# Patient Record
Sex: FEMALE | Race: BLACK OR AFRICAN AMERICAN | NOT HISPANIC OR LATINO
[De-identification: names, ages, dates, MRNs, and addresses within clinical notes are randomized per-mention and may not be internally consistent; named-entity substitution may affect disease eponyms.]

---

## 2017-06-30 ENCOUNTER — APPOINTMENT (OUTPATIENT)
Dept: ORTHOPEDIC SURGERY | Facility: CLINIC | Age: 27
End: 2017-06-30

## 2018-03-09 ENCOUNTER — APPOINTMENT (OUTPATIENT)
Dept: INTERNAL MEDICINE | Facility: CLINIC | Age: 28
End: 2018-03-09
Payer: MEDICAID

## 2018-03-09 VITALS
WEIGHT: 162.03 LBS | RESPIRATION RATE: 14 BRPM | HEIGHT: 63.78 IN | HEART RATE: 70 BPM | DIASTOLIC BLOOD PRESSURE: 90 MMHG | OXYGEN SATURATION: 98 % | BODY MASS INDEX: 28.01 KG/M2 | TEMPERATURE: 98.2 F | SYSTOLIC BLOOD PRESSURE: 110 MMHG

## 2018-03-09 DIAGNOSIS — Z86.69 PERSONAL HISTORY OF OTHER DISEASES OF THE NERVOUS SYSTEM AND SENSE ORGANS: ICD-10-CM

## 2018-03-09 DIAGNOSIS — M87.059 IDIOPATHIC ASEPTIC NECROSIS OF UNSPECIFIED FEMUR: ICD-10-CM

## 2018-03-09 DIAGNOSIS — Z83.3 FAMILY HISTORY OF DIABETES MELLITUS: ICD-10-CM

## 2018-03-09 DIAGNOSIS — M25.561 PAIN IN RIGHT KNEE: ICD-10-CM

## 2018-03-09 DIAGNOSIS — Q68.2 CONGENITAL DEFORMITY OF KNEE: ICD-10-CM

## 2018-03-09 DIAGNOSIS — M25.362 OTHER INSTABILITY, RIGHT KNEE: ICD-10-CM

## 2018-03-09 DIAGNOSIS — M25.361 OTHER INSTABILITY, RIGHT KNEE: ICD-10-CM

## 2018-03-09 DIAGNOSIS — Z83.2 FAMILY HISTORY OF DISEASES OF THE BLOOD AND BLOOD-FORMING ORGANS AND CERTAIN DISORDERS INVOLVING THE IMMUNE MECHANISM: ICD-10-CM

## 2018-03-09 PROCEDURE — 99204 OFFICE O/P NEW MOD 45 MIN: CPT | Mod: 25

## 2018-03-09 PROCEDURE — 36415 COLL VENOUS BLD VENIPUNCTURE: CPT

## 2018-03-09 RX ORDER — MULTIVIT-MIN/FOLIC/VIT K/LYCOP 400-300MCG
25 MCG TABLET ORAL
Refills: 0 | Status: ACTIVE | COMMUNITY

## 2018-03-09 RX ORDER — CHLORHEXIDINE GLUCONATE 4 %
1000 LIQUID (ML) TOPICAL
Refills: 0 | Status: ACTIVE | COMMUNITY

## 2018-03-09 RX ORDER — MULTIVITAMIN
CAPSULE ORAL
Refills: 0 | Status: ACTIVE | COMMUNITY

## 2018-03-13 LAB
25(OH)D3 SERPL-MCNC: 47.2 NG/ML
ALBUMIN SERPL ELPH-MCNC: 4.8 G/DL
ALP BLD-CCNC: 107 U/L
ALT SERPL-CCNC: 89 U/L
ANION GAP SERPL CALC-SCNC: 13 MMOL/L
AST SERPL-CCNC: 44 U/L
BASOPHILS # BLD AUTO: 0.03 K/UL
BASOPHILS NFR BLD AUTO: 0.4 %
BILIRUB SERPL-MCNC: 0.4 MG/DL
BUN SERPL-MCNC: 18 MG/DL
C TRACH RRNA SPEC QL NAA+PROBE: NOT DETECTED
CALCIUM SERPL-MCNC: 10 MG/DL
CHLORIDE SERPL-SCNC: 102 MMOL/L
CHOLEST SERPL-MCNC: 238 MG/DL
CHOLEST/HDLC SERPL: 5.7 RATIO
CO2 SERPL-SCNC: 25 MMOL/L
CREAT SERPL-MCNC: 0.66 MG/DL
EOSINOPHIL # BLD AUTO: 0.11 K/UL
EOSINOPHIL NFR BLD AUTO: 1.5 %
GLUCOSE SERPL-MCNC: 123 MG/DL
HBA1C MFR BLD HPLC: 5.8 %
HBV CORE IGG+IGM SER QL: NONREACTIVE
HBV SURFACE AB SER QL: NONREACTIVE
HBV SURFACE AG SER QL: NONREACTIVE
HCT VFR BLD CALC: 39.4 %
HCV AB SER QL: NONREACTIVE
HCV S/CO RATIO: 0.11 S/CO
HDLC SERPL-MCNC: 42 MG/DL
HGB BLD-MCNC: 13.1 G/DL
HIV1+2 AB SPEC QL IA.RAPID: NONREACTIVE
IMM GRANULOCYTES NFR BLD AUTO: 0.3 %
LDLC SERPL CALC-MCNC: 160 MG/DL
LYMPHOCYTES # BLD AUTO: 3.74 K/UL
LYMPHOCYTES NFR BLD AUTO: 51.1 %
MAN DIFF?: NORMAL
MCHC RBC-ENTMCNC: 28.6 PG
MCHC RBC-ENTMCNC: 33.2 GM/DL
MCV RBC AUTO: 86 FL
MONOCYTES # BLD AUTO: 0.63 K/UL
MONOCYTES NFR BLD AUTO: 8.6 %
N GONORRHOEA RRNA SPEC QL NAA+PROBE: NOT DETECTED
NEUTROPHILS # BLD AUTO: 2.79 K/UL
NEUTROPHILS NFR BLD AUTO: 38.1 %
PLATELET # BLD AUTO: 273 K/UL
POTASSIUM SERPL-SCNC: 4.8 MMOL/L
PROT SERPL-MCNC: 7.4 G/DL
RBC # BLD: 4.58 M/UL
RBC # FLD: 13.8 %
SODIUM SERPL-SCNC: 140 MMOL/L
SOURCE AMPLIFICATION: NORMAL
T PALLIDUM AB SER QL IA: NEGATIVE
TRIGL SERPL-MCNC: 182 MG/DL
TSH SERPL-ACNC: 2.45 UIU/ML
WBC # FLD AUTO: 7.32 K/UL

## 2018-03-14 ENCOUNTER — APPOINTMENT (OUTPATIENT)
Dept: ORTHOPEDIC SURGERY | Facility: CLINIC | Age: 28
End: 2018-03-14
Payer: MEDICAID

## 2018-03-14 PROCEDURE — 99214 OFFICE O/P EST MOD 30 MIN: CPT

## 2018-03-14 PROCEDURE — 73564 X-RAY EXAM KNEE 4 OR MORE: CPT | Mod: 50

## 2018-03-20 ENCOUNTER — APPOINTMENT (OUTPATIENT)
Dept: ALLERGY | Facility: CLINIC | Age: 28
End: 2018-03-20
Payer: MEDICAID

## 2018-03-20 VITALS — WEIGHT: 160 LBS | HEIGHT: 63 IN | BODY MASS INDEX: 28.35 KG/M2

## 2018-03-20 VITALS — SYSTOLIC BLOOD PRESSURE: 110 MMHG | HEART RATE: 72 BPM | RESPIRATION RATE: 14 BRPM | DIASTOLIC BLOOD PRESSURE: 80 MMHG

## 2018-03-20 PROCEDURE — 99204 OFFICE O/P NEW MOD 45 MIN: CPT | Mod: 25

## 2018-03-20 PROCEDURE — 99070 SPECIAL SUPPLIES PHYS/QHP: CPT

## 2018-03-20 PROCEDURE — 95004 PERQ TESTS W/ALRGNC XTRCS: CPT

## 2018-03-20 PROCEDURE — 95018 ALL TSTG PERQ&IQ DRUGS/BIOL: CPT

## 2018-03-29 ENCOUNTER — APPOINTMENT (OUTPATIENT)
Dept: ALLERGY | Facility: CLINIC | Age: 28
End: 2018-03-29
Payer: MEDICAID

## 2018-03-29 PROCEDURE — 95018 ALL TSTG PERQ&IQ DRUGS/BIOL: CPT

## 2018-03-29 PROCEDURE — 95024 IQ TESTS W/ALLERGENIC XTRCS: CPT

## 2018-04-01 ENCOUNTER — INPATIENT (INPATIENT)
Facility: HOSPITAL | Age: 28
LOS: 16 days | Discharge: NURSING, REHAB OR LONG-TERM CARE | End: 2018-04-18
Attending: INTERNAL MEDICINE | Admitting: INTERNAL MEDICINE
Payer: COMMERCIAL

## 2018-04-01 VITALS
SYSTOLIC BLOOD PRESSURE: 121 MMHG | RESPIRATION RATE: 17 BRPM | OXYGEN SATURATION: 97 % | DIASTOLIC BLOOD PRESSURE: 65 MMHG | TEMPERATURE: 100 F | HEART RATE: 125 BPM

## 2018-04-01 DIAGNOSIS — Z29.9 ENCOUNTER FOR PROPHYLACTIC MEASURES, UNSPECIFIED: ICD-10-CM

## 2018-04-01 DIAGNOSIS — Z96.60 PRESENCE OF UNSPECIFIED ORTHOPEDIC JOINT IMPLANT: Chronic | ICD-10-CM

## 2018-04-01 DIAGNOSIS — A41.9 SEPSIS, UNSPECIFIED ORGANISM: ICD-10-CM

## 2018-04-01 DIAGNOSIS — D57.1 SICKLE-CELL DISEASE WITHOUT CRISIS: ICD-10-CM

## 2018-04-01 LAB
ALBUMIN SERPL ELPH-MCNC: 3.4 G/DL — SIGNIFICANT CHANGE UP (ref 3.3–5)
ALBUMIN SERPL ELPH-MCNC: 3.4 G/DL — SIGNIFICANT CHANGE UP (ref 3.3–5)
ALP SERPL-CCNC: 127 U/L — HIGH (ref 40–120)
ALP SERPL-CCNC: 127 U/L — HIGH (ref 40–120)
ALT FLD-CCNC: 172 U/L — HIGH (ref 12–78)
ALT FLD-CCNC: 172 U/L — HIGH (ref 12–78)
ANION GAP SERPL CALC-SCNC: 9 MMOL/L — SIGNIFICANT CHANGE UP (ref 5–17)
APPEARANCE UR: CLEAR — SIGNIFICANT CHANGE UP
AST SERPL-CCNC: 85 U/L — HIGH (ref 15–37)
AST SERPL-CCNC: 85 U/L — HIGH (ref 15–37)
BACTERIA # UR AUTO: ABNORMAL
BILIRUB DIRECT SERPL-MCNC: 0.39 MG/DL — HIGH (ref 0.05–0.2)
BILIRUB INDIRECT FLD-MCNC: 0.7 MG/DL — SIGNIFICANT CHANGE UP (ref 0.2–1)
BILIRUB SERPL-MCNC: 1.1 MG/DL — SIGNIFICANT CHANGE UP (ref 0.2–1.2)
BILIRUB SERPL-MCNC: 1.1 MG/DL — SIGNIFICANT CHANGE UP (ref 0.2–1.2)
BILIRUB UR-MCNC: NEGATIVE — SIGNIFICANT CHANGE UP
BLD GP AB SCN SERPL QL: SIGNIFICANT CHANGE UP
BUN SERPL-MCNC: 13 MG/DL — SIGNIFICANT CHANGE UP (ref 7–23)
CALCIUM SERPL-MCNC: 8.9 MG/DL — SIGNIFICANT CHANGE UP (ref 8.5–10.1)
CHLORIDE SERPL-SCNC: 105 MMOL/L — SIGNIFICANT CHANGE UP (ref 96–108)
CK SERPL-CCNC: 76 U/L — SIGNIFICANT CHANGE UP (ref 26–192)
CO2 SERPL-SCNC: 28 MMOL/L — SIGNIFICANT CHANGE UP (ref 22–31)
COLOR SPEC: YELLOW — SIGNIFICANT CHANGE UP
CREAT SERPL-MCNC: 0.72 MG/DL — SIGNIFICANT CHANGE UP (ref 0.5–1.3)
CRP SERPL-MCNC: 17.6 MG/DL — HIGH (ref 0–0.4)
DIFF PNL FLD: NEGATIVE — SIGNIFICANT CHANGE UP
EPI CELLS # UR: SIGNIFICANT CHANGE UP
ERYTHROCYTE [SEDIMENTATION RATE] IN BLOOD: 36 MM/HR — HIGH (ref 0–15)
GLUCOSE SERPL-MCNC: 141 MG/DL — HIGH (ref 70–99)
GLUCOSE UR QL: NEGATIVE MG/DL — SIGNIFICANT CHANGE UP
HCG SERPL-ACNC: <1 MIU/ML — SIGNIFICANT CHANGE UP
HCT VFR BLD CALC: 35.6 % — SIGNIFICANT CHANGE UP (ref 34.5–45)
HGB BLD-MCNC: 11.6 G/DL — SIGNIFICANT CHANGE UP (ref 11.5–15.5)
KETONES UR-MCNC: NEGATIVE — SIGNIFICANT CHANGE UP
LACTATE SERPL-SCNC: 1.8 MMOL/L — SIGNIFICANT CHANGE UP (ref 0.7–2)
LEUKOCYTE ESTERASE UR-ACNC: ABNORMAL
MAGNESIUM SERPL-MCNC: 2 MG/DL — SIGNIFICANT CHANGE UP (ref 1.6–2.6)
MCHC RBC-ENTMCNC: 27.7 PG — SIGNIFICANT CHANGE UP (ref 27–34)
MCHC RBC-ENTMCNC: 32.6 GM/DL — SIGNIFICANT CHANGE UP (ref 32–36)
MCV RBC AUTO: 85 FL — SIGNIFICANT CHANGE UP (ref 80–100)
NITRITE UR-MCNC: NEGATIVE — SIGNIFICANT CHANGE UP
PH UR: 5 — SIGNIFICANT CHANGE UP (ref 5–8)
PLATELET # BLD AUTO: 224 K/UL — SIGNIFICANT CHANGE UP (ref 150–400)
POTASSIUM SERPL-MCNC: 4.1 MMOL/L — SIGNIFICANT CHANGE UP (ref 3.5–5.3)
POTASSIUM SERPL-SCNC: 4.1 MMOL/L — SIGNIFICANT CHANGE UP (ref 3.5–5.3)
PROT SERPL-MCNC: 7.2 GM/DL — SIGNIFICANT CHANGE UP (ref 6–8.3)
PROT SERPL-MCNC: 7.2 GM/DL — SIGNIFICANT CHANGE UP (ref 6–8.3)
PROT UR-MCNC: 30 MG/DL
RBC # BLD: 4.19 M/UL — SIGNIFICANT CHANGE UP (ref 3.8–5.2)
RBC # BLD: 4.19 M/UL — SIGNIFICANT CHANGE UP (ref 3.8–5.2)
RBC # FLD: 13.7 % — SIGNIFICANT CHANGE UP (ref 10.3–14.5)
RBC CASTS # UR COMP ASSIST: SIGNIFICANT CHANGE UP /HPF (ref 0–4)
RETICS #: 65.4 K/UL — SIGNIFICANT CHANGE UP (ref 25–125)
RETICS/RBC NFR: 1.6 % — SIGNIFICANT CHANGE UP (ref 0.5–2.5)
SODIUM SERPL-SCNC: 142 MMOL/L — SIGNIFICANT CHANGE UP (ref 135–145)
SP GR SPEC: 1.01 — SIGNIFICANT CHANGE UP (ref 1.01–1.02)
UROBILINOGEN FLD QL: NEGATIVE MG/DL — SIGNIFICANT CHANGE UP
WBC # BLD: 10.86 K/UL — HIGH (ref 3.8–10.5)
WBC # FLD AUTO: 10.86 K/UL — HIGH (ref 3.8–10.5)
WBC UR QL: SIGNIFICANT CHANGE UP

## 2018-04-01 PROCEDURE — 93971 EXTREMITY STUDY: CPT | Mod: 26,RT

## 2018-04-01 PROCEDURE — 72110 X-RAY EXAM L-2 SPINE 4/>VWS: CPT | Mod: 26

## 2018-04-01 PROCEDURE — 72132 CT LUMBAR SPINE W/DYE: CPT | Mod: 26

## 2018-04-01 PROCEDURE — 74177 CT ABD & PELVIS W/CONTRAST: CPT | Mod: 26

## 2018-04-01 PROCEDURE — 71275 CT ANGIOGRAPHY CHEST: CPT | Mod: 26

## 2018-04-01 PROCEDURE — 99291 CRITICAL CARE FIRST HOUR: CPT

## 2018-04-01 PROCEDURE — 99223 1ST HOSP IP/OBS HIGH 75: CPT

## 2018-04-01 PROCEDURE — 93010 ELECTROCARDIOGRAM REPORT: CPT

## 2018-04-01 PROCEDURE — 73522 X-RAY EXAM HIPS BI 3-4 VIEWS: CPT | Mod: 26,LT

## 2018-04-01 RX ORDER — SODIUM CHLORIDE 9 MG/ML
1000 INJECTION INTRAMUSCULAR; INTRAVENOUS; SUBCUTANEOUS ONCE
Qty: 0 | Refills: 0 | Status: COMPLETED | OUTPATIENT
Start: 2018-04-01 | End: 2018-04-01

## 2018-04-01 RX ORDER — SODIUM CHLORIDE 9 MG/ML
1000 INJECTION INTRAMUSCULAR; INTRAVENOUS; SUBCUTANEOUS
Qty: 0 | Refills: 0 | Status: DISCONTINUED | OUTPATIENT
Start: 2018-04-01 | End: 2018-04-12

## 2018-04-01 RX ORDER — HYDROMORPHONE HYDROCHLORIDE 2 MG/ML
1 INJECTION INTRAMUSCULAR; INTRAVENOUS; SUBCUTANEOUS ONCE
Qty: 0 | Refills: 0 | Status: DISCONTINUED | OUTPATIENT
Start: 2018-04-01 | End: 2018-04-01

## 2018-04-01 RX ORDER — ACETAMINOPHEN 500 MG
650 TABLET ORAL EVERY 6 HOURS
Qty: 0 | Refills: 0 | Status: DISCONTINUED | OUTPATIENT
Start: 2018-04-01 | End: 2018-04-12

## 2018-04-01 RX ORDER — DEXAMETHASONE 0.5 MG/5ML
10 ELIXIR ORAL ONCE
Qty: 0 | Refills: 0 | Status: COMPLETED | OUTPATIENT
Start: 2018-04-01 | End: 2018-04-01

## 2018-04-01 RX ORDER — VANCOMYCIN HCL 1 G
1000 VIAL (EA) INTRAVENOUS EVERY 12 HOURS
Qty: 0 | Refills: 0 | Status: DISCONTINUED | OUTPATIENT
Start: 2018-04-02 | End: 2018-04-04

## 2018-04-01 RX ORDER — KETOROLAC TROMETHAMINE 30 MG/ML
30 SYRINGE (ML) INJECTION ONCE
Qty: 0 | Refills: 0 | Status: DISCONTINUED | OUTPATIENT
Start: 2018-04-01 | End: 2018-04-01

## 2018-04-01 RX ORDER — HYDROMORPHONE HYDROCHLORIDE 2 MG/ML
1 INJECTION INTRAMUSCULAR; INTRAVENOUS; SUBCUTANEOUS EVERY 4 HOURS
Qty: 0 | Refills: 0 | Status: DISCONTINUED | OUTPATIENT
Start: 2018-04-01 | End: 2018-04-06

## 2018-04-01 RX ORDER — HEPARIN SODIUM 5000 [USP'U]/ML
5000 INJECTION INTRAVENOUS; SUBCUTANEOUS EVERY 8 HOURS
Qty: 0 | Refills: 0 | Status: DISCONTINUED | OUTPATIENT
Start: 2018-04-01 | End: 2018-04-07

## 2018-04-01 RX ORDER — CEFTRIAXONE 500 MG/1
1 INJECTION, POWDER, FOR SOLUTION INTRAMUSCULAR; INTRAVENOUS ONCE
Qty: 0 | Refills: 0 | Status: COMPLETED | OUTPATIENT
Start: 2018-04-01 | End: 2018-04-01

## 2018-04-01 RX ORDER — HEPARIN SODIUM 5000 [USP'U]/ML
5000 INJECTION INTRAVENOUS; SUBCUTANEOUS EVERY 8 HOURS
Qty: 0 | Refills: 0 | Status: DISCONTINUED | OUTPATIENT
Start: 2018-04-01 | End: 2018-04-01

## 2018-04-01 RX ORDER — VANCOMYCIN HCL 1 G
1000 VIAL (EA) INTRAVENOUS ONCE
Qty: 0 | Refills: 0 | Status: COMPLETED | OUTPATIENT
Start: 2018-04-01 | End: 2018-04-01

## 2018-04-01 RX ORDER — ACETAMINOPHEN 500 MG
975 TABLET ORAL ONCE
Qty: 0 | Refills: 0 | Status: COMPLETED | OUTPATIENT
Start: 2018-04-01 | End: 2018-04-01

## 2018-04-01 RX ORDER — OXYCODONE AND ACETAMINOPHEN 5; 325 MG/1; MG/1
1 TABLET ORAL EVERY 4 HOURS
Qty: 0 | Refills: 0 | Status: DISCONTINUED | OUTPATIENT
Start: 2018-04-01 | End: 2018-04-08

## 2018-04-01 RX ADMIN — SODIUM CHLORIDE 1000 MILLILITER(S): 9 INJECTION INTRAMUSCULAR; INTRAVENOUS; SUBCUTANEOUS at 18:57

## 2018-04-01 RX ADMIN — Medication 30 MILLIGRAM(S): at 14:38

## 2018-04-01 RX ADMIN — Medication 500 MILLIGRAM(S): at 18:56

## 2018-04-01 RX ADMIN — HYDROMORPHONE HYDROCHLORIDE 1 MILLIGRAM(S): 2 INJECTION INTRAMUSCULAR; INTRAVENOUS; SUBCUTANEOUS at 14:42

## 2018-04-01 RX ADMIN — SODIUM CHLORIDE 1000 MILLILITER(S): 9 INJECTION INTRAMUSCULAR; INTRAVENOUS; SUBCUTANEOUS at 16:02

## 2018-04-01 RX ADMIN — Medication 30 MILLIGRAM(S): at 15:16

## 2018-04-01 RX ADMIN — HYDROMORPHONE HYDROCHLORIDE 1 MILLIGRAM(S): 2 INJECTION INTRAMUSCULAR; INTRAVENOUS; SUBCUTANEOUS at 19:05

## 2018-04-01 RX ADMIN — Medication 250 MILLIGRAM(S): at 21:41

## 2018-04-01 RX ADMIN — Medication 975 MILLIGRAM(S): at 16:01

## 2018-04-01 RX ADMIN — Medication 500 MILLIGRAM(S): at 19:25

## 2018-04-01 RX ADMIN — CEFTRIAXONE 100 GRAM(S): 500 INJECTION, POWDER, FOR SOLUTION INTRAMUSCULAR; INTRAVENOUS at 16:01

## 2018-04-01 RX ADMIN — HYDROMORPHONE HYDROCHLORIDE 1 MILLIGRAM(S): 2 INJECTION INTRAMUSCULAR; INTRAVENOUS; SUBCUTANEOUS at 20:24

## 2018-04-01 RX ADMIN — HYDROMORPHONE HYDROCHLORIDE 1 MILLIGRAM(S): 2 INJECTION INTRAMUSCULAR; INTRAVENOUS; SUBCUTANEOUS at 15:16

## 2018-04-01 RX ADMIN — SODIUM CHLORIDE 100 MILLILITER(S): 9 INJECTION INTRAMUSCULAR; INTRAVENOUS; SUBCUTANEOUS at 21:41

## 2018-04-01 RX ADMIN — Medication 102 MILLIGRAM(S): at 20:27

## 2018-04-01 NOTE — ED ADULT NURSE REASSESSMENT NOTE - NS ED NURSE REASSESS COMMENT FT1
Md MCGARRY and Kobi  at bedside, patient reports feeling the same, meds given , family at bedside. report given to Jesse CRANDALL

## 2018-04-01 NOTE — H&P ADULT - PROBLEM SELECTOR PLAN 1
Given 1 dose of Vanco and rocephin in ED, continue Vanco for now  R/o OM  ID consult   MRI b/l hips (confirm with radiology hardware is MRI compatible)  IVF NS@ 100 ml/hr  Pain control with Tylenol/Percocet/Morphine PRN  Orthopedics consult appreciated

## 2018-04-01 NOTE — ED ADULT TRIAGE NOTE - CHIEF COMPLAINT QUOTE
hx sickle cell,  went out last night and woke up and couldn't move her legs,   bone marrow transplant 2 years ago, hx brain surgery

## 2018-04-01 NOTE — ED PROVIDER NOTE - MEDICAL DECISION MAKING DETAILS
patient pw hip pain in the context of sickle cell disease with bl hip replacements. will need to rule out sickle crisis patient pw hip pain in the context of sickle cell disease with bl hip replacements. will need to rule out sickle crisis vs fracture/dislocation. labs not consistent with sickle crisis. ddx also includes sciatica and dvt. there is no evidence of  dvt on sono. xrays of the hip are normal. given the tachycardia and fever that have developed, patient is septic or has developed a pe. if it is a pe, will need to obtain cta chest and the thigh pain was likely a dvt. if it is sepsis, the source is likely to be something lumbar, causing sciatica like pain. will obtain ct lumbar as well. cultures obtained, patient given antibiotics, and 3L bolused in. patient pw hip pain in the context of sickle cell disease with bl hip replacements. will need to rule out sickle crisis vs fracture/dislocation. labs not consistent with sickle crisis. ddx also includes sciatica and dvt. there is no evidence of  dvt on sono. xrays of the hip are normal. given the tachycardia and fever that have developed, patient is septic or has developed a pe. if it is a pe, will need to obtain cta chest and the thigh pain was likely a dvt. if it is sepsis, the source is likely to be something lumbar, causing sciatica like pain. will obtain ct lumbar as well. cultures obtained, patient given antibiotics, and 3L bolused in. notably, ekg I read as sinus tach in the 130s with right axis deviation.

## 2018-04-01 NOTE — ED PROVIDER NOTE - OBJECTIVE STATEMENT
Pertinent PMH/PSH/FHx/SHx and Review of Systems contained within:  27F hx of sickle cell sp bone marrow transplant pw right hip pain radiating to the knee and milder left hip pain. no trauma, nausea, vomiting, fever, chills. patient notes that pain is so severe she cant walk. no numbness or tingling or weakness as far as the patient can tell. there is no other cp, sob, weakness, fatigue, nausea, vomiting.   Fh and Sh not otherwise contributory  ROS otherwise negative Pertinent PMH/PSH/FHx/SHx and Review of Systems contained within:  27F hx of sickle cell sp complicated by intracranial hemorrhage as a child and bl hip arthroplasty s/p bone marrow transplant in 2011 pw right hip pain radiating to the knee and milder left hip pain. no trauma, nausea, vomiting, fever, chills. patient notes that pain is so severe she cant walk. no numbness or tingling or weakness as far as the patient can tell. there is no other cp, sob, weakness, fatigue, nausea, vomiting.   Fh and Sh not otherwise contributory  ROS otherwise negative

## 2018-04-01 NOTE — H&P ADULT - NSHPPHYSICALEXAM_GEN_ALL_CORE
GENERAL: Distress due to pain, well-groomed, well-developed  HEAD:  Atraumatic, Normocephalic  EYES: EOMI, PERRLA, conjunctiva and sclera clear  ENMT: No tonsillar erythema, exudates, or enlargement; Moist mucous membranes, No lesions  NECK: Supple, No JVD, Normal thyroid  NERVOUS SYSTEM:  Alert & Oriented X3, Good concentration  CHEST/LUNG: Clear to ascultation bilaterally; No rales, rhonchi, wheezing, or rubs  HEART: Regular rate and rhythm; No murmurs, rubs, or gallops  ABDOMEN: Soft, Nontender, Nondistended; Bowel sounds present  EXTREMITIES: Decreased ROM at R hip due to pain  SKIN: no rashes or lesions   PSYCH: normal affect and behavior

## 2018-04-01 NOTE — H&P ADULT - HISTORY OF PRESENT ILLNESS
In progress, admitted for OM evaluation and treatment. 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  She was at a dinner and tried to get up from her table when she suddenly had sharp shooting pain down from her right hip to her right knee.  She denies recent injury, new medications (not on any), dysuria, abdominal pain, Back pain, numbness, tingling, loss of sensation.  She does report fever and chills since pain started.    In the ED, CT imaging unremarkable, patient febrile and tachycardic, WBC mildly elevated, retic normal, ESR and CRP elevated.  Given 1 dose of Vanco and Rocephin in ED.

## 2018-04-01 NOTE — H&P ADULT - NSHPREVIEWOFSYSTEMS_GEN_ALL_CORE
No photophobia/eye pain/changes in vision,  No chest pain/palpitations, no SOB/cough/wheeze/stridor, No abdominal pain, No N/V/D, no dysuria/frequency/discharge, No neck/back pain, no rash, no changes in neurological status/function.

## 2018-04-01 NOTE — H&P ADULT - ASSESSMENT
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  Patient will require admission for at least 2 midnights for further evaluation and management of possibly OM hip as detailed below:    IMPROVE VTE Individual Risk Assessment          RISK                                                          Points    [  ] Previous VTE                                                3    [ x ] Thrombophilia                                             2    [x  ] Lower limb paralysis                                    2        (unable to hold up >15 seconds)      [  ] Current Cancer                                             2         (within 6 months)    [ x ] Immobilization > 24 hrs                              1    [  ] ICU/CCU stay > 24 hours                            1    [  ] Age > 60                                                    1    IMPROVE VTE Score _____5____

## 2018-04-01 NOTE — CONSULT NOTE ADULT - SUBJECTIVE AND OBJECTIVE BOX
27yFemale c/o right hip pain radiating to the knee for 2 weeks. Patient states it is now causing her to be unable to walk. No complaints of any back pain. Patient denies any hx of trauma, fever, or chills, but was found to be febrile to 101.3 in ED. Patient denies any changes in urinary or bowel control. Patient has hx of sickle cell disease with bilateral hip replacements done by Dr. Burns 8 years ago.    PMH:  Blindness, legal  Sickle cell disease    PSH:  H/O bilateral hip replacements ([facility], 2010)    AH: NKDA    Meds: See med rec    T(C): 37.2 (04-01-18 @ 21:56)  HR: 118 (04-01-18 @ 21:56)  BP: 106/66 (04-01-18 @ 21:56)  RR: 17 (04-01-18 @ 19:39)  SpO2: 97% (04-01-18 @ 19:39)  Wt(kg): --    PE Spine:  No TTP over spinous processes, SILT C5-T1 & L2-S1, C5-C8 5/5 BL, L3-S1 5/5 on L, Unwilling to move right hip during physical exam 2/2 pain, however sat up during exam painlessly with full BL hip flexion, L4-S1 5/5 on R, Able to SLR, +Distal pulses, (-) soliz, (-) clonus, (-) SLR, (-) babinski    Secondary:  No TTP over bony landmarks, SILT BL, ROM intact BL, distal pulses palpable.    Imaging:  CT demonstrating no hip joint effusion, bony changes consistent with sickle cell disease

## 2018-04-01 NOTE — H&P ADULT - NSHPLABSRESULTS_GEN_ALL_CORE
Vital Signs Last 24 Hrs  T(C): 37.2 (2018 21:56), Max: 38.6 (2018 14:45)  T(F): 99 (2018 21:56), Max: 101.4 (2018 14:45)  HR: 118 (2018 21:56) (118 - 133)  BP: 106/66 (2018 21:56) (105/58 - 121/65)  BP(mean): --  RR: 17 (2018 19:39) (16 - 17)  SpO2: 97% (2018 19:39) (95% - 97%)        LABS:                        11.6   10.86 )-----------( 224      ( 2018 14:50 )             35.6         142  |  105  |  13  ----------------------------<  141<H>  4.1   |  28  |  0.72    Ca    8.9      2018 14:50  Mg     2.0         TPro  7.2  /  Alb  3.4  /  TBili  1.1  /  DBili  .39<H>  /  AST  85<H>  /  ALT  172<H>  /  AlkPhos  127<H>        Urinalysis Basic - ( 2018 18:43 )    Color: Yellow / Appearance: Clear / S.015 / pH: x  Gluc: x / Ketone: Negative  / Bili: Negative / Urobili: Negative mg/dL   Blood: x / Protein: 30 mg/dL / Nitrite: Negative   Leuk Esterase: Trace / RBC: 0-2 /HPF / WBC 3-5   Sq Epi: x / Non Sq Epi: Few / Bacteria: Many        RADIOLOGY & ADDITIONAL STUDIES:    CT imaging:  IMPRESSION:  -No evidence of a pulmonary embolism  -Trace right lung tree-in-bud opacities may reflect small airways disease.  -Cholelithiasis  -No lumbar spine fracture.  No gross intraspinal abnormality or stenosis.   If there is continued concern for spinal infection, consider MRI if there   are no contraindications  -Bony changes related to Sickle Cell    XR hip:  Unchanged hardware    RLE venous duplex:  no DVT

## 2018-04-02 DIAGNOSIS — M25.559 PAIN IN UNSPECIFIED HIP: ICD-10-CM

## 2018-04-02 DIAGNOSIS — R65.10 SYSTEMIC INFLAMMATORY RESPONSE SYNDROME (SIRS) OF NON-INFECTIOUS ORIGIN WITHOUT ACUTE ORGAN DYSFUNCTION: ICD-10-CM

## 2018-04-02 LAB
ALBUMIN SERPL ELPH-MCNC: 2.7 G/DL — LOW (ref 3.3–5)
ALP SERPL-CCNC: 114 U/L — SIGNIFICANT CHANGE UP (ref 40–120)
ALT FLD-CCNC: 115 U/L — HIGH (ref 12–78)
ANION GAP SERPL CALC-SCNC: 6 MMOL/L — SIGNIFICANT CHANGE UP (ref 5–17)
ANISOCYTOSIS BLD QL: SLIGHT — SIGNIFICANT CHANGE UP
AST SERPL-CCNC: 42 U/L — HIGH (ref 15–37)
BASOPHILS # BLD AUTO: 0 K/UL — SIGNIFICANT CHANGE UP (ref 0–0.2)
BASOPHILS NFR BLD AUTO: 0 % — SIGNIFICANT CHANGE UP (ref 0–2)
BILIRUB SERPL-MCNC: 0.6 MG/DL — SIGNIFICANT CHANGE UP (ref 0.2–1.2)
BUN SERPL-MCNC: 14 MG/DL — SIGNIFICANT CHANGE UP (ref 7–23)
CALCIUM SERPL-MCNC: 8.5 MG/DL — SIGNIFICANT CHANGE UP (ref 8.5–10.1)
CHLORIDE SERPL-SCNC: 112 MMOL/L — HIGH (ref 96–108)
CO2 SERPL-SCNC: 25 MMOL/L — SIGNIFICANT CHANGE UP (ref 22–31)
CREAT SERPL-MCNC: 0.42 MG/DL — LOW (ref 0.5–1.3)
CULTURE RESULTS: SIGNIFICANT CHANGE UP
EOSINOPHIL # BLD AUTO: 0 K/UL — SIGNIFICANT CHANGE UP (ref 0–0.5)
EOSINOPHIL NFR BLD AUTO: 0 % — SIGNIFICANT CHANGE UP (ref 0–6)
GLUCOSE SERPL-MCNC: 133 MG/DL — HIGH (ref 70–99)
HCT VFR BLD CALC: 32.2 % — LOW (ref 34.5–45)
HGB BLD-MCNC: 10.8 G/DL — LOW (ref 11.5–15.5)
HYPOCHROMIA BLD QL: SLIGHT — SIGNIFICANT CHANGE UP
LYMPHOCYTES # BLD AUTO: 1.52 K/UL — SIGNIFICANT CHANGE UP (ref 1–3.3)
LYMPHOCYTES # BLD AUTO: 14 % — SIGNIFICANT CHANGE UP (ref 13–44)
MANUAL DIF COMMENT BLD-IMP: SIGNIFICANT CHANGE UP
MANUAL SMEAR VERIFICATION: SIGNIFICANT CHANGE UP
MCHC RBC-ENTMCNC: 29 PG — SIGNIFICANT CHANGE UP (ref 27–34)
MCHC RBC-ENTMCNC: 33.5 GM/DL — SIGNIFICANT CHANGE UP (ref 32–36)
MCV RBC AUTO: 86.6 FL — SIGNIFICANT CHANGE UP (ref 80–100)
METAMYELOCYTES # FLD: 4 % — HIGH (ref 0–0)
MICROCYTES BLD QL: SLIGHT — SIGNIFICANT CHANGE UP
MONOCYTES # BLD AUTO: 0.54 K/UL — SIGNIFICANT CHANGE UP (ref 0–0.9)
MONOCYTES NFR BLD AUTO: 5 % — SIGNIFICANT CHANGE UP (ref 2–14)
MYELOCYTES NFR BLD: 1 % — HIGH (ref 0–0)
NEUTROPHILS # BLD AUTO: 7.49 K/UL — HIGH (ref 1.8–7.4)
NEUTROPHILS NFR BLD AUTO: 52 % — SIGNIFICANT CHANGE UP (ref 43–77)
NEUTS BAND # BLD: 17 % — HIGH (ref 0–8)
NRBC # BLD: 0 /100 WBCS — SIGNIFICANT CHANGE UP (ref 0–0)
NRBC # BLD: 0 /100 — SIGNIFICANT CHANGE UP (ref 0–0)
NRBC # BLD: SIGNIFICANT CHANGE UP /100 WBCS (ref 0–0)
PLAT MORPH BLD: NORMAL — SIGNIFICANT CHANGE UP
PLATELET # BLD AUTO: 210 K/UL — SIGNIFICANT CHANGE UP (ref 150–400)
POIKILOCYTOSIS BLD QL AUTO: SLIGHT — SIGNIFICANT CHANGE UP
POLYCHROMASIA BLD QL SMEAR: SLIGHT — SIGNIFICANT CHANGE UP
POTASSIUM SERPL-MCNC: 4 MMOL/L — SIGNIFICANT CHANGE UP (ref 3.5–5.3)
POTASSIUM SERPL-SCNC: 4 MMOL/L — SIGNIFICANT CHANGE UP (ref 3.5–5.3)
PROT SERPL-MCNC: 6.3 GM/DL — SIGNIFICANT CHANGE UP (ref 6–8.3)
RBC # BLD: 3.72 M/UL — LOW (ref 3.8–5.2)
RBC # FLD: 13.9 % — SIGNIFICANT CHANGE UP (ref 10.3–14.5)
RBC BLD AUTO: ABNORMAL
SODIUM SERPL-SCNC: 143 MMOL/L — SIGNIFICANT CHANGE UP (ref 135–145)
SPECIMEN SOURCE: SIGNIFICANT CHANGE UP
TARGETS BLD QL SMEAR: SLIGHT — SIGNIFICANT CHANGE UP
VARIANT LYMPHS # BLD: 7 % — HIGH (ref 0–6)
WBC # BLD: 17.99 K/UL — HIGH (ref 3.8–10.5)
WBC # FLD AUTO: 17.99 K/UL — HIGH (ref 3.8–10.5)

## 2018-04-02 PROCEDURE — 99223 1ST HOSP IP/OBS HIGH 75: CPT

## 2018-04-02 PROCEDURE — 73721 MRI JNT OF LWR EXTRE W/O DYE: CPT | Mod: 26,50

## 2018-04-02 PROCEDURE — 99233 SBSQ HOSP IP/OBS HIGH 50: CPT

## 2018-04-02 RX ORDER — CEFTRIAXONE 500 MG/1
2 INJECTION, POWDER, FOR SOLUTION INTRAMUSCULAR; INTRAVENOUS EVERY 24 HOURS
Qty: 0 | Refills: 0 | Status: DISCONTINUED | OUTPATIENT
Start: 2018-04-02 | End: 2018-04-12

## 2018-04-02 RX ORDER — ONDANSETRON 8 MG/1
4 TABLET, FILM COATED ORAL ONCE
Qty: 0 | Refills: 0 | Status: COMPLETED | OUTPATIENT
Start: 2018-04-02 | End: 2018-04-02

## 2018-04-02 RX ADMIN — ONDANSETRON 4 MILLIGRAM(S): 8 TABLET, FILM COATED ORAL at 00:41

## 2018-04-02 RX ADMIN — SODIUM CHLORIDE 100 MILLILITER(S): 9 INJECTION INTRAMUSCULAR; INTRAVENOUS; SUBCUTANEOUS at 18:48

## 2018-04-02 RX ADMIN — SODIUM CHLORIDE 100 MILLILITER(S): 9 INJECTION INTRAMUSCULAR; INTRAVENOUS; SUBCUTANEOUS at 00:41

## 2018-04-02 RX ADMIN — Medication 250 MILLIGRAM(S): at 09:17

## 2018-04-02 RX ADMIN — CEFTRIAXONE 100 GRAM(S): 500 INJECTION, POWDER, FOR SOLUTION INTRAMUSCULAR; INTRAVENOUS at 18:48

## 2018-04-02 RX ADMIN — Medication 250 MILLIGRAM(S): at 22:47

## 2018-04-02 NOTE — CONSULT NOTE ADULT - SUBJECTIVE AND OBJECTIVE BOX
Infectious Diseases - Attending at Dr. Feliciano    HPI:  27 year old woman with PMH of sickle cell complicated by intracranial hemorrhage as a child and b/l hip arthroplasty at age 17, s/p bone marrow transplant in  presents to ED with complaint of sudden onset severe b/l hip pain, much worse on the right than left.  She was at a dinner and tried to get up from her table when she suddenly had sharp shooting pain down from her right hip to her right knee.  She denies recent injury, new medications (not on any), dysuria, abdominal pain, Back pain, numbness, tingling, loss of sensation.  She does report fever and chills since pain started.    In the ED, CT imaging unremarkable, patient febrile and tachycardic, WBC mildly elevated, retic normal, ESR and CRP elevated.  Given 1 dose of Vanco and Rocephin in ED. (2018 21:27)      PAST MEDICAL & SURGICAL HISTORY:  Blindness, legal  Sickle cell disease  H/O bilateral hip replacements      Allergies    No Known Allergies    Intolerances        FAMILY HISTORY:  No pertinent family history in first degree relatives      SOCIAL HISTORY: non smoker,not sexually active    REVIEW OF SYSTEMS:    Constitutional: No fever, weight loss or fatigue  Eyes: No eye pain, visual disturbances, or discharge  ENT:  No difficulty hearing, tinnitus, vertigo; No sinus or throat pain  Neck: No pain or stiffness  Respiratory: No cough, wheezing, chills or hemoptysis  Cardiovascular: No chest pain, palpitations, shortness of breath, dizziness or leg swelling  Gastrointestinal: No abdominal or epigastric pain. No nausea, vomiting or hematemesis; No diarrhea or constipation. No melena or hematochezia.  Genitourinary: vaginal discahaNo dysuria, frequency, hematuria or incontinence  Neurological: No headaches, memory loss, loss of strength, numbness or tremors  Skin: No itching, burning, rashes or lesions       MEDICATIONS  (STANDING):  heparin  Injectable 5000 Unit(s) SubCutaneous every 8 hours  sodium chloride 0.9%. 1000 milliLiter(s) (100 mL/Hr) IV Continuous <Continuous>  vancomycin  IVPB 1000 milliGRAM(s) IV Intermittent every 12 hours    MEDICATIONS  (PRN):  acetaminophen   Tablet 650 milliGRAM(s) Oral every 6 hours PRN For Temp greater than 38 C (100.4 F)  acetaminophen   Tablet. 650 milliGRAM(s) Oral every 6 hours PRN Mild Pain (1 - 3)  HYDROmorphone  Injectable 1 milliGRAM(s) IV Push every 4 hours PRN Severe Pain (7 - 10)  oxyCODONE    5 mG/acetaminophen 325 mG 1 Tablet(s) Oral every 4 hours PRN Moderate Pain (4 - 6)      Vital Signs Last 24 Hrs  T(C): 36.7 (2018 11:30), Max: 38.6 (2018 14:45)  T(F): 98 (2018 11:30), Max: 101.4 (2018 14:45)  HR: 95 (2018 11:30) (84 - 133)  BP: 112/56 (2018 11:30) (102/56 - 119/61)  BP(mean): --  RR: 18 (2018 11:30) (16 - 18)  SpO2: 96% (2018 11:30) (95% - 97%)    PHYSICAL EXAM:    Constitutional: NAD, well-groomed, well-developed  HEENT: PERRLA, EOMI, Normal Hearing, MMM  Neck: No LAD, No JVD  Back: Normal spine flexure, No CVA tenderness  Respiratory: CTAB/L  Cardiovascular: S1 and S2, RRR, no M/G/R  Gastrointestinal: BS+, soft, NT/ND  Extremities: No peripheral edema  Vascular: 2+ peripheral pulses  Neurological: A/O x 3, no focal deficits  Skin: No rashes      LABS:                        10.8   17.99 )-----------( 210      ( 2018 08:24 )             32.2     04-    143  |  112<H>  |  14  ----------------------------<  133<H>  4.0   |  25  |  0.42<L>    Ca    8.5      2018 08:24  Mg     2.0     04-    TPro  6.3  /  Alb  2.7<L>  /  TBili  0.6  /  DBili  x   /  AST  42<H>  /  ALT  115<H>  /  AlkPhos  114  04-02      Urinalysis Basic - ( 2018 18:43 )    Color: Yellow / Appearance: Clear / S.015 / pH: x  Gluc: x / Ketone: Negative  / Bili: Negative / Urobili: Negative mg/dL   Blood: x / Protein: 30 mg/dL / Nitrite: Negative   Leuk Esterase: Trace / RBC: 0-2 /HPF / WBC 3-5   Sq Epi: x / Non Sq Epi: Few / Bacteria: Many        MICROBIOLOGY:  RECENT CULTURES:        RADIOLOGY & ADDITIONAL STUDIES: Infectious Diseases - Attending at Dr. Feliciano    HPI:  27 year old woman with PMH of sickle cell complicated by intracranial hemorrhage as a child and b/l hip arthroplasty at age 17, s/p bone marrow transplant in  presents to ED with complaint of sudden onset severe b/l hip pain, much worse on the right than left.  She was at a dinner and tried to get up from her table when she suddenly had sharp shooting pain down from her right hip to her right knee.  She denies recent injury, new medications (not on any), dysuria, abdominal pain, Back pain, numbness, tingling, loss of sensation.  She does report fever and chills since pain started.    In the ED, CT imaging unremarkable, patient febrile and tachycardic, WBC mildly elevated, retic normal, ESR and CRP elevated.  Given 1 dose of Vanco and Rocephin in ED. (2018 21:27)      PAST MEDICAL & SURGICAL HISTORY:  Blindness, legal  Sickle cell disease  H/O bilateral hip replacements      Allergies    No Known Allergies    Intolerances        FAMILY HISTORY:  No pertinent family history in first degree relatives      SOCIAL HISTORY: non smoker,not sexually active    REVIEW OF SYSTEMS:    Constitutional: No fever, weight loss or fatigue  Eyes: No eye pain, visual disturbances, or discharge  ENT:  No difficulty hearing, tinnitus, vertigo; No sinus or throat pain  Neck: No pain or stiffness  Respiratory: No cough, wheezing, chills or hemoptysis  Cardiovascular: No chest pain, palpitations, shortness of breath, dizziness or leg swelling  Gastrointestinal: No abdominal or epigastric pain. No nausea, vomiting or hematemesis; No diarrhea or constipation. No melena or hematochezia.  Genitourinary: vaginal discharge ,resolving,No dysuria, frequency, hematuria or incontinence  Neurological: No headaches, memory loss, loss of strength, numbness or tremors  Skin: No itching, burning, rashes or lesions   Musculoskeletal :right hip pain radiating to knee,difficulty raising leg (resolving )         MEDICATIONS  (STANDING):  heparin  Injectable 5000 Unit(s) SubCutaneous every 8 hours  sodium chloride 0.9%. 1000 milliLiter(s) (100 mL/Hr) IV Continuous <Continuous>  vancomycin  IVPB 1000 milliGRAM(s) IV Intermittent every 12 hours    MEDICATIONS  (PRN):  acetaminophen   Tablet 650 milliGRAM(s) Oral every 6 hours PRN For Temp greater than 38 C (100.4 F)  acetaminophen   Tablet. 650 milliGRAM(s) Oral every 6 hours PRN Mild Pain (1 - 3)  HYDROmorphone  Injectable 1 milliGRAM(s) IV Push every 4 hours PRN Severe Pain (7 - 10)  oxyCODONE    5 mG/acetaminophen 325 mG 1 Tablet(s) Oral every 4 hours PRN Moderate Pain (4 - 6)      Vital Signs Last 24 Hrs  T(C): 36.7 (2018 11:30), Max: 38.6 (2018 14:45)  T(F): 98 (2018 11:30), Max: 101.4 (2018 14:45)  HR: 95 (2018 11:30) (84 - 133)  BP: 112/56 (2018 11:30) (102/56 - 119/61)  BP(mean): --  RR: 18 (2018 11:30) (16 - 18)  SpO2: 96% (2018 11:30) (95% - 97%)    PHYSICAL EXAM:    Constitutional: NAD, well-groomed, well-developed  HEENT: PERRLA, EOMI, Normal Hearing, MMM  Neck: No LAD, No JVD  Back: Normal spine flexure, No CVA tenderness  Respiratory: CTAB/L  Cardiovascular: S1 and S2, RRR, no M/G/R  Gastrointestinal: BS+, soft, NT/ND  Extremities: No peripheral edema  Vascular: 2+ peripheral pulses  Neurological: A/O x 3, no focal deficits  Skin: No rashes      LABS:                        10.8   17.99 )-----------( 210      ( 2018 08:24 )             32.2     04-02    143  |  112<H>  |  14  ----------------------------<  133<H>  4.0   |  25  |  0.42<L>    Ca    8.5      2018 08:24  Mg     2.0     04-    TPro  6.3  /  Alb  2.7<L>  /  TBili  0.6  /  DBili  x   /  AST  42<H>  /  ALT  115<H>  /  AlkPhos  114  04-02      Urinalysis Basic - ( 2018 18:43 )    Color: Yellow / Appearance: Clear / S.015 / pH: x  Gluc: x / Ketone: Negative  / Bili: Negative / Urobili: Negative mg/dL   Blood: x / Protein: 30 mg/dL / Nitrite: Negative   Leuk Esterase: Trace / RBC: 0-2 /HPF / WBC 3-5   Sq Epi: x / Non Sq Epi: Few / Bacteria: Many      ESR 36  CRP 17.6      MICROBIOLOGY:  RECENT CULTURES:        RADIOLOGY & ADDITIONAL STUDIES:  < from: CT Lumbar Spine w/ IV Cont (18 @ 20:19) >    IMPRESSION:    No evidence of a pulmonary embolism    Trace right lung tree-in-bud opacities may reflect small airways disease.    Cholelithiasis    No lumbar spine fracture.  No gross intraspinal abnormality or stenosis.   If there is continued concern for spinal infection, consider MRI if there   are no contraindications    Bony changes related to sickle cell    < end of copied text >

## 2018-04-02 NOTE — PROGRESS NOTE ADULT - ASSESSMENT
27F w/o neuro deficits, r/o OM/discitis  FU MRI LSp  FU MRI R hip  Analgesia  DVT ppx  Abx per medical team  WBAT  PT/OT

## 2018-04-02 NOTE — CHART NOTE - NSCHARTNOTEFT_GEN_A_CORE
Patient refused spine MRI. Patient denies any back pain, and states that she does feel that an MRI of her spine is necessary. Patient consulted regarding risks and benefits including but not limited to possibility of missed or delayed diagnosis, which may cause or increase in severity possible infection, neurologic deficit, or surgical intervention. Patient reported understanding and states that she prefers to have the MRI performed as an outpatient in an open MRI.    Considering patient refusing spinal MRI, no planned orthopedic intervention at this time  WBAT  Discussed possible need for surgical fixation  Ortho stable for discharge  Follow up in office within 5 days of discharge with Dr. Luna  Present to ED or please reconsult for any new or worsening signs/symptoms  Will discuss with attending and advise if change

## 2018-04-02 NOTE — PROGRESS NOTE ADULT - ASSESSMENT
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  Patient will require admission for at least 2 midnights for further evaluation and management of possibly OM hip as detailed below:

## 2018-04-02 NOTE — PROGRESS NOTE ADULT - SUBJECTIVE AND OBJECTIVE BOX
Patient is a 27y old  Female who presents with a chief complaint of Sudden right hip pain (2018 21:27)      OVERNIGHT EVENTS:      REVIEW OF SYSTEMS: denies chest pain/SOB, diaphoresis, no F/C, cough, dizziness, headache, blurry vision, nausea, vomiting, abdominal pain. Rest unremarkable     MEDICATIONS  (STANDING):  heparin  Injectable 5000 Unit(s) SubCutaneous every 8 hours  sodium chloride 0.9%. 1000 milliLiter(s) (100 mL/Hr) IV Continuous <Continuous>  vancomycin  IVPB 1000 milliGRAM(s) IV Intermittent every 12 hours    MEDICATIONS  (PRN):  acetaminophen   Tablet 650 milliGRAM(s) Oral every 6 hours PRN For Temp greater than 38 C (100.4 F)  acetaminophen   Tablet. 650 milliGRAM(s) Oral every 6 hours PRN Mild Pain (1 - 3)  HYDROmorphone  Injectable 1 milliGRAM(s) IV Push every 4 hours PRN Severe Pain (7 - 10)  oxyCODONE    5 mG/acetaminophen 325 mG 1 Tablet(s) Oral every 4 hours PRN Moderate Pain (4 - 6)      Allergies    No Known Allergies    Intolerances        SUBJECTIVE: in bed in NAD, no acute events overnight     T(F): 97.4 (18 @ 05:53), Max: 101.4 (18 @ 14:45)  HR: 85 (18 @ 05:53) (84 - 133)  BP: 102/56 (18 @ 05:53) (102/56 - 121/65)  RR: 16 (18 @ 05:53) (16 - 17)  SpO2: 95% (18 @ 05:53) (95% - 97%)  Wt(kg): --    PHYSICAL EXAM:  GENERAL: NAD, well-groomed, well-developed  HEAD:  Atraumatic, Normocephalic  EYES: EOMI, PERRLA, conjunctiva and sclera clear  ENMT: No tonsillar erythema, exudates, or enlargement; Moist mucous membranes, Good dentition, No lesions  NECK: Supple, No JVD, Normal thyroid  CHEST/LUNG: Clear to  auscultation bilaterally; No rales, rhonchi, wheezing, or rubs  bilaterally  HEART: Regular rate and rhythm; No murmurs, rubs, or gallops  ABDOMEN: Soft, Nontender, Nondistended; Bowel sounds present  EXTREMITIES:  2+ Peripheral Pulses, No clubbing, cyanosis, or edema BL LE  SKIN: No rashes or lesions  NERVOUS SYSTEM:  Alert & Oriented X3, Good concentration; Motor Strength 5/5 B/L upper and lower extremities;   DTRs 2+ intact and symmetric, sensation intact BL    LABS:                        10.8   17.99 )-----------( 210      ( 2018 08:24 )             32.2     04-    143  |  112<H>  |  14  ----------------------------<  133<H>  4.0   |  25  |  0.42<L>    Ca    8.5      2018 08:24  Mg     2.0     -    TPro  6.3  /  Alb  2.7<L>  /  TBili  0.6  /  DBili  x   /  AST  42<H>  /  ALT  115<H>  /  AlkPhos  114  -      Urinalysis Basic - ( 2018 18:43 )    Color: Yellow / Appearance: Clear / S.015 / pH: x  Gluc: x / Ketone: Negative  / Bili: Negative / Urobili: Negative mg/dL   Blood: x / Protein: 30 mg/dL / Nitrite: Negative   Leuk Esterase: Trace / RBC: 0-2 /HPF / WBC 3-5   Sq Epi: x / Non Sq Epi: Few / Bacteria: Many        Cultures;   CAPILLARY BLOOD GLUCOSE        Lipid panel:     CARDIAC MARKERS ( 2018 14:50 )  x     / x     / 76 U/L / x     / x            RADIOLOGY & ADDITIONAL TESTS:    < from: Xray Lumbosacral Spine + Obliques (18 @ 22:42) >  IMPRESSION:    Chronic compression deformity of L4. No acute fracture seen.        < from: CT Lumbar Spine w/ IV Cont (18 @ 20:19) >  IMPRESSION:    No evidence of a pulmonary embolism    Trace right lung tree-in-bud opacities may reflect small airways disease.    Cholelithiasis    No lumbar spine fracture.  No gross intraspinal abnormality or stenosis.   If there is continued concern for spinal infection, consider MRI if there   are no contraindications    Bony changes related to sickle cell      Imaging Personally Reviewed:  [ x] YES      Consultant(s) Notes Reviewed:  [x ] YES     Care Discussed with [x ] Consultants [X ] Patient [x ] Family  [x ]    [x ]  Other; RN

## 2018-04-02 NOTE — PROGRESS NOTE ADULT - SUBJECTIVE AND OBJECTIVE BOX
Pt seen & examined. Pain controlled. No acute events overnight.  All vital signs stable  Gen: NAD  Spine:  Skin intact  Sensation intact to light touch in C5-T1 and L2-S1 nerve distributions bilaterally  Radial/Dorsalis pedis/Posterior tibial pulses 2+  Compartments soft and compressible      Motor:    Right Upper Extremity      Left Upper Extremity                     C5: 5/5                               C5: 5/5               C6: 5/5                               C6: 5/5               C7: 5/5                               C7: 5/5               C8: 5/5                               C8: 5/5               T1: 5/5                               T1: 5/5       Right Lower Extremity      Left Lower Extremity               L2: 5/5                               L2: 5/5               L3: 5/5                               L3: 5/5               L4: 5/5                               L4: 5/5               L5: 5/5                               L5: 5/5               S1: 5/5                              S1: 5/5

## 2018-04-02 NOTE — PROGRESS NOTE ADULT - PROBLEM SELECTOR PLAN 1
cultures pending   continue with  antibx   R/o OM await mri hip and lumbar spine' appreciate ortho consult  ID consult pending  Pain control with Tylenol/Percocet/Morphine PRN

## 2018-04-02 NOTE — CONSULT NOTE ADULT - ATTENDING COMMENTS
as leukocytosis worsening add rocpehin while awaiting MRI hip/spine  follow up on blood c/s  c/o vaginal discharge ,on exam noted none

## 2018-04-03 DIAGNOSIS — M25.551 PAIN IN RIGHT HIP: ICD-10-CM

## 2018-04-03 LAB
CRP SERPL-MCNC: 14.2 MG/DL — HIGH (ref 0–0.4)
ERYTHROCYTE [SEDIMENTATION RATE] IN BLOOD: 45 MM/HR — HIGH (ref 0–15)
HCT VFR BLD CALC: 32.7 % — LOW (ref 34.5–45)
HGB BLD-MCNC: 11.2 G/DL — LOW (ref 11.5–15.5)
MCHC RBC-ENTMCNC: 29.2 PG — SIGNIFICANT CHANGE UP (ref 27–34)
MCHC RBC-ENTMCNC: 34.3 GM/DL — SIGNIFICANT CHANGE UP (ref 32–36)
MCV RBC AUTO: 85.4 FL — SIGNIFICANT CHANGE UP (ref 80–100)
NRBC # BLD: 0 /100 WBCS — SIGNIFICANT CHANGE UP (ref 0–0)
PLATELET # BLD AUTO: 233 K/UL — SIGNIFICANT CHANGE UP (ref 150–400)
RBC # BLD: 3.83 M/UL — SIGNIFICANT CHANGE UP (ref 3.8–5.2)
RBC # FLD: 13.9 % — SIGNIFICANT CHANGE UP (ref 10.3–14.5)
VANCOMYCIN TROUGH SERPL-MCNC: 10 UG/ML — SIGNIFICANT CHANGE UP (ref 10–20)
WBC # BLD: 19.51 K/UL — HIGH (ref 3.8–10.5)
WBC # FLD AUTO: 19.51 K/UL — HIGH (ref 3.8–10.5)

## 2018-04-03 PROCEDURE — 99233 SBSQ HOSP IP/OBS HIGH 50: CPT

## 2018-04-03 RX ADMIN — CEFTRIAXONE 100 GRAM(S): 500 INJECTION, POWDER, FOR SOLUTION INTRAMUSCULAR; INTRAVENOUS at 17:38

## 2018-04-03 RX ADMIN — Medication 250 MILLIGRAM(S): at 22:08

## 2018-04-03 RX ADMIN — OXYCODONE AND ACETAMINOPHEN 1 TABLET(S): 5; 325 TABLET ORAL at 23:07

## 2018-04-03 RX ADMIN — Medication 250 MILLIGRAM(S): at 09:50

## 2018-04-03 RX ADMIN — OXYCODONE AND ACETAMINOPHEN 1 TABLET(S): 5; 325 TABLET ORAL at 22:07

## 2018-04-03 NOTE — PHYSICAL THERAPY INITIAL EVALUATION ADULT - NAME OF DISCHARGE PLANNER
Order(s) created erroneously. Erroneous order ID: [id number]   Order canceled by: MARINA GONZALEZ   Order cancel date/time: 09/12/2017 2:46 PM  
 Order(s) created erroneously. Erroneous order ID: [id number]   Order canceled by: MARINA GONZALEZ   Order cancel date/time: 09/12/2017 2:47 PM  
 Order(s) created erroneously. Erroneous order ID: [id number]   Order canceled by: MARINA GONZALEZ   Order cancel date/time: 09/12/2017 2:47 PM  
left message with [id number]

## 2018-04-03 NOTE — CONSULT NOTE ADULT - SUBJECTIVE AND OBJECTIVE BOX
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  She was at a dinner and tried to get up from her table when she suddenly had sharp shooting pain down from her right hip to her right knee.  She denies recent injury, new medications (not on any), dysuria, abdominal pain, Back pain, numbness, tingling, loss of sensation.  She does report fever and chills since pain started.    In the ED, CT imaging unremarkable, patient febrile and tachycardic, WBC mildly elevated, retic normal, ESR and CRP elevated.  Given 1 dose of Vanco and Rocephin in ED. (2018 21:27)    Patient seen and examined at bedside with no complaints at present time. Patient states that on admission she had severe right thigh pain from her hip to her knee and mild left hip pain. Patient states pain is now resolved completely after fluids and previous pain medication Denies any difficulty with moving or ambulation from baseline. Admits to fevers and chills on admission but denies any current fevers.     PAST MEDICAL & SURGICAL HISTORY:  Blindness, legal  Sickle cell disease  H/O bilateral hip replacements    Review of Systems: Contained within HPI    MEDICATIONS  (STANDING):  cefTRIAXone   IVPB 2 Gram(s) IV Intermittent every 24 hours  heparin  Injectable 5000 Unit(s) SubCutaneous every 8 hours  sodium chloride 0.9%. 1000 milliLiter(s) (100 mL/Hr) IV Continuous <Continuous>  vancomycin  IVPB 1000 milliGRAM(s) IV Intermittent every 12 hours    MEDICATIONS  (PRN):  acetaminophen   Tablet 650 milliGRAM(s) Oral every 6 hours PRN For Temp greater than 38 C (100.4 F)  acetaminophen   Tablet. 650 milliGRAM(s) Oral every 6 hours PRN Mild Pain (1 - 3)  HYDROmorphone  Injectable 1 milliGRAM(s) IV Push every 4 hours PRN Severe Pain (7 - 10)  oxyCODONE    5 mG/acetaminophen 325 mG 1 Tablet(s) Oral every 4 hours PRN Moderate Pain (4 - 6)    Allergies    No Known Allergies    FAMILY HISTORY:  No pertinent family history in first degree relatives    Vital Signs Last 24 Hrs  T(C): 36.7 (2018 11:29), Max: 36.8 (2018 17:08)  T(F): 98 (2018 11:29), Max: 98.3 (2018 17:08)  HR: 69 (2018 11:29) (65 - 77)  BP: 107/55 (2018 11:29) (107/55 - 111/71)  RR: 18 (2018 11:29) (16 - 18)  SpO2: 99% (2018 11:29) (97% - 99%)    Physical Exam:    General:  Appears stated age, well-groomed, well-nourished, no distress  Eyes : EOMI  HENT:  WNL, no JVD  Chest:  good inspiratory effort  Cardiovascular:  Regular rate & rhythm  Abdomen:  Soft, nontender  Skin:  No rash  Musculoskeletal:  Nontender, soft, no erythema or edema noted, +FROM without pain, NVI, 2+ DP pulses bilaterally  Neuro/Psych:  Alert, oriented to time, place and person     LABS:                        11.2   19.51 )-----------( 233      ( 2018 11:17 )             32.7     04-    143  |  112<H>  |  14  ----------------------------<  133<H>  4.0   |  25  |  0.42<L>    Ca    8.5      2018 08:24    TPro  6.3  /  Alb  2.7<L>  /  TBili  0.6  /  DBili  x   /  AST  42<H>  /  ALT  115<H>  /  AlkPhos  114  04-02    ESR: 45    Urinalysis Basic - ( 2018 18:43 )    Color: Yellow / Appearance: Clear / S.015 / pH: x  Gluc: x / Ketone: Negative  / Bili: Negative / Urobili: Negative mg/dL   Blood: x / Protein: 30 mg/dL / Nitrite: Negative   Leuk Esterase: Trace / RBC: 0-2 /HPF / WBC 3-5   Sq Epi: x / Non Sq Epi: Few / Bacteria: Many    RADIOLOGY & ADDITIONAL STUDIES:  < from: MR Hip No Cont, Bilateral (18 @ 17:22) >  Impression:    Status post bilateral total hip arthroplasty. On the left there is a   moderate left hip joint effusion. There is a cystic focus within the   superomedial aspect of the left acetabulum concerning for osteolysis as   there is no correlate evident on the prior radiograph from 2015. On the right there is a small hip joint effusion without evidence   of osteolysis or loosening. There is mild intramuscular edema within the   proximal quadriceps muscles bilaterally and within the right adductor   muscles. Differential considerations include infectious and inflammatory   etiologies, correlate clinically.    Nonspecific focus of increased or signal within the mid shaft of the   right femur which is partially imaged. No definite corresponding T1   signal abnormality is noted. This may be artifactual, however a bone   infarct or a similar appearance. Consider dedicated imaging of this   region as clinically indicated.    < from: Xray Hip 3-4 Views, Bilateral (18 @ 16:28) >  IMPRESSION: Unchanged hip replacements.

## 2018-04-03 NOTE — PROGRESS NOTE ADULT - PROBLEM SELECTOR PLAN 1
cultures pending   continue with  antibx   R/o OM s/p  mri bilateral hip no OM   and patient refused MRI of lumbar spine reviewed ortho note .   ID consult noted   Pain control with Tylenol/Percocet/Morphine PRN cultures pending   continue with  antibx   R/o OM s/p  mri bilateral hip no OM   and patient refused MRI of lumbar spine reviewed ortho note however they were consulted for spine only and per note no intervention     on today I have consulted covering doc for dr. yu (Dr. Arredondo and spoke to Behan PA for consult)  ID consult noted    wbc increased,  but no fever   Pain control with Tylenol/Percocet/Morphine PRN cultures pending   continue with  antibx   R/o OM s/p  mri bilateral hip no OM but has edema and elevated wbc and osetolysis     and patient refused MRI of lumbar spine (b/c of the loud sounds) I reviewed ortho note however they were consulted for spine only and per note no intervention     on today I have consulted covering doc for dr. yu (Dr. Arredondo and spoke to Behan PA for consult)  ID consult noted    wbc increased,  but no fever   Pain control with Tylenol/Percocet/Morphine PRN

## 2018-04-03 NOTE — CONSULT NOTE ADULT - ASSESSMENT
27 year old female with PMH of sickle cell disease and bilateral hip replacements presents complaining of bilateral hip pain greater on the right then the left. Found to have moderate hip joint effusion on the left and cystic focus concerning for osteolysis and a small hip joint effusion on the right. Leukocytosis
27F w/ subjective complaints of R hip pain inconsistent with physical exam findings  Recommend MRI if concern for OM/discitis  FU CRP  FU BCx/UCx  Abx per medical team  Analgesia  DVT ppx  WBAT  PT/OT  Will d/w attending any further recommendations
27 yr old female seen with

## 2018-04-03 NOTE — PROGRESS NOTE ADULT - SUBJECTIVE AND OBJECTIVE BOX
Patient is a 27y old  Female who presents with a chief complaint of Sudden right hip pain (2018 21:27)      OVERNIGHT EVENTS: none      REVIEW OF SYSTEMS: denies chest pain/SOB, diaphoresis, no F/C, cough, dizziness, headache, blurry vision, nausea, vomiting, abdominal pain. Rest unremarkable     MEDICATIONS  (STANDING):  cefTRIAXone   IVPB 2 Gram(s) IV Intermittent every 24 hours  heparin  Injectable 5000 Unit(s) SubCutaneous every 8 hours  sodium chloride 0.9%. 1000 milliLiter(s) (100 mL/Hr) IV Continuous <Continuous>  vancomycin  IVPB 1000 milliGRAM(s) IV Intermittent every 12 hours    MEDICATIONS  (PRN):  acetaminophen   Tablet 650 milliGRAM(s) Oral every 6 hours PRN For Temp greater than 38 C (100.4 F)  acetaminophen   Tablet. 650 milliGRAM(s) Oral every 6 hours PRN Mild Pain (1 - 3)  HYDROmorphone  Injectable 1 milliGRAM(s) IV Push every 4 hours PRN Severe Pain (7 - 10)  oxyCODONE    5 mG/acetaminophen 325 mG 1 Tablet(s) Oral every 4 hours PRN Moderate Pain (4 - 6)    Allergies    No Known Allergies    Intolerances        SUBJECTIVE: in bed in NAD, no acute events overnight     Vital Signs Last 24 Hrs  T(C): 36.7 (2018 05:26), Max: 36.8 (2018 17:08)  T(F): 98 (2018 05:26), Max: 98.3 (2018 17:08)  HR: 65 (2018 05:26) (65 - 95)  BP: 111/66 (2018 05:26) (109/72 - 112/56)  BP(mean): --  RR: 16 (2018 05:26) (16 - 18)  SpO2: 97% (2018 05:26) (96% - 98%)    PHYSICAL EXAM:  GENERAL: NAD, well-groomed, well-developed  HEAD:  Atraumatic, Normocephalic  EYES: EOMI, PERRLA, conjunctiva and sclera clear  ENMT: No tonsillar erythema, exudates, or enlargement; Moist mucous membranes, Good dentition, No lesions  NECK: Supple, No JVD, Normal thyroid  CHEST/LUNG: Clear to  auscultation bilaterally; No rales, rhonchi, wheezing, or rubs  bilaterally  HEART: Regular rate and rhythm; No murmurs, rubs, or gallops  ABDOMEN: Soft, Nontender, Nondistended; Bowel sounds present  EXTREMITIES:  2+ Peripheral Pulses, No clubbing, cyanosis, or edema BL LE  SKIN: No rashes or lesions  NERVOUS SYSTEM:  Alert & Oriented X3, Good concentration; Motor Strength 5/5 B/L upper and lower extremities;   DTRs 2+ intact and symmetric, sensation intact BL    LABS:                                   10.8   17.99 )-----------( 210      ( 2018 08:24 )             32.2   04-02    143  |  112<H>  |  14  ----------------------------<  133<H>  4.0   |  25  |  0.42<L>    Ca    8.5      2018 08:24  Mg     2.0     04-    TPro  6.3  /  Alb  2.7<L>  /  TBili  0.6  /  DBili  x   /  AST  42<H>  /  ALT  115<H>  /  AlkPhos  114  04-       Urinalysis Basic - ( 2018 18:43 )    Color: Yellow / Appearance: Clear / S.015 / pH: x  Gluc: x / Ketone: Negative  / Bili: Negative / Urobili: Negative mg/dL   Blood: x / Protein: 30 mg/dL / Nitrite: Negative   Leuk Esterase: Trace / RBC: 0-2 /HPF / WBC 3-5   Sq Epi: x / Non Sq Epi: Few / Bacteria: Many        Cultures;   CAPILLARY BLOOD GLUCOSE        Lipid panel:     CARDIAC MARKERS ( 2018 14:50 )  x     / x     / 76 U/L / x     / x            RADIOLOGY & ADDITIONAL TESTS:    < from: Xray Lumbosacral Spine + Obliques (18 @ 22:42) >  IMPRESSION:    Chronic compression deformity of L4. No acute fracture seen.        < from: CT Lumbar Spine w/ IV Cont (18 @ 20:19) >  IMPRESSION:    No evidence of a pulmonary embolism    Trace right lung tree-in-bud opacities may reflect small airways disease.    Cholelithiasis    No lumbar spine fracture.  No gross intraspinal abnormality or stenosis.   If there is continued concern for spinal infection, consider MRI if there   are no contraindications    Bony changes related to sickle cell    < from: MR Hip No Cont, Bilateral (18 @ 17:22) >    Impression:    Status post bilateral total hip arthroplasty. On the left there is a   moderate left hip joint effusion. There is a cystic focus within the   superomedial aspect of the left acetabulum concerning for osteolysis as   there is no correlate evident on the prior radiograph from 2015. On the right there is a small hip joint effusion without evidence   of osteolysis or loosening. There is mild intramuscular edema within the   proximal quadriceps muscles bilaterally and within the right adductor   muscles. Differential considerations include infectious and inflammatory   etiologies, correlate clinically.    Nonspecific focus of increased or signal within the mid shaft of the   right femur which is partially imaged. No definite corresponding T1   signal abnormality is noted. This may be artifactual, however a bone   infarct or a similar appearance. Consider dedicated imaging of this   region as clinically indicated.        < end of copied text >      Imaging Personally Reviewed:  [ x] YES      Consultant(s) Notes Reviewed:  [x ] YES     Care Discussed with [x ] Consultants [X ] Patient [x ] Family  [x ]    [x ]  Other; RN Patient is a 27y old  Female who presents with a chief complaint of Sudden right hip pain (2018 21:27)      OVERNIGHT EVENTS: none      REVIEW OF SYSTEMS: denies chest pain/SOB, diaphoresis, no F/C, cough, dizziness, headache, blurry vision, nausea, vomiting, abdominal pain. Rest unremarkable     MEDICATIONS  (STANDING):  cefTRIAXone   IVPB 2 Gram(s) IV Intermittent every 24 hours  heparin  Injectable 5000 Unit(s) SubCutaneous every 8 hours  sodium chloride 0.9%. 1000 milliLiter(s) (100 mL/Hr) IV Continuous <Continuous>  vancomycin  IVPB 1000 milliGRAM(s) IV Intermittent every 12 hours    MEDICATIONS  (PRN):  acetaminophen   Tablet 650 milliGRAM(s) Oral every 6 hours PRN For Temp greater than 38 C (100.4 F)  acetaminophen   Tablet. 650 milliGRAM(s) Oral every 6 hours PRN Mild Pain (1 - 3)  HYDROmorphone  Injectable 1 milliGRAM(s) IV Push every 4 hours PRN Severe Pain (7 - 10)  oxyCODONE    5 mG/acetaminophen 325 mG 1 Tablet(s) Oral every 4 hours PRN Moderate Pain (4 - 6)    Allergies    No Known Allergies    Intolerances        SUBJECTIVE: in bed in NAD, no acute events overnight     Vital Signs Last 24 Hrs  T(C): 36.7 (2018 05:26), Max: 36.8 (2018 17:08)  T(F): 98 (2018 05:26), Max: 98.3 (2018 17:08)  HR: 65 (2018 05:26) (65 - 95)  BP: 111/66 (2018 05:26) (109/72 - 112/56)  BP(mean): --  RR: 16 (2018 05:26) (16 - 18)  SpO2: 97% (2018 05:26) (96% - 98%)    PHYSICAL EXAM:  GENERAL: NAD, well-groomed, well-developed  HEAD:  Atraumatic, Normocephalic  EYES: EOMI, PERRLA, conjunctiva and sclera clear  ENMT: No tonsillar erythema, exudates, or enlargement; Moist mucous membranes, Good dentition, No lesions  NECK: Supple, No JVD, Normal thyroid  CHEST/LUNG: Clear to  auscultation bilaterally; No rales, rhonchi, wheezing, or rubs  bilaterally  HEART: Regular rate and rhythm; No murmurs, rubs, or gallops  ABDOMEN: Soft, Nontender, Nondistended; Bowel sounds present  EXTREMITIES:  2+ Peripheral Pulses, No clubbing, cyanosis, or edema BL LE  SKIN: No rashes or lesions  NERVOUS SYSTEM:  Alert & Oriented X3, Good concentration; Motor Strength 5/5 B/L upper and lower extremities;   DTRs 2+ intact and symmetric, sensation intact BL    LABS:                                   11.2   19.51 )-----------( 233      ( 2018 11:17 )             32.7   04-    143  |  112<H>  |  14  ----------------------------<  133<H>  4.0   |  25  |  0.42<L>    Ca    8.5      2018 08:24  Mg     2.0     -    TPro  6.3  /  Alb  2.7<L>  /  TBili  0.6  /  DBili  x   /  AST  42<H>  /  ALT  115<H>  /  AlkPhos  114  04-             Urinalysis Basic - ( 2018 18:43 )    Color: Yellow / Appearance: Clear / S.015 / pH: x  Gluc: x / Ketone: Negative  / Bili: Negative / Urobili: Negative mg/dL   Blood: x / Protein: 30 mg/dL / Nitrite: Negative   Leuk Esterase: Trace / RBC: 0-2 /HPF / WBC 3-5   Sq Epi: x / Non Sq Epi: Few / Bacteria: Many        Cultures;   CAPILLARY BLOOD GLUCOSE        Lipid panel:     CARDIAC MARKERS ( 2018 14:50 )  x     / x     / 76 U/L / x     / x            RADIOLOGY & ADDITIONAL TESTS:    < from: Xray Lumbosacral Spine + Obliques (18 @ 22:42) >  IMPRESSION:    Chronic compression deformity of L4. No acute fracture seen.        < from: CT Lumbar Spine w/ IV Cont (18 @ 20:19) >  IMPRESSION:    No evidence of a pulmonary embolism    Trace right lung tree-in-bud opacities may reflect small airways disease.    Cholelithiasis    No lumbar spine fracture.  No gross intraspinal abnormality or stenosis.   If there is continued concern for spinal infection, consider MRI if there   are no contraindications    Bony changes related to sickle cell    < from: MR Hip No Cont, Bilateral (18 @ 17:22) >    Impression:    Status post bilateral total hip arthroplasty. On the left there is a   moderate left hip joint effusion. There is a cystic focus within the   superomedial aspect of the left acetabulum concerning for osteolysis as   there is no correlate evident on the prior radiograph from 2015. On the right there is a small hip joint effusion without evidence   of osteolysis or loosening. There is mild intramuscular edema within the   proximal quadriceps muscles bilaterally and within the right adductor   muscles. Differential considerations include infectious and inflammatory   etiologies, correlate clinically.    Nonspecific focus of increased or signal within the mid shaft of the   right femur which is partially imaged. No definite corresponding T1   signal abnormality is noted. This may be artifactual, however a bone   infarct or a similar appearance. Consider dedicated imaging of this   region as clinically indicated.        < end of copied text >      Imaging Personally Reviewed:  [ x] YES      Consultant(s) Notes Reviewed:  [x ] YES     Care Discussed with [x ] Consultants [X ] Patient [x ] Family  [x ]    [x ]  Other; RN

## 2018-04-03 NOTE — PROGRESS NOTE ADULT - SUBJECTIVE AND OBJECTIVE BOX
Patient is a 27y old  Female who presents with a chief complaint of Sudden right hip pain (01 Apr 2018 21:27)      INTERVAL HPI / OVERNIGHT EVENTS:    MEDICATIONS  (STANDING):  cefTRIAXone   IVPB 2 Gram(s) IV Intermittent every 24 hours  heparin  Injectable 5000 Unit(s) SubCutaneous every 8 hours  sodium chloride 0.9%. 1000 milliLiter(s) (100 mL/Hr) IV Continuous <Continuous>  vancomycin  IVPB 1000 milliGRAM(s) IV Intermittent every 12 hours    MEDICATIONS  (PRN):  acetaminophen   Tablet 650 milliGRAM(s) Oral every 6 hours PRN For Temp greater than 38 C (100.4 F)  acetaminophen   Tablet. 650 milliGRAM(s) Oral every 6 hours PRN Mild Pain (1 - 3)  HYDROmorphone  Injectable 1 milliGRAM(s) IV Push every 4 hours PRN Severe Pain (7 - 10)  oxyCODONE    5 mG/acetaminophen 325 mG 1 Tablet(s) Oral every 4 hours PRN Moderate Pain (4 - 6)      Vital Signs Last 24 Hrs  T(C): 37.1 (03 Apr 2018 17:30), Max: 37.1 (03 Apr 2018 17:30)  T(F): 98.7 (03 Apr 2018 17:30), Max: 98.7 (03 Apr 2018 17:30)  HR: 68 (03 Apr 2018 17:32) (63 - 73)  BP: 114/77 (03 Apr 2018 17:32) (104/75 - 114/77)  BP(mean): --  RR: 16 (03 Apr 2018 17:32) (16 - 18)  SpO2: 97% (03 Apr 2018 17:32) (97% - 99%)    PHYSICAL EXAM:  General :NAD  Constitutional:  well-groomed, well-developed  Respiratory: CTAB/L  Cardiovascular: S1 and S2, RRR, no M/G/R  Gastrointestinal: BS+, soft, NT/ND  Extremities: No peripheral edema  Vascular: 2+ peripheral pulses  Skin: No rashes      LABS:                        11.2   19.51 )-----------( 233      ( 03 Apr 2018 11:17 )             32.7     04-02    143  |  112<H>  |  14  ----------------------------<  133<H>  4.0   |  25  |  0.42<L>    Ca    8.5      02 Apr 2018 08:24    TPro  6.3  /  Alb  2.7<L>  /  TBili  0.6  /  DBili  x   /  AST  42<H>  /  ALT  115<H>  /  AlkPhos  114  04-02          MICROBIOLOGY:  RECENT CULTURES:  04-02 .Blood Blood XXXX XXXX   No growth to date.    04-01 .Blood Blood XXXX XXXX   No growth to date.    04-01 .Urine Clean Catch (Midstream) XXXX XXXX   Culture grew 3 or more types of organisms which indicate  collection contamination; consider recollection only if clinically  indicated.          RADIOLOGY & ADDITIONAL STUDIES:

## 2018-04-03 NOTE — PROGRESS NOTE ADULT - PROBLEM SELECTOR PLAN 4
she refused Heparin 5000 units SC q8h for DVT prophylaxis will place scd instead   General precautions

## 2018-04-03 NOTE — PHYSICAL THERAPY INITIAL EVALUATION ADULT - CRITERIA FOR SKILLED THERAPEUTIC INTERVENTIONS
Home with outpatient PT/predicted duration of therapy intervention/impairments found/therapy frequency/anticipated discharge recommendation/functional limitations in following categories/risk reduction/prevention

## 2018-04-03 NOTE — PHYSICAL THERAPY INITIAL EVALUATION ADULT - GAIT DEVIATIONS NOTED, PT EVAL
increased time in double stance/increased stride width/decreased justo/decreased step length/decreased weight-shifting ability/decreased velocity of limb motion/decreased stride length

## 2018-04-03 NOTE — PROGRESS NOTE ADULT - PROBLEM SELECTOR PLAN 3
she refused Heparin 5000 units SC q8h for DVT prophylaxis will place scd instead   General precautions Pt s/p bone marrow transplant   Retic count normal

## 2018-04-04 LAB
ANION GAP SERPL CALC-SCNC: 6 MMOL/L — SIGNIFICANT CHANGE UP (ref 5–17)
BUN SERPL-MCNC: 9 MG/DL — SIGNIFICANT CHANGE UP (ref 7–23)
CALCIUM SERPL-MCNC: 8.7 MG/DL — SIGNIFICANT CHANGE UP (ref 8.5–10.1)
CHLORIDE SERPL-SCNC: 106 MMOL/L — SIGNIFICANT CHANGE UP (ref 96–108)
CO2 SERPL-SCNC: 29 MMOL/L — SIGNIFICANT CHANGE UP (ref 22–31)
CREAT SERPL-MCNC: 0.44 MG/DL — LOW (ref 0.5–1.3)
GLUCOSE SERPL-MCNC: 97 MG/DL — SIGNIFICANT CHANGE UP (ref 70–99)
HCT VFR BLD CALC: 30.3 % — LOW (ref 34.5–45)
HGB BLD-MCNC: 10 G/DL — LOW (ref 11.5–15.5)
MAGNESIUM SERPL-MCNC: 1.9 MG/DL — SIGNIFICANT CHANGE UP (ref 1.6–2.6)
MCHC RBC-ENTMCNC: 27.9 PG — SIGNIFICANT CHANGE UP (ref 27–34)
MCHC RBC-ENTMCNC: 33 GM/DL — SIGNIFICANT CHANGE UP (ref 32–36)
MCV RBC AUTO: 84.6 FL — SIGNIFICANT CHANGE UP (ref 80–100)
NRBC # BLD: 0 /100 WBCS — SIGNIFICANT CHANGE UP (ref 0–0)
PHOSPHATE SERPL-MCNC: 4.4 MG/DL — SIGNIFICANT CHANGE UP (ref 2.5–4.5)
PLATELET # BLD AUTO: 167 K/UL — SIGNIFICANT CHANGE UP (ref 150–400)
POTASSIUM SERPL-MCNC: 4.2 MMOL/L — SIGNIFICANT CHANGE UP (ref 3.5–5.3)
POTASSIUM SERPL-SCNC: 4.2 MMOL/L — SIGNIFICANT CHANGE UP (ref 3.5–5.3)
RBC # BLD: 3.58 M/UL — LOW (ref 3.8–5.2)
RBC # FLD: 13.8 % — SIGNIFICANT CHANGE UP (ref 10.3–14.5)
SODIUM SERPL-SCNC: 141 MMOL/L — SIGNIFICANT CHANGE UP (ref 135–145)
WBC # BLD: 14.72 K/UL — HIGH (ref 3.8–10.5)
WBC # FLD AUTO: 14.72 K/UL — HIGH (ref 3.8–10.5)

## 2018-04-04 PROCEDURE — 99233 SBSQ HOSP IP/OBS HIGH 50: CPT

## 2018-04-04 PROCEDURE — 72170 X-RAY EXAM OF PELVIS: CPT | Mod: 26

## 2018-04-04 RX ORDER — VANCOMYCIN HCL 1 G
1250 VIAL (EA) INTRAVENOUS
Qty: 0 | Refills: 0 | Status: DISCONTINUED | OUTPATIENT
Start: 2018-04-04 | End: 2018-04-07

## 2018-04-04 RX ORDER — VANCOMYCIN HCL 1 G
1250 VIAL (EA) INTRAVENOUS
Qty: 0 | Refills: 0 | Status: DISCONTINUED | OUTPATIENT
Start: 2018-04-04 | End: 2018-04-04

## 2018-04-04 RX ADMIN — Medication 250 MILLIGRAM(S): at 10:10

## 2018-04-04 RX ADMIN — CEFTRIAXONE 100 GRAM(S): 500 INJECTION, POWDER, FOR SOLUTION INTRAMUSCULAR; INTRAVENOUS at 17:31

## 2018-04-04 RX ADMIN — OXYCODONE AND ACETAMINOPHEN 1 TABLET(S): 5; 325 TABLET ORAL at 22:07

## 2018-04-04 RX ADMIN — OXYCODONE AND ACETAMINOPHEN 1 TABLET(S): 5; 325 TABLET ORAL at 11:24

## 2018-04-04 RX ADMIN — OXYCODONE AND ACETAMINOPHEN 1 TABLET(S): 5; 325 TABLET ORAL at 10:24

## 2018-04-04 RX ADMIN — Medication 166.67 MILLIGRAM(S): at 21:13

## 2018-04-04 RX ADMIN — OXYCODONE AND ACETAMINOPHEN 1 TABLET(S): 5; 325 TABLET ORAL at 21:11

## 2018-04-04 NOTE — PROGRESS NOTE ADULT - SUBJECTIVE AND OBJECTIVE BOX
Patient is a 27y old  Female who presents with a chief complaint of Sudden right hip pain (01 Apr 2018 21:27)      OVERNIGHT EVENTS: none      REVIEW OF SYSTEMS: denies chest pain/SOB, diaphoresis, no F/C, cough, dizziness, headache, blurry vision, nausea, vomiting, abdominal pain. Rest unremarkable     MEDICATIONS  (STANDING):  cefTRIAXone   IVPB 2 Gram(s) IV Intermittent every 24 hours  heparin  Injectable 5000 Unit(s) SubCutaneous every 8 hours  sodium chloride 0.9%. 1000 milliLiter(s) (100 mL/Hr) IV Continuous <Continuous>  vancomycin  IVPB 1000 milliGRAM(s) IV Intermittent every 12 hours    MEDICATIONS  (PRN):  acetaminophen   Tablet 650 milliGRAM(s) Oral every 6 hours PRN For Temp greater than 38 C (100.4 F)  acetaminophen   Tablet. 650 milliGRAM(s) Oral every 6 hours PRN Mild Pain (1 - 3)  HYDROmorphone  Injectable 1 milliGRAM(s) IV Push every 4 hours PRN Severe Pain (7 - 10)  oxyCODONE    5 mG/acetaminophen 325 mG 1 Tablet(s) Oral every 4 hours PRN Moderate Pain (4 - 6)  Allergies    No Known Allergies    Intolerances        SUBJECTIVE: in bed in NAD, no acute events overnight     Vital Signs Last 24 Hrs  T(C): 36.9 (04 Apr 2018 04:59), Max: 37.3 (04 Apr 2018 00:20)  T(F): 98.4 (04 Apr 2018 04:59), Max: 99.1 (04 Apr 2018 00:20)  HR: 65 (04 Apr 2018 04:59) (63 - 69)  BP: 100/52 (04 Apr 2018 04:59) (100/52 - 114/77)  BP(mean): --  RR: 16 (04 Apr 2018 04:59) (16 - 18)  SpO2: 97% (04 Apr 2018 04:59) (97% - 99%)    PHYSICAL EXAM:  GENERAL: NAD, well-groomed, well-developed  HEAD:  Atraumatic, Normocephalic  EYES: EOMI, PERRLA, conjunctiva and sclera clear  ENMT: No tonsillar erythema, exudates, or enlargement; Moist mucous membranes, Good dentition, No lesions  NECK: Supple, No JVD, Normal thyroid  CHEST/LUNG: Clear to  auscultation bilaterally; No rales, rhonchi, wheezing, or rubs  bilaterally  HEART: Regular rate and rhythm; No murmurs, rubs, or gallops  ABDOMEN: Soft, Nontender, Nondistended; Bowel sounds present  EXTREMITIES:  2+ Peripheral Pulses, No clubbing, cyanosis, or edema BL LE  SKIN: No rashes or lesions  NERVOUS SYSTEM:  Alert & Oriented X3, Good concentration; Motor Strength 5/5 B/L upper and lower extremities;   DTRs 2+ intact and symmetric, sensation intact BL    LABS:                                            10.0   14.72 )-----------( 167      ( 04 Apr 2018 06:20 )             30.3   04-04    141  |  106  |  9   ----------------------------<  97  4.2   |  29  |  0.44<L>    Ca    8.7      04 Apr 2018 06:20  Phos  4.4     04-04  Mg     1.9     04-04         Culture - Blood (collected 02 Apr 2018 10:03)  Source: .Blood Blood  Preliminary Report (03 Apr 2018 11:01):    No growth to date.    Culture - Blood (collected 01 Apr 2018 23:16)  Source: .Blood Blood  Preliminary Report (03 Apr 2018 01:03):    No growth to date.    Culture - Urine (collected 01 Apr 2018 23:10)  Source: .Urine Clean Catch (Midstream)  Final Report (02 Apr 2018 23:06):    Culture grew 3 or more types of organisms which indicate    collection contamination; consider recollection only if clinically    indicated.          CAPILLARY BLOOD GLUCOSE        Lipid panel:     CARDIAC MARKERS ( 01 Apr 2018 14:50 )  x     / x     / 76 U/L / x     / x            RADIOLOGY & ADDITIONAL TESTS:    < from: Xray Lumbosacral Spine + Obliques (04.01.18 @ 22:42) >  IMPRESSION:    Chronic compression deformity of L4. No acute fracture seen.        < from: CT Lumbar Spine w/ IV Cont (04.01.18 @ 20:19) >  IMPRESSION:    No evidence of a pulmonary embolism    Trace right lung tree-in-bud opacities may reflect small airways disease.    Cholelithiasis    No lumbar spine fracture.  No gross intraspinal abnormality or stenosis.   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 On the left there is a   moderate left hip joint effusion. There is a cystic focus within the   superomedial aspect of the left acetabulum concerning for osteolysis as   there is no correlate evident on the prior radiograph from September 21, 2015. On the right there is a small hip joint effusion without evidence   of osteolysis or loosening. There is mild intramuscular edema within the   proximal quadriceps muscles bilaterally and within the right adductor   muscles. Differential considerations include infectious and inflammatory   etiologies, correlate clinically.    Nonspecific focus of increased or signal within the mid shaft of the   right femur which is partially imaged. No definite corresponding T1   signal abnormality is noted. This may be artifactual, however a bone   infarct or a similar appearance. Consider dedicated imaging of this   region as clinically indicated.        < end of copied text >      Imaging Personally Reviewed:  [ x] YES      Consultant(s) Notes Reviewed:  [x ] YES     Care Discussed with [x ] Consultants [X ] Patient [x ] Family  [x ]    [x ]  Other; RN

## 2018-04-04 NOTE — PROGRESS NOTE ADULT - SUBJECTIVE AND OBJECTIVE BOX
Patient is a 27y old  Female who presents with a chief complaint of Sudden right hip pain (01 Apr 2018 21:27)      INTERVAL HPI / OVERNIGHT EVENTS: c/o more pain in left hip joint today     MEDICATIONS  (STANDING):  cefTRIAXone   IVPB 2 Gram(s) IV Intermittent every 24 hours  heparin  Injectable 5000 Unit(s) SubCutaneous every 8 hours  sodium chloride 0.9%. 1000 milliLiter(s) (100 mL/Hr) IV Continuous <Continuous>  vancomycin  IVPB 1000 milliGRAM(s) IV Intermittent every 12 hours    MEDICATIONS  (PRN):  acetaminophen   Tablet 650 milliGRAM(s) Oral every 6 hours PRN For Temp greater than 38 C (100.4 F)  acetaminophen   Tablet. 650 milliGRAM(s) Oral every 6 hours PRN Mild Pain (1 - 3)  HYDROmorphone  Injectable 1 milliGRAM(s) IV Push every 4 hours PRN Severe Pain (7 - 10)  oxyCODONE    5 mG/acetaminophen 325 mG 1 Tablet(s) Oral every 4 hours PRN Moderate Pain (4 - 6)      Vital Signs Last 24 Hrs  T(C): 36.6 (04 Apr 2018 11:27), Max: 37.3 (04 Apr 2018 00:20)  T(F): 97.8 (04 Apr 2018 11:27), Max: 99.1 (04 Apr 2018 00:20)  HR: 69 (04 Apr 2018 11:27) (63 - 69)  BP: 99/48 (04 Apr 2018 11:27) (99/48 - 114/77)  BP(mean): --  RR: 17 (04 Apr 2018 11:27) (16 - 17)  SpO2: 96% (04 Apr 2018 11:27) (96% - 97%)    PHYSICAL EXAM:  General :NAD  Constitutional:  well-groomed, well-developed  Respiratory: CTAB/L  Cardiovascular: S1 and S2, RRR, no M/G/R  Gastrointestinal: BS+, soft, NT/ND  Extremities: No peripheral edema  Vascular: 2+ peripheral pulses  Skin: No rashes      LABS:                        10.0   14.72 )-----------( 167      ( 04 Apr 2018 06:20 )             30.3     04-04    141  |  106  |  9   ----------------------------<  97  4.2   |  29  |  0.44<L>    Ca    8.7      04 Apr 2018 06:20  Phos  4.4     04-04  Mg     1.9     04-04            MICROBIOLOGY:  RECENT CULTURES:  04-02 .Blood Blood XXXX XXXX   No growth to date.    04-01 .Blood Blood XXXX XXXX   No growth to date.    04-01 .Urine Clean Catch (Midstream) XXXX XXXX   Culture grew 3 or more types of organisms which indicate  collection contamination; consider recollection only if clinically  indicated.          RADIOLOGY & ADDITIONAL STUDIES:

## 2018-04-04 NOTE — PROGRESS NOTE ADULT - PROBLEM SELECTOR PLAN 1
cultures ngtd   wbc downtrending on today after elevation on yesterday   continue with  antibx   R/o OM:  s/p  mri of bilateral hip no OM but has edema and elevated wbc and osetolysis d/w with ortho they recc aspiration of joint fluid I d/w Monalisa mota for IR on today .          patient refused MRI of lumbar spine (b/c of the loud sounds)   ID consult noted for indium scan on today    wbc increased,  but no fever   Pain control with Tylenol/Percocet/Morphine PRN

## 2018-04-04 NOTE — PROCEDURE NOTE - ADDITIONAL PROCEDURE DETAILS
a Bard 18gx 10cm powerglide midline placed into left basilic vein.  Pt tolerated procedure well.    -Midline can be accessed

## 2018-04-05 PROCEDURE — 78806: CPT | Mod: 26

## 2018-04-05 PROCEDURE — 99233 SBSQ HOSP IP/OBS HIGH 50: CPT

## 2018-04-05 PROCEDURE — 78103 BONE MARROW IMAGING MULT: CPT | Mod: 26

## 2018-04-05 RX ORDER — ONDANSETRON 8 MG/1
4 TABLET, FILM COATED ORAL ONCE
Qty: 0 | Refills: 0 | Status: COMPLETED | OUTPATIENT
Start: 2018-04-05 | End: 2018-04-05

## 2018-04-05 RX ADMIN — CEFTRIAXONE 100 GRAM(S): 500 INJECTION, POWDER, FOR SOLUTION INTRAMUSCULAR; INTRAVENOUS at 17:57

## 2018-04-05 RX ADMIN — SODIUM CHLORIDE 100 MILLILITER(S): 9 INJECTION INTRAMUSCULAR; INTRAVENOUS; SUBCUTANEOUS at 17:58

## 2018-04-05 RX ADMIN — Medication 166.67 MILLIGRAM(S): at 22:28

## 2018-04-05 RX ADMIN — ONDANSETRON 4 MILLIGRAM(S): 8 TABLET, FILM COATED ORAL at 08:51

## 2018-04-05 RX ADMIN — OXYCODONE AND ACETAMINOPHEN 1 TABLET(S): 5; 325 TABLET ORAL at 18:39

## 2018-04-05 RX ADMIN — OXYCODONE AND ACETAMINOPHEN 1 TABLET(S): 5; 325 TABLET ORAL at 07:00

## 2018-04-05 RX ADMIN — Medication 166.67 MILLIGRAM(S): at 11:56

## 2018-04-05 RX ADMIN — OXYCODONE AND ACETAMINOPHEN 1 TABLET(S): 5; 325 TABLET ORAL at 05:43

## 2018-04-05 RX ADMIN — OXYCODONE AND ACETAMINOPHEN 1 TABLET(S): 5; 325 TABLET ORAL at 17:58

## 2018-04-05 NOTE — PROGRESS NOTE ADULT - SUBJECTIVE AND OBJECTIVE BOX
Patient is a 27y old  Female who presents with a chief complaint of Sudden right hip pain (01 Apr 2018 21:27)      OVERNIGHT EVENTS: none      REVIEW OF SYSTEMS: denies chest pain/SOB, diaphoresis, no F/C, cough, dizziness, headache, blurry vision, nausea, vomiting, abdominal pain. Rest unremarkable     MEDICATIONS  (STANDING):  cefTRIAXone   IVPB 2 Gram(s) IV Intermittent every 24 hours  heparin  Injectable 5000 Unit(s) SubCutaneous every 8 hours  sodium chloride 0.9%. 1000 milliLiter(s) (100 mL/Hr) IV Continuous <Continuous>  vancomycin  IVPB 1250 milliGRAM(s) IV Intermittent <User Schedule>    MEDICATIONS  (PRN):  acetaminophen   Tablet 650 milliGRAM(s) Oral every 6 hours PRN For Temp greater than 38 C (100.4 F)  acetaminophen   Tablet. 650 milliGRAM(s) Oral every 6 hours PRN Mild Pain (1 - 3)  HYDROmorphone  Injectable 1 milliGRAM(s) IV Push every 4 hours PRN Severe Pain (7 - 10)  oxyCODONE    5 mG/acetaminophen 325 mG 1 Tablet(s) Oral every 4 hours PRN Moderate Pain (4 - 6)    Allergies    No Known Allergies    Intolerances        SUBJECTIVE: in bed in NAD, no acute events overnight     Vital Signs Last 24 Hrs  T(C): 37.1 (05 Apr 2018 05:21), Max: 37.2 (04 Apr 2018 23:40)  T(F): 98.8 (05 Apr 2018 05:21), Max: 99 (04 Apr 2018 23:40)  HR: 83 (05 Apr 2018 05:21) (76 - 88)  BP: 112/73 (05 Apr 2018 05:21) (100/54 - 112/73)  BP(mean): --  RR: 16 (05 Apr 2018 05:21) (15 - 16)  SpO2: 97% (05 Apr 2018 05:21) (97% - 99%)    PHYSICAL EXAM:  GENERAL: NAD, well-groomed, well-developed  HEAD:  Atraumatic, Normocephalic  EYES: EOMI, PERRLA, conjunctiva and sclera clear  ENMT: No tonsillar erythema, exudates, or enlargement; Moist mucous membranes, Good dentition, No lesions  NECK: Supple, No JVD, Normal thyroid  CHEST/LUNG: Clear to  auscultation bilaterally; No rales, rhonchi, wheezing, or rubs  bilaterally  HEART: Regular rate and rhythm; No murmurs, rubs, or gallops  ABDOMEN: Soft, Nontender, Nondistended; Bowel sounds present  EXTREMITIES:  2+ Peripheral Pulses, No clubbing, cyanosis, or edema BL LE  SKIN: No rashes or lesions  NERVOUS SYSTEM:  Alert & Oriented X3, Good concentration; Motor Strength 5/5 B/L upper and lower extremities;   DTRs 2+ intact and symmetric, sensation intact BL    LABS:                                 10.0   14.72 )-----------( 167      ( 04 Apr 2018 06:20 )             30.3   04-04    141  |  106  |  9   ----------------------------<  97  4.2   |  29  |  0.44<L>    Ca    8.7      04 Apr 2018 06:20  Phos  4.4     04-04  Mg     1.9     04-04           CAPILLARY BLOOD GLUCOSE        Lipid panel:     CARDIAC MARKERS ( 01 Apr 2018 14:50 )  x     / x     / 76 U/L / x     / x            RADIOLOGY & ADDITIONAL TESTS:    < from: Xray Lumbosacral Spine + Obliques (04.01.18 @ 22:42) >  IMPRESSION:    Chronic compression deformity of L4. No acute fracture seen.        < from: CT Lumbar Spine w/ IV Cont (04.01.18 @ 20:19) >  IMPRESSION:    No evidence of a pulmonary embolism    Trace right lung tree-in-bud opacities may reflect small airways disease.    Cholelithiasis    No lumbar spine fracture.  No gross intraspinal abnormality or stenosis.   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 On the left there is a   moderate left hip joint effusion. There is a cystic focus within the   superomedial aspect of the left acetabulum concerning for osteolysis as   there is no correlate evident on the prior radiograph from September 21, 2015. On the right there is a small hip joint effusion without evidence   of osteolysis or loosening. There is mild intramuscular edema within the   proximal quadriceps muscles bilaterally and within the right adductor   muscles. Differential considerations include infectious and inflammatory   etiologies, correlate clinically.    Nonspecific focus of increased or signal within the mid shaft of the   right femur which is partially imaged. No definite corresponding T1   signal abnormality is noted. This may be artifactual, however a bone   infarct or a similar appearance. Consider dedicated imaging of this   region as clinically indicated.        < end of copied text >      Imaging Personally Reviewed:  [ x] YES      Consultant(s) Notes Reviewed:  [x ] YES     Care Discussed with [x ] Consultants [X ] Patient [x ] Family  [x ]    [x ]  Other; RN

## 2018-04-05 NOTE — PROGRESS NOTE ADULT - PROBLEM SELECTOR PLAN 4
she refused Heparin 5000 units SC q8h for DVT prophylaxis will place scd instead   General precautions    patient is ambulating the halls..

## 2018-04-05 NOTE — CHART NOTE - NSCHARTNOTEFT_GEN_A_CORE
called to bedside for pt with left hand swelling pain worse with flexion of fingers   pe reveals slight swelling over dorsal surface carpal bone region  pt able to move fingers freely and squeeze my hand no pitting no excessive warmth   ulna pulse 2+  radial pulse 2+  imp swelling at previous iv site   warm compress q 2 hour overnight   no motrin as pt for procedure am

## 2018-04-05 NOTE — PROGRESS NOTE ADULT - PROBLEM SELECTOR PLAN 1
cultures ngtd   wbc downtrending   continue with  antibx   R/o OM:  s/p  mri of bilateral hip no OM but has edema and elevated wbc and osetolysis d/w with ortho they recc aspiration of joint fluid I d/w Monalisa mota from IR and await indium scan results to determine aspiration of joint fluid ..    patient completing part 2 of scan on today ..     patient refused MRI of lumbar spine (b/c of the loud sounds)     Pain control with Tylenol/Percocet/Morphine PRN cultures ngtd   wbc downtrending   continue with  antibx   R/o OM:  s/p  mri of bilateral hip no OM but has edema and elevated wbc and osetolysis d/w with ortho they recc aspiration of joint fluid I d/w Monalisa mota from IR and await indium scan results to determine aspiration of joint fluid ..    patient completing part 2 of scan on today ..     patient refused MRI of lumbar spine (b/c of the loud sounds)     Pain control with Tylenol/Percocet/Morphine PRN   case d/w ortho pa and dr. pickard who still recommend aspiration of b/l hip joint

## 2018-04-05 NOTE — PROGRESS NOTE ADULT - SUBJECTIVE AND OBJECTIVE BOX
27F c/o B/L Hip pain now with L>R hip. When pt arrived to ED pain was more on the Right side, now pt admits there is increased L hip pain, pain worse with getting out of bed and ambulation. Pt underwent Indium scan resulting in suggested L AMBER infection. Results reviewed with Dr. Donovan who at this time would like B/L hip aspiration by IR team and send for R AMBER and L AMBER synovial joint fluid analysis (cell count, gram stain, protein, crystals, glucose, Culture). As per Dr. Donovan no antibiotics at this time unless pt is in sepsis. Will follow up IR aspiration results. Ortho to follow. Appreciate medical team care.

## 2018-04-06 LAB
ANION GAP SERPL CALC-SCNC: 6 MMOL/L — SIGNIFICANT CHANGE UP (ref 5–17)
BUN SERPL-MCNC: 13 MG/DL — SIGNIFICANT CHANGE UP (ref 7–23)
CALCIUM SERPL-MCNC: 9.5 MG/DL — SIGNIFICANT CHANGE UP (ref 8.5–10.1)
CHLORIDE SERPL-SCNC: 108 MMOL/L — SIGNIFICANT CHANGE UP (ref 96–108)
CO2 SERPL-SCNC: 27 MMOL/L — SIGNIFICANT CHANGE UP (ref 22–31)
CREAT SERPL-MCNC: 0.61 MG/DL — SIGNIFICANT CHANGE UP (ref 0.5–1.3)
CRP SERPL-MCNC: 6.2 MG/DL — HIGH (ref 0–0.4)
ERYTHROCYTE [SEDIMENTATION RATE] IN BLOOD: 73 MM/HR — HIGH (ref 0–15)
GLUCOSE SERPL-MCNC: 115 MG/DL — HIGH (ref 70–99)
HCT VFR BLD CALC: 34.8 % — SIGNIFICANT CHANGE UP (ref 34.5–45)
HGB BLD-MCNC: 11.3 G/DL — LOW (ref 11.5–15.5)
INR BLD: 1.08 RATIO — SIGNIFICANT CHANGE UP (ref 0.88–1.16)
MAGNESIUM SERPL-MCNC: 2.2 MG/DL — SIGNIFICANT CHANGE UP (ref 1.6–2.6)
MCHC RBC-ENTMCNC: 27.8 PG — SIGNIFICANT CHANGE UP (ref 27–34)
MCHC RBC-ENTMCNC: 32.5 GM/DL — SIGNIFICANT CHANGE UP (ref 32–36)
MCV RBC AUTO: 85.7 FL — SIGNIFICANT CHANGE UP (ref 80–100)
NRBC # BLD: 0 /100 WBCS — SIGNIFICANT CHANGE UP (ref 0–0)
PHOSPHATE SERPL-MCNC: 3.6 MG/DL — SIGNIFICANT CHANGE UP (ref 2.5–4.5)
PLATELET # BLD AUTO: 302 K/UL — SIGNIFICANT CHANGE UP (ref 150–400)
POTASSIUM SERPL-MCNC: 4.3 MMOL/L — SIGNIFICANT CHANGE UP (ref 3.5–5.3)
POTASSIUM SERPL-SCNC: 4.3 MMOL/L — SIGNIFICANT CHANGE UP (ref 3.5–5.3)
PROTHROM AB SERPL-ACNC: 11.8 SEC — SIGNIFICANT CHANGE UP (ref 9.8–12.7)
RBC # BLD: 4.06 M/UL — SIGNIFICANT CHANGE UP (ref 3.8–5.2)
RBC # FLD: 13.8 % — SIGNIFICANT CHANGE UP (ref 10.3–14.5)
SODIUM SERPL-SCNC: 141 MMOL/L — SIGNIFICANT CHANGE UP (ref 135–145)
WBC # BLD: 15.78 K/UL — HIGH (ref 3.8–10.5)
WBC # FLD AUTO: 15.78 K/UL — HIGH (ref 3.8–10.5)

## 2018-04-06 PROCEDURE — 99233 SBSQ HOSP IP/OBS HIGH 50: CPT

## 2018-04-06 PROCEDURE — 93971 EXTREMITY STUDY: CPT | Mod: 26,LT

## 2018-04-06 RX ORDER — MORPHINE SULFATE 50 MG/1
2 CAPSULE, EXTENDED RELEASE ORAL EVERY 4 HOURS
Qty: 0 | Refills: 0 | Status: DISCONTINUED | OUTPATIENT
Start: 2018-04-06 | End: 2018-04-07

## 2018-04-06 RX ORDER — LIDOCAINE 4 G/100G
1 CREAM TOPICAL ONCE
Qty: 0 | Refills: 0 | Status: COMPLETED | OUTPATIENT
Start: 2018-04-06 | End: 2018-04-06

## 2018-04-06 RX ADMIN — OXYCODONE AND ACETAMINOPHEN 1 TABLET(S): 5; 325 TABLET ORAL at 03:30

## 2018-04-06 RX ADMIN — MORPHINE SULFATE 2 MILLIGRAM(S): 50 CAPSULE, EXTENDED RELEASE ORAL at 18:07

## 2018-04-06 RX ADMIN — HYDROMORPHONE HYDROCHLORIDE 1 MILLIGRAM(S): 2 INJECTION INTRAMUSCULAR; INTRAVENOUS; SUBCUTANEOUS at 12:17

## 2018-04-06 RX ADMIN — HYDROMORPHONE HYDROCHLORIDE 1 MILLIGRAM(S): 2 INJECTION INTRAMUSCULAR; INTRAVENOUS; SUBCUTANEOUS at 12:41

## 2018-04-06 RX ADMIN — CEFTRIAXONE 100 GRAM(S): 500 INJECTION, POWDER, FOR SOLUTION INTRAMUSCULAR; INTRAVENOUS at 18:05

## 2018-04-06 RX ADMIN — Medication 166.67 MILLIGRAM(S): at 12:18

## 2018-04-06 RX ADMIN — LIDOCAINE 1 PATCH: 4 CREAM TOPICAL at 04:56

## 2018-04-06 RX ADMIN — OXYCODONE AND ACETAMINOPHEN 1 TABLET(S): 5; 325 TABLET ORAL at 02:51

## 2018-04-06 RX ADMIN — MORPHINE SULFATE 2 MILLIGRAM(S): 50 CAPSULE, EXTENDED RELEASE ORAL at 16:26

## 2018-04-06 RX ADMIN — LIDOCAINE 1 PATCH: 4 CREAM TOPICAL at 16:26

## 2018-04-06 NOTE — CHART NOTE - NSCHARTNOTEFT_GEN_A_CORE
To Whom It May Concern:   This letter is to serve as verification that the patient Nanci Morales has been hospitalized at [facility] since 4/1/18 to present time (currently no discharge date is available). Patient's mother Kayy Morales has been presnt daily at bedside. It is imperative that the  patient's mother remain in the [place] as the patient suffers with a serious medical condition and her mother provides emotional support.   Please take this into consideration if there are questions don't hesitate to call [phone] or [phone]     Best Regards,     Carmen Simental MD

## 2018-04-06 NOTE — PROGRESS NOTE ADULT - PROBLEM SELECTOR PLAN 4
she refused Heparin 5000 units SC q8h for DVT prophylaxis  place scd instead and pataient ambulates frequenlty  General precautions    patient is ambulating the halls..

## 2018-04-06 NOTE — PROGRESS NOTE ADULT - PROBLEM SELECTOR PLAN 1
s/p indium scan with positive results in b/l hips more on left suggestive of infection    blood cultures ngtd   wbc downtrending   continue with  antibx    check vanco trough at 11pm today   for aspiration of bilateral fluid noted in hip joints send for cell count and culture..

## 2018-04-06 NOTE — PROGRESS NOTE ADULT - PROBLEM SELECTOR PLAN 1
prosthetic septic hip arthritis  both indium scan + and MRI shows left joint effusion   Arthrocentesis required  IR vs by ortho   will cont vanco and Rocephin at this time  Increase vanco dose to Q 12 hrs as level low  fluid analysis and c/s required  anticipating 6 weeks of iv antibiotics via picc line

## 2018-04-06 NOTE — PROGRESS NOTE ADULT - SUBJECTIVE AND OBJECTIVE BOX
27F c/o B/L Hip pain now with L>R hip. When pt arrived to ED pain was more on the Right side, now pt admits there is increased L hip pain, pain worse with getting out of bed and ambulation. Pt underwent Indium scan resulting in suggested L AMBER infection. Results reviewed with Dr. Donovan who at this time would like B/L hip aspiration by IR team and send for R AMBER and L AMBER synovial joint fluid analysis (cell count, gram stain, protein, crystals, glucose, Culture). Will follow up IR aspiration results. Pt has been placed on NPO pending IR aspiration results. Ortho to follow. Appreciate medical team care.

## 2018-04-06 NOTE — PROGRESS NOTE ADULT - SUBJECTIVE AND OBJECTIVE BOX
Pt unable to tolerate IR aspiration of bilateral AMBER due to pain. Dr. Donovan would like B/L AMBER to be aspirated.  Discussed with IR team - will do B/L hip aspiration under general anesthesia on monday 4/9/18. Dr. Donovan aware. Hospitalist team aware. Will make pt NPO after midnight sunday night. All the above discussed and understood by pt. Ortho to follow

## 2018-04-06 NOTE — PROGRESS NOTE ADULT - SUBJECTIVE AND OBJECTIVE BOX
Patient is a 27y old  Female who presents with a chief complaint of Sudden right hip pain (01 Apr 2018 21:27)      OVERNIGHT EVENTS: none      REVIEW OF SYSTEMS: denies chest pain/SOB, diaphoresis, no F/C, cough, dizziness, headache, blurry vision, nausea, vomiting, abdominal pain. Rest unremarkable     MEDICATIONS  (STANDING):  cefTRIAXone   IVPB 2 Gram(s) IV Intermittent every 24 hours  heparin  Injectable 5000 Unit(s) SubCutaneous every 8 hours  sodium chloride 0.9%. 1000 milliLiter(s) (100 mL/Hr) IV Continuous <Continuous>  vancomycin  IVPB 1250 milliGRAM(s) IV Intermittent <User Schedule>    MEDICATIONS  (PRN):  acetaminophen   Tablet 650 milliGRAM(s) Oral every 6 hours PRN For Temp greater than 38 C (100.4 F)  acetaminophen   Tablet. 650 milliGRAM(s) Oral every 6 hours PRN Mild Pain (1 - 3)  morphine  - Injectable 2 milliGRAM(s) IV Push every 4 hours PRN Severe Pain (7 - 10)  oxyCODONE    5 mG/acetaminophen 325 mG 1 Tablet(s) Oral every 4 hours PRN Moderate Pain (4 - 6)    Allergies    No Known Allergies    Intolerances        SUBJECTIVE: in bed in NAD, no acute events overnight     Vital Signs Last 24 Hrs  T(C): 36.6 (06 Apr 2018 11:55), Max: 37.1 (05 Apr 2018 23:31)  T(F): 97.8 (06 Apr 2018 11:55), Max: 98.8 (06 Apr 2018 05:13)  HR: 71 (06 Apr 2018 11:55) (71 - 81)  BP: 110/73 (06 Apr 2018 11:55) (106/61 - 116/69)  BP(mean): --  RR: 18 (06 Apr 2018 11:55) (16 - 18)  SpO2: 99% (06 Apr 2018 11:55) (95% - 99%)      PHYSICAL EXAM:  GENERAL: NAD, well-groomed, well-developed  HEAD:  Atraumatic, Normocephalic  EYES: EOMI, PERRLA, conjunctiva and sclera clear  ENMT: No tonsillar erythema, exudates, or enlargement; Moist mucous membranes, Good dentition, No lesions  NECK: Supple, No JVD, Normal thyroid  CHEST/LUNG: Clear to  auscultation bilaterally; No rales, rhonchi, wheezing, or rubs  bilaterally  HEART: Regular rate and rhythm; No murmurs, rubs, or gallops  ABDOMEN: Soft, Nontender, Nondistended; Bowel sounds present  EXTREMITIES:  2+ Peripheral Pulses, No clubbing, cyanosis, or edema BL LE  SKIN: No rashes or lesions  NERVOUS SYSTEM:  Alert & Oriented X3, Good concentration; Motor Strength 5/5 B/L upper and lower extremities;   DTRs 2+ intact and symmetric, sensation intact BL    LABS:                                                11.3   15.78 )-----------( 302      ( 06 Apr 2018 07:54 )             34.8   04-06    141  |  108  |  13  ----------------------------<  115<H>  4.3   |  27  |  0.61    Ca    9.5      06 Apr 2018 07:54  Phos  3.6     04-06  Mg     2.2     04-06         CAPILLARY BLOOD GLUCOSE        Lipid panel:     CARDIAC MARKERS ( 01 Apr 2018 14:50 )  x     / x     / 76 U/L / x     / x            RADIOLOGY & ADDITIONAL TESTS:    < from: Xray Lumbosacral Spine + Obliques (04.01.18 @ 22:42) >  IMPRESSION:    Chronic compression deformity of L4. No acute fracture seen.        < from: CT Lumbar Spine w/ IV Cont (04.01.18 @ 20:19) >  IMPRESSION:    No evidence of a pulmonary embolism    Trace right lung tree-in-bud opacities may reflect small airways disease.    Cholelithiasis    No lumbar spine fracture.  No gross intraspinal abnormality or stenosis.   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 On the left there is a   moderate left hip joint effusion. There is a cystic focus within the   superomedial aspect of the left acetabulum concerning for osteolysis as   there is no correlate evident on the prior radiograph from September 21, 2015. On the right there is a small hip joint effusion without evidence   of osteolysis or loosening. There is mild intramuscular edema within the   proximal quadriceps muscles bilaterally and within the right adductor   muscles. Differential considerations include infectious and inflammatory   etiologies, correlate clinically.    Nonspecific focus of increased or signal within the mid shaft of the   right femur which is partially imaged. No definite corresponding T1   signal abnormality is noted. This may be artifactual, however a bone   infarct or a similar appearance. Consider dedicated imaging of this   region as clinically indicated.        < end of copied text >      Imaging Personally Reviewed:  [ x] YES      Consultant(s) Notes Reviewed:  [x ] YES     Care Discussed with [x ] Consultants [X ] Patient [x ] Family  [x ]    [x ]  Other; RN

## 2018-04-06 NOTE — CHART NOTE - NSCHARTNOTEFT_GEN_A_CORE
Medicine PA Note    Called by Rn that patient is complaining of increase pain and swelling to left hand and left upper arm around elbow as well as shoulder. Patient seen and examined at bedside.  Patient was seen earlier by midlevel for swelling of left dorsum of hand s/p iv infiltration. Patient currently stating that she has swelling to left elbow medial aspect and upper arm and left shoulder area. Patient denies numbness or tingling to left hand, no cp or palpitations, no neck pain. Pain is currently controlled with percocet however pain is exacerbated with flexion and extension of LUE  .    VsVital Signs Last 24 Hrs  T(C): 37.1 (06 Apr 2018 05:13), Max: 37.1 (05 Apr 2018 23:31)  T(F): 98.8 (06 Apr 2018 05:13), Max: 98.8 (06 Apr 2018 05:13)  HR: 74 (06 Apr 2018 05:13) (74 - 82)  BP: 116/69 (06 Apr 2018 05:13) (106/61 - 116/69)  BP(mean): --  RR: 16 (06 Apr 2018 05:13) (16 - 18)  SpO2: 99% (06 Apr 2018 05:13) (95% - 99%)      Gen Patient is alert, awake and oriented x 3 mild discomfort and concern to left elbow  Ext  Left dorsum of hand minimal swelling proximal to metacarpal bones of 2-4 th fingers. From flex and ext . sensory and motor intact  cap refill less than 2 sec. radial pulse +2 . midline distal to left antecubital area. ttp along the medial aspect of left elbow and tricep area , swelling to left upper arm measuring circumferentially 12 inches. brachial pulse +2 sensory and motor intact. 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  . Medicine PA Note    Called by Rn that patient is complaining of increase pain and swelling to left hand and left upper arm around elbow as well as shoulder. Patient seen and examined at bedside.  Patient was seen earlier by midlevel for swelling of left dorsum of hand s/p iv infiltration. Patient currently stating that she has swelling to left elbow medial aspect and upper arm and left shoulder area. Patient denies numbness or tingling to left hand, no cp or palpitations, no neck pain. Pain is currently controlled with percocet however pain is exacerbated with flexion and extension of LUE  .    VsVital Signs Last 24 Hrs  T(C): 37.1 (06 Apr 2018 05:13), Max: 37.1 (05 Apr 2018 23:31)  T(F): 98.8 (06 Apr 2018 05:13), Max: 98.8 (06 Apr 2018 05:13)  HR: 74 (06 Apr 2018 05:13) (74 - 82)  BP: 116/69 (06 Apr 2018 05:13) (106/61 - 116/69)  BP(mean): --  RR: 16 (06 Apr 2018 05:13) (16 - 18)  SpO2: 99% (06 Apr 2018 05:13) (95% - 99%)      Gen Patient is alert, awake and oriented x 3 mild discomfort and concern to left elbow  Ext  Left dorsum of hand minimal swelling proximal to metacarpal bones of 2-4 th fingers. From flex and ext . sensory and motor intact  cap refill less than 2 sec. radial pulse +2 . midline distal to left antecubital area. ttp along the medial aspect of left elbow and tricep area , swelling to left upper arm measuring circumferentially 12 inches. brachial pulse +2 sensory and motor intact. 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 Patient was admitted for possible OM of the hip and found abscess of HIp.   will elevate hand and arm for iv infiltration  warm compresses to arm every 2 hrs as tolerated  lidoderm patch to left shoulder  US of left arm to r/o r/o DVT:  will continue to monitor  [facility]      Addendum   patient resting comfortably this am. Patient stated that swelling and arm feels a lot better.   will continue to monitor  will sign out patient to next midlevel for f/u of US  [facility]

## 2018-04-06 NOTE — CHART NOTE - NSCHARTNOTEFT_GEN_A_CORE
b/l hip aspiration aborted due to patient not able to tolerate the local numbing medication.  Will schedule procedure for Monday with anesthesia.  NPO Sunday midnight b/l hip aspiration aborted due to patient not able to tolerate the pain.  Will schedule procedure for Monday with anesthesia.  NPO Sunday midnight

## 2018-04-06 NOTE — PROGRESS NOTE ADULT - SUBJECTIVE AND OBJECTIVE BOX
Patient is a 27y old  Female who presents with a chief complaint of Sudden right hip pain (01 Apr 2018 21:27)      INTERVAL HPI / OVERNIGHT EVENTS: left hip pain    MEDICATIONS  (STANDING):  cefTRIAXone   IVPB 2 Gram(s) IV Intermittent every 24 hours  heparin  Injectable 5000 Unit(s) SubCutaneous every 8 hours  sodium chloride 0.9%. 1000 milliLiter(s) (100 mL/Hr) IV Continuous <Continuous>  vancomycin  IVPB 1250 milliGRAM(s) IV Intermittent <User Schedule>    MEDICATIONS  (PRN):  acetaminophen   Tablet 650 milliGRAM(s) Oral every 6 hours PRN For Temp greater than 38 C (100.4 F)  acetaminophen   Tablet. 650 milliGRAM(s) Oral every 6 hours PRN Mild Pain (1 - 3)  morphine  - Injectable 2 milliGRAM(s) IV Push every 4 hours PRN Severe Pain (7 - 10)  oxyCODONE    5 mG/acetaminophen 325 mG 1 Tablet(s) Oral every 4 hours PRN Moderate Pain (4 - 6)      Vital Signs Last 24 Hrs  Tmax :left hip pain     PHYSICAL EXAM:  General :NAD  Constitutional:  well-groomed, well-developed  Respiratory: CTAB/L  Cardiovascular: S1 and S2, RRR, no M/G/R  Gastrointestinal: BS+, soft, NT/ND  Extremities: No peripheral edema  Vascular: 2+ peripheral pulses  Skin: No rashes      LABS:             WBC 14-->15  ESR 73  CRp 6.2  Vanco level 9          MICROBIOLOGY:  RECENT CULTURES:  04-02 .Blood Blood XXXX XXXX   No growth at 5 days.    04-01 .Blood Blood XXXX XXXX   No growth at 5 days.    04-01 .Urine Clean Catch (Midstream) XXXX XXXX   Culture grew 3 or more types of organisms which indicate  collection contamination; consider recollection only if clinically  indicated.          RADIOLOGY & ADDITIONAL STUDIES:  < from: NM Inflammatory Loc Wholebody, WBC (04.05.18 @ 15:45) >    IMPRESSION:  Abnormal combined Indium- labeled leukocyte study and marrow   scan.    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    Increased uptake in the region of the right maxilla and mild diffuse   uptake in the chest/lungs are nonspecific. Clinical correlation is   suggested.    < end of copied text >

## 2018-04-07 ENCOUNTER — APPOINTMENT (OUTPATIENT)
Dept: CHRONIC DISEASE MANAGEMENT | Facility: CLINIC | Age: 28
End: 2018-04-07

## 2018-04-07 DIAGNOSIS — M00.9 PYOGENIC ARTHRITIS, UNSPECIFIED: ICD-10-CM

## 2018-04-07 DIAGNOSIS — M25.552 PAIN IN LEFT HIP: ICD-10-CM

## 2018-04-07 DIAGNOSIS — D72.829 ELEVATED WHITE BLOOD CELL COUNT, UNSPECIFIED: ICD-10-CM

## 2018-04-07 DIAGNOSIS — M79.642 PAIN IN LEFT HAND: ICD-10-CM

## 2018-04-07 LAB
CULTURE RESULTS: SIGNIFICANT CHANGE UP
CULTURE RESULTS: SIGNIFICANT CHANGE UP
HCT VFR BLD CALC: 37.1 % — SIGNIFICANT CHANGE UP (ref 34.5–45)
HGB BLD-MCNC: 11.9 G/DL — SIGNIFICANT CHANGE UP (ref 11.5–15.5)
MCHC RBC-ENTMCNC: 27.9 PG — SIGNIFICANT CHANGE UP (ref 27–34)
MCHC RBC-ENTMCNC: 32.1 GM/DL — SIGNIFICANT CHANGE UP (ref 32–36)
MCV RBC AUTO: 87.1 FL — SIGNIFICANT CHANGE UP (ref 80–100)
NRBC # BLD: 0 /100 WBCS — SIGNIFICANT CHANGE UP (ref 0–0)
PLATELET # BLD AUTO: 336 K/UL — SIGNIFICANT CHANGE UP (ref 150–400)
RBC # BLD: 4.26 M/UL — SIGNIFICANT CHANGE UP (ref 3.8–5.2)
RBC # FLD: 13.9 % — SIGNIFICANT CHANGE UP (ref 10.3–14.5)
SPECIMEN SOURCE: SIGNIFICANT CHANGE UP
SPECIMEN SOURCE: SIGNIFICANT CHANGE UP
VANCOMYCIN TROUGH SERPL-MCNC: 9 UG/ML — LOW (ref 10–20)
WBC # BLD: 14.77 K/UL — HIGH (ref 3.8–10.5)
WBC # FLD AUTO: 14.77 K/UL — HIGH (ref 3.8–10.5)

## 2018-04-07 PROCEDURE — 73110 X-RAY EXAM OF WRIST: CPT | Mod: 26,LT

## 2018-04-07 PROCEDURE — 73120 X-RAY EXAM OF HAND: CPT | Mod: 26,LT

## 2018-04-07 PROCEDURE — 99233 SBSQ HOSP IP/OBS HIGH 50: CPT

## 2018-04-07 RX ORDER — LIDOCAINE 4 G/100G
1 CREAM TOPICAL ONCE
Qty: 0 | Refills: 0 | Status: COMPLETED | OUTPATIENT
Start: 2018-04-07 | End: 2018-04-07

## 2018-04-07 RX ORDER — VANCOMYCIN HCL 1 G
1000 VIAL (EA) INTRAVENOUS EVERY 8 HOURS
Qty: 0 | Refills: 0 | Status: DISCONTINUED | OUTPATIENT
Start: 2018-04-07 | End: 2018-04-12

## 2018-04-07 RX ORDER — VANCOMYCIN HCL 1 G
1000 VIAL (EA) INTRAVENOUS EVERY 12 HOURS
Qty: 0 | Refills: 0 | Status: DISCONTINUED | OUTPATIENT
Start: 2018-04-07 | End: 2018-04-07

## 2018-04-07 RX ADMIN — Medication 166.67 MILLIGRAM(S): at 00:32

## 2018-04-07 RX ADMIN — HEPARIN SODIUM 5000 UNIT(S): 5000 INJECTION INTRAVENOUS; SUBCUTANEOUS at 14:47

## 2018-04-07 RX ADMIN — OXYCODONE AND ACETAMINOPHEN 1 TABLET(S): 5; 325 TABLET ORAL at 23:51

## 2018-04-07 RX ADMIN — LIDOCAINE 1 PATCH: 4 CREAM TOPICAL at 14:53

## 2018-04-07 RX ADMIN — MORPHINE SULFATE 2 MILLIGRAM(S): 50 CAPSULE, EXTENDED RELEASE ORAL at 00:32

## 2018-04-07 RX ADMIN — MORPHINE SULFATE 2 MILLIGRAM(S): 50 CAPSULE, EXTENDED RELEASE ORAL at 11:57

## 2018-04-07 RX ADMIN — MORPHINE SULFATE 2 MILLIGRAM(S): 50 CAPSULE, EXTENDED RELEASE ORAL at 00:48

## 2018-04-07 RX ADMIN — Medication 250 MILLIGRAM(S): at 21:43

## 2018-04-07 RX ADMIN — Medication 166.67 MILLIGRAM(S): at 11:36

## 2018-04-07 RX ADMIN — MORPHINE SULFATE 2 MILLIGRAM(S): 50 CAPSULE, EXTENDED RELEASE ORAL at 12:30

## 2018-04-07 NOTE — PROGRESS NOTE ADULT - SUBJECTIVE AND OBJECTIVE BOX
Patient is a 27y old  Female who presents with a chief complaint of Sudden right hip pain (01 Apr 2018 21:27)      OVERNIGHT EVENTS: none      REVIEW OF SYSTEMS: denies chest pain/SOB, diaphoresis, no F/C, cough, dizziness, headache, blurry vision, nausea, vomiting, abdominal pain. Rest unremarkable     MEDICATIONS  (STANDING):  cefTRIAXone   IVPB 2 Gram(s) IV Intermittent every 24 hours  heparin  Injectable 5000 Unit(s) SubCutaneous every 8 hours  sodium chloride 0.9%. 1000 milliLiter(s) (100 mL/Hr) IV Continuous <Continuous>  vancomycin  IVPB 1250 milliGRAM(s) IV Intermittent <User Schedule>    MEDICATIONS  (PRN):  acetaminophen   Tablet 650 milliGRAM(s) Oral every 6 hours PRN For Temp greater than 38 C (100.4 F)  acetaminophen   Tablet. 650 milliGRAM(s) Oral every 6 hours PRN Mild Pain (1 - 3)  morphine  - Injectable 2 milliGRAM(s) IV Push every 4 hours PRN Severe Pain (7 - 10)  oxyCODONE    5 mG/acetaminophen 325 mG 1 Tablet(s) Oral every 4 hours PRN Moderate Pain (4 - 6)    Allergies    No Known Allergies    Intolerances      SUBJECTIVE: in bed in NAD, no acute events overnight     Vital Signs Last 24 Hrs  T(C): 37.3 (07 Apr 2018 04:56), Max: 37.3 (07 Apr 2018 04:53)  T(F): 99.1 (07 Apr 2018 04:56), Max: 99.1 (07 Apr 2018 04:53)  HR: 76 (07 Apr 2018 04:56) (71 - 94)  BP: 91/52 (07 Apr 2018 04:56) (91/52 - 134/58)  BP(mean): --  RR: 521 (07 Apr 2018 04:56) (16 - 521)  SpO2: 99% (07 Apr 2018 04:56) (96% - 99%)    PHYSICAL EXAM:  GENERAL: NAD, well-groomed, well-developed  HEAD:  Atraumatic, Normocephalic  EYES: EOMI, PERRLA, conjunctiva and sclera clear  ENMT: No tonsillar erythema, exudates, or enlargement; Moist mucous membranes, Good dentition, No lesions  NECK: Supple, No JVD, Normal thyroid  CHEST/LUNG: Clear to  auscultation bilaterally; No rales, rhonchi, wheezing, or rubs  bilaterally  HEART: Regular rate and rhythm; No murmurs, rubs, or gallops  ABDOMEN: Soft, Nontender, Nondistended; Bowel sounds present  EXTREMITIES:  2+ Peripheral Pulses, No clubbing, cyanosis, or edema BL LE  SKIN: No rashes or lesions  NERVOUS SYSTEM:  Alert & Oriented X3, Good concentration; Motor Strength 5/5 B/L upper and lower extremities;   DTRs 2+ intact and symmetric, sensation intact BL    LABS:                                                               11.9   14.77 )-----------( 336      ( 07 Apr 2018 07:58 )             37.1     04-06    141  |  108  |  13  ----------------------------<  115<H>  4.3   |  27  |  0.61    Ca    9.5      06 Apr 2018 07:54  Phos  3.6     04-06  Mg     2.2     04-06      CAPILLARY BLOOD GLUCOSE        Lipid panel:     CARDIAC MARKERS ( 01 Apr 2018 14:50 )  x     / x     / 76 U/L / x     / x            RADIOLOGY & ADDITIONAL TESTS:    < from: Xray Lumbosacral Spine + Obliques (04.01.18 @ 22:42) >  IMPRESSION:    Chronic compression deformity of L4. No acute fracture seen.        < from: CT Lumbar Spine w/ IV Cont (04.01.18 @ 20:19) >  IMPRESSION:    No evidence of a pulmonary embolism    Trace right lung tree-in-bud opacities may reflect small airways disease.    Cholelithiasis    No lumbar spine fracture.  No gross intraspinal abnormality or stenosis.   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 On the left there is a   moderate left hip joint effusion. There is a cystic focus within the   superomedial aspect of the left acetabulum concerning for osteolysis as   there is no correlate evident on the prior radiograph from September 21, 2015. On the right there is a small hip joint effusion without evidence   of osteolysis or loosening. There is mild intramuscular edema within the   proximal quadriceps muscles bilaterally and within the right adductor   muscles. Differential considerations include infectious and inflammatory   etiologies, correlate clinically.    Nonspecific focus of increased or signal within the mid shaft of the   right femur which is partially imaged. No definite corresponding T1   signal abnormality is noted. This may be artifactual, however a bone   infarct or a similar appearance. Consider dedicated imaging of this   region as clinically indicated.        < end of copied text >      Imaging Personally Reviewed:  [ x] YES      Consultant(s) Notes Reviewed:  [x ] YES     Care Discussed with [x ] Consultants [X ] Patient [x ] Family  [x ]    [x ]  Other; RN Patient is a 27y old  Female who presents with a chief complaint of Sudden right hip pain (01 Apr 2018 21:27)      OVERNIGHT EVENTS: none  c/o pain in wrist and hand on left    REVIEW OF SYSTEMS: denies chest pain/SOB, diaphoresis, no F/C, cough, dizziness, headache, blurry vision, nausea, vomiting, abdominal pain. Rest unremarkable     MEDICATIONS  (STANDING):  cefTRIAXone   IVPB 2 Gram(s) IV Intermittent every 24 hours  heparin  Injectable 5000 Unit(s) SubCutaneous every 8 hours  sodium chloride 0.9%. 1000 milliLiter(s) (100 mL/Hr) IV Continuous <Continuous>  vancomycin  IVPB 1250 milliGRAM(s) IV Intermittent <User Schedule>    MEDICATIONS  (PRN):  acetaminophen   Tablet 650 milliGRAM(s) Oral every 6 hours PRN For Temp greater than 38 C (100.4 F)  acetaminophen   Tablet. 650 milliGRAM(s) Oral every 6 hours PRN Mild Pain (1 - 3)  morphine  - Injectable 2 milliGRAM(s) IV Push every 4 hours PRN Severe Pain (7 - 10)  oxyCODONE    5 mG/acetaminophen 325 mG 1 Tablet(s) Oral every 4 hours PRN Moderate Pain (4 - 6)    Allergies    No Known Allergies    Intolerances      SUBJECTIVE: in bed in NAD, no acute events overnight     Vital Signs Last 24 Hrs  T(C): 37.3 (07 Apr 2018 04:56), Max: 37.3 (07 Apr 2018 04:53)  T(F): 99.1 (07 Apr 2018 04:56), Max: 99.1 (07 Apr 2018 04:53)  HR: 76 (07 Apr 2018 04:56) (71 - 94)  BP: 91/52 (07 Apr 2018 04:56) (91/52 - 134/58)  BP(mean): --  RR: 521 (07 Apr 2018 04:56) (16 - 521)  SpO2: 99% (07 Apr 2018 04:56) (96% - 99%)    PHYSICAL EXAM:  GENERAL: NAD, well-groomed, well-developed  HEAD:  Atraumatic, Normocephalic  EYES: EOMI, PERRLA, conjunctiva and sclera clear  ENMT: No tonsillar erythema, exudates, or enlargement; Moist mucous membranes, Good dentition, No lesions  NECK: Supple, No JVD, Normal thyroid  CHEST/LUNG: Clear to  auscultation bilaterally; No rales, rhonchi, wheezing, or rubs  bilaterally  HEART: Regular rate and rhythm; No murmurs, rubs, or gallops  ABDOMEN: Soft, Nontender, Nondistended; Bowel sounds present  EXTREMITIES:  2+ Peripheral Pulses, No clubbing, cyanosis, or edema BL LE  SKIN: No rashes or lesions  NERVOUS SYSTEM:  Alert & Oriented X3, Good concentration; Motor Strength 5/5 B/L upper and lower extremities; pain in left wrist, dorsum and palm of left hand  DTRs 2+ intact and symmetric, sensation intact BL    LABS:                                                               11.9   14.77 )-----------( 336      ( 07 Apr 2018 07:58 )             37.1     04-06    141  |  108  |  13  ----------------------------<  115<H>  4.3   |  27  |  0.61    Ca    9.5      06 Apr 2018 07:54  Phos  3.6     04-06  Mg     2.2     04-06      CAPILLARY BLOOD GLUCOSE        Lipid panel:     CARDIAC MARKERS ( 01 Apr 2018 14:50 )  x     / x     / 76 U/L / x     / x            RADIOLOGY & ADDITIONAL TESTS:    < from: Xray Lumbosacral Spine + Obliques (04.01.18 @ 22:42) >  IMPRESSION:    Chronic compression deformity of L4. No acute fracture seen.        < from: CT Lumbar Spine w/ IV Cont (04.01.18 @ 20:19) >  IMPRESSION:    No evidence of a pulmonary embolism    Trace right lung tree-in-bud opacities may reflect small airways disease.    Cholelithiasis    No lumbar spine fracture.  No gross intraspinal abnormality or stenosis.   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 On the left there is a   moderate left hip joint effusion. There is a cystic focus within the   superomedial aspect of the left acetabulum concerning for osteolysis as   there is no correlate evident on the prior radiograph from September 21, 2015. On the right there is a small hip joint effusion without evidence   of osteolysis or loosening. There is mild intramuscular edema within the   proximal quadriceps muscles bilaterally and within the right adductor   muscles. Differential considerations include infectious and inflammatory   etiologies, correlate clinically.    Nonspecific focus of increased or signal within the mid shaft of the   right femur which is partially imaged. No definite corresponding T1   signal abnormality is noted. This may be artifactual, however a bone   infarct or a similar appearance. Consider dedicated imaging of this   region as clinically indicated.        < end of copied text >      Imaging Personally Reviewed:  [ x] YES      Consultant(s) Notes Reviewed:  [x ] YES     Care Discussed with [x ] Consultants [X ] Patient [x ] Family  [x ]    [x ]  Other; RN Patient is a 27y old  Female who presents with a chief complaint of Sudden right hip pain (01 Apr 2018 21:27)      OVERNIGHT EVENTS: none  c/o pain in wrist and hand on left    REVIEW OF SYSTEMS: denies chest pain/SOB, diaphoresis, no F/C, cough, dizziness, headache, blurry vision, nausea, vomiting, abdominal pain. Rest unremarkable     MEDICATIONS  (STANDING):  cefTRIAXone   IVPB 2 Gram(s) IV Intermittent every 24 hours  heparin  Injectable 5000 Unit(s) SubCutaneous every 8 hours  sodium chloride 0.9%. 1000 milliLiter(s) (100 mL/Hr) IV Continuous <Continuous>  vancomycin  IVPB 1250 milliGRAM(s) IV Intermittent <User Schedule>    MEDICATIONS  (PRN):  acetaminophen   Tablet 650 milliGRAM(s) Oral every 6 hours PRN For Temp greater than 38 C (100.4 F)  acetaminophen   Tablet. 650 milliGRAM(s) Oral every 6 hours PRN Mild Pain (1 - 3)  morphine  - Injectable 2 milliGRAM(s) IV Push every 4 hours PRN Severe Pain (7 - 10)  oxyCODONE    5 mG/acetaminophen 325 mG 1 Tablet(s) Oral every 4 hours PRN Moderate Pain (4 - 6)    Allergies    No Known Allergies    Intolerances      SUBJECTIVE: in bed in NAD, no acute events overnight     Vital Signs Last 24 Hrs  T(C): 37.3 (07 Apr 2018 04:56), Max: 37.3 (07 Apr 2018 04:53)  T(F): 99.1 (07 Apr 2018 04:56), Max: 99.1 (07 Apr 2018 04:53)  HR: 76 (07 Apr 2018 04:56) (71 - 94)  BP: 91/52 (07 Apr 2018 04:56) (91/52 - 134/58)  BP(mean): --  RR: 521 (07 Apr 2018 04:56) (16 - 521)  SpO2: 99% (07 Apr 2018 04:56) (96% - 99%)    PHYSICAL EXAM:  GENERAL: NAD, well-groomed, well-developed  HEAD:  Atraumatic, Normocephalic  EYES: EOMI, PERRLA, conjunctiva and sclera clear  ENMT: No tonsillar erythema, exudates, or enlargement; Moist mucous membranes, Good dentition, No lesions  NECK: Supple, No JVD, Normal thyroid  CHEST/LUNG: Clear to  auscultation bilaterally; No rales, rhonchi, wheezing, or rubs  bilaterally  HEART: Regular rate and rhythm; No murmurs, rubs, or gallops  ABDOMEN: Soft, Nontender, Nondistended; Bowel sounds present  EXTREMITIES:  2+ Peripheral Pulses, No clubbing, cyanosis, or edema BL LE  SKIN: No rashes or lesions  NERVOUS SYSTEM:  Alert & Oriented X3, Good concentration; Motor Strength 5/5 B/L upper and lower extremities; pain in left wrist, dorsum and palm of left hand,   pain in left hip with rotation.  no pain in left forearm shoulder or biceps  DTRs 2+ intact and symmetric, sensation intact BL    LABS:                                                               11.9   14.77 )-----------( 336      ( 07 Apr 2018 07:58 )             37.1     04-06    141  |  108  |  13  ----------------------------<  115<H>  4.3   |  27  |  0.61    Ca    9.5      06 Apr 2018 07:54  Phos  3.6     04-06  Mg     2.2     04-06      CAPILLARY BLOOD GLUCOSE        Lipid panel:     CARDIAC MARKERS ( 01 Apr 2018 14:50 )  x     / x     / 76 U/L / x     / x            RADIOLOGY & ADDITIONAL TESTS:    < from: Xray Lumbosacral Spine + Obliques (04.01.18 @ 22:42) >  IMPRESSION:    Chronic compression deformity of L4. No acute fracture seen.        < from: CT Lumbar Spine w/ IV Cont (04.01.18 @ 20:19) >  IMPRESSION:    No evidence of a pulmonary embolism    Trace right lung tree-in-bud opacities may reflect small airways disease.    Cholelithiasis    No lumbar spine fracture.  No gross intraspinal abnormality or stenosis.   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 On the left there is a   moderate left hip joint effusion. There is a cystic focus within the   superomedial aspect of the left acetabulum concerning for osteolysis as   there is no correlate evident on the prior radiograph from September 21, 2015. On the right there is a small hip joint effusion without evidence   of osteolysis or loosening. There is mild intramuscular edema within the   proximal quadriceps muscles bilaterally and within the right adductor   muscles. Differential considerations include infectious and inflammatory   etiologies, correlate clinically.    Nonspecific focus of increased or signal within the mid shaft of the   right femur which is partially imaged. No definite corresponding T1   signal abnormality is noted. This may be artifactual, however a bone   infarct or a similar appearance. Consider dedicated imaging of this   region as clinically indicated.        < end of copied text >      Imaging Personally Reviewed:  [ x] YES      Consultant(s) Notes Reviewed:  [x ] YES     Care Discussed with [x ] Consultants [X ] Patient [x ] Family  [x ]    [x ]  Other; RN

## 2018-04-07 NOTE — PROGRESS NOTE ADULT - PROBLEM SELECTOR PLAN 4
she refused Heparin 5000 units SC q8h for DVT prophylaxis  place scd instead and patient ambulates frequently  General precautions    patient is ambulating the halls.. etiology unclear ,    check xray , elevation, check cpk, uric acid , continue with pain meds

## 2018-04-07 NOTE — PROGRESS NOTE ADULT - PROBLEM SELECTOR PLAN 5
she refused Heparin 5000 units SC q8h for DVT prophylaxis  place scd instead and patient ambulates frequently  General precautions    patient is ambulating the halls..

## 2018-04-07 NOTE — PROGRESS NOTE ADULT - PROBLEM SELECTOR PLAN 1
s/p indium scan with positive results in b/l hips more on left suggestive of infection    blood cultures ngtd   wbc downtrending   continue with  antibx    checked vanco trough and wnl    was  for aspiration of bilateral fluid noted in hip joints on 4/6/18 procedure aborted due to extreme pain per technician. will try again on Monday under general anesthesia , ortho aware

## 2018-04-08 LAB
ANION GAP SERPL CALC-SCNC: 4 MMOL/L — LOW (ref 5–17)
BUN SERPL-MCNC: 13 MG/DL — SIGNIFICANT CHANGE UP (ref 7–23)
CALCIUM SERPL-MCNC: 8.9 MG/DL — SIGNIFICANT CHANGE UP (ref 8.5–10.1)
CHLORIDE SERPL-SCNC: 108 MMOL/L — SIGNIFICANT CHANGE UP (ref 96–108)
CK SERPL-CCNC: 46 U/L — SIGNIFICANT CHANGE UP (ref 26–192)
CO2 SERPL-SCNC: 28 MMOL/L — SIGNIFICANT CHANGE UP (ref 22–31)
CREAT SERPL-MCNC: 0.7 MG/DL — SIGNIFICANT CHANGE UP (ref 0.5–1.3)
CRP SERPL-MCNC: 5.9 MG/DL — HIGH (ref 0–0.4)
ERYTHROCYTE [SEDIMENTATION RATE] IN BLOOD: 86 MM/HR — HIGH (ref 0–15)
GLUCOSE SERPL-MCNC: 128 MG/DL — HIGH (ref 70–99)
HCT VFR BLD CALC: 32.2 % — LOW (ref 34.5–45)
HGB BLD-MCNC: 10.4 G/DL — LOW (ref 11.5–15.5)
MAGNESIUM SERPL-MCNC: 2.3 MG/DL — SIGNIFICANT CHANGE UP (ref 1.6–2.6)
MCHC RBC-ENTMCNC: 28 PG — SIGNIFICANT CHANGE UP (ref 27–34)
MCHC RBC-ENTMCNC: 32.3 GM/DL — SIGNIFICANT CHANGE UP (ref 32–36)
MCV RBC AUTO: 86.6 FL — SIGNIFICANT CHANGE UP (ref 80–100)
NRBC # BLD: 0 /100 WBCS — SIGNIFICANT CHANGE UP (ref 0–0)
PHOSPHATE SERPL-MCNC: 3.5 MG/DL — SIGNIFICANT CHANGE UP (ref 2.5–4.5)
PLATELET # BLD AUTO: 412 K/UL — HIGH (ref 150–400)
POTASSIUM SERPL-MCNC: 4.2 MMOL/L — SIGNIFICANT CHANGE UP (ref 3.5–5.3)
POTASSIUM SERPL-SCNC: 4.2 MMOL/L — SIGNIFICANT CHANGE UP (ref 3.5–5.3)
RBC # BLD: 3.72 M/UL — LOW (ref 3.8–5.2)
RBC # FLD: 13.7 % — SIGNIFICANT CHANGE UP (ref 10.3–14.5)
SODIUM SERPL-SCNC: 140 MMOL/L — SIGNIFICANT CHANGE UP (ref 135–145)
URATE SERPL-MCNC: 3.2 MG/DL — SIGNIFICANT CHANGE UP (ref 2.5–7)
WBC # BLD: 13.18 K/UL — HIGH (ref 3.8–10.5)
WBC # FLD AUTO: 13.18 K/UL — HIGH (ref 3.8–10.5)

## 2018-04-08 PROCEDURE — 99233 SBSQ HOSP IP/OBS HIGH 50: CPT

## 2018-04-08 RX ORDER — MORPHINE SULFATE 50 MG/1
2 CAPSULE, EXTENDED RELEASE ORAL ONCE
Qty: 0 | Refills: 0 | Status: DISCONTINUED | OUTPATIENT
Start: 2018-04-08 | End: 2018-04-08

## 2018-04-08 RX ORDER — ONDANSETRON 8 MG/1
4 TABLET, FILM COATED ORAL ONCE
Qty: 0 | Refills: 0 | Status: COMPLETED | OUTPATIENT
Start: 2018-04-08 | End: 2018-04-08

## 2018-04-08 RX ORDER — HEPARIN SODIUM 5000 [USP'U]/ML
5000 INJECTION INTRAVENOUS; SUBCUTANEOUS EVERY 8 HOURS
Qty: 0 | Refills: 0 | Status: DISCONTINUED | OUTPATIENT
Start: 2018-04-08 | End: 2018-04-08

## 2018-04-08 RX ADMIN — CEFTRIAXONE 100 GRAM(S): 500 INJECTION, POWDER, FOR SOLUTION INTRAMUSCULAR; INTRAVENOUS at 17:50

## 2018-04-08 RX ADMIN — MORPHINE SULFATE 2 MILLIGRAM(S): 50 CAPSULE, EXTENDED RELEASE ORAL at 07:07

## 2018-04-08 RX ADMIN — OXYCODONE AND ACETAMINOPHEN 1 TABLET(S): 5; 325 TABLET ORAL at 07:00

## 2018-04-08 RX ADMIN — OXYCODONE AND ACETAMINOPHEN 1 TABLET(S): 5; 325 TABLET ORAL at 15:04

## 2018-04-08 RX ADMIN — OXYCODONE AND ACETAMINOPHEN 1 TABLET(S): 5; 325 TABLET ORAL at 14:09

## 2018-04-08 RX ADMIN — OXYCODONE AND ACETAMINOPHEN 1 TABLET(S): 5; 325 TABLET ORAL at 00:51

## 2018-04-08 RX ADMIN — Medication 250 MILLIGRAM(S): at 22:01

## 2018-04-08 RX ADMIN — MORPHINE SULFATE 2 MILLIGRAM(S): 50 CAPSULE, EXTENDED RELEASE ORAL at 06:52

## 2018-04-08 RX ADMIN — OXYCODONE AND ACETAMINOPHEN 1 TABLET(S): 5; 325 TABLET ORAL at 20:41

## 2018-04-08 RX ADMIN — ONDANSETRON 4 MILLIGRAM(S): 8 TABLET, FILM COATED ORAL at 09:11

## 2018-04-08 RX ADMIN — OXYCODONE AND ACETAMINOPHEN 1 TABLET(S): 5; 325 TABLET ORAL at 19:41

## 2018-04-08 RX ADMIN — Medication 250 MILLIGRAM(S): at 13:43

## 2018-04-08 RX ADMIN — OXYCODONE AND ACETAMINOPHEN 1 TABLET(S): 5; 325 TABLET ORAL at 06:00

## 2018-04-08 RX ADMIN — LIDOCAINE 1 PATCH: 4 CREAM TOPICAL at 05:51

## 2018-04-08 RX ADMIN — Medication 250 MILLIGRAM(S): at 05:51

## 2018-04-08 NOTE — PROGRESS NOTE ADULT - PROBLEM SELECTOR PLAN 5
she refused Heparin 5000 units SC q8h for DVT prophylaxis  place scd instead and patient ambulates frequently  General precautions    patient is ambulating the halls.. she refused Heparin 5000 units SC q8h for DVT prophylaxis  order  placed for  scd instead and patient ambulates frequently she refuses SCD   d/w patient risks and benefits and voiced understanding   .  General precautions    patient is ambulating the halls..

## 2018-04-08 NOTE — PROGRESS NOTE ADULT - SUBJECTIVE AND OBJECTIVE BOX
Patient is a 27y old  Female who presents with a chief complaint of Sudden right hip pain (01 Apr 2018 21:27)      OVERNIGHT EVENTS: still c/o pain in wrist and hand on left    REVIEW OF SYSTEMS: denies chest pain/SOB, diaphoresis, no F/C, cough, dizziness, headache, blurry vision, nausea, vomiting, abdominal pain. Rest unremarkable     MEDICATIONS  (STANDING):  cefTRIAXone   IVPB 2 Gram(s) IV Intermittent every 24 hours  sodium chloride 0.9%. 1000 milliLiter(s) (100 mL/Hr) IV Continuous <Continuous>  vancomycin  IVPB 1000 milliGRAM(s) IV Intermittent every 8 hours    MEDICATIONS  (PRN):  acetaminophen   Tablet 650 milliGRAM(s) Oral every 6 hours PRN For Temp greater than 38 C (100.4 F)  acetaminophen   Tablet. 650 milliGRAM(s) Oral every 6 hours PRN Mild Pain (1 - 3)  oxyCODONE    5 mG/acetaminophen 325 mG 1 Tablet(s) Oral every 4 hours PRN Moderate Pain (4 - 6)  Allergies    No Known Allergies    Intolerances      SUBJECTIVE: in bed in NAD, no acute events overnight     Vital Signs Last 24 Hrs  T(C): 37.3 (07 Apr 2018 04:56), Max: 37.3 (07 Apr 2018 04:53)  T(F): 99.1 (07 Apr 2018 04:56), Max: 99.1 (07 Apr 2018 04:53)  HR: 76 (07 Apr 2018 04:56) (71 - 94)  BP: 91/52 (07 Apr 2018 04:56) (91/52 - 134/58)  BP(mean): --  RR: 521 (07 Apr 2018 04:56) (16 - 521)  SpO2: 99% (07 Apr 2018 04:56) (96% - 99%)    PHYSICAL EXAM:  GENERAL: NAD, well-groomed, well-developed  HEAD:  Atraumatic, Normocephalic  EYES: EOMI, PERRLA, conjunctiva and sclera clear  ENMT: No tonsillar erythema, exudates, or enlargement; Moist mucous membranes, Good dentition, No lesions  NECK: Supple, No JVD, Normal thyroid  CHEST/LUNG: Clear to  auscultation bilaterally; No rales, rhonchi, wheezing, or rubs  bilaterally  HEART: Regular rate and rhythm; No murmurs, rubs, or gallops  ABDOMEN: Soft, Nontender, Nondistended; Bowel sounds present  EXTREMITIES:  2+ Peripheral Pulses, No clubbing, cyanosis, or edema BL LE  SKIN: No rashes or lesions  NERVOUS SYSTEM:  Alert & Oriented X3, Good concentration; Motor Strength 5/5 B/L upper and lower extremities; pain in left wrist, dorsum and palm of left hand, and left biceps   pain in left hip with rotation. has left midline     DTRs 2+ intact and symmetric, sensation intact BL    LABS:                                                               11.9   14.77 )-----------( 336      ( 07 Apr 2018 07:58 )             37.1     04-06    141  |  108  |  13  ----------------------------<  115<H>  4.3   |  27  |  0.61    Ca    9.5      06 Apr 2018 07:54  Phos  3.6     04-06  Mg     2.2     04-06      CAPILLARY BLOOD GLUCOSE        Lipid panel:     CARDIAC MARKERS ( 01 Apr 2018 14:50 )  x     / x     / 76 U/L / x     / x            RADIOLOGY & ADDITIONAL TESTS:    < from: Xray Lumbosacral Spine + Obliques (04.01.18 @ 22:42) >  IMPRESSION:    Chronic compression deformity of L4. No acute fracture seen.        < from: CT Lumbar Spine w/ IV Cont (04.01.18 @ 20:19) >  IMPRESSION:    No evidence of a pulmonary embolism    Trace right lung tree-in-bud opacities may reflect small airways disease.    Cholelithiasis    No lumbar spine fracture.  No gross intraspinal abnormality or stenosis.   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 On the left there is a   moderate left hip joint effusion. There is a cystic focus within the   superomedial aspect of the left acetabulum concerning for osteolysis as   there is no correlate evident on the prior radiograph from September 21, 2015. On the right there is a small hip joint effusion without evidence   of osteolysis or loosening. There is mild intramuscular edema within the   proximal quadriceps muscles bilaterally and within the right adductor   muscles. Differential considerations include infectious and inflammatory   etiologies, correlate clinically.    Nonspecific focus of increased or signal within the mid shaft of the   right femur which is partially imaged. No definite corresponding T1   signal abnormality is noted. This may be artifactual, however a bone   infarct or a similar appearance. Consider dedicated imaging of this   region as clinically indicated.        < end of copied text >      Imaging Personally Reviewed:  [ x] YES      Consultant(s) Notes Reviewed:  [x ] YES     Care Discussed with [x ] Consultants [X ] Patient [x ] Family  [x ]    [x ]  Other; RN

## 2018-04-08 NOTE — PROGRESS NOTE ADULT - SUBJECTIVE AND OBJECTIVE BOX
INTERVAL HPI/OVERNIGHT EVENTS:    Patient lying comfortably.  Hip pain slow to impove.  Complaint of L wrist/hand pain.  WBC trending down.  Afebrile.      Vital Signs Last 24 Hrs  T(C): 36.7 (08 Apr 2018 05:12), Max: 37.2 (08 Apr 2018 00:19)  T(F): 98.1 (08 Apr 2018 05:12), Max: 98.9 (08 Apr 2018 00:19)  HR: 86 (08 Apr 2018 05:12) (85 - 109)  BP: 104/52 (08 Apr 2018 05:12) (98/59 - 115/64)  BP(mean): --  RR: 18 (08 Apr 2018 05:12) (16 - 18)  SpO2: 99% (08 Apr 2018 05:12) (98% - 99%)    MEDICATIONS  (STANDING):  cefTRIAXone   IVPB 2 Gram(s) IV Intermittent every 24 hours  heparin  Injectable 5000 Unit(s) SubCutaneous every 8 hours  sodium chloride 0.9%. 1000 milliLiter(s) (100 mL/Hr) IV Continuous <Continuous>  vancomycin  IVPB 1000 milliGRAM(s) IV Intermittent every 8 hours    MEDICATIONS  (PRN):  acetaminophen   Tablet 650 milliGRAM(s) Oral every 6 hours PRN For Temp greater than 38 C (100.4 F)  acetaminophen   Tablet. 650 milliGRAM(s) Oral every 6 hours PRN Mild Pain (1 - 3)  oxyCODONE    5 mG/acetaminophen 325 mG 1 Tablet(s) Oral every 4 hours PRN Moderate Pain (4 - 6)      PHYSICAL EXAM:    GENERAL: NAD  HEAD:  Atraumatic, Normocephalic  EYES: EOMI, PERRLA, conjunctiva and sclera clear  CHEST/LUNG: Clear to ausculation, bilaterally   HEART: S1S2  ABDOMEN: non distended, +BS, soft, non tender, no guarding  B/L:  limited ROM due to pain.  NV intact   EXTREMITIES:  calf soft, non tender     I&O's Detail    07 Apr 2018 07:01  -  08 Apr 2018 07:00  --------------------------------------------------------  IN:    Oral Fluid: 325 mL  Total IN: 325 mL    OUT:  Total OUT: 0 mL    Total NET: 325 mL      08 Apr 2018 07:01  -  08 Apr 2018 11:23  --------------------------------------------------------  IN:    Oral Fluid: 350 mL  Total IN: 350 mL    OUT:  Total OUT: 0 mL    Total NET: 350 mL          LABS:                        10.4   13.18 )-----------( 412      ( 08 Apr 2018 07:12 )             32.2     04-08    140  |  108  |  13  ----------------------------<  128<H>  4.2   |  28  |  0.70    Ca    8.9      08 Apr 2018 07:12  Phos  3.5     04-08  Mg     2.3     04-08      PT/INR - ( 06 Apr 2018 12:29 )   PT: 11.8 sec;   INR: 1.08 ratio           RADIOLOGY & ADDITIONAL STUDIES:  < from: Xray Wrist w/Navicular View, Left (04.07.18 @ 13:59) >  There is no fracture dislocation. The soft tissues are unremarkable.    Impression:    No acute process.    < end of copied text >    < from: Xray Hand 2 Views, Left (04.07.18 @ 13:58) >  There is no fracture dislocation. The soft tissues are unremarkable.    Impression:    No acute process.    < end of copied text >    < from: MR Hip No Cont, Bilateral (04.02.18 @ 17:22) >    Status post bilateral total hip arthroplasty. On the left there is a   moderate left hip joint effusion. There is a cystic focus within the   superomedial aspect of the left acetabulum concerning for osteolysis as   there is no correlate evident on the prior radiograph from September 21, 2015. On the right there is a small hip joint effusion without evidence   of osteolysis or loosening. There is mild intramuscular edema within the   proximal quadriceps muscles bilaterally and within the right adductor   muscles. Differential considerations include infectious and inflammatory   etiologies, correlate clinically.    Nonspecific focus of increased or signal within the mid shaft of the   right femur which is partially imaged. No definite corresponding T1   signal abnormality is noted. This may be artifactual, however a bone   infarct or a similar appearance. Consider dedicated imaging of this   region as clinically indicated.    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 Donovan

## 2018-04-08 NOTE — PROGRESS NOTE ADULT - PROBLEM SELECTOR PLAN 4
etiology unclear ,    checked xray and negative   pt declined having labs drawn and she is ahrd stick   also recommend d/c midline and obtaining peripheral; EJ or placing another midline in other arm and she declines that as well    will have covering MD nain tolliver in am

## 2018-04-08 NOTE — PROGRESS NOTE ADULT - PROBLEM SELECTOR PLAN 1
s/p indium scan with positive results in b/l hips more on left suggestive of infection  blood cultures ngtd   wbc downtrending   continue with  antibx       was  for aspiration of bilateral fluid noted in hip joints on 4/6/18 procedure aborted due to extreme pain per technician. will try again on Monday under general anesthesia , ortho aware

## 2018-04-09 LAB
ANION GAP SERPL CALC-SCNC: 7 MMOL/L — SIGNIFICANT CHANGE UP (ref 5–17)
BUN SERPL-MCNC: 9 MG/DL — SIGNIFICANT CHANGE UP (ref 7–23)
CALCIUM SERPL-MCNC: 9.1 MG/DL — SIGNIFICANT CHANGE UP (ref 8.5–10.1)
CHLORIDE SERPL-SCNC: 103 MMOL/L — SIGNIFICANT CHANGE UP (ref 96–108)
CO2 SERPL-SCNC: 28 MMOL/L — SIGNIFICANT CHANGE UP (ref 22–31)
CREAT SERPL-MCNC: 0.53 MG/DL — SIGNIFICANT CHANGE UP (ref 0.5–1.3)
GLUCOSE SERPL-MCNC: 113 MG/DL — HIGH (ref 70–99)
HCT VFR BLD CALC: 32.5 % — LOW (ref 34.5–45)
HGB BLD-MCNC: 10.6 G/DL — LOW (ref 11.5–15.5)
MCHC RBC-ENTMCNC: 28.3 PG — SIGNIFICANT CHANGE UP (ref 27–34)
MCHC RBC-ENTMCNC: 32.6 GM/DL — SIGNIFICANT CHANGE UP (ref 32–36)
MCV RBC AUTO: 86.7 FL — SIGNIFICANT CHANGE UP (ref 80–100)
NRBC # BLD: 0 /100 WBCS — SIGNIFICANT CHANGE UP (ref 0–0)
PLATELET # BLD AUTO: 458 K/UL — HIGH (ref 150–400)
POTASSIUM SERPL-MCNC: 4 MMOL/L — SIGNIFICANT CHANGE UP (ref 3.5–5.3)
POTASSIUM SERPL-SCNC: 4 MMOL/L — SIGNIFICANT CHANGE UP (ref 3.5–5.3)
RBC # BLD: 3.75 M/UL — LOW (ref 3.8–5.2)
RBC # FLD: 13.7 % — SIGNIFICANT CHANGE UP (ref 10.3–14.5)
SODIUM SERPL-SCNC: 138 MMOL/L — SIGNIFICANT CHANGE UP (ref 135–145)
VANCOMYCIN TROUGH SERPL-MCNC: 17.5 UG/ML — SIGNIFICANT CHANGE UP (ref 10–20)
WBC # BLD: 13.08 K/UL — HIGH (ref 3.8–10.5)
WBC # FLD AUTO: 13.08 K/UL — HIGH (ref 3.8–10.5)

## 2018-04-09 PROCEDURE — 76937 US GUIDE VASCULAR ACCESS: CPT | Mod: 26

## 2018-04-09 PROCEDURE — 99233 SBSQ HOSP IP/OBS HIGH 50: CPT

## 2018-04-09 PROCEDURE — 77001 FLUOROGUIDE FOR VEIN DEVICE: CPT | Mod: 26

## 2018-04-09 PROCEDURE — 36569 INSJ PICC 5 YR+ W/O IMAGING: CPT

## 2018-04-09 RX ORDER — DOCUSATE SODIUM 100 MG
100 CAPSULE ORAL DAILY
Qty: 0 | Refills: 0 | Status: DISCONTINUED | OUTPATIENT
Start: 2018-04-09 | End: 2018-04-12

## 2018-04-09 RX ORDER — ACETAMINOPHEN 500 MG
1000 TABLET ORAL ONCE
Qty: 0 | Refills: 0 | Status: COMPLETED | OUTPATIENT
Start: 2018-04-09 | End: 2018-04-09

## 2018-04-09 RX ADMIN — Medication 650 MILLIGRAM(S): at 15:22

## 2018-04-09 RX ADMIN — Medication 650 MILLIGRAM(S): at 16:20

## 2018-04-09 RX ADMIN — Medication 100 MILLIGRAM(S): at 18:52

## 2018-04-09 RX ADMIN — Medication 1000 MILLIGRAM(S): at 01:50

## 2018-04-09 RX ADMIN — Medication 250 MILLIGRAM(S): at 05:52

## 2018-04-09 RX ADMIN — SODIUM CHLORIDE 100 MILLILITER(S): 9 INJECTION INTRAMUSCULAR; INTRAVENOUS; SUBCUTANEOUS at 17:31

## 2018-04-09 RX ADMIN — CEFTRIAXONE 100 GRAM(S): 500 INJECTION, POWDER, FOR SOLUTION INTRAMUSCULAR; INTRAVENOUS at 17:33

## 2018-04-09 RX ADMIN — Medication 400 MILLIGRAM(S): at 01:36

## 2018-04-09 RX ADMIN — Medication 250 MILLIGRAM(S): at 22:05

## 2018-04-09 NOTE — PROGRESS NOTE ADULT - ASSESSMENT
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

## 2018-04-09 NOTE — DIETITIAN INITIAL EVALUATION ADULT. - NS AS NUTRI INTERV ED CONTENT
Purpose of the nutrition education/Recommended modifications/"weight management"; 1500 calorie 5-day sample menu/Nutrition relationship to health/disease

## 2018-04-09 NOTE — CHART NOTE - NSCHARTNOTEFT_GEN_A_CORE
b/l hip aspiration deferred for Wednesday.  Pt needed to be NPO for sedation, but family brought in breakfast and pt ate.   -NPO Tuesday midnight

## 2018-04-09 NOTE — PROGRESS NOTE ADULT - SUBJECTIVE AND OBJECTIVE BOX
Patient seen and examined bedside resting comfortably.  C/O pain in her left arm from the midline. She does not c/o pain in legs.  Denies nausea and vomiting. Tolerating diet.  Flatus/BM. +  Denies chest pain, dyspnea, cough.  T(F): 97 (04-09-18 @ 04:59), Max: 98.3 (04-08-18 @ 19:21)  HR: 68 (04-09-18 @ 04:59) (68 - 93)  BP: 102/61 (04-09-18 @ 04:59) (102/61 - 115/72)  RR: 14 (04-09-18 @ 04:59) (14 - 16)  SpO2: 98% (04-09-18 @ 04:59) (96% - 98%)    PHYSICAL EXAM:  General: NAD, WDWN  Neuro:  Alert & oriented x 3. Visually impaired.  left arm: tenderness around the insertion site of the midline. No purulence.  ambulating without difficulty now.      LABS:                        10.6   13.08 )-----------( 458      ( 09 Apr 2018 08:22 )             32.5     04-09    138  |  103  |  9   ----------------------------<  113<H>  4.0   |  28  |  0.53    Ca    9.1      09 Apr 2018 08:22  Phos  3.5     04-08  Mg     2.3     04-08        I&O's Detail    08 Apr 2018 07:01  -  09 Apr 2018 07:00  --------------------------------------------------------  IN:    Oral Fluid: 800 mL    sodium chloride 0.9%.: 1000 mL    Solution: 350 mL  Total IN: 2150 mL    OUT:  Total OUT: 0 mL    Total NET: 2150 mL    Impression: 27y Female admitted with fluid collections in both hips. Pt was scheduled for IR drainage of these collections this morning. However, the family brought in food earlier this morning & she ate it. Pt needed to be NPO because sedation was going to be given to the pt.  -extravasation vs phlebitis of left arm midline. She was evaluated by IR PA who plans to remove the midline from the left arm and place a PICC in the right arm today.  - continued lekocytosis - stable for the past 3 days.      Plan:  - continue IVAB per ID  - continue medical f/u  - IR f/u  -f/u AM labs  -Had discussed pt earlier with Dr. Donovan. Will advise him about aspiration not being done & iv access status.

## 2018-04-09 NOTE — DIETITIAN INITIAL EVALUATION ADULT. - PERTINENT LABORATORY DATA
04-09 Na138 mmol/L Glu 113 mg/dL<H> K+ 4.0 mmol/L Cr  0.53 mg/dL BUN 9 mg/dL Phos n/a   Alb n/a   PAB n/a

## 2018-04-09 NOTE — PROGRESS NOTE ADULT - PROBLEM SELECTOR PLAN 1
prosthetic septic hip arthritis  both indium scan + and MRI shows left joint effusion   Arthrocentesis required: to be done today under general anesthesia  will cont vanco and Rocephin at this time  fluid analysis and c/s required  anticipating 6 weeks of iv antibiotics + rifampin via picc line

## 2018-04-09 NOTE — DIETITIAN INITIAL EVALUATION ADULT. - ENERGY NEEDS
Height (cm): 162.56 (04-03)  Weight (kg): 76.3 (04-08)  BMI (kg/m2): 28.9 (04-08)  Ideal Body Weight: 54.5 kg +/- 10%   % Ideal Body Weight: 140%

## 2018-04-09 NOTE — DIETITIAN INITIAL EVALUATION ADULT. - ORAL INTAKE PTA
good/pt c very good appetite. she lives c her family. pt mom cooks but she doesn't eat her food. she eats outside food (fast food)

## 2018-04-09 NOTE — PROGRESS NOTE ADULT - SUBJECTIVE AND OBJECTIVE BOX
Patient is a 27y old  Female who presents with a chief complaint of Sudden right hip pain (01 Apr 2018 21:27)      INTERVAL HPI / OVERNIGHT EVENTS: less left hip pain     MEDICATIONS  (STANDING):  cefTRIAXone   IVPB 2 Gram(s) IV Intermittent every 24 hours  sodium chloride 0.9%. 1000 milliLiter(s) (100 mL/Hr) IV Continuous <Continuous>  vancomycin  IVPB 1000 milliGRAM(s) IV Intermittent every 8 hours    MEDICATIONS  (PRN):  acetaminophen   Tablet 650 milliGRAM(s) Oral every 6 hours PRN For Temp greater than 38 C (100.4 F)  acetaminophen   Tablet. 650 milliGRAM(s) Oral every 6 hours PRN Mild Pain (1 - 3)      Vital Signs Last 24 Hrs  T(C): 36.1 (09 Apr 2018 04:59), Max: 36.8 (08 Apr 2018 19:21)  T(F): 97 (09 Apr 2018 04:59), Max: 98.3 (08 Apr 2018 19:21)  HR: 68 (09 Apr 2018 04:59) (68 - 93)  BP: 102/61 (09 Apr 2018 04:59) (102/61 - 115/72)  BP(mean): --  RR: 14 (09 Apr 2018 04:59) (14 - 16)  SpO2: 98% (09 Apr 2018 04:59) (96% - 98%)    PHYSICAL EXAM:  General :NAD  Constitutional:  well-groomed, well-developed  Respiratory: CTAB/L  Cardiovascular: S1 and S2, RRR, no M/G/R  Gastrointestinal: BS+, soft, NT/ND  Extremities: No peripheral edema  Vascular: 2+ peripheral pulses  Skin: No rashes      LABS:                        10.6   13.08 )-----------( 458      ( 09 Apr 2018 08:22 )             32.5     04-09    138  |  103  |  9   ----------------------------<  113<H>  4.0   |  28  |  0.53    Ca    9.1      09 Apr 2018 08:22  Phos  3.5     04-08  Mg     2.3     04-08            MICROBIOLOGY:  RECENT CULTURES:        RADIOLOGY & ADDITIONAL STUDIES:

## 2018-04-09 NOTE — PROGRESS NOTE ADULT - SUBJECTIVE AND OBJECTIVE BOX
Patient is a 27y old  Female who presents with a chief complaint of Sudden right hip pain (01 Apr 2018 21:27)      OVERNIGHT EVENTS: mild L wrist pain    REVIEW OF SYSTEMS: denies chest pain/SOB, diaphoresis, no F/C, cough, dizziness, headache, blurry vision, nausea, vomiting, abdominal pain. All others review of systems negative     MEDICATIONS  (STANDING):  cefTRIAXone   IVPB 2 Gram(s) IV Intermittent every 24 hours  sodium chloride 0.9%. 1000 milliLiter(s) (100 mL/Hr) IV Continuous <Continuous>  vancomycin  IVPB 1000 milliGRAM(s) IV Intermittent every 8 hours    MEDICATIONS  (PRN):  acetaminophen   Tablet 650 milliGRAM(s) Oral every 6 hours PRN For Temp greater than 38 C (100.4 F)  acetaminophen   Tablet. 650 milliGRAM(s) Oral every 6 hours PRN Mild Pain (1 - 3)      Allergies    No Known Allergies    Intolerances        T(F): 97 (04-09-18 @ 04:59), Max: 98.3 (04-08-18 @ 19:21)  HR: 68 (04-09-18 @ 04:59) (68 - 93)  BP: 102/61 (04-09-18 @ 04:59) (102/61 - 115/72)  RR: 14 (04-09-18 @ 04:59) (14 - 16)  SpO2: 98% (04-09-18 @ 04:59) (96% - 98%)  Wt(kg): --    PHYSICAL EXAM:  GENERAL: NAD, well-groomed, well-developed  HEAD:  Atraumatic, Normocephalic  EYES: EOMI, PERRLA, conjunctiva and sclera clear  ENMT: No tonsillar erythema, exudates, or enlargement; Moist mucous membranes, Good dentition, No lesions  NECK: Supple, No JVD, Normal thyroid  NERVOUS SYSTEM:  Alert & Oriented X3, Good concentration; Motor Strength 5/5 B/L upper and lower extremities; DTRs 2+ intact and symmetric, decreased ROM of L wrist due to pain   CHEST/LUNG: Clear to percussion bilaterally; No rales, rhonchi, wheezing, or rubs BL  HEART: Regular rate and rhythm; No murmurs, rubs, or gallops  ABDOMEN: Soft, Nontender, Nondistended; Bowel sounds present  EXTREMITIES:  2+ Peripheral Pulses, No clubbing, cyanosis, or edema BL LE  LYMPH: No lymphadenopathy noted  SKIN: No rashes or lesions    LABS:                        10.6   13.08 )-----------( 458      ( 09 Apr 2018 08:22 )             32.5     04-09    138  |  103  |  9   ----------------------------<  113<H>  4.0   |  28  |  0.53    Ca    9.1      09 Apr 2018 08:22  Phos  3.5     04-08  Mg     2.3     04-08          Cultures;   CAPILLARY BLOOD GLUCOSE        Lipid panel:     CARDIAC MARKERS ( 08 Apr 2018 07:12 )  x     / x     / 46 U/L / x     / x            RADIOLOGY & ADDITIONAL TESTS:    Imaging Personally Reviewed:  [x ] YES    < from: Xray Wrist w/Navicular View, Left (04.07.18 @ 13:59) >    No acute process.    < end of copied text >      Consultant(s) Notes Reviewed:  [ x] YES     Care Discussed with [x ] Consultants [X ] Patient [ ] Family  [x ]    [x ]  Other; RN

## 2018-04-09 NOTE — DIETITIAN INITIAL EVALUATION ADULT. - OTHER INFO
pt seen due to LOS. good appetite, no N/V/C/D. regular BM. going for aspiration of b/l fluid in hip joints. pt was supposed to see a dietitian 2 weeks ago (had to cancel appt); PCP advised she go for wt loss counseling since itll help reduce hip pain. pt does minimal exercise; she is legally blind but says she can do everything.

## 2018-04-09 NOTE — PROGRESS NOTE ADULT - PROBLEM SELECTOR PLAN 1
-s/p indium scan with positive results in BL hips more on left suggestive of infection  blood cultures ngtd   -wbc downtrending   -continue with  antibx   -for aspiration of bilateral fluid noted in hip joints on 4/6/18

## 2018-04-09 NOTE — PROCEDURE NOTE - ADDITIONAL PROCEDURE DETAILS
pt s/p picc line placement inserted via right brachial vein.  Length -40cm.  Catheter tip in the SVC.  Pt tolerated procedure well pt s/p picc line placement inserted via right brachial vein.  Length -40cm.  Catheter tip in the SVC.  Pt tolerated procedure well.  -Left arm midline removed.  All 10cm and intact

## 2018-04-10 DIAGNOSIS — M25.552 PAIN IN LEFT HIP: ICD-10-CM

## 2018-04-10 LAB
ANION GAP SERPL CALC-SCNC: 6 MMOL/L — SIGNIFICANT CHANGE UP (ref 5–17)
BUN SERPL-MCNC: 8 MG/DL — SIGNIFICANT CHANGE UP (ref 7–23)
CALCIUM SERPL-MCNC: 8.9 MG/DL — SIGNIFICANT CHANGE UP (ref 8.5–10.1)
CHLORIDE SERPL-SCNC: 104 MMOL/L — SIGNIFICANT CHANGE UP (ref 96–108)
CO2 SERPL-SCNC: 29 MMOL/L — SIGNIFICANT CHANGE UP (ref 22–31)
CREAT SERPL-MCNC: 0.5 MG/DL — SIGNIFICANT CHANGE UP (ref 0.5–1.3)
GLUCOSE SERPL-MCNC: 133 MG/DL — HIGH (ref 70–99)
HCT VFR BLD CALC: 30.4 % — LOW (ref 34.5–45)
HGB BLD-MCNC: 9.8 G/DL — LOW (ref 11.5–15.5)
MCHC RBC-ENTMCNC: 27.6 PG — SIGNIFICANT CHANGE UP (ref 27–34)
MCHC RBC-ENTMCNC: 32.2 GM/DL — SIGNIFICANT CHANGE UP (ref 32–36)
MCV RBC AUTO: 85.6 FL — SIGNIFICANT CHANGE UP (ref 80–100)
NRBC # BLD: 0 /100 WBCS — SIGNIFICANT CHANGE UP (ref 0–0)
PLATELET # BLD AUTO: 467 K/UL — HIGH (ref 150–400)
POTASSIUM SERPL-MCNC: 3.9 MMOL/L — SIGNIFICANT CHANGE UP (ref 3.5–5.3)
POTASSIUM SERPL-SCNC: 3.9 MMOL/L — SIGNIFICANT CHANGE UP (ref 3.5–5.3)
RBC # BLD: 3.55 M/UL — LOW (ref 3.8–5.2)
RBC # FLD: 13.4 % — SIGNIFICANT CHANGE UP (ref 10.3–14.5)
SODIUM SERPL-SCNC: 139 MMOL/L — SIGNIFICANT CHANGE UP (ref 135–145)
WBC # BLD: 12.03 K/UL — HIGH (ref 3.8–10.5)
WBC # FLD AUTO: 12.03 K/UL — HIGH (ref 3.8–10.5)

## 2018-04-10 PROCEDURE — 99233 SBSQ HOSP IP/OBS HIGH 50: CPT

## 2018-04-10 RX ADMIN — Medication 650 MILLIGRAM(S): at 19:40

## 2018-04-10 RX ADMIN — Medication 650 MILLIGRAM(S): at 19:05

## 2018-04-10 RX ADMIN — SODIUM CHLORIDE 100 MILLILITER(S): 9 INJECTION INTRAMUSCULAR; INTRAVENOUS; SUBCUTANEOUS at 06:04

## 2018-04-10 RX ADMIN — Medication 250 MILLIGRAM(S): at 06:10

## 2018-04-10 RX ADMIN — Medication 250 MILLIGRAM(S): at 14:15

## 2018-04-10 RX ADMIN — SODIUM CHLORIDE 100 MILLILITER(S): 9 INJECTION INTRAMUSCULAR; INTRAVENOUS; SUBCUTANEOUS at 22:16

## 2018-04-10 RX ADMIN — Medication 100 MILLIGRAM(S): at 11:45

## 2018-04-10 RX ADMIN — CEFTRIAXONE 100 GRAM(S): 500 INJECTION, POWDER, FOR SOLUTION INTRAMUSCULAR; INTRAVENOUS at 17:49

## 2018-04-10 RX ADMIN — Medication 250 MILLIGRAM(S): at 22:16

## 2018-04-10 NOTE — PROGRESS NOTE ADULT - SUBJECTIVE AND OBJECTIVE BOX
Awaiting Fluid Aspiration of Hips    P: Pain Control     Continue Care as per Medicine     Ortho to F/U after Aspiration

## 2018-04-10 NOTE — PROGRESS NOTE ADULT - SUBJECTIVE AND OBJECTIVE BOX
Patient is a 27y old  Female who presents with a chief complaint of Sudden right hip pain (01 Apr 2018 21:27)      OVERNIGHT EVENTS: none    REVIEW OF SYSTEMS: denies chest pain/SOB, diaphoresis, no F/C, cough, dizziness, headache, blurry vision, nausea, vomiting, abdominal pain. All others review of systems negative     MEDICATIONS  (STANDING):  cefTRIAXone   IVPB 2 Gram(s) IV Intermittent every 24 hours  docusate sodium 100 milliGRAM(s) Oral daily  sodium chloride 0.9%. 1000 milliLiter(s) (100 mL/Hr) IV Continuous <Continuous>  vancomycin  IVPB 1000 milliGRAM(s) IV Intermittent every 8 hours    MEDICATIONS  (PRN):  acetaminophen   Tablet 650 milliGRAM(s) Oral every 6 hours PRN For Temp greater than 38 C (100.4 F)  acetaminophen   Tablet. 650 milliGRAM(s) Oral every 6 hours PRN Mild Pain (1 - 3)      Allergies    No Known Allergies    Intolerances        T(F): 98.3 (04-10-18 @ 11:37), Max: 98.8 (04-10-18 @ 05:29)  HR: 88 (04-10-18 @ 11:37) (74 - 94)  BP: 101/68 (04-10-18 @ 11:37) (101/68 - 122/75)  RR: 17 (04-10-18 @ 11:37) (16 - 18)  SpO2: 99% (04-10-18 @ 11:37) (97% - 99%)  Wt(kg): --    PHYSICAL EXAM:  GENERAL: NAD, well-groomed, well-developed  HEAD:  Atraumatic, Normocephalic  EYES: EOMI, PERRLA, conjunctiva and sclera clear  ENMT: No tonsillar erythema, exudates, or enlargement; Moist mucous membranes, Good dentition, No lesions  NECK: Supple, No JVD, Normal thyroid  NERVOUS SYSTEM:  Alert & Oriented X3, Good concentration; Motor Strength 5/5 B/L upper and lower extremities; DTRs 2+ intact and symmetric, decreased ROM of L wrist due to pain   CHEST/LUNG: Clear to percussion bilaterally; No rales, rhonchi, wheezing, or rubs BL  HEART: Regular rate and rhythm; No murmurs, rubs, or gallops  ABDOMEN: Soft, Nontender, Nondistended; Bowel sounds present  EXTREMITIES:  2+ Peripheral Pulses, No clubbing, cyanosis, or edema BL LE  LYMPH: No lymphadenopathy noted  SKIN: No rashes or lesions    LABS:                        9.8    12.03 )-----------( 467      ( 10 Apr 2018 08:14 )             30.4     04-10    139  |  104  |  8   ----------------------------<  133<H>  3.9   |  29  |  0.50    Ca    8.9      10 Apr 2018 08:14          Cultures;   CAPILLARY BLOOD GLUCOSE        RADIOLOGY & ADDITIONAL TESTS:    Imaging Personally Reviewed:  [x ] YES    < from: Xray Wrist w/Navicular View, Left (04.07.18 @ 13:59) >    No acute process.    < end of copied text >      Consultant(s) Notes Reviewed:  [ x] YES     Care Discussed with [x ] Consultants [X ] Patient [ ] Family  [x ]    [x ]  Other; RN

## 2018-04-11 ENCOUNTER — TRANSCRIPTION ENCOUNTER (OUTPATIENT)
Age: 28
End: 2018-04-11

## 2018-04-11 LAB
ANION GAP SERPL CALC-SCNC: 7 MMOL/L — SIGNIFICANT CHANGE UP (ref 5–17)
B PERT IGG+IGM PNL SER: ABNORMAL
BUN SERPL-MCNC: 9 MG/DL — SIGNIFICANT CHANGE UP (ref 7–23)
CALCIUM SERPL-MCNC: 9.1 MG/DL — SIGNIFICANT CHANGE UP (ref 8.5–10.1)
CHLORIDE SERPL-SCNC: 106 MMOL/L — SIGNIFICANT CHANGE UP (ref 96–108)
CO2 SERPL-SCNC: 27 MMOL/L — SIGNIFICANT CHANGE UP (ref 22–31)
COLOR FLD: SIGNIFICANT CHANGE UP
CREAT SERPL-MCNC: 0.43 MG/DL — LOW (ref 0.5–1.3)
FLUID INTAKE SUBSTANCE CLASS: SIGNIFICANT CHANGE UP
FLUID SEGMENTED GRANULOCYTES: 95 % — SIGNIFICANT CHANGE UP
GLUCOSE SERPL-MCNC: 113 MG/DL — HIGH (ref 70–99)
GRAM STN FLD: SIGNIFICANT CHANGE UP
HCT VFR BLD CALC: 30.8 % — LOW (ref 34.5–45)
HGB BLD-MCNC: 10.2 G/DL — LOW (ref 11.5–15.5)
LYMPHOCYTES # FLD: 3 % — SIGNIFICANT CHANGE UP
MCHC RBC-ENTMCNC: 28.7 PG — SIGNIFICANT CHANGE UP (ref 27–34)
MCHC RBC-ENTMCNC: 33.1 GM/DL — SIGNIFICANT CHANGE UP (ref 32–36)
MCV RBC AUTO: 86.5 FL — SIGNIFICANT CHANGE UP (ref 80–100)
MONOS+MACROS # FLD: 2 % — SIGNIFICANT CHANGE UP
NRBC # BLD: 0 /100 WBCS — SIGNIFICANT CHANGE UP (ref 0–0)
PLATELET # BLD AUTO: 495 K/UL — HIGH (ref 150–400)
POTASSIUM SERPL-MCNC: 3.9 MMOL/L — SIGNIFICANT CHANGE UP (ref 3.5–5.3)
POTASSIUM SERPL-SCNC: 3.9 MMOL/L — SIGNIFICANT CHANGE UP (ref 3.5–5.3)
RBC # BLD: 3.56 M/UL — LOW (ref 3.8–5.2)
RBC # FLD: 13.4 % — SIGNIFICANT CHANGE UP (ref 10.3–14.5)
RCV VOL RI: HIGH /UL (ref 0–5)
SODIUM SERPL-SCNC: 140 MMOL/L — SIGNIFICANT CHANGE UP (ref 135–145)
SPECIMEN SOURCE FLD: SIGNIFICANT CHANGE UP
SPECIMEN SOURCE: SIGNIFICANT CHANGE UP
SPECIMEN SOURCE: SIGNIFICANT CHANGE UP
TOTAL NUCLEATED CELL COUNT, BODY FLUID: HIGH /UL (ref 0–5)
TUBE TYPE: SIGNIFICANT CHANGE UP
VANCOMYCIN TROUGH SERPL-MCNC: 14.5 UG/ML — SIGNIFICANT CHANGE UP (ref 10–20)
WBC # BLD: 14.12 K/UL — HIGH (ref 3.8–10.5)
WBC # FLD AUTO: 14.12 K/UL — HIGH (ref 3.8–10.5)

## 2018-04-11 PROCEDURE — 77002 NEEDLE LOCALIZATION BY XRAY: CPT | Mod: 26,59

## 2018-04-11 PROCEDURE — 20610 DRAIN/INJ JOINT/BURSA W/O US: CPT | Mod: 50

## 2018-04-11 RX ORDER — OXYCODONE AND ACETAMINOPHEN 5; 325 MG/1; MG/1
1 TABLET ORAL EVERY 4 HOURS
Qty: 0 | Refills: 0 | Status: DISCONTINUED | OUTPATIENT
Start: 2018-04-11 | End: 2018-04-12

## 2018-04-11 RX ADMIN — Medication 250 MILLIGRAM(S): at 07:06

## 2018-04-11 RX ADMIN — Medication 250 MILLIGRAM(S): at 22:00

## 2018-04-11 RX ADMIN — SODIUM CHLORIDE 100 MILLILITER(S): 9 INJECTION INTRAMUSCULAR; INTRAVENOUS; SUBCUTANEOUS at 06:11

## 2018-04-11 RX ADMIN — OXYCODONE AND ACETAMINOPHEN 1 TABLET(S): 5; 325 TABLET ORAL at 15:34

## 2018-04-11 RX ADMIN — Medication 650 MILLIGRAM(S): at 07:58

## 2018-04-11 RX ADMIN — Medication 650 MILLIGRAM(S): at 11:26

## 2018-04-11 RX ADMIN — OXYCODONE AND ACETAMINOPHEN 1 TABLET(S): 5; 325 TABLET ORAL at 14:19

## 2018-04-11 RX ADMIN — OXYCODONE AND ACETAMINOPHEN 1 TABLET(S): 5; 325 TABLET ORAL at 21:57

## 2018-04-11 RX ADMIN — Medication 100 MILLIGRAM(S): at 11:26

## 2018-04-11 RX ADMIN — CEFTRIAXONE 100 GRAM(S): 500 INJECTION, POWDER, FOR SOLUTION INTRAMUSCULAR; INTRAVENOUS at 17:42

## 2018-04-11 RX ADMIN — Medication 250 MILLIGRAM(S): at 14:21

## 2018-04-11 RX ADMIN — OXYCODONE AND ACETAMINOPHEN 1 TABLET(S): 5; 325 TABLET ORAL at 22:57

## 2018-04-11 RX ADMIN — Medication 650 MILLIGRAM(S): at 07:08

## 2018-04-11 NOTE — PROGRESS NOTE ADULT - SUBJECTIVE AND OBJECTIVE BOX
Patient is a 27y old  Female who presents with a chief complaint of Sudden right hip pain (01 Apr 2018 21:27)      INTERVAL HPI/OVERNIGHT EVENTS:    Events noted  s/p left hip aspiration, microscopy- high wbc consistent with septic hip  ortho follow up noted    MEDICATIONS  (STANDING):  cefTRIAXone   IVPB 2 Gram(s) IV Intermittent every 24 hours  docusate sodium 100 milliGRAM(s) Oral daily  sodium chloride 0.9%. 1000 milliLiter(s) (100 mL/Hr) IV Continuous <Continuous>  vancomycin  IVPB 1000 milliGRAM(s) IV Intermittent every 8 hours    MEDICATIONS  (PRN):  acetaminophen   Tablet 650 milliGRAM(s) Oral every 6 hours PRN For Temp greater than 38 C (100.4 F)  acetaminophen   Tablet. 650 milliGRAM(s) Oral every 6 hours PRN Mild Pain (1 - 3)  oxyCODONE    5 mG/acetaminophen 325 mG 1 Tablet(s) Oral every 4 hours PRN Moderate Pain (4 - 6)        REVIEW OF SYSTEMS:  CONSTITUTIONAL: No fever, weight loss, or fatigue  EYES: No eye pain, visual disturbances, or discharge  ENMT:  No difficulty hearing, tinnitus, vertigo; No sinus or throat pain  NECK: No pain or stiffness  RESPIRATORY: No cough, wheezing, chills or hemoptysis; No shortness of breath  CARDIOVASCULAR: No chest pain, palpitations, dizziness, or leg swelling  GASTROINTESTINAL: No abdominal or epigastric pain. No nausea, vomiting, or hematemesis; No diarrhea or constipation. No melena or hematochezia.  GENITOURINARY: No dysuria, frequency, hematuria, or incontinence  NEUROLOGICAL: No headaches, memory loss, loss of strength, numbness, or tremors  SKIN: No itching, burning, rashes, or lesions      Vital Signs Last 24 Hrs  T(C): 36.9 (11 Apr 2018 17:52), Max: 37.1 (11 Apr 2018 05:00)  T(F): 98.5 (11 Apr 2018 17:52), Max: 98.7 (11 Apr 2018 05:00)  HR: 70 (11 Apr 2018 17:52) (66 - 83)  BP: 118/69 (11 Apr 2018 17:52) (88/59 - 118/69)  BP(mean): --  RR: 16 (11 Apr 2018 17:52) (16 - 18)  SpO2: 96% (11 Apr 2018 17:52) (96% - 100%)    PHYSICAL EXAM:  GENERAL: NAD, well-groomed, well-developed  HEAD:  Atraumatic, Normocephalic  EYES: EOMI, PERRLA, conjunctiva and sclera clear  ENMT: No tonsillar erythema, exudates, or enlargement; Moist mucous membranes   NECK: Supple, No JVD   NERVOUS SYSTEM:  Alert & Oriented X3, Good concentration; Motor Strength 5/5 B/L upper and lower extremities; DTRs 2+ intact and symmetric  CHEST/LUNG: Clear to percussion bilaterally; No rales, rhonchi, wheezing, or rubs  HEART: Regular rate and rhythm; No murmurs, rubs, or gallops  ABDOMEN: Soft, Nontender, Nondistended; Bowel sounds present  EXTREMITIES:  2+ Peripheral Pulses, No clubbing, cyanosis, or edema  LYMPH: No lymphadenopathy noted  SKIN: No rashes or lesions    LABS:                        10.2   14.12 )-----------( 495      ( 11 Apr 2018 06:39 )             30.8     04-11    140  |  106  |  9   ----------------------------<  113<H>  3.9   |  27  |  0.43<L>    Ca    9.1      11 Apr 2018 06:39          CAPILLARY BLOOD GLUCOSE          RADIOLOGY & ADDITIONAL TESTS:    Imaging Personally Reviewed:  [ ] YES  [ ] NO    Consultant(s) Notes Reviewed:  [ ] YES  [ ] NO    Care Discussed with Consultants/Other Providers [ ] YES  [ ] NO

## 2018-04-11 NOTE — PROGRESS NOTE ADULT - PROBLEM SELECTOR PLAN 2
-s/p indium scan with positive results in BL hips more on left suggestive of infection  blood cultures ngtd   -wbc downtrending   -continue with  antibx

## 2018-04-11 NOTE — PROGRESS NOTE ADULT - SUBJECTIVE AND OBJECTIVE BOX
Pt was seen and examined by Dr. Burns. After exam, pt had more pain in the Left hip than Right hip. IR aspiration done for bilateral hip, inadequate amount found on R hip joint for cell count, L hip joint was aspirated and 46,000 for cell count was present in L AMBER - indicating L AMBER septic joint. Pt will need acute orthopedic surgical intervention at this time. Treatment options discussed with pt as well as pt mother. Two options given to pt and pt mother. 1st option includes I&D and head/liner exchange. 2nd option includes removal of L AMBER and placement of Prostalac hip and then a second surgery will be needed 3 months later for re-implantation of L AMBER. Both options also include IV antibiotics for many weeks pending Infectious Disease team plan. Risks and benefits of both procedures explained to pt and pt mother. Pt and pt family to decide which surgery they would like. Will plan to do either surgery tomorrow. Medical team aware. Discussed case with dr. Eugene for medical optimization. NPO after midnight. Pt was seen and examined by Dr. Burns. After exam, pt had more pain in the Left hip than Right hip. IR aspiration done for bilateral hip, inadequate amount found on R hip joint for cell count, L hip joint was aspirated and 46,000 for cell count was present in L AMBER - indicating L AMBER septic joint. Pt will need acute orthopedic surgical intervention at this time. Treatment options discussed with pt as well as pt mother. Two options given to pt and pt mother. 1st option includes I&D and head/liner exchange. 2nd option includes removal of L AMBER and placement of Prostalac hip and then a second surgery will be needed 3 months later for re-implantation of L AMBER. Both options also include IV antibiotics for many weeks pending Infectious Disease team plan. Risks and benefits of both procedures explained to pt and pt mother. Pt and pt family to decide which surgery they would like. Will plan to do either surgery tomorrow. Medical team aware. Discussed case with dr. Eugene for medical optimization. NPO after midnight.     I personally saw and examined pt.  Spoke with family - we are planning removal HD and spacer- they agree with this plan -  Kervin Burns

## 2018-04-11 NOTE — PROGRESS NOTE ADULT - SUBJECTIVE AND OBJECTIVE BOX
VIR Procedure Note    Pre-Procedure Diagnosis: B/l hip pain and leukocytosis  Post-Procedure Diagnosis: Same as pre.    Procedure Details: Image guided hip aspirations. The left yielded 4cc of serosanguinous fluid, sent for culutre and cell count. The right hip yielded1-2cc of serosanguinous fluid, sent for culture Inadequate volume for cell count.     Complications: None  Blood loss: 5cc  Specimen: 4cc left hip, 1-2cc right hip.  Contrast: none  Sedation: Anesthesia    Plan: f/u labs    James Castanon MD  VIR

## 2018-04-11 NOTE — PROGRESS NOTE ADULT - PROBLEM SELECTOR PLAN 1
-s/p indium scan with positive results in BL hips more on left suggestive of infection  blood cultures ngtd   -wbc downtrending   -continue with  antibx   -aspiration of bilateral fluid noted

## 2018-04-12 ENCOUNTER — RESULT REVIEW (OUTPATIENT)
Age: 28
End: 2018-04-12

## 2018-04-12 LAB
ANION GAP SERPL CALC-SCNC: 7 MMOL/L — SIGNIFICANT CHANGE UP (ref 5–17)
BUN SERPL-MCNC: 10 MG/DL — SIGNIFICANT CHANGE UP (ref 7–23)
CALCIUM SERPL-MCNC: 9.2 MG/DL — SIGNIFICANT CHANGE UP (ref 8.5–10.1)
CHLORIDE SERPL-SCNC: 106 MMOL/L — SIGNIFICANT CHANGE UP (ref 96–108)
CO2 SERPL-SCNC: 26 MMOL/L — SIGNIFICANT CHANGE UP (ref 22–31)
CREAT SERPL-MCNC: 0.44 MG/DL — LOW (ref 0.5–1.3)
GLUCOSE SERPL-MCNC: 110 MG/DL — HIGH (ref 70–99)
HCG UR QL: NEGATIVE — SIGNIFICANT CHANGE UP
HCT VFR BLD CALC: 29.8 % — LOW (ref 34.5–45)
HCT VFR BLD CALC: 31.4 % — LOW (ref 34.5–45)
HGB BLD-MCNC: 10.1 G/DL — LOW (ref 11.5–15.5)
HGB BLD-MCNC: 9.4 G/DL — LOW (ref 11.5–15.5)
INR BLD: 1.26 RATIO — HIGH (ref 0.88–1.16)
MCHC RBC-ENTMCNC: 28.1 PG — SIGNIFICANT CHANGE UP (ref 27–34)
MCHC RBC-ENTMCNC: 28.2 PG — SIGNIFICANT CHANGE UP (ref 27–34)
MCHC RBC-ENTMCNC: 31.5 GM/DL — LOW (ref 32–36)
MCHC RBC-ENTMCNC: 32.2 GM/DL — SIGNIFICANT CHANGE UP (ref 32–36)
MCV RBC AUTO: 87.5 FL — SIGNIFICANT CHANGE UP (ref 80–100)
MCV RBC AUTO: 89.5 FL — SIGNIFICANT CHANGE UP (ref 80–100)
NRBC # BLD: 0 /100 WBCS — SIGNIFICANT CHANGE UP (ref 0–0)
NRBC # BLD: 0 /100 WBCS — SIGNIFICANT CHANGE UP (ref 0–0)
PLATELET # BLD AUTO: 458 K/UL — HIGH (ref 150–400)
PLATELET # BLD AUTO: 527 K/UL — HIGH (ref 150–400)
POTASSIUM SERPL-MCNC: 4 MMOL/L — SIGNIFICANT CHANGE UP (ref 3.5–5.3)
POTASSIUM SERPL-SCNC: 4 MMOL/L — SIGNIFICANT CHANGE UP (ref 3.5–5.3)
PROTHROM AB SERPL-ACNC: 13.8 SEC — HIGH (ref 9.8–12.7)
RBC # BLD: 3.33 M/UL — LOW (ref 3.8–5.2)
RBC # BLD: 3.59 M/UL — LOW (ref 3.8–5.2)
RBC # FLD: 13.4 % — SIGNIFICANT CHANGE UP (ref 10.3–14.5)
RBC # FLD: 13.7 % — SIGNIFICANT CHANGE UP (ref 10.3–14.5)
SODIUM SERPL-SCNC: 139 MMOL/L — SIGNIFICANT CHANGE UP (ref 135–145)
WBC # BLD: 13.3 K/UL — HIGH (ref 3.8–10.5)
WBC # BLD: 31.12 K/UL — HIGH (ref 3.8–10.5)
WBC # FLD AUTO: 13.3 K/UL — HIGH (ref 3.8–10.5)
WBC # FLD AUTO: 31.12 K/UL — HIGH (ref 3.8–10.5)

## 2018-04-12 PROCEDURE — 88304 TISSUE EXAM BY PATHOLOGIST: CPT | Mod: 26

## 2018-04-12 PROCEDURE — 88300 SURGICAL PATH GROSS: CPT | Mod: 26,59

## 2018-04-12 RX ORDER — ONDANSETRON 8 MG/1
4 TABLET, FILM COATED ORAL EVERY 6 HOURS
Qty: 0 | Refills: 0 | Status: DISCONTINUED | OUTPATIENT
Start: 2018-04-12 | End: 2018-04-12

## 2018-04-12 RX ORDER — CEFAZOLIN SODIUM 1 G
2000 VIAL (EA) INJECTION EVERY 8 HOURS
Qty: 0 | Refills: 0 | Status: COMPLETED | OUTPATIENT
Start: 2018-04-13 | End: 2018-04-13

## 2018-04-12 RX ORDER — ACETAMINOPHEN 500 MG
650 TABLET ORAL ONCE
Qty: 0 | Refills: 0 | Status: DISCONTINUED | OUTPATIENT
Start: 2018-04-12 | End: 2018-04-13

## 2018-04-12 RX ORDER — FOLIC ACID 0.8 MG
1 TABLET ORAL DAILY
Qty: 0 | Refills: 0 | Status: DISCONTINUED | OUTPATIENT
Start: 2018-04-12 | End: 2018-04-18

## 2018-04-12 RX ORDER — DOCUSATE SODIUM 100 MG
100 CAPSULE ORAL THREE TIMES A DAY
Qty: 0 | Refills: 0 | Status: DISCONTINUED | OUTPATIENT
Start: 2018-04-12 | End: 2018-04-18

## 2018-04-12 RX ORDER — OXYCODONE HYDROCHLORIDE 5 MG/1
10 TABLET ORAL EVERY 6 HOURS
Qty: 0 | Refills: 0 | Status: DISCONTINUED | OUTPATIENT
Start: 2018-04-12 | End: 2018-04-12

## 2018-04-12 RX ORDER — MAGNESIUM HYDROXIDE 400 MG/1
30 TABLET, CHEWABLE ORAL EVERY 12 HOURS
Qty: 0 | Refills: 0 | Status: DISCONTINUED | OUTPATIENT
Start: 2018-04-12 | End: 2018-04-18

## 2018-04-12 RX ORDER — PANTOPRAZOLE SODIUM 20 MG/1
40 TABLET, DELAYED RELEASE ORAL DAILY
Qty: 0 | Refills: 0 | Status: DISCONTINUED | OUTPATIENT
Start: 2018-04-12 | End: 2018-04-18

## 2018-04-12 RX ORDER — POLYETHYLENE GLYCOL 3350 17 G/17G
17 POWDER, FOR SOLUTION ORAL DAILY
Qty: 0 | Refills: 0 | Status: DISCONTINUED | OUTPATIENT
Start: 2018-04-12 | End: 2018-04-18

## 2018-04-12 RX ORDER — CELECOXIB 200 MG/1
200 CAPSULE ORAL ONCE
Qty: 0 | Refills: 0 | Status: DISCONTINUED | OUTPATIENT
Start: 2018-04-12 | End: 2018-04-18

## 2018-04-12 RX ORDER — ONDANSETRON 8 MG/1
4 TABLET, FILM COATED ORAL EVERY 6 HOURS
Qty: 0 | Refills: 0 | Status: DISCONTINUED | OUTPATIENT
Start: 2018-04-12 | End: 2018-04-18

## 2018-04-12 RX ORDER — HYDROMORPHONE HYDROCHLORIDE 2 MG/ML
1 INJECTION INTRAMUSCULAR; INTRAVENOUS; SUBCUTANEOUS ONCE
Qty: 0 | Refills: 0 | Status: DISCONTINUED | OUTPATIENT
Start: 2018-04-12 | End: 2018-04-12

## 2018-04-12 RX ORDER — FERROUS SULFATE 325(65) MG
325 TABLET ORAL
Qty: 0 | Refills: 0 | Status: DISCONTINUED | OUTPATIENT
Start: 2018-04-12 | End: 2018-04-18

## 2018-04-12 RX ORDER — OXYCODONE HYDROCHLORIDE 5 MG/1
10 TABLET ORAL ONCE
Qty: 0 | Refills: 0 | Status: DISCONTINUED | OUTPATIENT
Start: 2018-04-12 | End: 2018-04-14

## 2018-04-12 RX ORDER — SODIUM CHLORIDE 9 MG/ML
1000 INJECTION INTRAMUSCULAR; INTRAVENOUS; SUBCUTANEOUS
Qty: 0 | Refills: 0 | Status: DISCONTINUED | OUTPATIENT
Start: 2018-04-12 | End: 2018-04-18

## 2018-04-12 RX ORDER — SENNA PLUS 8.6 MG/1
2 TABLET ORAL AT BEDTIME
Qty: 0 | Refills: 0 | Status: DISCONTINUED | OUTPATIENT
Start: 2018-04-12 | End: 2018-04-18

## 2018-04-12 RX ORDER — RIVAROXABAN 15 MG-20MG
10 KIT ORAL DAILY
Qty: 0 | Refills: 0 | Status: DISCONTINUED | OUTPATIENT
Start: 2018-04-13 | End: 2018-04-18

## 2018-04-12 RX ORDER — ACETAMINOPHEN 500 MG
650 TABLET ORAL ONCE
Qty: 0 | Refills: 0 | Status: COMPLETED | OUTPATIENT
Start: 2018-04-12 | End: 2018-04-12

## 2018-04-12 RX ORDER — HYDROMORPHONE HYDROCHLORIDE 2 MG/ML
0.5 INJECTION INTRAMUSCULAR; INTRAVENOUS; SUBCUTANEOUS EVERY 4 HOURS
Qty: 0 | Refills: 0 | Status: DISCONTINUED | OUTPATIENT
Start: 2018-04-12 | End: 2018-04-14

## 2018-04-12 RX ORDER — CELECOXIB 200 MG/1
200 CAPSULE ORAL ONCE
Qty: 0 | Refills: 0 | Status: COMPLETED | OUTPATIENT
Start: 2018-04-12 | End: 2018-04-12

## 2018-04-12 RX ORDER — OXYCODONE HYDROCHLORIDE 5 MG/1
5 TABLET ORAL EVERY 6 HOURS
Qty: 0 | Refills: 0 | Status: DISCONTINUED | OUTPATIENT
Start: 2018-04-12 | End: 2018-04-12

## 2018-04-12 RX ORDER — OXYCODONE HYDROCHLORIDE 5 MG/1
10 TABLET ORAL EVERY 4 HOURS
Qty: 0 | Refills: 0 | Status: DISCONTINUED | OUTPATIENT
Start: 2018-04-12 | End: 2018-04-13

## 2018-04-12 RX ORDER — ACETAMINOPHEN 500 MG
975 TABLET ORAL EVERY 8 HOURS
Qty: 0 | Refills: 0 | Status: DISCONTINUED | OUTPATIENT
Start: 2018-04-12 | End: 2018-04-13

## 2018-04-12 RX ORDER — OXYCODONE HYDROCHLORIDE 5 MG/1
10 TABLET ORAL ONCE
Qty: 0 | Refills: 0 | Status: DISCONTINUED | OUTPATIENT
Start: 2018-04-12 | End: 2018-04-12

## 2018-04-12 RX ORDER — ACETAMINOPHEN 500 MG
1000 TABLET ORAL ONCE
Qty: 0 | Refills: 0 | Status: COMPLETED | OUTPATIENT
Start: 2018-04-12 | End: 2018-04-12

## 2018-04-12 RX ORDER — SODIUM CHLORIDE 9 MG/ML
1000 INJECTION, SOLUTION INTRAVENOUS
Qty: 0 | Refills: 0 | Status: DISCONTINUED | OUTPATIENT
Start: 2018-04-12 | End: 2018-04-13

## 2018-04-12 RX ORDER — OXYCODONE HYDROCHLORIDE 5 MG/1
5 TABLET ORAL EVERY 4 HOURS
Qty: 0 | Refills: 0 | Status: DISCONTINUED | OUTPATIENT
Start: 2018-04-12 | End: 2018-04-13

## 2018-04-12 RX ORDER — ASCORBIC ACID 60 MG
500 TABLET,CHEWABLE ORAL
Qty: 0 | Refills: 0 | Status: DISCONTINUED | OUTPATIENT
Start: 2018-04-12 | End: 2018-04-18

## 2018-04-12 RX ADMIN — Medication 400 MILLIGRAM(S): at 19:15

## 2018-04-12 RX ADMIN — HYDROMORPHONE HYDROCHLORIDE 1 MILLIGRAM(S): 2 INJECTION INTRAMUSCULAR; INTRAVENOUS; SUBCUTANEOUS at 19:15

## 2018-04-12 RX ADMIN — Medication 250 MILLIGRAM(S): at 05:52

## 2018-04-12 RX ADMIN — OXYCODONE HYDROCHLORIDE 10 MILLIGRAM(S): 5 TABLET ORAL at 13:18

## 2018-04-12 RX ADMIN — OXYCODONE HYDROCHLORIDE 10 MILLIGRAM(S): 5 TABLET ORAL at 23:25

## 2018-04-12 RX ADMIN — Medication 975 MILLIGRAM(S): at 22:00

## 2018-04-12 RX ADMIN — HYDROMORPHONE HYDROCHLORIDE 1 MILLIGRAM(S): 2 INJECTION INTRAMUSCULAR; INTRAVENOUS; SUBCUTANEOUS at 20:16

## 2018-04-12 RX ADMIN — SODIUM CHLORIDE 125 MILLILITER(S): 9 INJECTION, SOLUTION INTRAVENOUS at 19:11

## 2018-04-12 RX ADMIN — OXYCODONE AND ACETAMINOPHEN 1 TABLET(S): 5; 325 TABLET ORAL at 06:30

## 2018-04-12 RX ADMIN — Medication 1000 MILLIGRAM(S): at 19:40

## 2018-04-12 RX ADMIN — OXYCODONE HYDROCHLORIDE 10 MILLIGRAM(S): 5 TABLET ORAL at 21:59

## 2018-04-12 RX ADMIN — HYDROMORPHONE HYDROCHLORIDE 1 MILLIGRAM(S): 2 INJECTION INTRAMUSCULAR; INTRAVENOUS; SUBCUTANEOUS at 20:09

## 2018-04-12 RX ADMIN — Medication 650 MILLIGRAM(S): at 13:24

## 2018-04-12 RX ADMIN — HYDROMORPHONE HYDROCHLORIDE 1 MILLIGRAM(S): 2 INJECTION INTRAMUSCULAR; INTRAVENOUS; SUBCUTANEOUS at 19:06

## 2018-04-12 RX ADMIN — CELECOXIB 200 MILLIGRAM(S): 200 CAPSULE ORAL at 13:18

## 2018-04-12 RX ADMIN — Medication 100 MILLIGRAM(S): at 23:06

## 2018-04-12 RX ADMIN — Medication 100 MILLIGRAM(S): at 22:00

## 2018-04-12 RX ADMIN — OXYCODONE AND ACETAMINOPHEN 1 TABLET(S): 5; 325 TABLET ORAL at 05:52

## 2018-04-12 NOTE — PROGRESS NOTE ADULT - SUBJECTIVE AND OBJECTIVE BOX
Ortho Post-op check    S/P Removal of hardware from hip with insertion of antibiotic beads, Left total hip arthroplasty resection with placement of prosthetic hip system and antibiotic beads POD#0  27 year old Female seen and examined at bedside. Admits to incisional pain well controlled with medication. Denies numbness b/l, tingling b/l, chest pain, shortness of breath, nausea/ vomiting, and dizziness.     Vital Signs Last 24 Hrs  T(F): 98.2 (04-12-18 @ 21:22), Max: 99 (04-12-18 @ 04:45)  HR: 92 (04-12-18 @ 21:22)  BP: 122/75 (04-12-18 @ 21:22)  RR: 18 (04-12-18 @ 21:22)  SpO2: 99% (04-12-18 @ 21:22)  Wt(kg): --   CAPILLARY BLOOD GLUCOSE      GENERAL: Alert, NAD  CHEST/LUNG: Clear to auscultation bilaterally, respirations nonlabored  HEART: S1S2, Regular rate and rhythm  ABDOMEN: Soft, non tender, Nondistended  LOWER EXTREMITIES: LLE prineo dressing clean/dry/intact. +EHL, FHL, TA, GS bilaterally. +SILT bilaterally. +DP and PT pulses bilaterally. No foot drop. Neurovascularly intact. No deficits. No calf tenderness, No edema, intermittent compression devices in place bilaterally. Abduction pillow in place.      Assessment: 28 y/o female S/P Removal of hardware from hip with insertion of antibiotic beads, Left total hip arthroplasty resection with placement of prosthetic hip system and antibiotic beads POD#0    Plan:  - local wound care   - WBAT with PT, OOB  - DVT prophylaxis, Incentive Spirometer, pain control  - Post-op abx  - AC  - diet: advance as tolerated  - f/u labs   - Continue current medical care  - will discuss with surgical attending

## 2018-04-12 NOTE — PROGRESS NOTE ADULT - PROBLEM SELECTOR PLAN 1
prosthetic septic hip arthritis  both indium scan + and MRI shows left joint effusion   Arthrocentesis done :septic joint with pleocytosis anesthesia  will cont vanco and Rocephin at this time  fluid c/s neg so far  anticipating 6-8 weeks of iv antibiotics + rifampin via picc line  Spoke with Dr Burns :plan for left hip joint removal with spacer placement today

## 2018-04-12 NOTE — PROGRESS NOTE ADULT - SUBJECTIVE AND OBJECTIVE BOX
Patient is a 27y old  Female who presents with a chief complaint of Sudden right hip pain (01 Apr 2018 21:27)      INTERVAL HPI / OVERNIGHT EVENTS: left hip pain +,planned for surgery (left hip )today    MEDICATIONS  (STANDING):  cefTRIAXone   IVPB 2 Gram(s) IV Intermittent every 24 hours  docusate sodium 100 milliGRAM(s) Oral daily  sodium chloride 0.9%. 1000 milliLiter(s) (100 mL/Hr) IV Continuous <Continuous>  vancomycin  IVPB 1000 milliGRAM(s) IV Intermittent every 8 hours    MEDICATIONS  (PRN):  acetaminophen   Tablet 650 milliGRAM(s) Oral every 6 hours PRN For Temp greater than 38 C (100.4 F)  acetaminophen   Tablet. 650 milliGRAM(s) Oral every 6 hours PRN Mild Pain (1 - 3)  oxyCODONE    5 mG/acetaminophen 325 mG 1 Tablet(s) Oral every 4 hours PRN Moderate Pain (4 - 6)      Vital Signs Last 24 Hrs  T(C): 36.6 (12 Apr 2018 12:02), Max: 37.2 (12 Apr 2018 04:45)  T(F): 97.8 (12 Apr 2018 12:02), Max: 99 (12 Apr 2018 04:45)  HR: 87 (12 Apr 2018 12:02) (69 - 87)  BP: 112/59 (12 Apr 2018 12:02) (110/65 - 118/69)  BP(mean): --  RR: 17 (12 Apr 2018 12:02) (16 - 18)  SpO2: 99% (12 Apr 2018 12:02) (96% - 99%)    PHYSICAL EXAM:  General :NAD  Constitutional:  well-groomed, well-developed  Respiratory: CTAB/L  Cardiovascular: S1 and S2, RRR, no M/G/R  Gastrointestinal: BS+, soft, NT/ND  Extremities: No peripheral edema  Vascular: 2+ peripheral pulses  Skin: No rashes      LABS:                        10.1   13.30 )-----------( 527      ( 12 Apr 2018 08:35 )             31.4     04-12    139  |  106  |  10  ----------------------------<  110<H>  4.0   |  26  |  0.44<L>    Ca    9.2      12 Apr 2018 08:35  Cell Count, Body Fluid (04.11.18 @ 12:22)    Monocyte/Macrophage Count - Body Fluid: 2 %    Fluid Segmented Granulocytes: 95 %    Fluid Source: Aspirate    Fluid Type: Other    Body Fluid Appearance: Turbid    BF Lymphocytes: 3 %    Tube Type: Lavender    Color - Body Fluid: Red    Total Nucleated Cell Count, Body Fluid: 63198 /uL    Total RBC Count: 034169 /uL            MICROBIOLOGY:  RECENT CULTURES:  04-11 .Body Fluid left hip aspiration XXXX   Moderate polymorphonuclear leukocytes per low power field  No organisms seen per oil power field   No growth to date          RADIOLOGY & ADDITIONAL STUDIES:

## 2018-04-12 NOTE — BRIEF OPERATIVE NOTE - SPECIMENS
[facility] [place]  [address]  [facility] [address]  [phone]      Leon Llanes :1999 MRN:[id number]      Max Lawrence L[facility]     cultures of hip

## 2018-04-12 NOTE — PROGRESS NOTE ADULT - ASSESSMENT
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   s/p hip aspiration- infected hip  Clinically stable for planned ortho intervention/surgery  Will proceed to OR today

## 2018-04-12 NOTE — BRIEF OPERATIVE NOTE - PROCEDURE
<<-----Click on this checkbox to enter Procedure Removal of hardware from hip with insertion of antibiotic beads  04/12/2018  Left total hip arthroplasty resection with placement of prostalac hip system and antibiotic beads  Active  [id number]

## 2018-04-12 NOTE — CHART NOTE - NSCHARTNOTEFT_GEN_A_CORE
Called by RN for vasquez placement. Patient and family are requesting a vasquez catheter because patient does not want to use the bedpan. 16F placed using sterile technique while maintaining the hip precautions. Good urine output in tubing. Vasquez secured. Patient tolerated well.

## 2018-04-13 DIAGNOSIS — L29.8 OTHER PRURITUS: ICD-10-CM

## 2018-04-13 LAB
ANION GAP SERPL CALC-SCNC: 7 MMOL/L — SIGNIFICANT CHANGE UP (ref 5–17)
BUN SERPL-MCNC: 9 MG/DL — SIGNIFICANT CHANGE UP (ref 7–23)
CALCIUM SERPL-MCNC: 8.9 MG/DL — SIGNIFICANT CHANGE UP (ref 8.5–10.1)
CHLORIDE SERPL-SCNC: 104 MMOL/L — SIGNIFICANT CHANGE UP (ref 96–108)
CO2 SERPL-SCNC: 29 MMOL/L — SIGNIFICANT CHANGE UP (ref 22–31)
CREAT SERPL-MCNC: 0.5 MG/DL — SIGNIFICANT CHANGE UP (ref 0.5–1.3)
GLUCOSE SERPL-MCNC: 114 MG/DL — HIGH (ref 70–99)
GRAM STN FLD: SIGNIFICANT CHANGE UP
HCT VFR BLD CALC: 20.3 % — CRITICAL LOW (ref 34.5–45)
HGB BLD-MCNC: 6.5 G/DL — CRITICAL LOW (ref 11.5–15.5)
MCHC RBC-ENTMCNC: 28 PG — SIGNIFICANT CHANGE UP (ref 27–34)
MCHC RBC-ENTMCNC: 32 GM/DL — SIGNIFICANT CHANGE UP (ref 32–36)
MCV RBC AUTO: 87.5 FL — SIGNIFICANT CHANGE UP (ref 80–100)
NRBC # BLD: 0 /100 WBCS — SIGNIFICANT CHANGE UP (ref 0–0)
PLATELET # BLD AUTO: 420 K/UL — HIGH (ref 150–400)
POTASSIUM SERPL-MCNC: 4 MMOL/L — SIGNIFICANT CHANGE UP (ref 3.5–5.3)
POTASSIUM SERPL-SCNC: 4 MMOL/L — SIGNIFICANT CHANGE UP (ref 3.5–5.3)
RBC # BLD: 2.32 M/UL — LOW (ref 3.8–5.2)
RBC # FLD: 13.4 % — SIGNIFICANT CHANGE UP (ref 10.3–14.5)
SODIUM SERPL-SCNC: 140 MMOL/L — SIGNIFICANT CHANGE UP (ref 135–145)
SPECIMEN SOURCE: SIGNIFICANT CHANGE UP
WBC # BLD: 14.52 K/UL — HIGH (ref 3.8–10.5)
WBC # FLD AUTO: 14.52 K/UL — HIGH (ref 3.8–10.5)

## 2018-04-13 PROCEDURE — 99233 SBSQ HOSP IP/OBS HIGH 50: CPT

## 2018-04-13 RX ORDER — VANCOMYCIN HCL 1 G
1000 VIAL (EA) INTRAVENOUS ONCE
Qty: 0 | Refills: 0 | Status: COMPLETED | OUTPATIENT
Start: 2018-04-13 | End: 2018-04-13

## 2018-04-13 RX ORDER — OXYCODONE HYDROCHLORIDE 5 MG/1
5 TABLET ORAL
Qty: 0 | Refills: 0 | Status: DISCONTINUED | OUTPATIENT
Start: 2018-04-13 | End: 2018-04-14

## 2018-04-13 RX ORDER — OXYCODONE HYDROCHLORIDE 5 MG/1
10 TABLET ORAL
Qty: 0 | Refills: 0 | Status: DISCONTINUED | OUTPATIENT
Start: 2018-04-13 | End: 2018-04-14

## 2018-04-13 RX ORDER — ACETAMINOPHEN 500 MG
1000 TABLET ORAL ONCE
Qty: 0 | Refills: 0 | Status: COMPLETED | OUTPATIENT
Start: 2018-04-13 | End: 2018-04-13

## 2018-04-13 RX ORDER — CEFEPIME 1 G/1
INJECTION, POWDER, FOR SOLUTION INTRAMUSCULAR; INTRAVENOUS
Qty: 0 | Refills: 0 | Status: DISCONTINUED | OUTPATIENT
Start: 2018-04-13 | End: 2018-04-16

## 2018-04-13 RX ORDER — ACETAMINOPHEN 500 MG
975 TABLET ORAL EVERY 8 HOURS
Qty: 0 | Refills: 0 | Status: DISCONTINUED | OUTPATIENT
Start: 2018-04-14 | End: 2018-04-18

## 2018-04-13 RX ORDER — ACETAMINOPHEN 500 MG
650 TABLET ORAL ONCE
Qty: 0 | Refills: 0 | Status: DISCONTINUED | OUTPATIENT
Start: 2018-04-13 | End: 2018-04-13

## 2018-04-13 RX ORDER — OXYCODONE HYDROCHLORIDE 5 MG/1
10 TABLET ORAL EVERY 12 HOURS
Qty: 0 | Refills: 0 | Status: DISCONTINUED | OUTPATIENT
Start: 2018-04-13 | End: 2018-04-14

## 2018-04-13 RX ORDER — CEFEPIME 1 G/1
2000 INJECTION, POWDER, FOR SOLUTION INTRAMUSCULAR; INTRAVENOUS EVERY 8 HOURS
Qty: 0 | Refills: 0 | Status: DISCONTINUED | OUTPATIENT
Start: 2018-04-13 | End: 2018-04-16

## 2018-04-13 RX ORDER — CEFEPIME 1 G/1
2000 INJECTION, POWDER, FOR SOLUTION INTRAMUSCULAR; INTRAVENOUS ONCE
Qty: 0 | Refills: 0 | Status: COMPLETED | OUTPATIENT
Start: 2018-04-13 | End: 2018-04-13

## 2018-04-13 RX ORDER — DIPHENHYDRAMINE HCL 50 MG
25 CAPSULE ORAL ONCE
Qty: 0 | Refills: 0 | Status: COMPLETED | OUTPATIENT
Start: 2018-04-13 | End: 2018-04-13

## 2018-04-13 RX ORDER — VANCOMYCIN HCL 1 G
VIAL (EA) INTRAVENOUS
Qty: 0 | Refills: 0 | Status: DISCONTINUED | OUTPATIENT
Start: 2018-04-13 | End: 2018-04-18

## 2018-04-13 RX ORDER — VANCOMYCIN HCL 1 G
1000 VIAL (EA) INTRAVENOUS EVERY 8 HOURS
Qty: 0 | Refills: 0 | Status: DISCONTINUED | OUTPATIENT
Start: 2018-04-13 | End: 2018-04-18

## 2018-04-13 RX ADMIN — Medication 325 MILLIGRAM(S): at 08:54

## 2018-04-13 RX ADMIN — HYDROMORPHONE HYDROCHLORIDE 0.5 MILLIGRAM(S): 2 INJECTION INTRAMUSCULAR; INTRAVENOUS; SUBCUTANEOUS at 01:53

## 2018-04-13 RX ADMIN — POLYETHYLENE GLYCOL 3350 17 GRAM(S): 17 POWDER, FOR SOLUTION ORAL at 11:16

## 2018-04-13 RX ADMIN — Medication 250 MILLIGRAM(S): at 13:02

## 2018-04-13 RX ADMIN — OXYCODONE HYDROCHLORIDE 10 MILLIGRAM(S): 5 TABLET ORAL at 23:00

## 2018-04-13 RX ADMIN — Medication 100 MILLIGRAM(S): at 22:01

## 2018-04-13 RX ADMIN — HYDROMORPHONE HYDROCHLORIDE 0.5 MILLIGRAM(S): 2 INJECTION INTRAMUSCULAR; INTRAVENOUS; SUBCUTANEOUS at 21:05

## 2018-04-13 RX ADMIN — OXYCODONE HYDROCHLORIDE 10 MILLIGRAM(S): 5 TABLET ORAL at 10:28

## 2018-04-13 RX ADMIN — PANTOPRAZOLE SODIUM 40 MILLIGRAM(S): 20 TABLET, DELAYED RELEASE ORAL at 13:01

## 2018-04-13 RX ADMIN — OXYCODONE HYDROCHLORIDE 10 MILLIGRAM(S): 5 TABLET ORAL at 22:01

## 2018-04-13 RX ADMIN — Medication 400 MILLIGRAM(S): at 11:16

## 2018-04-13 RX ADMIN — HYDROMORPHONE HYDROCHLORIDE 0.5 MILLIGRAM(S): 2 INJECTION INTRAMUSCULAR; INTRAVENOUS; SUBCUTANEOUS at 01:28

## 2018-04-13 RX ADMIN — Medication 100 MILLIGRAM(S): at 13:02

## 2018-04-13 RX ADMIN — Medication 1 TABLET(S): at 11:16

## 2018-04-13 RX ADMIN — HYDROMORPHONE HYDROCHLORIDE 0.5 MILLIGRAM(S): 2 INJECTION INTRAMUSCULAR; INTRAVENOUS; SUBCUTANEOUS at 07:18

## 2018-04-13 RX ADMIN — CEFEPIME 100 MILLIGRAM(S): 1 INJECTION, POWDER, FOR SOLUTION INTRAMUSCULAR; INTRAVENOUS at 14:13

## 2018-04-13 RX ADMIN — Medication 325 MILLIGRAM(S): at 11:16

## 2018-04-13 RX ADMIN — HYDROMORPHONE HYDROCHLORIDE 0.5 MILLIGRAM(S): 2 INJECTION INTRAMUSCULAR; INTRAVENOUS; SUBCUTANEOUS at 07:03

## 2018-04-13 RX ADMIN — OXYCODONE HYDROCHLORIDE 10 MILLIGRAM(S): 5 TABLET ORAL at 11:15

## 2018-04-13 RX ADMIN — Medication 100 MILLIGRAM(S): at 06:55

## 2018-04-13 RX ADMIN — HYDROMORPHONE HYDROCHLORIDE 0.5 MILLIGRAM(S): 2 INJECTION INTRAMUSCULAR; INTRAVENOUS; SUBCUTANEOUS at 21:20

## 2018-04-13 RX ADMIN — OXYCODONE HYDROCHLORIDE 10 MILLIGRAM(S): 5 TABLET ORAL at 22:55

## 2018-04-13 RX ADMIN — Medication 500 MILLIGRAM(S): at 17:18

## 2018-04-13 RX ADMIN — HYDROMORPHONE HYDROCHLORIDE 0.5 MILLIGRAM(S): 2 INJECTION INTRAMUSCULAR; INTRAVENOUS; SUBCUTANEOUS at 13:17

## 2018-04-13 RX ADMIN — Medication 325 MILLIGRAM(S): at 17:18

## 2018-04-13 RX ADMIN — Medication 500 MILLIGRAM(S): at 05:35

## 2018-04-13 RX ADMIN — CEFEPIME 100 MILLIGRAM(S): 1 INJECTION, POWDER, FOR SOLUTION INTRAMUSCULAR; INTRAVENOUS at 21:10

## 2018-04-13 RX ADMIN — RIVAROXABAN 10 MILLIGRAM(S): KIT at 13:02

## 2018-04-13 RX ADMIN — Medication 1000 MILLIGRAM(S): at 19:15

## 2018-04-13 RX ADMIN — Medication 25 MILLIGRAM(S): at 14:51

## 2018-04-13 RX ADMIN — Medication 1000 MILLIGRAM(S): at 12:45

## 2018-04-13 RX ADMIN — OXYCODONE HYDROCHLORIDE 10 MILLIGRAM(S): 5 TABLET ORAL at 18:15

## 2018-04-13 RX ADMIN — OXYCODONE HYDROCHLORIDE 10 MILLIGRAM(S): 5 TABLET ORAL at 05:36

## 2018-04-13 RX ADMIN — Medication 975 MILLIGRAM(S): at 05:38

## 2018-04-13 RX ADMIN — OXYCODONE HYDROCHLORIDE 10 MILLIGRAM(S): 5 TABLET ORAL at 23:50

## 2018-04-13 RX ADMIN — Medication 100 MILLIGRAM(S): at 05:39

## 2018-04-13 RX ADMIN — Medication 1 MILLIGRAM(S): at 11:16

## 2018-04-13 RX ADMIN — Medication 250 MILLIGRAM(S): at 22:02

## 2018-04-13 RX ADMIN — OXYCODONE HYDROCHLORIDE 10 MILLIGRAM(S): 5 TABLET ORAL at 17:18

## 2018-04-13 RX ADMIN — HYDROMORPHONE HYDROCHLORIDE 0.5 MILLIGRAM(S): 2 INJECTION INTRAMUSCULAR; INTRAVENOUS; SUBCUTANEOUS at 13:02

## 2018-04-13 RX ADMIN — Medication 400 MILLIGRAM(S): at 18:42

## 2018-04-13 NOTE — PROGRESS NOTE ADULT - SUBJECTIVE AND OBJECTIVE BOX
27yFemale s/p L AMBER Resection/placement of abx spacer/beads and ORIF greater trochanter  POD#1. Pt seen and examined in NAD with Dr. Burns. Pain controlled. Pt denies any new complaints. Pt denies CP/SOB/N/V/D/numbness/tingling/bowel or bladder dysfunction. +vasquez     PE:   LLE: dressing c/d/i. +ROM ankle/toes. Calf: soft, compressible and nontender. DP/PT 2+ NVI.                           6.5    14.52 )-----------( 420      ( 13 Apr 2018 09:34 )             20.3       04-13    140  |  104  |  9   ----------------------------<  114<H>  4.0   |  29  |  0.50    Ca    8.9      13 Apr 2018 09:34          A/P: 27yFemale s/p L AMBER Resection/placement of abx spacer/beads w/ ORIF greater trochanter POD#1 .  H/H noted. Will transfuse 2U PRBC for normal expected acute anemia due to post op blood. Case was discussed with Dr. Burns,  medical team (dr. Eugene) also spoke to pt own hematologist (Dr. Pugh) about any issues with transfusing PRBC. Antibodies are negative. as per dr. pugh will pre-treat pt with tylenol and benadryl   Pain controlled  Total hip precautions: No flexion past 90 degrees, no active abduction, no hip extension, no external rotataion  PT: TTWB - spinal precautions   DVT ppx: SCDs   Wound care, Isometric exercises, incentive spirometry   Discharge: planning   All the above discussed and understood by pt 27yFemale s/p L AMBER Resection/placement of abx spacer/beads and ORIF greater trochanter  POD#1. Pt seen and examined in NAD with Dr. Burns. Pain controlled. Pt denies any new complaints. Pt denies CP/SOB/N/V/D/numbness/tingling/bowel or bladder dysfunction. +avsquez     PE:   LLE: dressing c/d/i. +ROM ankle/toes. Calf: soft, compressible and nontender. DP/PT 2+ NVI.                           6.5    14.52 )-----------( 420      ( 13 Apr 2018 09:34 )             20.3       04-13    140  |  104  |  9   ----------------------------<  114<H>  4.0   |  29  |  0.50    Ca    8.9      13 Apr 2018 09:34          A/P: 27yFemale s/p L AMBER Resection/placement of abx spacer/beads w/ ORIF greater trochanter POD#1 .  H/H noted. Will transfuse 2U PRBC for normal expected acute anemia due to post op blood. Case was discussed with Dr. Burns,  medical team (dr. Eugene) also spoke to pt own hematologist (Dr. Pugh) about any issues with transfusing PRBC. Antibodies are negative. as per dr. pugh will pre-treat pt with tylenol and benadryl and ok to give 2U PRBC with lasix after the first unit of prbc  Monitor Vasquez q8H - continue until pt able to ambulate out of bed to bathroom as per Dr. Burns  Pain controlled  PICC care   F/u ID plan   F/u OR cx   Total hip precautions: No flexion past 90 degrees, no active abduction, no hip extension, no external rotataion  PT: TTWB  DVT ppx: SCDs/Xarelto 10mg once a day   Wound care, Isometric exercises, incentive spirometry   Discharge: planning   All the above discussed and understood by pt 27yFemale s/p L AMBER Resection/placement of abx spacer/beads and ORIF greater trochanter  POD#1. Pt seen and examined in NAD with Dr. Burns. Pain controlled. Pt denies any new complaints. Pt denies CP/SOB/N/V/D/numbness/tingling/bowel or bladder dysfunction. +vasquez     PE:   LLE: dressing c/d/i. +ROM ankle/toes. Calf: soft, compressible and nontender. DP/PT 2+ NVI.                           6.5    14.52 )-----------( 420      ( 13 Apr 2018 09:34 )             20.3       04-13    140  |  104  |  9   ----------------------------<  114<H>  4.0   |  29  |  0.50    Ca    8.9      13 Apr 2018 09:34          A/P: 27yFemale s/p L AMBER Resection/placement of abx spacer/beads w/ ORIF greater trochanter POD#1 .  H/H noted. Will transfuse 2U PRBC for normal expected acute anemia due to post op blood. Case was discussed with Dr. Burns,  medical team (dr. Eugene) also spoke to pt own hematologist (Dr. Pugh) about any issues with transfusing PRBC. Antibodies are negative. as per dr. pugh will pre-treat pt with tylenol and benadryl and ok to give 2U PRBC with lasix after the first unit of prbc  Monitor Vasquez q8H - continue until pt able to ambulate out of bed to bathroom as per Dr. Burns  Pain controlled  PICC care   F/u ID plan   F/u OR cx   Total hip precautions: No flexion past 90 degrees, no active abduction, no hip extension, no external rotataion - L hip abduction brace ordered. F/u brace   PT: TTWB  DVT ppx: SCDs/Xarelto 10mg once a day   Wound care, Isometric exercises, incentive spirometry   Discharge: planning   All the above discussed and understood by pt

## 2018-04-13 NOTE — PROGRESS NOTE ADULT - PROBLEM SELECTOR PLAN 1
prosthetic septic hip arthritis  both indium scan + and MRI shows left joint effusion   s/p 2 staged joint exchange (stage 1 ) with spacer placement   anticipating 6-8 weeks of iv antibiotics + rifampin via picc line  start vanco and cefepime   vanco level  follow up on OR c/s

## 2018-04-13 NOTE — OCCUPATIONAL THERAPY INITIAL EVALUATION ADULT - ADDITIONAL COMMENTS
Patient lives in a private house with 3-4 steps with bilateral handrails to enter. Once inside, the patient bedroom and bathroom is on that main level when  entering. The patient bathroom has a tub/shower combination with a regular toilet. The patient reports having no DME inside of the bathroom. The patient ambulates with a sight cane due to impaired vision and the patient reports owning no other devices. The patient is R handed, does not drive and does not wear glasses.

## 2018-04-13 NOTE — OCCUPATIONAL THERAPY INITIAL EVALUATION ADULT - RANGE OF MOTION EXAMINATION, LOWER EXTREMITY
LLE AROM hip flexion not tested due to pain and restrictions, LLE AROM knee flexion 0-65, LLE AROM distally to knee WFL/Right LE Active ROM was WFL   (within functional limits)/Right LE Passive ROM was WFL  (within functional limits)

## 2018-04-13 NOTE — OCCUPATIONAL THERAPY INITIAL EVALUATION ADULT - GENERAL OBSERVATIONS, REHAB EVAL
Pt was encountered supine in bed with family present; NAD, Abductor wedge on, S/P L Left total hip arthroplasty resection with placement of prosthetic hip system and antibiotic beads POD#1, L hip precautions, L hip dressing clean, dry and intact, vasquez on, AXOX4, cooperative, followed commands; pt c/o pain in L hip due to s/p hip surgery which limits pt performance with functional ADL's/transfers and mobility.

## 2018-04-13 NOTE — PROGRESS NOTE ADULT - SUBJECTIVE AND OBJECTIVE BOX
Patient is a 27y old  Female who presents with a chief complaint of Sudden right hip pain (01 Apr 2018 21:27)      INTERVAL HPI / OVERNIGHT EVENTS: doing ok ,lethargic ,soreness of left hip ,c/o vaginal itching    MEDICATIONS  (STANDING):  acetaminophen   Tablet 975 milliGRAM(s) Oral every 8 hours  acetaminophen   Tablet. 650 milliGRAM(s) Oral once  ascorbic acid 500 milliGRAM(s) Oral two times a day  cefepime  IVPB      cefepime  IVPB 2000 milliGRAM(s) IV Intermittent once  cefepime  IVPB 2000 milliGRAM(s) IV Intermittent every 8 hours  celecoxib 200 milliGRAM(s) Oral once  diphenhydrAMINE   Injectable 25 milliGRAM(s) IV Push once  docusate sodium 100 milliGRAM(s) Oral three times a day  ferrous    sulfate 325 milliGRAM(s) Oral three times a day with meals  folic acid 1 milliGRAM(s) Oral daily  lactated ringers. 1000 milliLiter(s) (125 mL/Hr) IV Continuous <Continuous>  multivitamin 1 Tablet(s) Oral daily  oxyCODONE  ER Tablet 10 milliGRAM(s) Oral once  pantoprazole    Tablet 40 milliGRAM(s) Oral daily  polyethylene glycol 3350 17 Gram(s) Oral daily  rifampin 300 milliGRAM(s) Oral daily  rivaroxaban 10 milliGRAM(s) Oral daily  sodium chloride 0.9%. 1000 milliLiter(s) (120 mL/Hr) IV Continuous <Continuous>  vancomycin  IVPB 1000 milliGRAM(s) IV Intermittent every 8 hours  vancomycin  IVPB        MEDICATIONS  (PRN):  aluminum hydroxide/magnesium hydroxide/simethicone Suspension 30 milliLiter(s) Oral four times a day PRN Indigestion  HYDROmorphone  Injectable 0.5 milliGRAM(s) IV Push every 4 hours PRN breakthrough pain  magnesium hydroxide Suspension 30 milliLiter(s) Oral every 12 hours PRN Constipation  ondansetron Injectable 4 milliGRAM(s) IV Push every 6 hours PRN Nausea and/or Vomiting  oxyCODONE    IR 5 milliGRAM(s) Oral every 4 hours PRN pain 1 - 5  oxyCODONE    IR 10 milliGRAM(s) Oral every 4 hours PRN Pain 6 - 10  senna 2 Tablet(s) Oral at bedtime PRN Constipation      Vital Signs Last 24 Hrs  T(C): 36.8 (13 Apr 2018 12:30), Max: 36.8 (12 Apr 2018 21:22)  T(F): 98.3 (13 Apr 2018 12:30), Max: 98.3 (13 Apr 2018 04:32)  HR: 122 (13 Apr 2018 12:30) (88 - 122)  BP: 120/74 (13 Apr 2018 12:30) (96/62 - 134/73)  BP(mean): --  RR: 16 (13 Apr 2018 12:30) (12 - 25)  SpO2: 98% (13 Apr 2018 12:30) (98% - 100%)    PHYSICAL EXAM:  General :NAD  Constitutional:  well-groomed, well-developed  Respiratory: CTAB/L  Cardiovascular: S1 and S2, RRR, no M/G/R  Gastrointestinal: BS+, soft, NT/ND  Extremities: No peripheral edema  Vascular: 2+ peripheral pulses  Skin: left hip dressing      LABS:                        6.5    14.52 )-----------( 420      ( 13 Apr 2018 09:34 )             20.3     04-13    140  |  104  |  9   ----------------------------<  114<H>  4.0   |  29  |  0.50    Ca    8.9      13 Apr 2018 09:34            MICROBIOLOGY:  RECENT CULTURES:  04-13 .Tissue LEFT HIP SYNOVIUM XXXX   Moderate polymorphonuclear leukocytes per low power field  No organisms seen per oil power field XXXX    04-11 .Body Fluid left hip aspiration XXXX   Moderate polymorphonuclear leukocytes per low power field  No organisms seen per oil power field   No growth to date          RADIOLOGY & ADDITIONAL STUDIES:

## 2018-04-13 NOTE — OCCUPATIONAL THERAPY INITIAL EVALUATION ADULT - MODIFIED CLINICAL TEST OF SENSORY INTEGRATION IN BALANCE TEST
Barthel Index: Feeding Score___10___, Bathing Score___0___, Grooming Score__5___, Dressing Score___5__, Bowel Score___10__, Bladder Score____0__, Toilet Score___0__, Transfer Score___0___, Mobility Score___0__, Stairs Score___0__, Total Score__30___.

## 2018-04-13 NOTE — OCCUPATIONAL THERAPY INITIAL EVALUATION ADULT - PRECAUTIONS/LIMITATIONS, REHAB EVAL
L hip precautions (No active hip extension, no active hip abduction, no active external rotation, no active hip flexion past 90 degress)

## 2018-04-13 NOTE — OCCUPATIONAL THERAPY INITIAL EVALUATION ADULT - PERTINENT HX OF CURRENT PROBLEM, REHAB EVAL
Patient admitted to [facility] due to Pyogenic arthritis of left hip, due to unspecified organism. Patient is S/P removal of hardware from hip with insertion of antibiotic beads, Left total hip arthroplasty resection with placement of prosthetic hip system and antibiotic beads POD#1

## 2018-04-14 LAB
ANION GAP SERPL CALC-SCNC: 7 MMOL/L — SIGNIFICANT CHANGE UP (ref 5–17)
BUN SERPL-MCNC: 8 MG/DL — SIGNIFICANT CHANGE UP (ref 7–23)
CALCIUM SERPL-MCNC: 8.7 MG/DL — SIGNIFICANT CHANGE UP (ref 8.5–10.1)
CHLORIDE SERPL-SCNC: 104 MMOL/L — SIGNIFICANT CHANGE UP (ref 96–108)
CO2 SERPL-SCNC: 30 MMOL/L — SIGNIFICANT CHANGE UP (ref 22–31)
CREAT SERPL-MCNC: 0.47 MG/DL — LOW (ref 0.5–1.3)
GLUCOSE SERPL-MCNC: 112 MG/DL — HIGH (ref 70–99)
HCT VFR BLD CALC: 26.1 % — LOW (ref 34.5–45)
HCT VFR BLD CALC: 26.2 % — LOW (ref 34.5–45)
HGB BLD-MCNC: 8.7 G/DL — LOW (ref 11.5–15.5)
HGB BLD-MCNC: 9 G/DL — LOW (ref 11.5–15.5)
MCHC RBC-ENTMCNC: 28.2 PG — SIGNIFICANT CHANGE UP (ref 27–34)
MCHC RBC-ENTMCNC: 29 PG — SIGNIFICANT CHANGE UP (ref 27–34)
MCHC RBC-ENTMCNC: 33.3 GM/DL — SIGNIFICANT CHANGE UP (ref 32–36)
MCHC RBC-ENTMCNC: 34.4 GM/DL — SIGNIFICANT CHANGE UP (ref 32–36)
MCV RBC AUTO: 84.5 FL — SIGNIFICANT CHANGE UP (ref 80–100)
MCV RBC AUTO: 84.7 FL — SIGNIFICANT CHANGE UP (ref 80–100)
NRBC # BLD: 0 /100 WBCS — SIGNIFICANT CHANGE UP (ref 0–0)
NRBC # BLD: 0 /100 WBCS — SIGNIFICANT CHANGE UP (ref 0–0)
PLATELET # BLD AUTO: 393 K/UL — SIGNIFICANT CHANGE UP (ref 150–400)
PLATELET # BLD AUTO: 395 K/UL — SIGNIFICANT CHANGE UP (ref 150–400)
POTASSIUM SERPL-MCNC: 3.6 MMOL/L — SIGNIFICANT CHANGE UP (ref 3.5–5.3)
POTASSIUM SERPL-SCNC: 3.6 MMOL/L — SIGNIFICANT CHANGE UP (ref 3.5–5.3)
RBC # BLD: 3.08 M/UL — LOW (ref 3.8–5.2)
RBC # BLD: 3.1 M/UL — LOW (ref 3.8–5.2)
RBC # FLD: 13.4 % — SIGNIFICANT CHANGE UP (ref 10.3–14.5)
RBC # FLD: 13.9 % — SIGNIFICANT CHANGE UP (ref 10.3–14.5)
SODIUM SERPL-SCNC: 141 MMOL/L — SIGNIFICANT CHANGE UP (ref 135–145)
VANCOMYCIN TROUGH SERPL-MCNC: 12.4 UG/ML — SIGNIFICANT CHANGE UP (ref 10–20)
WBC # BLD: 14.89 K/UL — HIGH (ref 3.8–10.5)
WBC # BLD: 18.59 K/UL — HIGH (ref 3.8–10.5)
WBC # FLD AUTO: 14.89 K/UL — HIGH (ref 3.8–10.5)
WBC # FLD AUTO: 18.59 K/UL — HIGH (ref 3.8–10.5)

## 2018-04-14 PROCEDURE — 72170 X-RAY EXAM OF PELVIS: CPT | Mod: 26

## 2018-04-14 RX ORDER — OXYCODONE HYDROCHLORIDE 5 MG/1
10 TABLET ORAL
Qty: 0 | Refills: 0 | Status: DISCONTINUED | OUTPATIENT
Start: 2018-04-14 | End: 2018-04-18

## 2018-04-14 RX ORDER — HYDROMORPHONE HYDROCHLORIDE 2 MG/ML
0.5 INJECTION INTRAMUSCULAR; INTRAVENOUS; SUBCUTANEOUS ONCE
Qty: 0 | Refills: 0 | Status: DISCONTINUED | OUTPATIENT
Start: 2018-04-14 | End: 2018-04-14

## 2018-04-14 RX ORDER — HYDROMORPHONE HYDROCHLORIDE 2 MG/ML
0.5 INJECTION INTRAMUSCULAR; INTRAVENOUS; SUBCUTANEOUS EVERY 4 HOURS
Qty: 0 | Refills: 0 | Status: DISCONTINUED | OUTPATIENT
Start: 2018-04-14 | End: 2018-04-18

## 2018-04-14 RX ORDER — CELECOXIB 200 MG/1
200 CAPSULE ORAL EVERY 12 HOURS
Qty: 0 | Refills: 0 | Status: DISCONTINUED | OUTPATIENT
Start: 2018-04-14 | End: 2018-04-18

## 2018-04-14 RX ORDER — HYDROMORPHONE HYDROCHLORIDE 2 MG/ML
4 INJECTION INTRAMUSCULAR; INTRAVENOUS; SUBCUTANEOUS EVERY 4 HOURS
Qty: 0 | Refills: 0 | Status: DISCONTINUED | OUTPATIENT
Start: 2018-04-14 | End: 2018-04-14

## 2018-04-14 RX ORDER — KETOROLAC TROMETHAMINE 30 MG/ML
30 SYRINGE (ML) INJECTION EVERY 8 HOURS
Qty: 0 | Refills: 0 | Status: DISCONTINUED | OUTPATIENT
Start: 2018-04-14 | End: 2018-04-15

## 2018-04-14 RX ORDER — HYDROMORPHONE HYDROCHLORIDE 2 MG/ML
2 INJECTION INTRAMUSCULAR; INTRAVENOUS; SUBCUTANEOUS EVERY 4 HOURS
Qty: 0 | Refills: 0 | Status: DISCONTINUED | OUTPATIENT
Start: 2018-04-14 | End: 2018-04-14

## 2018-04-14 RX ORDER — OXYCODONE HYDROCHLORIDE 5 MG/1
5 TABLET ORAL
Qty: 0 | Refills: 0 | Status: DISCONTINUED | OUTPATIENT
Start: 2018-04-14 | End: 2018-04-18

## 2018-04-14 RX ORDER — HYDROMORPHONE HYDROCHLORIDE 2 MG/ML
1 INJECTION INTRAMUSCULAR; INTRAVENOUS; SUBCUTANEOUS EVERY 4 HOURS
Qty: 0 | Refills: 0 | Status: DISCONTINUED | OUTPATIENT
Start: 2018-04-14 | End: 2018-04-14

## 2018-04-14 RX ADMIN — CEFEPIME 100 MILLIGRAM(S): 1 INJECTION, POWDER, FOR SOLUTION INTRAMUSCULAR; INTRAVENOUS at 13:47

## 2018-04-14 RX ADMIN — POLYETHYLENE GLYCOL 3350 17 GRAM(S): 17 POWDER, FOR SOLUTION ORAL at 12:59

## 2018-04-14 RX ADMIN — OXYCODONE HYDROCHLORIDE 10 MILLIGRAM(S): 5 TABLET ORAL at 09:00

## 2018-04-14 RX ADMIN — HYDROMORPHONE HYDROCHLORIDE 0.5 MILLIGRAM(S): 2 INJECTION INTRAMUSCULAR; INTRAVENOUS; SUBCUTANEOUS at 15:00

## 2018-04-14 RX ADMIN — RIVAROXABAN 10 MILLIGRAM(S): KIT at 11:51

## 2018-04-14 RX ADMIN — HYDROMORPHONE HYDROCHLORIDE 0.5 MILLIGRAM(S): 2 INJECTION INTRAMUSCULAR; INTRAVENOUS; SUBCUTANEOUS at 11:26

## 2018-04-14 RX ADMIN — Medication 30 MILLIGRAM(S): at 16:00

## 2018-04-14 RX ADMIN — OXYCODONE HYDROCHLORIDE 10 MILLIGRAM(S): 5 TABLET ORAL at 05:00

## 2018-04-14 RX ADMIN — Medication 1 MILLIGRAM(S): at 11:51

## 2018-04-14 RX ADMIN — Medication 325 MILLIGRAM(S): at 18:30

## 2018-04-14 RX ADMIN — Medication 100 MILLIGRAM(S): at 21:55

## 2018-04-14 RX ADMIN — Medication 975 MILLIGRAM(S): at 02:46

## 2018-04-14 RX ADMIN — Medication 250 MILLIGRAM(S): at 22:58

## 2018-04-14 RX ADMIN — Medication 30 MILLIGRAM(S): at 20:58

## 2018-04-14 RX ADMIN — Medication 100 MILLIGRAM(S): at 05:32

## 2018-04-14 RX ADMIN — HYDROMORPHONE HYDROCHLORIDE 1 MILLIGRAM(S): 2 INJECTION INTRAMUSCULAR; INTRAVENOUS; SUBCUTANEOUS at 07:08

## 2018-04-14 RX ADMIN — HYDROMORPHONE HYDROCHLORIDE 1 MILLIGRAM(S): 2 INJECTION INTRAMUSCULAR; INTRAVENOUS; SUBCUTANEOUS at 01:15

## 2018-04-14 RX ADMIN — OXYCODONE HYDROCHLORIDE 10 MILLIGRAM(S): 5 TABLET ORAL at 08:27

## 2018-04-14 RX ADMIN — Medication 500 MILLIGRAM(S): at 05:32

## 2018-04-14 RX ADMIN — CEFEPIME 100 MILLIGRAM(S): 1 INJECTION, POWDER, FOR SOLUTION INTRAMUSCULAR; INTRAVENOUS at 05:32

## 2018-04-14 RX ADMIN — Medication 325 MILLIGRAM(S): at 08:21

## 2018-04-14 RX ADMIN — OXYCODONE HYDROCHLORIDE 10 MILLIGRAM(S): 5 TABLET ORAL at 04:07

## 2018-04-14 RX ADMIN — CEFEPIME 100 MILLIGRAM(S): 1 INJECTION, POWDER, FOR SOLUTION INTRAMUSCULAR; INTRAVENOUS at 21:55

## 2018-04-14 RX ADMIN — HYDROMORPHONE HYDROCHLORIDE 1 MILLIGRAM(S): 2 INJECTION INTRAMUSCULAR; INTRAVENOUS; SUBCUTANEOUS at 01:02

## 2018-04-14 RX ADMIN — Medication 30 MILLIGRAM(S): at 21:32

## 2018-04-14 RX ADMIN — Medication 250 MILLIGRAM(S): at 06:30

## 2018-04-14 RX ADMIN — HYDROMORPHONE HYDROCHLORIDE 1 MILLIGRAM(S): 2 INJECTION INTRAMUSCULAR; INTRAVENOUS; SUBCUTANEOUS at 07:23

## 2018-04-14 RX ADMIN — Medication 1 TABLET(S): at 11:51

## 2018-04-14 RX ADMIN — HYDROMORPHONE HYDROCHLORIDE 0.5 MILLIGRAM(S): 2 INJECTION INTRAMUSCULAR; INTRAVENOUS; SUBCUTANEOUS at 11:49

## 2018-04-14 RX ADMIN — CELECOXIB 200 MILLIGRAM(S): 200 CAPSULE ORAL at 14:41

## 2018-04-14 RX ADMIN — Medication 30 MILLIGRAM(S): at 16:30

## 2018-04-14 RX ADMIN — HYDROMORPHONE HYDROCHLORIDE 0.5 MILLIGRAM(S): 2 INJECTION INTRAMUSCULAR; INTRAVENOUS; SUBCUTANEOUS at 14:12

## 2018-04-14 RX ADMIN — SODIUM CHLORIDE 120 MILLILITER(S): 9 INJECTION INTRAMUSCULAR; INTRAVENOUS; SUBCUTANEOUS at 05:32

## 2018-04-14 RX ADMIN — CELECOXIB 200 MILLIGRAM(S): 200 CAPSULE ORAL at 15:31

## 2018-04-14 RX ADMIN — PANTOPRAZOLE SODIUM 40 MILLIGRAM(S): 20 TABLET, DELAYED RELEASE ORAL at 11:51

## 2018-04-14 RX ADMIN — SODIUM CHLORIDE 120 MILLILITER(S): 9 INJECTION INTRAMUSCULAR; INTRAVENOUS; SUBCUTANEOUS at 18:35

## 2018-04-14 RX ADMIN — Medication 975 MILLIGRAM(S): at 18:29

## 2018-04-14 RX ADMIN — Medication 975 MILLIGRAM(S): at 09:40

## 2018-04-14 RX ADMIN — Medication 500 MILLIGRAM(S): at 18:33

## 2018-04-14 RX ADMIN — Medication 100 MILLIGRAM(S): at 13:47

## 2018-04-14 RX ADMIN — Medication 325 MILLIGRAM(S): at 11:51

## 2018-04-14 RX ADMIN — Medication 250 MILLIGRAM(S): at 16:06

## 2018-04-14 NOTE — PROGRESS NOTE ADULT - SUBJECTIVE AND OBJECTIVE BOX
27yFemale s/p L AMBER resection/placement of prostalac hip system/antibiotic beads POD#2. Pt seen and examined in NAD. Pain UNcontrolled overnight and pain medications were adjusted. Pt denies any new complaints. Pt denies CP/SOB/dizziness/N/V/D/numbness/tingling/bowel or bladder dysfunction.     PE:   LLE: dressing c/d/i. +ROM ankle/toes. Calf: soft, compressible and nontender. DP/PT 2+ NVI.                           8.7    14.89 )-----------( 395      ( 14 Apr 2018 06:41 )             26.1       04-14    141  |  104  |  8   ----------------------------<  112<H>  3.6   |  30  |  0.47<L>    Ca    8.7      14 Apr 2018 06:41          A/P: 27yFemale s/p L AMEBR resection/placement of prostalac hip system/antibiotic beads POD#2 .  Continue vasquez until able to ambulate out of bed as per Dr. Burns  Continue IV abx via PICC line   F/u OR cx: NGTD   Pain control: pain medications adjusted   Total hip precautions: hip abduction brace  PT: TTWB   DVT ppx: SCDs   Wound care, Isometric exercises, incentive spirometry   Discharge: planning   All the above discussed and understood by pt 27yFemale s/p L AMBER resection/placement of prostalac hip system/antibiotic beads POD#2. Pt seen and examined in NAD. Pain UNcontrolled overnight and pain medications were adjusted. Pt denies any new complaints. Pt denies CP/SOB/dizziness/N/V/D/numbness/tingling/bowel or bladder dysfunction.     PE:   LLE: dressing c/d/i. +ROM ankle/toes. Calf: soft, compressible and nontender. DP/PT 2+ NVI.                           8.7    14.89 )-----------( 395      ( 14 Apr 2018 06:41 )             26.1       04-14    141  |  104  |  8   ----------------------------<  112<H>  3.6   |  30  |  0.47<L>    Ca    8.7      14 Apr 2018 06:41          A/P: 27yFemale s/p L AMBER resection/placement of prostalac hip system/antibiotic beads POD#2 .  Continue vasquez until able to ambulate out of bed as per Dr. Burns  Continue IV abx via PICC line   F/u OR cx: NGTD   Pain control: pain medications adjusted   Total hip precautions: hip abduction brace  PT: TTWB with hip abduction brace flexion: 90 deg Abd: 20 deg - no extension/no external rotation/no flexion past 90 deg/no active abduction    DVT ppx: SCDs/Xarelto 10mg once a day    Wound care, Isometric exercises, incentive spirometry   Discharge: planning   All the above discussed and understood by pt

## 2018-04-14 NOTE — PROGRESS NOTE ADULT - SUBJECTIVE AND OBJECTIVE BOX
Pt was seen multiple times through out the day to manage pt pain. Most likely exparel injectable block has woren off increasing pt pain. Pt unable to do Physical therapy due to pain. Pt currently denies numbness/tingling. PE: +moderate swelling of hip and tender but soft. +TTP over distal portion of incision. +ROM ankle/toes. DP/PT 2+ NVI.  Torodol added to pt regimen. Oxycodone PO d/c'd and Dilaudid PO added to regimen. Xray pelvis ordered to look for dislocation. No dislocation seen on xray. Xray reviewed by Dr. Burns. Case reviewed with Dr. Burns. Continue pain control. F/u CBC and will order LLE venous doppler. All the above discussed and understood by pt and pt family. D/w Dr. Burns Pt was seen multiple times through out the day to manage pt pain. Most likely exparel injectable block has woren off increasing pt pain. Pt unable to do Physical therapy due to pain. Pt currently denies numbness/tingling. PE: +moderate swelling of hip and tender but soft. +TTP over distal portion of incision. +ROM ankle/toes. mild calf tenderness DP/PT 2+ NVI.  Torodol added to pt regimen. Will continue oxycodone regimen as pt only took oxycodone once this whole day. Xray pelvis ordered to look for dislocation. No dislocation seen on xray. Xray reviewed by Dr. Burns. Case reviewed with Dr. Burns. Continue pain control. F/u CBC and will order LLE venous doppler. All the above discussed and understood by pt and pt family. D/w Dr. Burns

## 2018-04-14 NOTE — PROGRESS NOTE ADULT - ASSESSMENT
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   s/p hip aspiration- infected hip  Doing well post op

## 2018-04-14 NOTE — PROGRESS NOTE ADULT - SUBJECTIVE AND OBJECTIVE BOX
Chief Complaints:  Patient is a 27y old  Female who presents with a chief complaint of Sudden right hip pain (01 Apr 2018 21:27)      Interval History:    ROS: Unchanged    Physical Exam:    In NAD:    Vital Signs Last 24 Hrs  T(C): 37.3 (04-14-18 @ 17:49), Max: 37.9 (04-14-18 @ 01:15)  T(F): 99.1 (04-14-18 @ 17:49), Max: 100.2 (04-14-18 @ 01:15)  HR: 111 (04-14-18 @ 17:49) (107 - 121)  BP: 114/83 (04-14-18 @ 17:49) (114/83 - 139/83)  BP(mean): --  RR: 17 (04-14-18 @ 17:49) (16 - 19)  SpO2: 98% (04-14-18 @ 17:49) (95% - 99%)    Eyes: Anicteric.  Neck: Supple No jugular venous distention  Chest: Good air entry bilaterally  CVS: S1 and S2 regular  Abdomen: Soft BS + in all quadrant, no tenderness, no rebound tenderness and guarding.  Extremity: No edema cyanosis or clubbing.  CNS: awake and alert.    MEDICATIONS  (STANDING):  acetaminophen   Tablet 975 milliGRAM(s) Oral every 8 hours  ascorbic acid 500 milliGRAM(s) Oral two times a day  cefepime  IVPB      cefepime  IVPB 2000 milliGRAM(s) IV Intermittent every 8 hours  celecoxib 200 milliGRAM(s) Oral once  celecoxib 200 milliGRAM(s) Oral every 12 hours  docusate sodium 100 milliGRAM(s) Oral three times a day  ferrous    sulfate 325 milliGRAM(s) Oral three times a day with meals  folic acid 1 milliGRAM(s) Oral daily  ketorolac   Injectable 30 milliGRAM(s) IV Push every 8 hours  multivitamin 1 Tablet(s) Oral daily  pantoprazole    Tablet 40 milliGRAM(s) Oral daily  polyethylene glycol 3350 17 Gram(s) Oral daily  rifampin 300 milliGRAM(s) Oral daily  rivaroxaban 10 milliGRAM(s) Oral daily  sodium chloride 0.9%. 1000 milliLiter(s) IV Continuous <Continuous>  vancomycin  IVPB 1000 milliGRAM(s) IV Intermittent every 8 hours  vancomycin  IVPB        14 Apr 2018 06:41    141    |  104    |  8      ----------------------------<  112    3.6     |  30     |  0.47     Ca    8.7        14 Apr 2018 06:41                            9.0    18.59 )-----------( 393      ( 14 Apr 2018 19:55 )             26.2     CAPILLARY BLOOD GLUCOSE          Blood Bank Release Request (04-14-18 @ 00:34)  Pulse Oximetry (04-14-18 @ 16:09)  Remote Telemetry/EKG EXCEPT Off Unit Tests (04-14-18 @ 16:09)  Indwelling Urethral Catheter (04-14-18 @ 18:39) Chief Complaints:  Patient is a 27y old  Female who presents with a chief complaint of Sudden right hip pain (01 Apr 2018 21:27)      Interval History: She has morepain today than before.    ROS: Unchanged    Physical Exam:    In NAD:    Vital Signs Last 24 Hrs  T(C): 37.3 (04-14-18 @ 17:49), Max: 37.9 (04-14-18 @ 01:15)  T(F): 99.1 (04-14-18 @ 17:49), Max: 100.2 (04-14-18 @ 01:15)  HR: 111 (04-14-18 @ 17:49) (107 - 121)  BP: 114/83 (04-14-18 @ 17:49) (114/83 - 139/83)  BP(mean): --  RR: 17 (04-14-18 @ 17:49) (16 - 19)  SpO2: 98% (04-14-18 @ 17:49) (95% - 99%)    Eyes: Anicteric.  Neck: Supple No jugular venous distention  Chest: Good air entry bilaterally  CVS: S1 and S2 regular  Abdomen: Soft BS + in all quadrant, no tenderness, no rebound tenderness and guarding.  Extremity: No edema cyanosis or clubbing. Dressing intact.  CNS: awake and alert.    MEDICATIONS  (STANDING):  acetaminophen   Tablet 975 milliGRAM(s) Oral every 8 hours  ascorbic acid 500 milliGRAM(s) Oral two times a day  cefepime  IVPB      cefepime  IVPB 2000 milliGRAM(s) IV Intermittent every 8 hours  celecoxib 200 milliGRAM(s) Oral once  celecoxib 200 milliGRAM(s) Oral every 12 hours  docusate sodium 100 milliGRAM(s) Oral three times a day  ferrous    sulfate 325 milliGRAM(s) Oral three times a day with meals  folic acid 1 milliGRAM(s) Oral daily  ketorolac   Injectable 30 milliGRAM(s) IV Push every 8 hours  multivitamin 1 Tablet(s) Oral daily  pantoprazole    Tablet 40 milliGRAM(s) Oral daily  polyethylene glycol 3350 17 Gram(s) Oral daily  rifampin 300 milliGRAM(s) Oral daily  rivaroxaban 10 milliGRAM(s) Oral daily  sodium chloride 0.9%. 1000 milliLiter(s) IV Continuous <Continuous>  vancomycin  IVPB 1000 milliGRAM(s) IV Intermittent every 8 hours  vancomycin  IVPB        14 Apr 2018 06:41    141    |  104    |  8      ----------------------------<  112    3.6     |  30     |  0.47     Ca    8.7        14 Apr 2018 06:41                            9.0    18.59 )-----------( 393      ( 14 Apr 2018 19:55 )             26.2     CAPILLARY BLOOD GLUCOSE          Blood Bank Release Request (04-14-18 @ 00:34)  Pulse Oximetry (04-14-18 @ 16:09)  Remote Telemetry/EKG EXCEPT Off Unit Tests (04-14-18 @ 16:09)  Indwelling Urethral Catheter (04-14-18 @ 18:39)

## 2018-04-15 LAB
ANION GAP SERPL CALC-SCNC: 8 MMOL/L — SIGNIFICANT CHANGE UP (ref 5–17)
BUN SERPL-MCNC: 6 MG/DL — LOW (ref 7–23)
CALCIUM SERPL-MCNC: 9.2 MG/DL — SIGNIFICANT CHANGE UP (ref 8.5–10.1)
CHLORIDE SERPL-SCNC: 105 MMOL/L — SIGNIFICANT CHANGE UP (ref 96–108)
CO2 SERPL-SCNC: 26 MMOL/L — SIGNIFICANT CHANGE UP (ref 22–31)
CREAT SERPL-MCNC: 0.42 MG/DL — LOW (ref 0.5–1.3)
GLUCOSE SERPL-MCNC: 120 MG/DL — HIGH (ref 70–99)
HCT VFR BLD CALC: 25.9 % — LOW (ref 34.5–45)
HGB BLD-MCNC: 8.6 G/DL — LOW (ref 11.5–15.5)
MCHC RBC-ENTMCNC: 28.2 PG — SIGNIFICANT CHANGE UP (ref 27–34)
MCHC RBC-ENTMCNC: 33.2 GM/DL — SIGNIFICANT CHANGE UP (ref 32–36)
MCV RBC AUTO: 84.9 FL — SIGNIFICANT CHANGE UP (ref 80–100)
NRBC # BLD: 0 /100 WBCS — SIGNIFICANT CHANGE UP (ref 0–0)
PLATELET # BLD AUTO: 379 K/UL — SIGNIFICANT CHANGE UP (ref 150–400)
POTASSIUM SERPL-MCNC: 3.5 MMOL/L — SIGNIFICANT CHANGE UP (ref 3.5–5.3)
POTASSIUM SERPL-SCNC: 3.5 MMOL/L — SIGNIFICANT CHANGE UP (ref 3.5–5.3)
RBC # BLD: 3.05 M/UL — LOW (ref 3.8–5.2)
RBC # FLD: 14 % — SIGNIFICANT CHANGE UP (ref 10.3–14.5)
SODIUM SERPL-SCNC: 139 MMOL/L — SIGNIFICANT CHANGE UP (ref 135–145)
WBC # BLD: 16.8 K/UL — HIGH (ref 3.8–10.5)
WBC # FLD AUTO: 16.8 K/UL — HIGH (ref 3.8–10.5)

## 2018-04-15 PROCEDURE — 93971 EXTREMITY STUDY: CPT | Mod: 26

## 2018-04-15 PROCEDURE — 73560 X-RAY EXAM OF KNEE 1 OR 2: CPT | Mod: 26,LT

## 2018-04-15 RX ADMIN — Medication 100 MILLIGRAM(S): at 13:53

## 2018-04-15 RX ADMIN — Medication 500 MILLIGRAM(S): at 05:54

## 2018-04-15 RX ADMIN — CEFEPIME 100 MILLIGRAM(S): 1 INJECTION, POWDER, FOR SOLUTION INTRAMUSCULAR; INTRAVENOUS at 21:17

## 2018-04-15 RX ADMIN — OXYCODONE HYDROCHLORIDE 10 MILLIGRAM(S): 5 TABLET ORAL at 18:58

## 2018-04-15 RX ADMIN — Medication 975 MILLIGRAM(S): at 02:07

## 2018-04-15 RX ADMIN — CELECOXIB 200 MILLIGRAM(S): 200 CAPSULE ORAL at 18:58

## 2018-04-15 RX ADMIN — PANTOPRAZOLE SODIUM 40 MILLIGRAM(S): 20 TABLET, DELAYED RELEASE ORAL at 13:52

## 2018-04-15 RX ADMIN — CELECOXIB 200 MILLIGRAM(S): 200 CAPSULE ORAL at 05:54

## 2018-04-15 RX ADMIN — HYDROMORPHONE HYDROCHLORIDE 0.5 MILLIGRAM(S): 2 INJECTION INTRAMUSCULAR; INTRAVENOUS; SUBCUTANEOUS at 20:16

## 2018-04-15 RX ADMIN — HYDROMORPHONE HYDROCHLORIDE 0.5 MILLIGRAM(S): 2 INJECTION INTRAMUSCULAR; INTRAVENOUS; SUBCUTANEOUS at 20:30

## 2018-04-15 RX ADMIN — Medication 250 MILLIGRAM(S): at 15:35

## 2018-04-15 RX ADMIN — Medication 100 MILLIGRAM(S): at 06:00

## 2018-04-15 RX ADMIN — Medication 30 MILLIGRAM(S): at 05:53

## 2018-04-15 RX ADMIN — CELECOXIB 200 MILLIGRAM(S): 200 CAPSULE ORAL at 17:32

## 2018-04-15 RX ADMIN — OXYCODONE HYDROCHLORIDE 10 MILLIGRAM(S): 5 TABLET ORAL at 10:46

## 2018-04-15 RX ADMIN — OXYCODONE HYDROCHLORIDE 10 MILLIGRAM(S): 5 TABLET ORAL at 10:30

## 2018-04-15 RX ADMIN — RIVAROXABAN 10 MILLIGRAM(S): KIT at 13:53

## 2018-04-15 RX ADMIN — CEFEPIME 100 MILLIGRAM(S): 1 INJECTION, POWDER, FOR SOLUTION INTRAMUSCULAR; INTRAVENOUS at 05:58

## 2018-04-15 RX ADMIN — CELECOXIB 200 MILLIGRAM(S): 200 CAPSULE ORAL at 06:54

## 2018-04-15 RX ADMIN — OXYCODONE HYDROCHLORIDE 10 MILLIGRAM(S): 5 TABLET ORAL at 23:18

## 2018-04-15 RX ADMIN — Medication 1 TABLET(S): at 13:53

## 2018-04-15 RX ADMIN — HYDROMORPHONE HYDROCHLORIDE 0.5 MILLIGRAM(S): 2 INJECTION INTRAMUSCULAR; INTRAVENOUS; SUBCUTANEOUS at 08:30

## 2018-04-15 RX ADMIN — Medication 325 MILLIGRAM(S): at 13:53

## 2018-04-15 RX ADMIN — HYDROMORPHONE HYDROCHLORIDE 0.5 MILLIGRAM(S): 2 INJECTION INTRAMUSCULAR; INTRAVENOUS; SUBCUTANEOUS at 14:41

## 2018-04-15 RX ADMIN — Medication 250 MILLIGRAM(S): at 21:17

## 2018-04-15 RX ADMIN — Medication 500 MILLIGRAM(S): at 17:32

## 2018-04-15 RX ADMIN — HYDROMORPHONE HYDROCHLORIDE 0.5 MILLIGRAM(S): 2 INJECTION INTRAMUSCULAR; INTRAVENOUS; SUBCUTANEOUS at 00:01

## 2018-04-15 RX ADMIN — OXYCODONE HYDROCHLORIDE 10 MILLIGRAM(S): 5 TABLET ORAL at 17:32

## 2018-04-15 RX ADMIN — Medication 30 MILLIGRAM(S): at 06:54

## 2018-04-15 RX ADMIN — Medication 1 MILLIGRAM(S): at 13:53

## 2018-04-15 RX ADMIN — Medication 975 MILLIGRAM(S): at 10:16

## 2018-04-15 RX ADMIN — CEFEPIME 100 MILLIGRAM(S): 1 INJECTION, POWDER, FOR SOLUTION INTRAMUSCULAR; INTRAVENOUS at 13:53

## 2018-04-15 RX ADMIN — HYDROMORPHONE HYDROCHLORIDE 0.5 MILLIGRAM(S): 2 INJECTION INTRAMUSCULAR; INTRAVENOUS; SUBCUTANEOUS at 00:31

## 2018-04-15 RX ADMIN — Medication 325 MILLIGRAM(S): at 17:32

## 2018-04-15 RX ADMIN — Medication 250 MILLIGRAM(S): at 07:02

## 2018-04-15 RX ADMIN — HYDROMORPHONE HYDROCHLORIDE 0.5 MILLIGRAM(S): 2 INJECTION INTRAMUSCULAR; INTRAVENOUS; SUBCUTANEOUS at 07:47

## 2018-04-15 RX ADMIN — Medication 325 MILLIGRAM(S): at 07:47

## 2018-04-15 RX ADMIN — Medication 100 MILLIGRAM(S): at 21:18

## 2018-04-15 NOTE — PROGRESS NOTE ADULT - SUBJECTIVE AND OBJECTIVE BOX
Chief Complaints:  Patient is a 27y old  Female who presents with a chief complaint of Sudden right hip pain (01 Apr 2018 21:27)      Interval History: She is feeling better today. She is able to walk with the PT. Toradol worked better for her.    ROS: Unchanged    Physical Exam:    In NAD:    Vital Signs Last 24 Hrs  T(C): 37.1 (04-15-18 @ 18:08), Max: 37.1 (04-15-18 @ 18:08)  T(F): 98.8 (04-15-18 @ 18:08), Max: 98.8 (04-15-18 @ 18:08)  HR: 100 (04-15-18 @ 18:08) (90 - 112)  BP: 133/88 (04-15-18 @ 18:08) (125/79 - 135/84)  BP(mean): --  RR: 17 (04-15-18 @ 18:08) (17 - 18)  SpO2: 100% (04-15-18 @ 18:08) (95% - 100%)    Eyes: Anicteric.  Neck: Supple No jugular venous distention  Chest: Good air entry bilaterally  CVS: S1 and S2 regular  Abdomen: Soft BS + in all quadrant, no tenderness, no rebound tenderness and guarding.  Extremity: No edema cyanosis or clubbing. Post op dressing intact  CNS: awake and alert.    MEDICATIONS  (STANDING):  acetaminophen   Tablet 975 milliGRAM(s) Oral every 8 hours  ascorbic acid 500 milliGRAM(s) Oral two times a day  cefepime  IVPB      cefepime  IVPB 2000 milliGRAM(s) IV Intermittent every 8 hours  celecoxib 200 milliGRAM(s) Oral once  celecoxib 200 milliGRAM(s) Oral every 12 hours  docusate sodium 100 milliGRAM(s) Oral three times a day  ferrous    sulfate 325 milliGRAM(s) Oral three times a day with meals  folic acid 1 milliGRAM(s) Oral daily  multivitamin 1 Tablet(s) Oral daily  pantoprazole    Tablet 40 milliGRAM(s) Oral daily  polyethylene glycol 3350 17 Gram(s) Oral daily  rifampin 300 milliGRAM(s) Oral daily  rivaroxaban 10 milliGRAM(s) Oral daily  sodium chloride 0.9%. 1000 milliLiter(s) IV Continuous <Continuous>  vancomycin  IVPB 1000 milliGRAM(s) IV Intermittent every 8 hours  vancomycin  IVPB        15 Apr 2018 07:17    139    |  105    |  6      ----------------------------<  120    3.5     |  26     |  0.42     Ca    9.2        15 Apr 2018 07:17                            8.6    16.80 )-----------( 379      ( 15 Apr 2018 07:17 )             25.9     CAPILLARY BLOOD GLUCOSE          Provider to RN (04-15-18 @ 16:43)

## 2018-04-15 NOTE — PROGRESS NOTE ADULT - SUBJECTIVE AND OBJECTIVE BOX
Patient is seen and examined at bedside. Denies CP/SOB/Dizziness/N/V/D/HA. Pain is controlled.     Vital Signs Last 24 Hrs  T(C): 36.8 (15 Apr 2018 05:26), Max: 37.3 (14 Apr 2018 17:49)  T(F): 98.3 (15 Apr 2018 05:26), Max: 99.1 (14 Apr 2018 17:49)  HR: 90 (15 Apr 2018 05:26) (90 - 117)  BP: 125/79 (15 Apr 2018 05:26) (114/83 - 139/83)  BP(mean): --  RR: 18 (15 Apr 2018 05:26) (17 - 19)  SpO2: 98% (15 Apr 2018 05:26) (98% - 98%)    GEN: NAD  CV:  S1, S2, no murmurs  Neurologic: AAOx3; CNS grossly intact; no focal deficits  LLE: dressing c/d/i. +ROM ankle/toes. Calf: soft, compressible and nontender. DP/PT 2+    Labs:                          8.6    16.80 )-----------( 379      ( 15 Apr 2018 07:17 )             25.9       04-15    139  |  105  |  6<L>  ----------------------------<  120<H>  3.5   |  26  |  0.42<L>    Ca    9.2      15 Apr 2018 07:17      A/P: 27yFemale s/p L AMBER resection/placement of prostalac hip system/antibiotic beads POD#3 .  Continue vasquez until able to ambulate out of bed as per Dr. Burns  Continue IV abx via PICC line   F/u OR cx: NGTD   Pain control: pain medications adjusted   Total hip precautions: hip abduction brace  PT: TTWB with hip abduction brace flexion: 90 deg Abd: 20 deg - no extension/no external rotation/no flexion past 90 deg/no active abduction    DVT ppx: SCDs/Xarelto 10mg once a day    Wound care, Isometric exercises, incentive spirometry   Discharge: planning

## 2018-04-15 NOTE — CHART NOTE - NSCHARTNOTEFT_GEN_A_CORE
Assessment:     Factors impacting intake: [ ] none [ ] nausea  [ ] vomiting [ ] diarrhea [ ] constipation  [ ]chewing problems [ ] swallowing issues  [ ] other:     Diet Presciption: Diet, Regular (04-12-18 @ 19:04)    Intake:  75 - 100%    Current Weight: Weight (kg): 76.3 (04-12 @ 21:22)   % Weight Change    Pertinent Medications: MEDICATIONS  (STANDING):  acetaminophen   Tablet 975 milliGRAM(s) Oral every 8 hours  ascorbic acid 500 milliGRAM(s) Oral two times a day  cefepime  IVPB      cefepime  IVPB 2000 milliGRAM(s) IV Intermittent every 8 hours  celecoxib 200 milliGRAM(s) Oral once  celecoxib 200 milliGRAM(s) Oral every 12 hours  docusate sodium 100 milliGRAM(s) Oral three times a day  ferrous    sulfate 325 milliGRAM(s) Oral three times a day with meals  folic acid 1 milliGRAM(s) Oral daily  multivitamin 1 Tablet(s) Oral daily  pantoprazole    Tablet 40 milliGRAM(s) Oral daily  polyethylene glycol 3350 17 Gram(s) Oral daily  rifampin 300 milliGRAM(s) Oral daily  rivaroxaban 10 milliGRAM(s) Oral daily  sodium chloride 0.9%. 1000 milliLiter(s) (120 mL/Hr) IV Continuous <Continuous>  vancomycin  IVPB 1000 milliGRAM(s) IV Intermittent every 8 hours  vancomycin  IVPB        MEDICATIONS  (PRN):  aluminum hydroxide/magnesium hydroxide/simethicone Suspension 30 milliLiter(s) Oral four times a day PRN Indigestion  bisacodyl Suppository 10 milliGRAM(s) Rectal daily PRN If no bowel movement by POD#2  HYDROmorphone  Injectable 0.5 milliGRAM(s) IV Push every 4 hours PRN breakthrough pain  magnesium hydroxide Suspension 30 milliLiter(s) Oral every 12 hours PRN Constipation  ondansetron Injectable 4 milliGRAM(s) IV Push every 6 hours PRN Nausea and/or Vomiting  oxyCODONE    IR 5 milliGRAM(s) Oral every 3 hours PRN pain 1 - 5  oxyCODONE    IR 10 milliGRAM(s) Oral every 3 hours PRN pain 6 - 10  senna 2 Tablet(s) Oral at bedtime PRN Constipation    Pertinent Labs: 04-15 Na139 mmol/L Glu 120 mg/dL<H> K+ 3.5 mmol/L Cr  0.42 mg/dL<L> BUN 6 mg/dL<L>     CAPILLARY BLOOD GLUCOSE        Skin:     Estimated Needs:   [ ] no change since previous assessment  [ ] recalculated:     Previous Nutrition Diagnosis:   [ ] Inadequate Energy Intake [ ]Inadequate Oral Intake [ ] Excessive Energy Intake   [ ] Underweight [ ] Increased Nutrient Needs [ ] Overweight/Obesity   [ ] Altered GI Function [ ] Unintended Weight Loss [ ] Food & Nutrition Related Knowledge Deficit [ ] Malnutrition     Nutrition Diagnosis is [ ] ongoing  [ ] resolved [ ] not applicable     New Nutrition Diagnosis: [ ] not applicable       Interventions:   Recommend  [ ] Change Diet To:  [ ] Nutrition Supplement  [ ] Nutrition Support  [ ] Other:     Monitoring and Evaluation:   [ ] PO intake [ x ] Tolerance to diet prescription [ x ] weights [ x ] labs[ x ] follow up per protocol  [ ] other: S/P L AMBER resection/placement of prostetic hip system. POD #3    Factors impacting intake: [x ] none [ ] nausea  [ ] vomiting [ ] diarrhea [ ] constipation  [ ]chewing problems [ ] swallowing issues  [ ] other:     Diet Prescription: Diet, Regular (04-12-18 @ 19:04)    Intake:  75 - 100%    Current Weight: Weight (kg): 76.3 (04-12 @ 21:22) , 4/1 - 168.3 (76.3 kg)  % Weight Change - 0 %    Pertinent Medications: MEDICATIONS  (STANDING):  acetaminophen   Tablet 975 milliGRAM(s) Oral every 8 hours  ascorbic acid 500 milliGRAM(s) Oral two times a day  cefepime  IVPB      cefepime  IVPB 2000 milliGRAM(s) IV Intermittent every 8 hours  celecoxib 200 milliGRAM(s) Oral once  celecoxib 200 milliGRAM(s) Oral every 12 hours  docusate sodium 100 milliGRAM(s) Oral three times a day  ferrous    sulfate 325 milliGRAM(s) Oral three times a day with meals  folic acid 1 milliGRAM(s) Oral daily  multivitamin 1 Tablet(s) Oral daily  pantoprazole    Tablet 40 milliGRAM(s) Oral daily  polyethylene glycol 3350 17 Gram(s) Oral daily  rifampin 300 milliGRAM(s) Oral daily  rivaroxaban 10 milliGRAM(s) Oral daily  sodium chloride 0.9%. 1000 milliLiter(s) (120 mL/Hr) IV Continuous <Continuous>  vancomycin  IVPB 1000 milliGRAM(s) IV Intermittent every 8 hours  vancomycin  IVPB        MEDICATIONS  (PRN):  aluminum hydroxide/magnesium hydroxide/simethicone Suspension 30 milliLiter(s) Oral four times a day PRN Indigestion  bisacodyl Suppository 10 milliGRAM(s) Rectal daily PRN If no bowel movement by POD#2  HYDROmorphone  Injectable 0.5 milliGRAM(s) IV Push every 4 hours PRN breakthrough pain  magnesium hydroxide Suspension 30 milliLiter(s) Oral every 12 hours PRN Constipation  ondansetron Injectable 4 milliGRAM(s) IV Push every 6 hours PRN Nausea and/or Vomiting  oxyCODONE    IR 5 milliGRAM(s) Oral every 3 hours PRN pain 1 - 5  oxyCODONE    IR 10 milliGRAM(s) Oral every 3 hours PRN pain 6 - 10  senna 2 Tablet(s) Oral at bedtime PRN Constipation    Pertinent Labs: 04-15 Na139 mmol/L Glu 120 mg/dL<H> K+ 3.5 mmol/L Cr  0.42 mg/dL<L> BUN 6 mg/dL<L>     CAPILLARY BLOOD GLUCOSE      Edema - 4/14 - 2+ (L) Hip  Skin: WDL    Estimated Needs:   [x ] no change since previous assessment  [ ] recalculated:     Previous Nutrition Diagnosis:   [ ] Inadequate Energy Intake [ ]Inadequate Oral Intake [ ] Excessive Energy Intake   [ ] Underweight [ ] Increased Nutrient Needs [ ] Overweight/Obesity   [ ] Altered GI Function [ ] Unintended Weight Loss [ ] Food & Nutrition Related Knowledge Deficit [ ] Malnutrition     Nutrition Diagnosis is [ ] ongoing  [ ] resolved [ ] not applicable     New Nutrition Diagnosis: [ ] not applicable       Interventions:   Recommend  [ ] Change Diet To:  [ ] Nutrition Supplement  [ ] Nutrition Support  [ ] Other:     Monitoring and Evaluation:   [ ] PO intake [ x ] Tolerance to diet prescription [ x ] weights [ x ] labs[ x ] follow up per protocol  [ ] other: S/P L AMBER resection/placement of prosthetic hip system. POD #3    Factors impacting intake: [x ] none [ ] nausea  [ ] vomiting [ ] diarrhea [ ] constipation  [ ]chewing problems [ ] swallowing issues  [ ] other:     Diet Prescription: Diet, Regular (04-12-18 @ 19:04)    Intake:  75 - 100%    Current Weight: Weight (kg): 76.3 (04-12 @ 21:22) , 4/1 - 168.3 (76.3 kg)  % Weight Change - 0 %    Pertinent Medications: MEDICATIONS  (STANDING):  acetaminophen   Tablet 975 milliGRAM(s) Oral every 8 hours  ascorbic acid 500 milliGRAM(s) Oral two times a day  cefepime  IVPB      cefepime  IVPB 2000 milliGRAM(s) IV Intermittent every 8 hours  celecoxib 200 milliGRAM(s) Oral once  celecoxib 200 milliGRAM(s) Oral every 12 hours  docusate sodium 100 milliGRAM(s) Oral three times a day  ferrous    sulfate 325 milliGRAM(s) Oral three times a day with meals  folic acid 1 milliGRAM(s) Oral daily  multivitamin 1 Tablet(s) Oral daily  pantoprazole    Tablet 40 milliGRAM(s) Oral daily  polyethylene glycol 3350 17 Gram(s) Oral daily  rifampin 300 milliGRAM(s) Oral daily  rivaroxaban 10 milliGRAM(s) Oral daily  sodium chloride 0.9%. 1000 milliLiter(s) (120 mL/Hr) IV Continuous <Continuous>  vancomycin  IVPB 1000 milliGRAM(s) IV Intermittent every 8 hours  vancomycin  IVPB        MEDICATIONS  (PRN):  aluminum hydroxide/magnesium hydroxide/simethicone Suspension 30 milliLiter(s) Oral four times a day PRN Indigestion  bisacodyl Suppository 10 milliGRAM(s) Rectal daily PRN If no bowel movement by POD#2  HYDROmorphone  Injectable 0.5 milliGRAM(s) IV Push every 4 hours PRN breakthrough pain  magnesium hydroxide Suspension 30 milliLiter(s) Oral every 12 hours PRN Constipation  ondansetron Injectable 4 milliGRAM(s) IV Push every 6 hours PRN Nausea and/or Vomiting  oxyCODONE    IR 5 milliGRAM(s) Oral every 3 hours PRN pain 1 - 5  oxyCODONE    IR 10 milliGRAM(s) Oral every 3 hours PRN pain 6 - 10  senna 2 Tablet(s) Oral at bedtime PRN Constipation    Pertinent Labs: 04-15 Na139 mmol/L Glu 120 mg/dL<H> K+ 3.5 mmol/L Cr  0.42 mg/dL<L> BUN 6 mg/dL<L>     CAPILLARY BLOOD GLUCOSE      Edema - 4/14 - 2+ (L) Hip  Skin: WDL    Estimated Needs:   [x ] no change since previous assessment  [ ] recalculated:     Previous Nutrition Diagnosis:   [ ] Inadequate Energy Intake [ ]Inadequate Oral Intake [ ] Excessive Energy Intake   [ ] Underweight [ ] Increased Nutrient Needs [x ] Overweight/Obesity - food and nutrition related knowledge deficit  [ ] Altered GI Function [ ] Unintended Weight Loss [ ] Food & Nutrition Related Knowledge Deficit [ ] Malnutrition     Nutrition Diagnosis is [ ] ongoing  [ ] resolved [x ] not applicable - pt's mom at bedside expressed concern over following diet while in hospital and recovering at home. Reviewed diet literature as a guide after recovery.   New Nutrition Diagnosis: [x ] not applicable       Interventions:   Recommend - continue Regular diet as Rx'd  [ ] Change Diet To:  [ ] Nutrition Supplement  [ ] Nutrition Support  [ ] Other:     Monitoring and Evaluation:   [ ] PO intake [ x ] Tolerance to diet prescription [ x ] weights [ x ] labs[ x ] follow up per protocol  [ ] other:

## 2018-04-16 LAB
ANION GAP SERPL CALC-SCNC: 8 MMOL/L — SIGNIFICANT CHANGE UP (ref 5–17)
BUN SERPL-MCNC: 7 MG/DL — SIGNIFICANT CHANGE UP (ref 7–23)
CALCIUM SERPL-MCNC: 9.1 MG/DL — SIGNIFICANT CHANGE UP (ref 8.5–10.1)
CHLORIDE SERPL-SCNC: 106 MMOL/L — SIGNIFICANT CHANGE UP (ref 96–108)
CO2 SERPL-SCNC: 28 MMOL/L — SIGNIFICANT CHANGE UP (ref 22–31)
CREAT SERPL-MCNC: 0.41 MG/DL — LOW (ref 0.5–1.3)
CULTURE RESULTS: SIGNIFICANT CHANGE UP
CULTURE RESULTS: SIGNIFICANT CHANGE UP
GLUCOSE SERPL-MCNC: 113 MG/DL — HIGH (ref 70–99)
GRAM STN FLD: SIGNIFICANT CHANGE UP
GRAM STN FLD: SIGNIFICANT CHANGE UP
HCT VFR BLD CALC: 25.1 % — LOW (ref 34.5–45)
HGB BLD-MCNC: 8.5 G/DL — LOW (ref 11.5–15.5)
MCHC RBC-ENTMCNC: 29.2 PG — SIGNIFICANT CHANGE UP (ref 27–34)
MCHC RBC-ENTMCNC: 33.9 GM/DL — SIGNIFICANT CHANGE UP (ref 32–36)
MCV RBC AUTO: 86.3 FL — SIGNIFICANT CHANGE UP (ref 80–100)
NRBC # BLD: 0 /100 WBCS — SIGNIFICANT CHANGE UP (ref 0–0)
PLATELET # BLD AUTO: 387 K/UL — SIGNIFICANT CHANGE UP (ref 150–400)
POTASSIUM SERPL-MCNC: 3.7 MMOL/L — SIGNIFICANT CHANGE UP (ref 3.5–5.3)
POTASSIUM SERPL-SCNC: 3.7 MMOL/L — SIGNIFICANT CHANGE UP (ref 3.5–5.3)
RBC # BLD: 2.91 M/UL — LOW (ref 3.8–5.2)
RBC # FLD: 14 % — SIGNIFICANT CHANGE UP (ref 10.3–14.5)
SODIUM SERPL-SCNC: 142 MMOL/L — SIGNIFICANT CHANGE UP (ref 135–145)
SPECIMEN SOURCE: SIGNIFICANT CHANGE UP
SPECIMEN SOURCE: SIGNIFICANT CHANGE UP
SURGICAL PATHOLOGY FINAL REPORT - CH: SIGNIFICANT CHANGE UP
WBC # BLD: 13.87 K/UL — HIGH (ref 3.8–10.5)
WBC # FLD AUTO: 13.87 K/UL — HIGH (ref 3.8–10.5)

## 2018-04-16 PROCEDURE — 99233 SBSQ HOSP IP/OBS HIGH 50: CPT

## 2018-04-16 RX ORDER — MEROPENEM 1 G/30ML
INJECTION INTRAVENOUS
Qty: 0 | Refills: 0 | Status: DISCONTINUED | OUTPATIENT
Start: 2018-04-16 | End: 2018-04-18

## 2018-04-16 RX ORDER — MEROPENEM 1 G/30ML
1000 INJECTION INTRAVENOUS ONCE
Qty: 0 | Refills: 0 | Status: COMPLETED | OUTPATIENT
Start: 2018-04-16 | End: 2018-04-16

## 2018-04-16 RX ORDER — MEROPENEM 1 G/30ML
1000 INJECTION INTRAVENOUS EVERY 8 HOURS
Qty: 0 | Refills: 0 | Status: DISCONTINUED | OUTPATIENT
Start: 2018-04-16 | End: 2018-04-18

## 2018-04-16 RX ADMIN — OXYCODONE HYDROCHLORIDE 10 MILLIGRAM(S): 5 TABLET ORAL at 21:57

## 2018-04-16 RX ADMIN — OXYCODONE HYDROCHLORIDE 10 MILLIGRAM(S): 5 TABLET ORAL at 18:21

## 2018-04-16 RX ADMIN — CELECOXIB 200 MILLIGRAM(S): 200 CAPSULE ORAL at 17:24

## 2018-04-16 RX ADMIN — Medication 500 MILLIGRAM(S): at 17:24

## 2018-04-16 RX ADMIN — OXYCODONE HYDROCHLORIDE 10 MILLIGRAM(S): 5 TABLET ORAL at 03:50

## 2018-04-16 RX ADMIN — OXYCODONE HYDROCHLORIDE 10 MILLIGRAM(S): 5 TABLET ORAL at 22:57

## 2018-04-16 RX ADMIN — Medication 250 MILLIGRAM(S): at 13:38

## 2018-04-16 RX ADMIN — HYDROMORPHONE HYDROCHLORIDE 0.5 MILLIGRAM(S): 2 INJECTION INTRAMUSCULAR; INTRAVENOUS; SUBCUTANEOUS at 07:47

## 2018-04-16 RX ADMIN — Medication 1 MILLIGRAM(S): at 11:01

## 2018-04-16 RX ADMIN — SODIUM CHLORIDE 120 MILLILITER(S): 9 INJECTION INTRAMUSCULAR; INTRAVENOUS; SUBCUTANEOUS at 11:01

## 2018-04-16 RX ADMIN — OXYCODONE HYDROCHLORIDE 10 MILLIGRAM(S): 5 TABLET ORAL at 12:00

## 2018-04-16 RX ADMIN — Medication 100 MILLIGRAM(S): at 21:56

## 2018-04-16 RX ADMIN — Medication 975 MILLIGRAM(S): at 02:55

## 2018-04-16 RX ADMIN — OXYCODONE HYDROCHLORIDE 10 MILLIGRAM(S): 5 TABLET ORAL at 15:15

## 2018-04-16 RX ADMIN — HYDROMORPHONE HYDROCHLORIDE 0.5 MILLIGRAM(S): 2 INJECTION INTRAMUSCULAR; INTRAVENOUS; SUBCUTANEOUS at 07:58

## 2018-04-16 RX ADMIN — OXYCODONE HYDROCHLORIDE 10 MILLIGRAM(S): 5 TABLET ORAL at 17:52

## 2018-04-16 RX ADMIN — Medication 250 MILLIGRAM(S): at 22:52

## 2018-04-16 RX ADMIN — Medication 100 MILLIGRAM(S): at 13:38

## 2018-04-16 RX ADMIN — RIVAROXABAN 10 MILLIGRAM(S): KIT at 11:01

## 2018-04-16 RX ADMIN — OXYCODONE HYDROCHLORIDE 10 MILLIGRAM(S): 5 TABLET ORAL at 14:39

## 2018-04-16 RX ADMIN — Medication 325 MILLIGRAM(S): at 11:01

## 2018-04-16 RX ADMIN — CELECOXIB 200 MILLIGRAM(S): 200 CAPSULE ORAL at 06:01

## 2018-04-16 RX ADMIN — OXYCODONE HYDROCHLORIDE 10 MILLIGRAM(S): 5 TABLET ORAL at 02:48

## 2018-04-16 RX ADMIN — MEROPENEM 100 MILLIGRAM(S): 1 INJECTION INTRAVENOUS at 21:56

## 2018-04-16 RX ADMIN — Medication 100 MILLIGRAM(S): at 06:01

## 2018-04-16 RX ADMIN — OXYCODONE HYDROCHLORIDE 10 MILLIGRAM(S): 5 TABLET ORAL at 06:01

## 2018-04-16 RX ADMIN — Medication 975 MILLIGRAM(S): at 11:01

## 2018-04-16 RX ADMIN — PANTOPRAZOLE SODIUM 40 MILLIGRAM(S): 20 TABLET, DELAYED RELEASE ORAL at 11:01

## 2018-04-16 RX ADMIN — Medication 250 MILLIGRAM(S): at 06:04

## 2018-04-16 RX ADMIN — OXYCODONE HYDROCHLORIDE 10 MILLIGRAM(S): 5 TABLET ORAL at 11:01

## 2018-04-16 RX ADMIN — POLYETHYLENE GLYCOL 3350 17 GRAM(S): 17 POWDER, FOR SOLUTION ORAL at 13:38

## 2018-04-16 RX ADMIN — Medication 325 MILLIGRAM(S): at 17:24

## 2018-04-16 RX ADMIN — Medication 1 TABLET(S): at 11:01

## 2018-04-16 RX ADMIN — Medication 975 MILLIGRAM(S): at 17:52

## 2018-04-16 RX ADMIN — OXYCODONE HYDROCHLORIDE 10 MILLIGRAM(S): 5 TABLET ORAL at 00:18

## 2018-04-16 RX ADMIN — MEROPENEM 100 MILLIGRAM(S): 1 INJECTION INTRAVENOUS at 13:38

## 2018-04-16 RX ADMIN — CEFEPIME 100 MILLIGRAM(S): 1 INJECTION, POWDER, FOR SOLUTION INTRAMUSCULAR; INTRAVENOUS at 06:04

## 2018-04-16 RX ADMIN — Medication 325 MILLIGRAM(S): at 07:50

## 2018-04-16 RX ADMIN — Medication 500 MILLIGRAM(S): at 06:01

## 2018-04-16 NOTE — PROGRESS NOTE ADULT - ASSESSMENT
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   s/p hip aspiration- infected hip  Doing well post op  DC to acute rehab when arrangemnets are complete  Able to tolerate 3 hours of rehab daily

## 2018-04-16 NOTE — PROGRESS NOTE ADULT - SUBJECTIVE AND OBJECTIVE BOX
Patient is a 27y old  Female who presents with a chief complaint of Sudden right hip pain (01 Apr 2018 21:27)      INTERVAL HPI/OVERNIGHT EVENTS:  continues to improve  MEDICATIONS  (STANDING):  acetaminophen   Tablet 975 milliGRAM(s) Oral every 8 hours  ascorbic acid 500 milliGRAM(s) Oral two times a day  cefepime  IVPB      cefepime  IVPB 2000 milliGRAM(s) IV Intermittent every 8 hours  celecoxib 200 milliGRAM(s) Oral once  celecoxib 200 milliGRAM(s) Oral every 12 hours  docusate sodium 100 milliGRAM(s) Oral three times a day  ferrous    sulfate 325 milliGRAM(s) Oral three times a day with meals  folic acid 1 milliGRAM(s) Oral daily  multivitamin 1 Tablet(s) Oral daily  pantoprazole    Tablet 40 milliGRAM(s) Oral daily  polyethylene glycol 3350 17 Gram(s) Oral daily  rifampin 300 milliGRAM(s) Oral daily  rivaroxaban 10 milliGRAM(s) Oral daily  sodium chloride 0.9%. 1000 milliLiter(s) (120 mL/Hr) IV Continuous <Continuous>  vancomycin  IVPB 1000 milliGRAM(s) IV Intermittent every 8 hours  vancomycin  IVPB        MEDICATIONS  (PRN):  aluminum hydroxide/magnesium hydroxide/simethicone Suspension 30 milliLiter(s) Oral four times a day PRN Indigestion  bisacodyl Suppository 10 milliGRAM(s) Rectal daily PRN If no bowel movement by POD#2  HYDROmorphone  Injectable 0.5 milliGRAM(s) IV Push every 4 hours PRN breakthrough pain  magnesium hydroxide Suspension 30 milliLiter(s) Oral every 12 hours PRN Constipation  ondansetron Injectable 4 milliGRAM(s) IV Push every 6 hours PRN Nausea and/or Vomiting  oxyCODONE    IR 5 milliGRAM(s) Oral every 3 hours PRN pain 1 - 5  oxyCODONE    IR 10 milliGRAM(s) Oral every 3 hours PRN pain 6 - 10  senna 2 Tablet(s) Oral at bedtime PRN Constipation        REVIEW OF SYSTEMS:  CONSTITUTIONAL: No fever, weight loss, or fatigue  EYES: No eye pain, visual disturbances, or discharge  ENMT:  No difficulty hearing, tinnitus, vertigo; No sinus or throat pain  NECK: No pain or stiffness  RESPIRATORY: No cough, wheezing, chills or hemoptysis; No shortness of breath  CARDIOVASCULAR: No chest pain, palpitations, dizziness, or leg swelling  GASTROINTESTINAL: No abdominal or epigastric pain. No nausea, vomiting, or hematemesis; No diarrhea or constipation. No melena or hematochezia.  GENITOURINARY: No dysuria, frequency, hematuria, or incontinence  NEUROLOGICAL: No headaches, memory loss, loss of strength, numbness, or tremors  SKIN: No itching, burning, rashes, or lesions      Vital Signs Last 24 Hrs  T(C): 36.8 (16 Apr 2018 05:59), Max: 37.1 (15 Apr 2018 18:08)  T(F): 98.2 (16 Apr 2018 05:59), Max: 98.8 (15 Apr 2018 18:08)  HR: 99 (16 Apr 2018 05:59) (99 - 112)  BP: 109/62 (16 Apr 2018 05:59) (109/62 - 135/84)  BP(mean): --  RR: 17 (16 Apr 2018 05:59) (17 - 18)  SpO2: 98% (16 Apr 2018 05:59) (95% - 100%)    PHYSICAL EXAM:  GENERAL: NAD, well-groomed, well-developed  HEAD:  Atraumatic, Normocephalic  EYES: EOMI, PERRLA, conjunctiva and sclera clear  ENMT: No tonsillar erythema, exudates, or enlargement; Moist mucous membranes   NECK: Supple, No JVD   NERVOUS SYSTEM:  Alert & Oriented X3, Good concentration; Motor Strength 5/5 B/L upper and lower extremities; DTRs 2+ intact and symmetric  CHEST/LUNG: Clear to percussion bilaterally; No rales, rhonchi, wheezing, or rubs  HEART: Regular rate and rhythm; No murmurs, rubs, or gallops  ABDOMEN: Soft, Nontender, Nondistended; Bowel sounds present  EXTREMITIES:  2+ Peripheral Pulses, No clubbing, cyanosis, or edema  LYMPH: No lymphadenopathy noted  SKIN: No rashes or lesions    LABS:                        8.5    13.87 )-----------( 387      ( 16 Apr 2018 06:37 )             25.1     04-16    142  |  106  |  7   ----------------------------<  113<H>  3.7   |  28  |  0.41<L>    Ca    9.1      16 Apr 2018 06:37          CAPILLARY BLOOD GLUCOSE          RADIOLOGY & ADDITIONAL TESTS:    Imaging Personally Reviewed:  [ ] YES  [ ] NO    Consultant(s) Notes Reviewed:  [ ] YES  [ ] NO    Care Discussed with Consultants/Other Providers [ ] YES  [ ] NO Patient is a 27y old  Female who presents with a chief complaint of Sudden right hip pain (01 Apr 2018 21:27)      INTERVAL HPI/OVERNIGHT EVENTS:    continues to improve    MEDICATIONS  (STANDING):  acetaminophen   Tablet 975 milliGRAM(s) Oral every 8 hours  ascorbic acid 500 milliGRAM(s) Oral two times a day  cefepime  IVPB      cefepime  IVPB 2000 milliGRAM(s) IV Intermittent every 8 hours  celecoxib 200 milliGRAM(s) Oral once  celecoxib 200 milliGRAM(s) Oral every 12 hours  docusate sodium 100 milliGRAM(s) Oral three times a day  ferrous    sulfate 325 milliGRAM(s) Oral three times a day with meals  folic acid 1 milliGRAM(s) Oral daily  multivitamin 1 Tablet(s) Oral daily  pantoprazole    Tablet 40 milliGRAM(s) Oral daily  polyethylene glycol 3350 17 Gram(s) Oral daily  rifampin 300 milliGRAM(s) Oral daily  rivaroxaban 10 milliGRAM(s) Oral daily  sodium chloride 0.9%. 1000 milliLiter(s) (120 mL/Hr) IV Continuous <Continuous>  vancomycin  IVPB 1000 milliGRAM(s) IV Intermittent every 8 hours  vancomycin  IVPB        MEDICATIONS  (PRN):  aluminum hydroxide/magnesium hydroxide/simethicone Suspension 30 milliLiter(s) Oral four times a day PRN Indigestion  bisacodyl Suppository 10 milliGRAM(s) Rectal daily PRN If no bowel movement by POD#2  HYDROmorphone  Injectable 0.5 milliGRAM(s) IV Push every 4 hours PRN breakthrough pain  magnesium hydroxide Suspension 30 milliLiter(s) Oral every 12 hours PRN Constipation  ondansetron Injectable 4 milliGRAM(s) IV Push every 6 hours PRN Nausea and/or Vomiting  oxyCODONE    IR 5 milliGRAM(s) Oral every 3 hours PRN pain 1 - 5  oxyCODONE    IR 10 milliGRAM(s) Oral every 3 hours PRN pain 6 - 10  senna 2 Tablet(s) Oral at bedtime PRN Constipation        REVIEW OF SYSTEMS:  CONSTITUTIONAL: No fever, weight loss, or fatigue  EYES: No eye pain, visual disturbances, or discharge  ENMT:  No difficulty hearing, tinnitus, vertigo; No sinus or throat pain  NECK: No pain or stiffness  RESPIRATORY: No cough, wheezing, chills or hemoptysis; No shortness of breath  CARDIOVASCULAR: No chest pain, palpitations, dizziness, or leg swelling  GASTROINTESTINAL: No abdominal or epigastric pain. No nausea, vomiting, or hematemesis; No diarrhea or constipation. No melena or hematochezia.  GENITOURINARY: No dysuria, frequency, hematuria, or incontinence  NEUROLOGICAL: No headaches, memory loss, loss of strength, numbness, or tremors  SKIN: No itching, burning, rashes, or lesions      Vital Signs Last 24 Hrs  T(C): 36.8 (16 Apr 2018 05:59), Max: 37.1 (15 Apr 2018 18:08)  T(F): 98.2 (16 Apr 2018 05:59), Max: 98.8 (15 Apr 2018 18:08)  HR: 99 (16 Apr 2018 05:59) (99 - 112)  BP: 109/62 (16 Apr 2018 05:59) (109/62 - 135/84)  BP(mean): --  RR: 17 (16 Apr 2018 05:59) (17 - 18)  SpO2: 98% (16 Apr 2018 05:59) (95% - 100%)    PHYSICAL EXAM:  GENERAL: NAD, well-groomed, well-developed  HEAD:  Atraumatic, Normocephalic  EYES: EOMI, PERRLA, conjunctiva and sclera clear  ENMT: No tonsillar erythema, exudates, or enlargement; Moist mucous membranes   NECK: Supple, No JVD   NERVOUS SYSTEM:  Alert & Oriented X3, Good concentration; Motor Strength 5/5 B/L upper and lower extremities; DTRs 2+ intact and symmetric  CHEST/LUNG: Clear to percussion bilaterally; No rales, rhonchi, wheezing, or rubs  HEART: Regular rate and rhythm; No murmurs, rubs, or gallops  ABDOMEN: Soft, Nontender, Nondistended; Bowel sounds present  EXTREMITIES:  2+ Peripheral Pulses, No clubbing, cyanosis, or edema  LYMPH: No lymphadenopathy noted  SKIN: No rashes or lesions    LABS:                        8.5    13.87 )-----------( 387      ( 16 Apr 2018 06:37 )             25.1     04-16    142  |  106  |  7   ----------------------------<  113<H>  3.7   |  28  |  0.41<L>    Ca    9.1      16 Apr 2018 06:37          CAPILLARY BLOOD GLUCOSE          RADIOLOGY & ADDITIONAL TESTS:    Imaging Personally Reviewed:  [ ] YES  [ ] NO    Consultant(s) Notes Reviewed:  [ ] YES  [ ] NO    Care Discussed with Consultants/Other Providers [ ] YES  [ ] NO

## 2018-04-16 NOTE — PROGRESS NOTE ADULT - ATTENDING COMMENTS
Patient's primary attending physician has privileges in facility.  Advised transfer of orthopedic care to [facility].  Lumbar symptoms improved.  Please reconsult if needed.
as leukocytosis worsening added rocephin ,cont vanco ,increase dose to 1250 mg Iv q 12 hrs as level 10  MRI hip shows some joint effusion and quadriceps edema  MRI spine couldn't be done as pt had HA from [facility] hip   ordering indium scan  to evaluate hip joint for septic arthirits as well as quadriceps edema as mentioned in hip MRI  agree with Orthopedic  regarding drainage of the joints corby now that pt is c/o more pain in left hip and effusion is more in that joint as well   ESR and CRP elevated as well
as leukocytosis worsening added rocephin ,cont vanco   MRI hip shows some joint effusion and quadriceps edema  MRI spine couldn't be done as pt had HA from [facility] hip   ordering indium scan    check cbc and pct in am
as leukocytosis worsening added rocephin ,cont vanco ,increase dose to 1250 mg Iv q 12 hrs as level 10  MRI hip shows some joint effusion and quadriceps edema  MRI spine couldn't be done as pt had HA from [facility] hip   ordering indium scan  to evaluate hip joint for septic arthirits as well as quadriceps edema as mentioned in hip MRI  agree with Orthopedic  regarding drainage of the joints corby now that pt is c/o more pain in left hip and effusion is more in that joint as well   ESR and CRP elevated as well

## 2018-04-16 NOTE — PROGRESS NOTE ADULT - SUBJECTIVE AND OBJECTIVE BOX
Patient is a 27y old  Female who presents with a chief complaint of Sudden right hip pain (01 Apr 2018 21:27)      INTERVAL HPI / OVERNIGHT EVENTS: hip soreness, no fever,vaginal itching    MEDICATIONS  (STANDING):  acetaminophen   Tablet 975 milliGRAM(s) Oral every 8 hours  ascorbic acid 500 milliGRAM(s) Oral two times a day  celecoxib 200 milliGRAM(s) Oral once  celecoxib 200 milliGRAM(s) Oral every 12 hours  docusate sodium 100 milliGRAM(s) Oral three times a day  ferrous    sulfate 325 milliGRAM(s) Oral three times a day with meals  folic acid 1 milliGRAM(s) Oral daily  meropenem  IVPB      meropenem  IVPB 1000 milliGRAM(s) IV Intermittent every 8 hours  multivitamin 1 Tablet(s) Oral daily  pantoprazole    Tablet 40 milliGRAM(s) Oral daily  polyethylene glycol 3350 17 Gram(s) Oral daily  rifampin 300 milliGRAM(s) Oral daily  rivaroxaban 10 milliGRAM(s) Oral daily  sodium chloride 0.9%. 1000 milliLiter(s) (120 mL/Hr) IV Continuous <Continuous>  vancomycin  IVPB 1000 milliGRAM(s) IV Intermittent every 8 hours  vancomycin  IVPB        MEDICATIONS  (PRN):  aluminum hydroxide/magnesium hydroxide/simethicone Suspension 30 milliLiter(s) Oral four times a day PRN Indigestion  bisacodyl Suppository 10 milliGRAM(s) Rectal daily PRN If no bowel movement by POD#2  HYDROmorphone  Injectable 0.5 milliGRAM(s) IV Push every 4 hours PRN breakthrough pain  magnesium hydroxide Suspension 30 milliLiter(s) Oral every 12 hours PRN Constipation  ondansetron Injectable 4 milliGRAM(s) IV Push every 6 hours PRN Nausea and/or Vomiting  oxyCODONE    IR 5 milliGRAM(s) Oral every 3 hours PRN pain 1 - 5  oxyCODONE    IR 10 milliGRAM(s) Oral every 3 hours PRN pain 6 - 10  senna 2 Tablet(s) Oral at bedtime PRN Constipation      Vital Signs Last 24 Hrs  T(C): 36.7 (16 Apr 2018 11:58), Max: 37.1 (15 Apr 2018 18:08)  T(F): 98 (16 Apr 2018 11:58), Max: 98.8 (15 Apr 2018 18:08)  HR: 77 (16 Apr 2018 11:58) (77 - 100)  BP: 126/77 (16 Apr 2018 11:58) (109/62 - 133/88)  BP(mean): --  RR: 17 (16 Apr 2018 11:58) (17 - 18)  SpO2: 94% (16 Apr 2018 11:58) (94% - 100%)    PHYSICAL EXAM:  General :NAD  Constitutional:  well-groomed, well-developed  Respiratory: CTAB/L  Cardiovascular: S1 and S2, RRR, no M/G/R  Gastrointestinal: BS+, soft, NT/ND  Extremities: No peripheral edema  Vascular: 2+ peripheral pulses  Skin: No rashes      LABS:                        8.5    13.87 )-----------( 387      ( 16 Apr 2018 06:37 )             25.1     04-16    142  |  106  |  7   ----------------------------<  113<H>  3.7   |  28  |  0.41<L>    Ca    9.1      16 Apr 2018 06:37            MICROBIOLOGY:  RECENT CULTURES:  04-13 .Surgical Swab LEFT HIP ACETABULAR XXXX XXXX   No growth to date.    04-13 .Tissue LEFT HIP SYNOVIUM XXXX   Moderate polymorphonuclear leukocytes per low power field  No organisms seen per oil power field   No growth to date.    04-11 .Body Fluid left hip aspiration XXXX   Moderate polymorphonuclear leukocytes per low power field  No organisms seen per oil power field   No growth at 5 days          RADIOLOGY & ADDITIONAL STUDIES:

## 2018-04-16 NOTE — PROGRESS NOTE ADULT - SUBJECTIVE AND OBJECTIVE BOX
INTERVAL HPI/OVERNIGHT EVENTS:    Patient lying comfortably.  Postop pain continues to improve.  Tolerating diet with no complaint of nausea/vomiting.      Vital Signs Last 24 Hrs  T(C): 36.8 (16 Apr 2018 05:59), Max: 37.1 (15 Apr 2018 18:08)  T(F): 98.2 (16 Apr 2018 05:59), Max: 98.8 (15 Apr 2018 18:08)  HR: 99 (16 Apr 2018 05:59) (99 - 112)  BP: 109/62 (16 Apr 2018 05:59) (109/62 - 135/84)  BP(mean): --  RR: 17 (16 Apr 2018 05:59) (17 - 18)  SpO2: 98% (16 Apr 2018 05:59) (95% - 100%)    MEDICATIONS  (STANDING):  acetaminophen   Tablet 975 milliGRAM(s) Oral every 8 hours  ascorbic acid 500 milliGRAM(s) Oral two times a day  cefepime  IVPB      cefepime  IVPB 2000 milliGRAM(s) IV Intermittent every 8 hours  celecoxib 200 milliGRAM(s) Oral once  celecoxib 200 milliGRAM(s) Oral every 12 hours  docusate sodium 100 milliGRAM(s) Oral three times a day  ferrous    sulfate 325 milliGRAM(s) Oral three times a day with meals  folic acid 1 milliGRAM(s) Oral daily  multivitamin 1 Tablet(s) Oral daily  pantoprazole    Tablet 40 milliGRAM(s) Oral daily  polyethylene glycol 3350 17 Gram(s) Oral daily  rifampin 300 milliGRAM(s) Oral daily  rivaroxaban 10 milliGRAM(s) Oral daily  sodium chloride 0.9%. 1000 milliLiter(s) (120 mL/Hr) IV Continuous <Continuous>  vancomycin  IVPB 1000 milliGRAM(s) IV Intermittent every 8 hours  vancomycin  IVPB        MEDICATIONS  (PRN):  aluminum hydroxide/magnesium hydroxide/simethicone Suspension 30 milliLiter(s) Oral four times a day PRN Indigestion  bisacodyl Suppository 10 milliGRAM(s) Rectal daily PRN If no bowel movement by POD#2  HYDROmorphone  Injectable 0.5 milliGRAM(s) IV Push every 4 hours PRN breakthrough pain  magnesium hydroxide Suspension 30 milliLiter(s) Oral every 12 hours PRN Constipation  ondansetron Injectable 4 milliGRAM(s) IV Push every 6 hours PRN Nausea and/or Vomiting  oxyCODONE    IR 5 milliGRAM(s) Oral every 3 hours PRN pain 1 - 5  oxyCODONE    IR 10 milliGRAM(s) Oral every 3 hours PRN pain 6 - 10  senna 2 Tablet(s) Oral at bedtime PRN Constipation      PHYSICAL EXAM:  EYES: EOMI, PERRLA, conjunctiva and sclera clear  CHEST/LUNG: Clear to ausculation, bilaterally   HEART: S1S2  ABDOMEN: non distended, +BS, soft, non tender, no guarding  LLE: dressing c/d/i. +ROM ankle/toes. Calf: soft, compressible and nontender. DP/PT 2+ NVI.     I&O's Detail    15 Apr 2018 07:01  -  16 Apr 2018 07:00  --------------------------------------------------------  IN:    Solution: 100 mL    Solution: 500 mL  Total IN: 600 mL    OUT:    Indwelling Catheter - Urethral: 900 mL  Total OUT: 900 mL    Total NET: -300 mL          LABS:                        8.5    13.87 )-----------( 387      ( 16 Apr 2018 06:37 )             25.1     04-16    142  |  106  |  7   ----------------------------<  113<H>  3.7   |  28  |  0.41<L>    Ca    9.1      16 Apr 2018 06:37        Impression:    27yFemale s/p L AMBER resection/placement of prostalac hip system/antibiotic beads POD#4 .        Continue IV abx via PICC line per ID   F/u OR cx: NGTD   pain management  Total hip precautions: hip abduction brace  PT: TTWB with hip abduction brace flexion: 90 deg Abd: 20 deg - no extension/no external rotation/no flexion past 90 deg/no active abduction    DVT ppx: SCDs/Xarelto 10mg once a day    Wound care, Isometric exercises, incentive spirometry   Discharge: planning

## 2018-04-16 NOTE — PROGRESS NOTE ADULT - PROBLEM SELECTOR PROBLEM 5
Hb-SS disease without crisis
Preventive measure
Hb-SS disease without crisis
Preventive measure

## 2018-04-16 NOTE — PROGRESS NOTE ADULT - PROBLEM SELECTOR PLAN 1
prosthetic septic hip arthritis  both indium scan + and MRI shows left joint effusion   s/p 2 staged joint exchange (stage 1 ) with spacer placement on 4/12/18  anticipating 6-8 weeks of iv antibiotics + rifampin via picc line  all c/s stayed negative   cont vanco and add meropenem   d/c cefepime   cont antibiotics till 5/24/18  vanco level, cbc, bmp, esr and crp q weekly in rehab

## 2018-04-17 ENCOUNTER — TRANSCRIPTION ENCOUNTER (OUTPATIENT)
Age: 28
End: 2018-04-17

## 2018-04-17 LAB
ANION GAP SERPL CALC-SCNC: 8 MMOL/L — SIGNIFICANT CHANGE UP (ref 5–17)
BUN SERPL-MCNC: 7 MG/DL — SIGNIFICANT CHANGE UP (ref 7–23)
CALCIUM SERPL-MCNC: 8.8 MG/DL — SIGNIFICANT CHANGE UP (ref 8.5–10.1)
CHLORIDE SERPL-SCNC: 107 MMOL/L — SIGNIFICANT CHANGE UP (ref 96–108)
CO2 SERPL-SCNC: 27 MMOL/L — SIGNIFICANT CHANGE UP (ref 22–31)
CREAT SERPL-MCNC: 0.38 MG/DL — LOW (ref 0.5–1.3)
CULTURE RESULTS: SIGNIFICANT CHANGE UP
GLUCOSE SERPL-MCNC: 134 MG/DL — HIGH (ref 70–99)
GRAM STN FLD: SIGNIFICANT CHANGE UP
HCT VFR BLD CALC: 24.3 % — LOW (ref 34.5–45)
HGB BLD-MCNC: 8 G/DL — LOW (ref 11.5–15.5)
MCHC RBC-ENTMCNC: 28.5 PG — SIGNIFICANT CHANGE UP (ref 27–34)
MCHC RBC-ENTMCNC: 32.9 GM/DL — SIGNIFICANT CHANGE UP (ref 32–36)
MCV RBC AUTO: 86.5 FL — SIGNIFICANT CHANGE UP (ref 80–100)
NRBC # BLD: 1 /100 WBCS — HIGH (ref 0–0)
PLATELET # BLD AUTO: 419 K/UL — HIGH (ref 150–400)
POTASSIUM SERPL-MCNC: 3.4 MMOL/L — LOW (ref 3.5–5.3)
POTASSIUM SERPL-SCNC: 3.4 MMOL/L — LOW (ref 3.5–5.3)
RBC # BLD: 2.81 M/UL — LOW (ref 3.8–5.2)
RBC # FLD: 14.1 % — SIGNIFICANT CHANGE UP (ref 10.3–14.5)
SODIUM SERPL-SCNC: 142 MMOL/L — SIGNIFICANT CHANGE UP (ref 135–145)
SPECIMEN SOURCE: SIGNIFICANT CHANGE UP
WBC # BLD: 10.32 K/UL — SIGNIFICANT CHANGE UP (ref 3.8–10.5)
WBC # FLD AUTO: 10.32 K/UL — SIGNIFICANT CHANGE UP (ref 3.8–10.5)

## 2018-04-17 RX ORDER — FLUCONAZOLE 150 MG/1
1 TABLET ORAL
Qty: 0 | Refills: 0 | COMMUNITY
Start: 2018-04-17

## 2018-04-17 RX ORDER — RIVAROXABAN 15 MG-20MG
1 KIT ORAL
Qty: 0 | Refills: 0 | COMMUNITY
Start: 2018-04-17

## 2018-04-17 RX ORDER — FLUCONAZOLE 150 MG/1
100 TABLET ORAL DAILY
Qty: 0 | Refills: 0 | Status: DISCONTINUED | OUTPATIENT
Start: 2018-04-17 | End: 2018-04-18

## 2018-04-17 RX ORDER — POTASSIUM CHLORIDE 20 MEQ
20 PACKET (EA) ORAL ONCE
Qty: 0 | Refills: 0 | Status: COMPLETED | OUTPATIENT
Start: 2018-04-17 | End: 2018-04-17

## 2018-04-17 RX ORDER — OXYCODONE HYDROCHLORIDE 5 MG/1
1 TABLET ORAL
Qty: 0 | Refills: 0 | COMMUNITY
Start: 2018-04-17

## 2018-04-17 RX ORDER — POLYETHYLENE GLYCOL 3350 17 G/17G
17 POWDER, FOR SOLUTION ORAL
Qty: 0 | Refills: 0 | COMMUNITY
Start: 2018-04-17

## 2018-04-17 RX ORDER — VANCOMYCIN HCL 1 G
1 VIAL (EA) INTRAVENOUS
Qty: 0 | Refills: 0 | COMMUNITY
Start: 2018-04-17 | End: 2018-05-24

## 2018-04-17 RX ORDER — FLUCONAZOLE 150 MG/1
100 TABLET ORAL DAILY
Qty: 0 | Refills: 0 | Status: DISCONTINUED | OUTPATIENT
Start: 2018-04-17 | End: 2018-04-17

## 2018-04-17 RX ORDER — PANTOPRAZOLE SODIUM 20 MG/1
1 TABLET, DELAYED RELEASE ORAL
Qty: 0 | Refills: 0 | COMMUNITY
Start: 2018-04-17

## 2018-04-17 RX ORDER — DOCUSATE SODIUM 100 MG
1 CAPSULE ORAL
Qty: 0 | Refills: 0 | COMMUNITY
Start: 2018-04-17

## 2018-04-17 RX ORDER — ASCORBIC ACID 60 MG
1 TABLET,CHEWABLE ORAL
Qty: 0 | Refills: 0 | COMMUNITY
Start: 2018-04-17

## 2018-04-17 RX ORDER — FERROUS SULFATE 325(65) MG
1 TABLET ORAL
Qty: 0 | Refills: 0 | COMMUNITY
Start: 2018-04-17

## 2018-04-17 RX ORDER — MEROPENEM 1 G/30ML
1 INJECTION INTRAVENOUS
Qty: 0 | Refills: 0 | COMMUNITY
Start: 2018-04-17 | End: 2018-05-24

## 2018-04-17 RX ORDER — CELECOXIB 200 MG/1
1 CAPSULE ORAL
Qty: 0 | Refills: 0 | COMMUNITY
Start: 2018-04-17

## 2018-04-17 RX ORDER — FOLIC ACID 0.8 MG
1 TABLET ORAL
Qty: 0 | Refills: 0 | COMMUNITY
Start: 2018-04-17

## 2018-04-17 RX ADMIN — RIVAROXABAN 10 MILLIGRAM(S): KIT at 11:28

## 2018-04-17 RX ADMIN — Medication 500 MILLIGRAM(S): at 05:31

## 2018-04-17 RX ADMIN — OXYCODONE HYDROCHLORIDE 10 MILLIGRAM(S): 5 TABLET ORAL at 22:30

## 2018-04-17 RX ADMIN — CELECOXIB 200 MILLIGRAM(S): 200 CAPSULE ORAL at 18:31

## 2018-04-17 RX ADMIN — OXYCODONE HYDROCHLORIDE 10 MILLIGRAM(S): 5 TABLET ORAL at 15:53

## 2018-04-17 RX ADMIN — Medication 100 MILLIGRAM(S): at 05:32

## 2018-04-17 RX ADMIN — HYDROMORPHONE HYDROCHLORIDE 0.5 MILLIGRAM(S): 2 INJECTION INTRAMUSCULAR; INTRAVENOUS; SUBCUTANEOUS at 06:32

## 2018-04-17 RX ADMIN — Medication 100 MILLIGRAM(S): at 21:14

## 2018-04-17 RX ADMIN — Medication 975 MILLIGRAM(S): at 18:31

## 2018-04-17 RX ADMIN — HYDROMORPHONE HYDROCHLORIDE 0.5 MILLIGRAM(S): 2 INJECTION INTRAMUSCULAR; INTRAVENOUS; SUBCUTANEOUS at 16:00

## 2018-04-17 RX ADMIN — Medication 250 MILLIGRAM(S): at 21:12

## 2018-04-17 RX ADMIN — HYDROMORPHONE HYDROCHLORIDE 0.5 MILLIGRAM(S): 2 INJECTION INTRAMUSCULAR; INTRAVENOUS; SUBCUTANEOUS at 15:52

## 2018-04-17 RX ADMIN — Medication 250 MILLIGRAM(S): at 05:32

## 2018-04-17 RX ADMIN — MEROPENEM 100 MILLIGRAM(S): 1 INJECTION INTRAVENOUS at 21:37

## 2018-04-17 RX ADMIN — CELECOXIB 200 MILLIGRAM(S): 200 CAPSULE ORAL at 19:00

## 2018-04-17 RX ADMIN — Medication 100 MILLIGRAM(S): at 13:24

## 2018-04-17 RX ADMIN — OXYCODONE HYDROCHLORIDE 10 MILLIGRAM(S): 5 TABLET ORAL at 21:35

## 2018-04-17 RX ADMIN — MEROPENEM 100 MILLIGRAM(S): 1 INJECTION INTRAVENOUS at 06:10

## 2018-04-17 RX ADMIN — Medication 325 MILLIGRAM(S): at 17:15

## 2018-04-17 RX ADMIN — OXYCODONE HYDROCHLORIDE 10 MILLIGRAM(S): 5 TABLET ORAL at 04:40

## 2018-04-17 RX ADMIN — HYDROMORPHONE HYDROCHLORIDE 0.5 MILLIGRAM(S): 2 INJECTION INTRAMUSCULAR; INTRAVENOUS; SUBCUTANEOUS at 11:40

## 2018-04-17 RX ADMIN — Medication 1 TABLET(S): at 11:28

## 2018-04-17 RX ADMIN — SODIUM CHLORIDE 120 MILLILITER(S): 9 INJECTION INTRAMUSCULAR; INTRAVENOUS; SUBCUTANEOUS at 13:24

## 2018-04-17 RX ADMIN — Medication 20 MILLIEQUIVALENT(S): at 09:47

## 2018-04-17 RX ADMIN — Medication 500 MILLIGRAM(S): at 17:15

## 2018-04-17 RX ADMIN — MEROPENEM 100 MILLIGRAM(S): 1 INJECTION INTRAVENOUS at 13:24

## 2018-04-17 RX ADMIN — Medication 1 MILLIGRAM(S): at 11:28

## 2018-04-17 RX ADMIN — OXYCODONE HYDROCHLORIDE 10 MILLIGRAM(S): 5 TABLET ORAL at 00:41

## 2018-04-17 RX ADMIN — PANTOPRAZOLE SODIUM 40 MILLIGRAM(S): 20 TABLET, DELAYED RELEASE ORAL at 06:14

## 2018-04-17 RX ADMIN — Medication 975 MILLIGRAM(S): at 09:45

## 2018-04-17 RX ADMIN — OXYCODONE HYDROCHLORIDE 10 MILLIGRAM(S): 5 TABLET ORAL at 01:41

## 2018-04-17 RX ADMIN — Medication 325 MILLIGRAM(S): at 08:15

## 2018-04-17 RX ADMIN — HYDROMORPHONE HYDROCHLORIDE 0.5 MILLIGRAM(S): 2 INJECTION INTRAMUSCULAR; INTRAVENOUS; SUBCUTANEOUS at 05:33

## 2018-04-17 RX ADMIN — Medication 250 MILLIGRAM(S): at 14:16

## 2018-04-17 RX ADMIN — POLYETHYLENE GLYCOL 3350 17 GRAM(S): 17 POWDER, FOR SOLUTION ORAL at 11:27

## 2018-04-17 RX ADMIN — OXYCODONE HYDROCHLORIDE 10 MILLIGRAM(S): 5 TABLET ORAL at 15:03

## 2018-04-17 RX ADMIN — FLUCONAZOLE 100 MILLIGRAM(S): 150 TABLET ORAL at 11:28

## 2018-04-17 RX ADMIN — OXYCODONE HYDROCHLORIDE 10 MILLIGRAM(S): 5 TABLET ORAL at 05:17

## 2018-04-17 RX ADMIN — Medication 975 MILLIGRAM(S): at 03:20

## 2018-04-17 RX ADMIN — HYDROMORPHONE HYDROCHLORIDE 0.5 MILLIGRAM(S): 2 INJECTION INTRAMUSCULAR; INTRAVENOUS; SUBCUTANEOUS at 11:27

## 2018-04-17 NOTE — DISCHARGE NOTE ADULT - CARE PLAN
Principal Discharge DX:	Pyogenic arthritis of left hip, due to unspecified organism  Goal:	resolve infection with iv ab and antibiotic spacer  Assessment and plan of treatment:	complete antibiotics then 2nd stage left hip replacement  Secondary Diagnosis:	Sepsis, due to unspecified organism  Assessment and plan of treatment:	resolved  Secondary Diagnosis:	Vaginal itching  Assessment and plan of treatment:	resolved  Secondary Diagnosis:	Left hand pain  Assessment and plan of treatment:	controlled  Secondary Diagnosis:	Hb-SS disease without crisis  Assessment and plan of treatment:	stable post marrow transplant  Secondary Diagnosis:	H/O bilateral hip replacements  Assessment and plan of treatment:	s/p left hip hardware removal  Secondary Diagnosis:	Blindness, legal  Assessment and plan of treatment:	stable

## 2018-04-17 NOTE — DISCHARGE NOTE ADULT - PLAN OF CARE
resolve infection with iv ab and antibiotic spacer complete antibiotics then 2nd stage left hip replacement resolved controlled stable post marrow transplant s/p left hip hardware removal stable

## 2018-04-17 NOTE — PROGRESS NOTE ADULT - SUBJECTIVE AND OBJECTIVE BOX
Postoperative Day # 5 s/p removal of infected hardware and placement of antibiotic beads with left AMBER.    Patient seen and examined bedside with Dr. Burns,   Pain is improving.   C/O vaginal itching.  Denies nausea and vomiting. Tolerating diet.  Flatus/BM. +  Denies chest pain, dyspnea, cough.  T(F): 97.5 (04-17-18 @ 06:06), Max: 98.9 (04-16-18 @ 23:44)  HR: 83 (04-17-18 @ 06:06) (77 - 117)  BP: 111/56 (04-17-18 @ 06:06) (110/69 - 127/79)  RR: 18 (04-17-18 @ 06:06) (16 - 18)  SpO2: 97% (04-17-18 @ 06:06) (94% - 99%)    PHYSICAL EXAM:  General: NAD, WDWN  Neuro:  Alert & oriented x 3  Abdomen: soft, NTND. Normactive BS  Left hip: Wound clean & dry. No signs of infection      LABS:                        8.0    10.32 )-----------( 419      ( 17 Apr 2018 07:28 )             24.3     04-17    142  |  107  |  7   ----------------------------<  134<H>  3.4<L>   |  27  |  0.38<L>    Ca    8.8      17 Apr 2018 07:28    Aspiration & OR cultures reported as no growth.    ID & medical f/u noted.        Impression: 27y Female Postoperative Day # 5 s/p removal of infected hardware and placement of antibiotic beads with left AMBER.  anemia  vaginosis secondary to antibiotic usage.    Plan:  -continue VTE prophylaxis - Xarelto  - Diflucan ? will discuss with Dr. Martinez.  - replace K  - continue IVAB per ID  - continue medical f/u  -Increase activity with PT, OOB, Ambulate  - await placement in rehab. Dr. Burns does not want pt discharged / transferred till he re-evaluates pt in AM.  -f/u AM labs  -Pt was discussed with Dr. Burns

## 2018-04-17 NOTE — DISCHARGE NOTE ADULT - SECONDARY DIAGNOSIS.
Sepsis, due to unspecified organism Vaginal itching Left hand pain Hb-SS disease without crisis H/O bilateral hip replacements Blindness, legal

## 2018-04-17 NOTE — DISCHARGE NOTE ADULT - PATIENT PORTAL LINK FT
You can access the [facility] Patient Portal, offered by [facility], by registering with the following website: [email or web address]

## 2018-04-17 NOTE — DISCHARGE NOTE ADULT - HOSPITAL COURSE
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  She was at a dinner and tried to get up from her table when she suddenly had sharp shooting pain down from her right hip to her right knee.  She denies recent injury, new medications (not on any), dysuria, abdominal pain, Back pain, numbness, tingling, loss of sensation.  She does report fever and chills since pain started.   CT imaging unremarkable, patient febrile and tachycardic, WBC mildly elevated, retic normal, ESR and CRP elevated.  Given 1 dose of Vanco and Rocephin in ED  pain was controlled on analgesics and a/b  had left hip fluid tap which showed high leucocytes consistent with infected prosthetic hip  Ortho removed prosthesis and a/b beads left in situ  patient to complete 6 weeks of iv vanco, merrem, rifampin (end 5/24/18) and have interval left hip teplacement  dc to acute rehab

## 2018-04-17 NOTE — DISCHARGE NOTE ADULT - CARE PROVIDER_API CALL
Mauri Melchor (MAYRA), Internal Medicine  [address] [place] [address]  Phone: [phone]  Fax: [phone]

## 2018-04-17 NOTE — DISCHARGE NOTE ADULT - MEDICATION SUMMARY - MEDICATIONS TO TAKE
I will START or STAY ON the medications listed below when I get home from the hospital:    oxyCODONE 5 mg oral tablet  -- 1 tab(s) by mouth every 3 hours, As needed, pain 1 - 5  -- Indication: For Acute hip pain, left    CeleBREX 200 mg oral capsule  -- 1 cap(s) by mouth once  -- Indication: For Acute hip pain, left    aluminum hydroxide-magnesium hydroxide 200 mg-200 mg/5 mL oral suspension  -- 30 milliliter(s) by mouth 4 times a day, As needed, Indigestion  -- Indication: For dypepsia    rivaroxaban 10 mg oral tablet  -- 1 tab(s) by mouth once a day  -- Indication: For .    rifAMPin 300 mg oral capsule  -- 1 cap(s) by mouth once a day  -- Indication: For Pyogenic arthritis of left hip, due to unspecified organism    meropenem  -- 1 gram(s) intravenously every 8 hours  -- Indication: For Pyogenic arthritis of left hip, due to unspecified organism    vancomycin  -- 1 gram(s) intravenously every 8 hours  -- Indication: For Pyogenic arthritis of left hip, due to unspecified organism    FeroSul 325 mg (65 mg elemental iron) oral tablet  -- 1 tab(s) by mouth 3 times a day  -- Indication: For Anemia    docusate sodium 100 mg oral capsule  -- 1 cap(s) by mouth 3 times a day  -- Indication: For constipation    polyethylene glycol 3350 oral powder for reconstitution  -- 17 gram(s) by mouth once a day  -- Indication: For constipation    bisacodyl 10 mg rectal suppository  -- 1 suppository(ies) rectally once a day, As needed, If no bowel movement by POD#2  -- Indication: For constipation    pantoprazole 40 mg oral delayed release tablet  -- 1 tab(s) by mouth once a day  -- Indication: For dyspepsia    Multiple Vitamins oral tablet  -- 1 tab(s) by mouth once a day  -- Indication: For Supplement    ascorbic acid 500 mg oral tablet  -- 1 tab(s) by mouth 2 times a day  -- Indication: For Supplement    folic acid 1 mg oral tablet  -- 1 tab(s) by mouth once a day  -- Indication: For Supplement

## 2018-04-18 VITALS — OXYGEN SATURATION: 95 % | RESPIRATION RATE: 16 BRPM | HEART RATE: 90 BPM

## 2018-04-18 LAB
ANION GAP SERPL CALC-SCNC: 8 MMOL/L — SIGNIFICANT CHANGE UP (ref 5–17)
BUN SERPL-MCNC: 7 MG/DL — SIGNIFICANT CHANGE UP (ref 7–23)
CALCIUM SERPL-MCNC: 8.9 MG/DL — SIGNIFICANT CHANGE UP (ref 8.5–10.1)
CHLORIDE SERPL-SCNC: 109 MMOL/L — HIGH (ref 96–108)
CO2 SERPL-SCNC: 25 MMOL/L — SIGNIFICANT CHANGE UP (ref 22–31)
CREAT SERPL-MCNC: 0.24 MG/DL — LOW (ref 0.5–1.3)
GLUCOSE SERPL-MCNC: 119 MG/DL — HIGH (ref 70–99)
HCT VFR BLD CALC: 25.1 % — LOW (ref 34.5–45)
HGB BLD-MCNC: 8.1 G/DL — LOW (ref 11.5–15.5)
MCHC RBC-ENTMCNC: 28.3 PG — SIGNIFICANT CHANGE UP (ref 27–34)
MCHC RBC-ENTMCNC: 32.3 GM/DL — SIGNIFICANT CHANGE UP (ref 32–36)
MCV RBC AUTO: 87.8 FL — SIGNIFICANT CHANGE UP (ref 80–100)
NRBC # BLD: 1 /100 WBCS — HIGH (ref 0–0)
PLATELET # BLD AUTO: 437 K/UL — HIGH (ref 150–400)
POTASSIUM SERPL-MCNC: 3.5 MMOL/L — SIGNIFICANT CHANGE UP (ref 3.5–5.3)
POTASSIUM SERPL-SCNC: 3.5 MMOL/L — SIGNIFICANT CHANGE UP (ref 3.5–5.3)
RBC # BLD: 2.86 M/UL — LOW (ref 3.8–5.2)
RBC # FLD: 14.2 % — SIGNIFICANT CHANGE UP (ref 10.3–14.5)
SODIUM SERPL-SCNC: 142 MMOL/L — SIGNIFICANT CHANGE UP (ref 135–145)
WBC # BLD: 10.4 K/UL — SIGNIFICANT CHANGE UP (ref 3.8–10.5)
WBC # FLD AUTO: 10.4 K/UL — SIGNIFICANT CHANGE UP (ref 3.8–10.5)

## 2018-04-18 RX ADMIN — Medication 100 MILLIGRAM(S): at 05:49

## 2018-04-18 RX ADMIN — OXYCODONE HYDROCHLORIDE 10 MILLIGRAM(S): 5 TABLET ORAL at 08:07

## 2018-04-18 RX ADMIN — HYDROMORPHONE HYDROCHLORIDE 0.5 MILLIGRAM(S): 2 INJECTION INTRAMUSCULAR; INTRAVENOUS; SUBCUTANEOUS at 01:49

## 2018-04-18 RX ADMIN — Medication 500 MILLIGRAM(S): at 05:49

## 2018-04-18 RX ADMIN — FLUCONAZOLE 100 MILLIGRAM(S): 150 TABLET ORAL at 11:27

## 2018-04-18 RX ADMIN — OXYCODONE HYDROCHLORIDE 10 MILLIGRAM(S): 5 TABLET ORAL at 09:07

## 2018-04-18 RX ADMIN — Medication 325 MILLIGRAM(S): at 12:42

## 2018-04-18 RX ADMIN — HYDROMORPHONE HYDROCHLORIDE 0.5 MILLIGRAM(S): 2 INJECTION INTRAMUSCULAR; INTRAVENOUS; SUBCUTANEOUS at 05:03

## 2018-04-18 RX ADMIN — Medication 1 TABLET(S): at 11:27

## 2018-04-18 RX ADMIN — POLYETHYLENE GLYCOL 3350 17 GRAM(S): 17 POWDER, FOR SOLUTION ORAL at 11:28

## 2018-04-18 RX ADMIN — Medication 250 MILLIGRAM(S): at 05:53

## 2018-04-18 RX ADMIN — OXYCODONE HYDROCHLORIDE 10 MILLIGRAM(S): 5 TABLET ORAL at 03:47

## 2018-04-18 RX ADMIN — MEROPENEM 100 MILLIGRAM(S): 1 INJECTION INTRAVENOUS at 05:48

## 2018-04-18 RX ADMIN — Medication 1 MILLIGRAM(S): at 11:27

## 2018-04-18 RX ADMIN — PANTOPRAZOLE SODIUM 40 MILLIGRAM(S): 20 TABLET, DELAYED RELEASE ORAL at 11:27

## 2018-04-18 RX ADMIN — Medication 325 MILLIGRAM(S): at 08:12

## 2018-04-18 RX ADMIN — HYDROMORPHONE HYDROCHLORIDE 0.5 MILLIGRAM(S): 2 INJECTION INTRAMUSCULAR; INTRAVENOUS; SUBCUTANEOUS at 05:49

## 2018-04-18 RX ADMIN — HYDROMORPHONE HYDROCHLORIDE 0.5 MILLIGRAM(S): 2 INJECTION INTRAMUSCULAR; INTRAVENOUS; SUBCUTANEOUS at 01:34

## 2018-04-18 RX ADMIN — CELECOXIB 200 MILLIGRAM(S): 200 CAPSULE ORAL at 05:49

## 2018-04-18 RX ADMIN — OXYCODONE HYDROCHLORIDE 10 MILLIGRAM(S): 5 TABLET ORAL at 04:45

## 2018-04-18 RX ADMIN — OXYCODONE HYDROCHLORIDE 10 MILLIGRAM(S): 5 TABLET ORAL at 12:00

## 2018-04-18 RX ADMIN — Medication 975 MILLIGRAM(S): at 11:27

## 2018-04-18 RX ADMIN — RIVAROXABAN 10 MILLIGRAM(S): KIT at 11:27

## 2018-04-18 RX ADMIN — OXYCODONE HYDROCHLORIDE 10 MILLIGRAM(S): 5 TABLET ORAL at 11:26

## 2018-04-18 RX ADMIN — CELECOXIB 200 MILLIGRAM(S): 200 CAPSULE ORAL at 06:40

## 2018-04-18 NOTE — PROGRESS NOTE ADULT - PROVIDER SPECIALTY LIST ADULT
Hospitalist
Infectious Disease
Internal Medicine
Orthopedics
Surgery
Intervent Radiology
Infectious Disease
Internal Medicine
Hospitalist
Infectious Disease
Hospitalist
Internal Medicine

## 2018-04-18 NOTE — PROGRESS NOTE ADULT - SUBJECTIVE AND OBJECTIVE BOX
Patient is a 27y old  Female who presents with a chief complaint of painful hips (17 Apr 2018 09:06)      INTERVAL HPI / OVERNIGHT EVENTS: vag itching, left hip soreness    MEDICATIONS  (STANDING):  acetaminophen   Tablet 975 milliGRAM(s) Oral every 8 hours  ascorbic acid 500 milliGRAM(s) Oral two times a day  celecoxib 200 milliGRAM(s) Oral once  celecoxib 200 milliGRAM(s) Oral every 12 hours  docusate sodium 100 milliGRAM(s) Oral three times a day  ferrous    sulfate 325 milliGRAM(s) Oral three times a day with meals  fluconAZOLE   Tablet 100 milliGRAM(s) Oral daily  folic acid 1 milliGRAM(s) Oral daily  meropenem  IVPB      meropenem  IVPB 1000 milliGRAM(s) IV Intermittent every 8 hours  multivitamin 1 Tablet(s) Oral daily  pantoprazole    Tablet 40 milliGRAM(s) Oral daily  polyethylene glycol 3350 17 Gram(s) Oral daily  rifampin 300 milliGRAM(s) Oral daily  rivaroxaban 10 milliGRAM(s) Oral daily  sodium chloride 0.9%. 1000 milliLiter(s) (120 mL/Hr) IV Continuous <Continuous>  vancomycin  IVPB 1000 milliGRAM(s) IV Intermittent every 8 hours  vancomycin  IVPB        MEDICATIONS  (PRN):  aluminum hydroxide/magnesium hydroxide/simethicone Suspension 30 milliLiter(s) Oral four times a day PRN Indigestion  bisacodyl Suppository 10 milliGRAM(s) Rectal daily PRN If no bowel movement by POD#2  HYDROmorphone  Injectable 0.5 milliGRAM(s) IV Push every 4 hours PRN breakthrough pain  magnesium hydroxide Suspension 30 milliLiter(s) Oral every 12 hours PRN Constipation  ondansetron Injectable 4 milliGRAM(s) IV Push every 6 hours PRN Nausea and/or Vomiting  oxyCODONE    IR 5 milliGRAM(s) Oral every 3 hours PRN pain 1 - 5  oxyCODONE    IR 10 milliGRAM(s) Oral every 3 hours PRN pain 6 - 10  senna 2 Tablet(s) Oral at bedtime PRN Constipation      Vital Signs Last 24 Hrs  T(C): 36.4 (18 Apr 2018 11:20), Max: 36.9 (17 Apr 2018 17:24)  T(F): 97.6 (18 Apr 2018 11:20), Max: 98.5 (17 Apr 2018 17:24)  HR: 90 (18 Apr 2018 12:28) (86 - 110)  BP: 118/60 (18 Apr 2018 11:20) (117/69 - 129/75)  BP(mean): --  RR: 16 (18 Apr 2018 12:28) (16 - 18)  SpO2: 95% (18 Apr 2018 12:28) (95% - 99%)    PHYSICAL EXAM:  General :NAD  Constitutional:  well-groomed, well-developed  Respiratory: CTAB/L  Cardiovascular: S1 and S2, RRR, no M/G/R  Gastrointestinal: BS+, soft, NT/ND  Extremities: No peripheral edema  Vascular: 2+ peripheral pulses  Skin: No rashes      LABS:                        8.1    10.40 )-----------( 437      ( 18 Apr 2018 06:34 )             25.1     04-18    142  |  109<H>  |  7   ----------------------------<  119<H>  3.5   |  25  |  0.24<L>    Ca    8.9      18 Apr 2018 06:34            MICROBIOLOGY:  RECENT CULTURES:  04-13 .Surgical Swab LEFT HIP ACETABULAR XXXX XXXX   No growth at 5 days    04-13 .Tissue LEFT HIP SYNOVIUM XXXX   Moderate polymorphonuclear leukocytes per low power field  No organisms seen per oil power field   No growth at 5 days          RADIOLOGY & ADDITIONAL STUDIES:

## 2018-04-18 NOTE — CHART NOTE - NSCHARTNOTEFT_GEN_A_CORE
Orthopedically unchanged.   Per Dr. Burns, pt can be discharged to [facility]  today. He is in the OR & will see the pt in [place].  Continue: physical therapy ; Xarelto for DVt prophylaxis; IVAB per ID.

## 2018-04-18 NOTE — PROGRESS NOTE ADULT - PROBLEM SELECTOR PROBLEM 1
Sepsis, due to unspecified organism
Sepsis, due to unspecified organism
Pyogenic arthritis of left hip, due to unspecified organism
SIRS (systemic inflammatory response syndrome)
SIRS (systemic inflammatory response syndrome)
Sepsis, due to unspecified organism
Pyogenic arthritis of left hip, due to unspecified organism
Sepsis, due to unspecified organism
Pyogenic arthritis of left hip, due to unspecified organism
Sepsis, due to unspecified organism

## 2018-04-18 NOTE — PROGRESS NOTE ADULT - PROBLEM SELECTOR PLAN 1
prosthetic septic hip arthritis  both indium scan + and MRI shows left joint effusion   s/p 2 staged joint exchange (stage 1 ) with spacer placement on 4/12/18  anticipating 6-8 weeks of iv antibiotics + rifampin via picc line  all c/s stayed negative   cont vanco and meropenem  till 5/24/18  vanco level, cbc, bmp, esr and crp q weekly in rehab  f/u in [facility]

## 2018-04-26 ENCOUNTER — OUTPATIENT (OUTPATIENT)
Dept: OUTPATIENT SERVICES | Facility: HOSPITAL | Age: 28
LOS: 1 days | Discharge: HOME | End: 2018-04-26
Payer: COMMERCIAL

## 2018-04-26 DIAGNOSIS — M00.9 PYOGENIC ARTHRITIS, UNSPECIFIED: ICD-10-CM

## 2018-04-26 DIAGNOSIS — Z96.60 PRESENCE OF UNSPECIFIED ORTHOPEDIC JOINT IMPLANT: Chronic | ICD-10-CM

## 2018-04-26 DIAGNOSIS — A41.9 SEPSIS, UNSPECIFIED ORGANISM: ICD-10-CM

## 2018-04-26 DIAGNOSIS — M25.451 EFFUSION, RIGHT HIP: ICD-10-CM

## 2018-04-26 DIAGNOSIS — K80.20 CALCULUS OF GALLBLADDER WITHOUT CHOLECYSTITIS WITHOUT OBSTRUCTION: ICD-10-CM

## 2018-04-26 DIAGNOSIS — R00.2 PALPITATIONS: ICD-10-CM

## 2018-04-26 DIAGNOSIS — T84.52XA INFECTION AND INFLAMMATORY REACTION DUE TO INTERNAL LEFT HIP PROSTHESIS, INITIAL ENCOUNTER: ICD-10-CM

## 2018-04-26 DIAGNOSIS — N76.0 ACUTE VAGINITIS: ICD-10-CM

## 2018-04-26 DIAGNOSIS — T36.95XA ADVERSE EFFECT OF UNSPECIFIED SYSTEMIC ANTIBIOTIC, INITIAL ENCOUNTER: ICD-10-CM

## 2018-04-26 DIAGNOSIS — H54.8 LEGAL BLINDNESS, AS DEFINED IN USA: ICD-10-CM

## 2018-04-26 DIAGNOSIS — M79.642 PAIN IN LEFT HAND: ICD-10-CM

## 2018-04-26 DIAGNOSIS — Z96.643 PRESENCE OF ARTIFICIAL HIP JOINT, BILATERAL: ICD-10-CM

## 2018-04-26 DIAGNOSIS — D57.1 SICKLE-CELL DISEASE WITHOUT CRISIS: ICD-10-CM

## 2018-04-26 DIAGNOSIS — Z94.81 BONE MARROW TRANSPLANT STATUS: ICD-10-CM

## 2018-04-26 DIAGNOSIS — M25.452 EFFUSION, LEFT HIP: ICD-10-CM

## 2018-04-26 DIAGNOSIS — S72.102A UNSPECIFIED TROCHANTERIC FRACTURE OF LEFT FEMUR, INITIAL ENCOUNTER FOR CLOSED FRACTURE: ICD-10-CM
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
## 2018-04-26 DIAGNOSIS — D62 ACUTE POSTHEMORRHAGIC ANEMIA: ICD-10-CM
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
## 2018-04-26 PROCEDURE — 93010 ELECTROCARDIOGRAM REPORT: CPT

## 2018-05-07 ENCOUNTER — LABORATORY RESULT (OUTPATIENT)
Age: 28
End: 2018-05-07

## 2018-05-07 ENCOUNTER — APPOINTMENT (OUTPATIENT)
Dept: INTERNAL MEDICINE | Facility: CLINIC | Age: 28
End: 2018-05-07
Payer: MEDICAID

## 2018-05-07 VITALS
TEMPERATURE: 98.7 F | DIASTOLIC BLOOD PRESSURE: 80 MMHG | HEART RATE: 87 BPM | SYSTOLIC BLOOD PRESSURE: 100 MMHG | OXYGEN SATURATION: 98 %

## 2018-05-07 PROCEDURE — 99214 OFFICE O/P EST MOD 30 MIN: CPT

## 2018-05-07 RX ORDER — AMOXICILLIN 500 MG/1
500 CAPSULE ORAL
Qty: 21 | Refills: 0 | Status: DISCONTINUED | COMMUNITY
Start: 2018-01-23

## 2018-05-09 LAB
ALBUMIN SERPL ELPH-MCNC: 4 G/DL
ALP BLD-CCNC: 155 U/L
ALT SERPL-CCNC: 38 U/L
ANION GAP SERPL CALC-SCNC: 23 MMOL/L
AST SERPL-CCNC: 34 U/L
BASOPHILS # BLD AUTO: 0.04 K/UL
BASOPHILS NFR BLD AUTO: 0.6 %
BILIRUB SERPL-MCNC: <0.2 MG/DL
BUN SERPL-MCNC: 15 MG/DL
CALCIUM SERPL-MCNC: 10.4 MG/DL
CHLORIDE SERPL-SCNC: 96 MMOL/L
CO2 SERPL-SCNC: 21 MMOL/L
CREAT SERPL-MCNC: 0.48 MG/DL
EOSINOPHIL # BLD AUTO: 0.33 K/UL
EOSINOPHIL NFR BLD AUTO: 5.3 %
FERRITIN SERPL-MCNC: 2797 NG/ML
GLUCOSE SERPL-MCNC: 72 MG/DL
HBA1C MFR BLD HPLC: 5.5 %
HCT VFR BLD CALC: 32.4 %
HGB BLD-MCNC: 10.4 G/DL
IMM GRANULOCYTES NFR BLD AUTO: 0.3 %
IRON SATN MFR SERPL: 35 %
IRON SERPL-MCNC: 55 UG/DL
LYMPHOCYTES # BLD AUTO: 2.79 K/UL
LYMPHOCYTES NFR BLD AUTO: 44.7 %
MAN DIFF?: NORMAL
MCHC RBC-ENTMCNC: 28.3 PG
MCHC RBC-ENTMCNC: 32.1 GM/DL
MCV RBC AUTO: 88 FL
MONOCYTES # BLD AUTO: 0.93 K/UL
MONOCYTES NFR BLD AUTO: 14.9 %
NEUTROPHILS # BLD AUTO: 2.13 K/UL
NEUTROPHILS NFR BLD AUTO: 34.2 %
PLATELET # BLD AUTO: 450 K/UL
POTASSIUM SERPL-SCNC: 4.5 MMOL/L
PROT SERPL-MCNC: 7.2 G/DL
RBC # BLD: 3.68 M/UL
RBC # FLD: 14.5 %
SODIUM SERPL-SCNC: 140 MMOL/L
TIBC SERPL-MCNC: 155 UG/DL
UIBC SERPL-MCNC: 100 UG/DL
WBC # FLD AUTO: 6.24 K/UL

## 2018-06-12 ENCOUNTER — APPOINTMENT (OUTPATIENT)
Dept: INTERNAL MEDICINE | Facility: CLINIC | Age: 28
End: 2018-06-12
Payer: MEDICAID

## 2018-06-12 VITALS
TEMPERATURE: 97.8 F | HEIGHT: 63 IN | BODY MASS INDEX: 27.29 KG/M2 | OXYGEN SATURATION: 99 % | DIASTOLIC BLOOD PRESSURE: 80 MMHG | HEART RATE: 85 BPM | SYSTOLIC BLOOD PRESSURE: 120 MMHG | WEIGHT: 154 LBS | RESPIRATION RATE: 14 BRPM

## 2018-06-12 LAB
ALBUMIN SERPL ELPH-MCNC: 4.6 G/DL
ALP BLD-CCNC: 184 U/L
ALT SERPL-CCNC: 86 U/L
ANION GAP SERPL CALC-SCNC: 14 MMOL/L
AST SERPL-CCNC: 37 U/L
BASOPHILS # BLD AUTO: 0.05 K/UL
BASOPHILS NFR BLD AUTO: 0.6 %
BILIRUB SERPL-MCNC: 0.4 MG/DL
BUN SERPL-MCNC: 9 MG/DL
CALCIUM SERPL-MCNC: 10.1 MG/DL
CHLORIDE SERPL-SCNC: 101 MMOL/L
CO2 SERPL-SCNC: 28 MMOL/L
CREAT SERPL-MCNC: 0.51 MG/DL
EOSINOPHIL # BLD AUTO: 0.33 K/UL
EOSINOPHIL NFR BLD AUTO: 4.3 %
GLUCOSE SERPL-MCNC: 105 MG/DL
HCT VFR BLD CALC: 37 %
HGB BLD-MCNC: 11.3 G/DL
IMM GRANULOCYTES NFR BLD AUTO: 0.3 %
IRON SATN MFR SERPL: 46 %
IRON SERPL-MCNC: 90 UG/DL
LYMPHOCYTES # BLD AUTO: 1.79 K/UL
LYMPHOCYTES NFR BLD AUTO: 23.1 %
MAN DIFF?: NORMAL
MCHC RBC-ENTMCNC: 27.1 PG
MCHC RBC-ENTMCNC: 30.5 GM/DL
MCV RBC AUTO: 88.7 FL
MONOCYTES # BLD AUTO: 0.83 K/UL
MONOCYTES NFR BLD AUTO: 10.7 %
NEUTROPHILS # BLD AUTO: 4.73 K/UL
NEUTROPHILS NFR BLD AUTO: 61 %
PLATELET # BLD AUTO: 356 K/UL
POTASSIUM SERPL-SCNC: 4.6 MMOL/L
PROT SERPL-MCNC: 7.2 G/DL
RAPID RVP RESULT: DETECTED
RBC # BLD: 4.17 M/UL
RBC # FLD: 15.6 %
RV+EV RNA SPEC QL NAA+PROBE: DETECTED
S PYO AG SPEC QL IA: NORMAL
SODIUM SERPL-SCNC: 143 MMOL/L
TIBC SERPL-MCNC: 196 UG/DL
UIBC SERPL-MCNC: 106 UG/DL
WBC # FLD AUTO: 7.75 K/UL

## 2018-06-12 PROCEDURE — 99215 OFFICE O/P EST HI 40 MIN: CPT | Mod: 25

## 2018-06-12 PROCEDURE — 36415 COLL VENOUS BLD VENIPUNCTURE: CPT

## 2018-06-12 PROCEDURE — 87880 STREP A ASSAY W/OPTIC: CPT | Mod: QW

## 2018-06-12 RX ORDER — NYSTATIN 100000 [USP'U]/G
100000 CREAM TOPICAL
Refills: 0 | Status: DISCONTINUED | COMMUNITY
End: 2018-06-12

## 2018-06-12 RX ORDER — ONDANSETRON HYDROCHLORIDE 4 MG/1
4 TABLET, FILM COATED ORAL
Refills: 0 | Status: DISCONTINUED | COMMUNITY
End: 2018-06-12

## 2018-06-13 LAB — FERRITIN SERPL-MCNC: 2863 NG/ML

## 2018-06-14 NOTE — HISTORY OF PRESENT ILLNESS
[de-identified] : \par Accompanied by sister\par \par \par c/o cold sx's x 3 days- rhinitis (clear), sore throat, cough with clear mucous, hoarse voice and mild HA on/off (similar as had in past chronically)\par -sick contact at home, another sister who lives with her had similar sx's- repotrts got better on own\par -denies dysphagia, fever, chills, CP or sob\par -reports appetite and BMs nl; denies GI complaints.\par -no flonase/claritin use recently; using benadryl qhs with some help\par \par hx infected left hip prosthesis- s/p inpt stay 4/18 with removal  and placement of spacer, tx'd with 6 week course of IV abx (via Rt-PICC, finished abx 5/24/18) and is s/p [facility] stay d/c'd 5/1/18.  Currently denies hip pain.\par -followed by Dr. Katy browning- last seen 2 weeks ago- states had xray, told doing okay.  left knee injection given, has f/u next week.  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 Kait cash ([place])-last seen 12/17 it was posthospital d/c f/u, states had nl cbc in office and advised f/u in few months.  States went to [place] ~ 1 week prior for acute onset of numbness and pain of left side of body (face--> feet).  States had w/o for stroke, were negative- told likely migraine HA.  Hx negative MRI/echo ?EEG per pt.  Hx neuro eval inpt- states in 1/18 saw neuro outpt, reviewed inpt records, states advised ASA 81 daily for stroke ppx, no f/u advised.  States no Rx's given on d/c.\par -notes numbness had resolved prior to hospital d/c\par \par hx intracranial bleeding surgery 2007- hx occ HA's a/w dizziness since, mild in nature.  Controlled with advil or Tylenol prn.\par \par hx b/l knee pain on/off x many yrs- stable, feels popping and stiffness when walking- overall stable sx's\par -followed by ortho\par \par Reports hx persistent dry cough x few yrs- feels onset was at 18 yo (pre BM transplant)- on/off, no clear pattern in time of day or indoor/outdoor relation - overall stable, no meds taken\par -has occ rhinitis and need for throat clearing\par -denies wheezing, sob, reflux, ANDERSON, CP or leg edema\par -denies hx asthma dx, recalls  hx "inhaler" and nebulizer trial w/o help so stopped\par -reports hx negative cxr > 1 yr ago for eval and thinks had again 12/17 at [place] during hospital stay that was normal\par -denies hx pulm, ENT or GI eval\par -seen by A/I 3/18- 12/17 cxr reviewed, found nl.  Allergy testing done- found +environmental, advised flonase/claritin\par \par \par Denies hx sexual activity or intimate.\par Notes LMP @ 19 yo s/p BM transplant- told likely 2/2 transplant\par

## 2018-06-14 NOTE — REVIEW OF SYSTEMS
[Negative] : Genitourinary [Hoarseness] : no hoarseness [Sore Throat] : no sore throat [Chest Pain] : no chest pain [Palpitations] : no palpitations [Shortness Of Breath] : no shortness of breath [Wheezing] : no wheezing [Abdominal Pain] : no abdominal pain [Melena] : no melena [Dysuria] : no dysuria [Fainting] : no fainting [FreeTextEntry2] : see HPI [FreeTextEntry4] : see HPI [FreeTextEntry6] : see HPI [FreeTextEntry9] : see HPI [de-identified] : see HPI

## 2018-06-14 NOTE — ASSESSMENT
[FreeTextEntry1] : \par URI- POCT strep negative, suspect viral.  VSS and nontoxic appearing\par -check RVP\par -flonase prn\par -advised rest, increased hydration\par -advised to f/u if sx's worsen\par \par hx sickle cell anemia- Hx splenectomy 2007.  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  \par -hx BM transplant at 19 yo ([facility]) from her brother, not currently on immunosuppressives.  \par -followed by heme, Dr. Carballo ([place])\par -followed by ortho, Dr. Burns- has f/u 6/18, awaiting hip replacement- advised to f/u 2 weeks prior to any planned procedure if medical clearance needed.\par -on oxycodone per ortho\par -ambulating with walker at home, wheelchair when out\par -Asked to forward records.\par \par hx intracranial hemorrhage s/p surgery 2007 with chronic HAs's since.    Hx paresthesias s/p inpt stay 12/17 at [facility] with negative w/u for stroke reported and told 2/2 migraine HAs per pt; currently asx\par -followed by neurology\par -on Tylenol or advil prn for HAs\par -Asked to forward records.\par \par perDM- 5/18 5.5 (was 5.8 prior)\par -ADA diet advised\par \par HLD- 3/18 Tchol 238   HDL 42\par -low fat diet advised\par \par abnl LFT- hx similar since BM transplant per pt; asx\par -3/18 hep B/C serology negative\par -5/17 Alk 155\par -US abd pending\par -to forward records\par \par chronic cough- possibly 2/2 postnasal drip\par -advised flonase and claritin\par -followed by A/I - 12/17 cxr reviewed, found nl.  Allergy testing done- found +environmental, advised flonase/claritin\par -hx negative cxr, last 12/17 while inpt at [facility] \par \par knee pain- b/l chronic, hx OA\par -followed by ortho\par -hx PT\par -advised Tylenol prn\par \par overweight-\par -healthy eating and wt loss counseled\par \par \par HCM\par -hx CPE 12/17 by prior PMD, yearly advised\par -check fasting screening labs; agreeable to STD/HIV screening\par -STD and pregnancy prevention with regular use of condoms counseled\par -hx flu shot 12/17\par -hx Tdap < 10 yrs, to forward records\par -hx splenectomy s/p vaccinations--> plans to forward records\par -hx negative PAP 2017 by GYN, Dr. Corinne Dougherty per pt.  Hx amenorrhea since BM transplant at 19 yo per pt.\par - yearly derm skin screening advised.  Regular use of sun block for skin cancer prevention advised.\par \par \par Pt's cell: [phone]\par States mom (Lamar) can be called with medical care/labs discussed with her as needed if pt can't be reached, cell: [phone]\par \par Pt declines f/u appt, states will call back.

## 2018-06-14 NOTE — PHYSICAL EXAM
[No Acute Distress] : no acute distress [Well-Appearing] : well-appearing [Normal Sclera/Conjunctiva] : normal sclera/conjunctiva [PERRL] : pupils equal round and reactive to light [EOMI] : extraocular movements intact [Normal Outer Ear/Nose] : the outer ears and nose were normal in appearance [Supple] : supple [No Lymphadenopathy] : no lymphadenopathy [Thyroid Normal, No Nodules] : the thyroid was normal and there were no nodules present [No Respiratory Distress] : no respiratory distress  [Clear to Auscultation] : lungs were clear to auscultation bilaterally [No Accessory Muscle Use] : no accessory muscle use [Normal Rate] : normal rate  [Regular Rhythm] : with a regular rhythm [Normal S1, S2] : normal S1 and S2 [No Murmur] : no murmur heard [Pedal Pulses Present] : the pedal pulses are present [No Edema] : there was no peripheral edema [Soft] : abdomen soft [Non Tender] : non-tender [No HSM] : no HSM [Normal Supraclavicular Nodes] : no supraclavicular lymphadenopathy [Normal Posterior Cervical Nodes] : no posterior cervical lymphadenopathy [Normal Anterior Cervical Nodes] : no anterior cervical lymphadenopathy [No CVA Tenderness] : no CVA  tenderness [No Spinal Tenderness] : no spinal tenderness [No Joint Swelling] : no joint swelling [No Rash] : no rash [Normal Affect] : the affect was normal [Alert and Oriented x3] : oriented to person, place, and time [de-identified] : no photophobia [de-identified] : mild b/l turbinate edema, clear d/c; no sinus tenderness; +cobblestoning, no erythema or exudate [de-identified] : right picc line: c/d/i [de-identified] : left hip: well healed surgical incision, no redness/swelling/tenderness or d/c [de-identified] : sitting in wheelchair, moving all extremtities

## 2018-06-14 NOTE — HEALTH RISK ASSESSMENT
[Patient reported PAP Smear was normal] : Patient reported PAP Smear was normal [PapSmearDate] : 2017

## 2018-06-15 ENCOUNTER — APPOINTMENT (OUTPATIENT)
Dept: ORTHOPEDIC SURGERY | Facility: CLINIC | Age: 28
End: 2018-06-15
Payer: MEDICAID

## 2018-06-15 LAB — BACTERIA THROAT CULT: NORMAL

## 2018-06-15 PROCEDURE — 99213 OFFICE O/P EST LOW 20 MIN: CPT

## 2018-07-17 ENCOUNTER — OUTPATIENT (OUTPATIENT)
Dept: OUTPATIENT SERVICES | Facility: HOSPITAL | Age: 28
LOS: 1 days | Discharge: HOME | End: 2018-07-17

## 2018-07-17 VITALS
SYSTOLIC BLOOD PRESSURE: 122 MMHG | HEIGHT: 64 IN | TEMPERATURE: 98 F | DIASTOLIC BLOOD PRESSURE: 80 MMHG | OXYGEN SATURATION: 100 % | WEIGHT: 161.6 LBS | HEART RATE: 60 BPM | RESPIRATION RATE: 18 BRPM

## 2018-07-17 DIAGNOSIS — Z90.81 ACQUIRED ABSENCE OF SPLEEN: Chronic | ICD-10-CM

## 2018-07-17 DIAGNOSIS — Z96.60 PRESENCE OF UNSPECIFIED ORTHOPEDIC JOINT IMPLANT: Chronic | ICD-10-CM

## 2018-07-17 DIAGNOSIS — Z90.89 ACQUIRED ABSENCE OF OTHER ORGANS: Chronic | ICD-10-CM

## 2018-07-17 DIAGNOSIS — I63.9 CEREBRAL INFARCTION, UNSPECIFIED: ICD-10-CM

## 2018-07-17 DIAGNOSIS — Z94.81 BONE MARROW TRANSPLANT STATUS: Chronic | ICD-10-CM

## 2018-07-17 DIAGNOSIS — Z01.818 ENCOUNTER FOR OTHER PREPROCEDURAL EXAMINATION: ICD-10-CM

## 2018-07-17 DIAGNOSIS — D57.1 SICKLE-CELL DISEASE WITHOUT CRISIS: ICD-10-CM

## 2018-07-17 DIAGNOSIS — T84.50XD INFECTION AND INFLAMMATORY REACTION DUE TO UNSPECIFIED INTERNAL JOINT PROSTHESIS, SUBSEQUENT ENCOUNTER: ICD-10-CM

## 2018-07-17 LAB
ANION GAP SERPL CALC-SCNC: 9 MMOL/L — SIGNIFICANT CHANGE UP (ref 5–17)
APTT BLD: 33.2 SEC — SIGNIFICANT CHANGE UP (ref 27.5–37.4)
BASOPHILS # BLD AUTO: 0.04 K/UL — SIGNIFICANT CHANGE UP (ref 0–0.2)
BASOPHILS NFR BLD AUTO: 0.7 % — SIGNIFICANT CHANGE UP (ref 0–2)
BLD GP AB SCN SERPL QL: SIGNIFICANT CHANGE UP
BUN SERPL-MCNC: 11 MG/DL — SIGNIFICANT CHANGE UP (ref 7–23)
CALCIUM SERPL-MCNC: 9.1 MG/DL — SIGNIFICANT CHANGE UP (ref 8.5–10.1)
CHLORIDE SERPL-SCNC: 106 MMOL/L — SIGNIFICANT CHANGE UP (ref 96–108)
CO2 SERPL-SCNC: 27 MMOL/L — SIGNIFICANT CHANGE UP (ref 22–31)
CREAT SERPL-MCNC: 0.48 MG/DL — LOW (ref 0.5–1.3)
EOSINOPHIL # BLD AUTO: 0.16 K/UL — SIGNIFICANT CHANGE UP (ref 0–0.5)
EOSINOPHIL NFR BLD AUTO: 2.7 % — SIGNIFICANT CHANGE UP (ref 0–6)
GLUCOSE SERPL-MCNC: 96 MG/DL — SIGNIFICANT CHANGE UP (ref 70–99)
HBA1C BLD-MCNC: 5.7 % — HIGH (ref 4–5.6)
HCG UR QL: NEGATIVE — SIGNIFICANT CHANGE UP
HCT VFR BLD CALC: 36.5 % — SIGNIFICANT CHANGE UP (ref 34.5–45)
HGB BLD-MCNC: 11.5 G/DL — SIGNIFICANT CHANGE UP (ref 11.5–15.5)
IMM GRANULOCYTES NFR BLD AUTO: 0.2 % — SIGNIFICANT CHANGE UP (ref 0–1.5)
INR BLD: 1.08 RATIO — SIGNIFICANT CHANGE UP (ref 0.88–1.16)
LYMPHOCYTES # BLD AUTO: 2.68 K/UL — SIGNIFICANT CHANGE UP (ref 1–3.3)
LYMPHOCYTES # BLD AUTO: 45.6 % — HIGH (ref 13–44)
MCHC RBC-ENTMCNC: 27.9 PG — SIGNIFICANT CHANGE UP (ref 27–34)
MCHC RBC-ENTMCNC: 31.5 GM/DL — LOW (ref 32–36)
MCV RBC AUTO: 88.6 FL — SIGNIFICANT CHANGE UP (ref 80–100)
MONOCYTES # BLD AUTO: 0.62 K/UL — SIGNIFICANT CHANGE UP (ref 0–0.9)
MONOCYTES NFR BLD AUTO: 10.5 % — SIGNIFICANT CHANGE UP (ref 2–14)
NEUTROPHILS # BLD AUTO: 2.37 K/UL — SIGNIFICANT CHANGE UP (ref 1.8–7.4)
NEUTROPHILS NFR BLD AUTO: 40.3 % — LOW (ref 43–77)
NRBC # BLD: 0 /100 WBCS — SIGNIFICANT CHANGE UP (ref 0–0)
PLATELET # BLD AUTO: 295 K/UL — SIGNIFICANT CHANGE UP (ref 150–400)
POTASSIUM SERPL-MCNC: 4.1 MMOL/L — SIGNIFICANT CHANGE UP (ref 3.5–5.3)
POTASSIUM SERPL-SCNC: 4.1 MMOL/L — SIGNIFICANT CHANGE UP (ref 3.5–5.3)
PROTHROM AB SERPL-ACNC: 11.8 SEC — SIGNIFICANT CHANGE UP (ref 9.8–12.7)
RBC # BLD: 4.12 M/UL — SIGNIFICANT CHANGE UP (ref 3.8–5.2)
RBC # FLD: 14.1 % — SIGNIFICANT CHANGE UP (ref 10.3–14.5)
SODIUM SERPL-SCNC: 142 MMOL/L — SIGNIFICANT CHANGE UP (ref 135–145)
WBC # BLD: 5.88 K/UL — SIGNIFICANT CHANGE UP (ref 3.8–10.5)
WBC # FLD AUTO: 5.88 K/UL — SIGNIFICANT CHANGE UP (ref 3.8–10.5)

## 2018-07-17 NOTE — H&P PST ADULT - PSH
H/O bilateral hip replacements H/O bilateral hip replacements    H/O bone marrow transplant  2011  H/O splenectomy    History of tonsillectomy

## 2018-07-17 NOTE — H&P PST ADULT - ASSESSMENT
27F pmh sickle cell anemia, CVA 2006, vit D def here for PST for scheduled Left total hip arthroplasty revision  CAPRINI SCORE    AGE RELATED RISK FACTORS                                                       MOBILITY RELATED FACTORS  [ ] Age 41-60 years                                            (1 Point)                  [ ] Bed rest                                                        (1 Point)  [ ] Age: 61-74 years                                           (2 Points)                [ ] Plaster cast                                                   (2 Points)  [ ] Age= 75 years                                              (3 Points)                 [ ] Bed bound for more than 72 hours                   (2 Points)    DISEASE RELATED RISK FACTORS                                               GENDER SPECIFIC FACTORS  [ ] Edema in the lower extremities                       (1 Point)                  [ ] Pregnancy                                                     (1 Point)  [ ] Varicose veins                                               (1 Point)                  [ ] Post-partum < 6 weeks                                   (1 Point)             [ ] BMI > 25 Kg/m2                                            (1 Point)                  [ ] Hormonal therapy  or oral contraception            (1 Point)                 [ ] Sepsis (in the previous month)                        (1 Point)                  [ ] History of pregnancy complications  [ ] Pneumonia or serious lung disease                                               [ ] Unexplained or recurrent                       (1 Point)           (in the previous month)                               (1 Point)  [ ] Abnormal pulmonary function test                     (1 Point)                 SURGERY RELATED RISK FACTORS  [ ] Acute myocardial infarction                              (1 Point)                 [ ]  Section                                            (1 Point)  [ ] Congestive heart failure (in the previous month)  (1 Point)                 [ ] Minor surgery                                                 (1 Point)   [ ] Inflammatory bowel disease                             (1 Point)                 [ ] Arthroscopic surgery                                        (2 Points)  [ ] Central venous access                                    (2 Points)                [ ] General surgery lasting more than 45 minutes   (2 Points)       [ ] Stroke (in the previous month)                          (5 Points)               [ ] Elective arthroplasty                                        (5 Points)                                                                                                                                               HEMATOLOGY RELATED FACTORS                                                 TRAUMA RELATED RISK FACTORS  [ ] Prior episodes of VTE                                     (3 Points)                 [ ] Fracture of the hip, pelvis, or leg                       (5 Points)  [ ] Positive family history for VTE                         (3 Points)                 [ ] Acute spinal cord injury (in the previous month)  (5 Points)  [ ] Prothrombin 91665 A                                      (3 Points)                 [ ] Paralysis  (less than 1 month)                          (5 Points)  [ ] Factor V Leiden                                             (3 Points)                 [ ] Multiple Trauma within 1 month                         (5 Points)  [ ] Lupus anticoagulants                                     (3 Points)                                                           [ ] Anticardiolipin antibodies                                (3 Points)                                                       [ ] High homocysteine in the blood                      (3 Points)                                             [ ] Other congenital or acquired thrombophilia       (3 Points)                                                [ ] Heparin induced thrombocytopenia                  (3 Points)                                          Total Score [          ] 27F pmh sickle cell anemia, CVA 2006, vit D def here for PST for scheduled Left total hip arthroplasty revision  CAPRINI SCORE    AGE RELATED RISK FACTORS                                                       MOBILITY RELATED FACTORS  [ ] Age 41-60 years                                            (1 Point)                  [ ] Bed rest                                                        (1 Point)  [ ] Age: 61-74 years                                           (2 Points)                [ ] Plaster cast                                                   (2 Points)  [ ] Age= 75 years                                              (3 Points)                 [ ] Bed bound for more than 72 hours                   (2 Points)    DISEASE RELATED RISK FACTORS                                               GENDER SPECIFIC FACTORS  [ x] Edema in the lower extremities                       (1 Point)                  [ ] Pregnancy                                                     (1 Point)  [ ] Varicose veins                                               (1 Point)                  [ ] Post-partum < 6 weeks                                   (1 Point)             [x ] BMI > 25 Kg/m2                                            (1 Point)                  [ ] Hormonal therapy  or oral contraception            (1 Point)                 [ ] Sepsis (in the previous month)                        (1 Point)                  [ ] History of pregnancy complications  [ ] Pneumonia or serious lung disease                                               [ ] Unexplained or recurrent                       (1 Point)           (in the previous month)                               (1 Point)  [ ] Abnormal pulmonary function test                     (1 Point)                 SURGERY RELATED RISK FACTORS  [ ] Acute myocardial infarction                              (1 Point)                 [ ]  Section                                            (1 Point)  [ ] Congestive heart failure (in the previous month)  (1 Point)                 [ ] Minor surgery                                                 (1 Point)   [ ] Inflammatory bowel disease                             (1 Point)                 [ ] Arthroscopic surgery                                        (2 Points)  [ ] Central venous access                                    (2 Points)                [ ] General surgery lasting more than 45 minutes   (2 Points)       [ ] Stroke (in the previous month)                          (5 Points)               [x ] Elective arthroplasty                                        (5 Points)                                                                                                                                               HEMATOLOGY RELATED FACTORS                                                 TRAUMA RELATED RISK FACTORS  [ ] Prior episodes of VTE                                     (3 Points)                 [ ] Fracture of the hip, pelvis, or leg                       (5 Points)  [ ] Positive family history for VTE                         (3 Points)                 [ ] Acute spinal cord injury (in the previous month)  (5 Points)  [ ] Prothrombin 65744 A                                      (3 Points)                 [ ] Paralysis  (less than 1 month)                          (5 Points)  [ ] Factor V Leiden                                             (3 Points)                 [ ] Multiple Trauma within 1 month                         (5 Points)  [ ] Lupus anticoagulants                                     (3 Points)                                                           [ ] Anticardiolipin antibodies                                (3 Points)                                                       [ ] High homocysteine in the blood                      (3 Points)                                             [ ] Other congenital or acquired thrombophilia       (3 Points)                                                [ ] Heparin induced thrombocytopenia                  (3 Points)                                          Total Score [      7    ]

## 2018-07-17 NOTE — H&P PST ADULT - PROBLEM SELECTOR PLAN 1
Left total hip arthroplasty revision Left total hip arthroplasty revision  Medical clearance requested

## 2018-07-17 NOTE — OCCUPATIONAL THERAPY INITIAL EVALUATION ADULT - SOCIAL CONCERNS
As per patient "My family will be able to help me when I get home with any of my daily tasks and needs"./None

## 2018-07-17 NOTE — H&P PST ADULT - NSANTHOSAYNRD_GEN_A_CORE
No. ALESHIA screening performed.  STOP BANG Legend: 0-2 = LOW Risk; 3-4 = INTERMEDIATE Risk; 5-8 = HIGH Risk

## 2018-07-18 ENCOUNTER — NON-APPOINTMENT (OUTPATIENT)
Age: 28
End: 2018-07-18

## 2018-07-18 ENCOUNTER — APPOINTMENT (OUTPATIENT)
Dept: INTERNAL MEDICINE | Facility: CLINIC | Age: 28
End: 2018-07-18
Payer: MEDICAID

## 2018-07-18 VITALS
DIASTOLIC BLOOD PRESSURE: 70 MMHG | WEIGHT: 154 LBS | HEART RATE: 75 BPM | BODY MASS INDEX: 27.29 KG/M2 | OXYGEN SATURATION: 98 % | HEIGHT: 63 IN | RESPIRATION RATE: 14 BRPM | SYSTOLIC BLOOD PRESSURE: 112 MMHG

## 2018-07-18 VITALS — TEMPERATURE: 97.9 F

## 2018-07-18 DIAGNOSIS — R01.0 BENIGN AND INNOCENT CARDIAC MURMURS: ICD-10-CM

## 2018-07-18 LAB
MRSA PCR RESULT.: SIGNIFICANT CHANGE UP
S AUREUS DNA NOSE QL NAA+PROBE: SIGNIFICANT CHANGE UP

## 2018-07-18 PROCEDURE — 99215 OFFICE O/P EST HI 40 MIN: CPT | Mod: 25

## 2018-07-18 PROCEDURE — 93000 ELECTROCARDIOGRAM COMPLETE: CPT

## 2018-07-18 RX ORDER — ZINC OXIDE 13 %
CREAM (GRAM) TOPICAL
Refills: 0 | Status: DISCONTINUED | COMMUNITY
End: 2018-07-18

## 2018-07-18 RX ORDER — RIVAROXABAN 10 MG/1
10 TABLET, FILM COATED ORAL
Refills: 0 | Status: DISCONTINUED | COMMUNITY
End: 2018-07-18

## 2018-07-18 NOTE — RESULTS/DATA
[] : results reviewed [de-identified] : 7/17/18 eleazarl [de-identified] : 7/17/18 wnl [de-identified] : 7/17/18 danis stanton [de-identified] : today: NSR @ 83 bpm, nl axis, no LVH/path Q/ST chgs [de-identified] : \par \par 7/17/18\par A1c 5.7\par urine pregnancy: negative\par ABO: B positive

## 2018-07-18 NOTE — PHYSICAL THERAPY INITIAL EVALUATION ADULT - ADDITIONAL COMMENTS
Pt is currently on hip precautions, and requires assistance at times with ADLS. Pt went to rehab after surgery in April. Pt is R hand dominant, does not wear glasses, does not drive. There are 4 steps to enter  with B handrail, 14 steps with B handrail to 2nd floor. there is a bedroom on the first floor, bathrooms on both floors that are the same (shower stall, no grab bars). Pt ambulates with a quad cane, also has a rolling walker a commode and wheelchair. pain is 3/10 at its worst, 0/10 at rest. Pt takes tylenol, and currently goes to PT 2x week.

## 2018-07-18 NOTE — REVIEW OF SYSTEMS
[Negative] : Psychiatric [Chest Pain] : no chest pain [Palpitations] : no palpitations [Leg Claudication] : no leg claudication [Lower Ext Edema] : no lower extremity edema [Wheezing] : no wheezing [Dyspnea on Exertion] : no dyspnea on exertion [Dizziness] : no dizziness [FreeTextEntry6] : s [FreeTextEntry9] : see HPI

## 2018-07-18 NOTE — PHYSICAL EXAM
[No Acute Distress] : no acute distress [Well-Appearing] : well-appearing [Normal Sclera/Conjunctiva] : normal sclera/conjunctiva [PERRL] : pupils equal round and reactive to light [EOMI] : extraocular movements intact [Normal Outer Ear/Nose] : the outer ears and nose were normal in appearance [Supple] : supple [No Lymphadenopathy] : no lymphadenopathy [Thyroid Normal, No Nodules] : the thyroid was normal and there were no nodules present [No Respiratory Distress] : no respiratory distress  [Clear to Auscultation] : lungs were clear to auscultation bilaterally [No Accessory Muscle Use] : no accessory muscle use [Normal Rate] : normal rate  [Regular Rhythm] : with a regular rhythm [Normal S1, S2] : normal S1 and S2 [No Murmur] : no murmur heard [Pedal Pulses Present] : the pedal pulses are present [No Edema] : there was no peripheral edema [Soft] : abdomen soft [Non Tender] : non-tender [No HSM] : no HSM [Normal Supraclavicular Nodes] : no supraclavicular lymphadenopathy [Normal Posterior Cervical Nodes] : no posterior cervical lymphadenopathy [Normal Anterior Cervical Nodes] : no anterior cervical lymphadenopathy [No CVA Tenderness] : no CVA  tenderness [No Spinal Tenderness] : no spinal tenderness [No Joint Swelling] : no joint swelling [No Rash] : no rash [Normal Affect] : the affect was normal [Alert and Oriented x3] : oriented to person, place, and time [de-identified] : left hip: well healed surgical incision, no redness/swelling/tenderness or d/c [de-identified] : walking with cane, moving all extremities

## 2018-07-18 NOTE — HISTORY OF PRESENT ILLNESS
[( Patient denies any chest pain, claudication, dyspnea on exertion, orthopnea, palpitations or syncope )] : Patient denies any chest pain, claudication, dyspnea on exertion, orthopnea, palpitations or syncope. [Coronary Artery Disease] : no coronary artery disease [Diabetes] : no diabetes [Sleep Apnea] : no sleep apnea [COPD] : no COPD [Previous Adverse Anesthesia Reaction] : no previous adverse anesthesia reaction [FreeTextEntry1] : Left total hip replacement [FreeTextEntry2] : 7/25/18 [FreeTextEntry3] : Dr. 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 Burns, yesterday- had xray of hips, told all okay with replacement planned\par -currently denies hip pain, ambulating with wheelchair at home or doctor visits; using cane otherwise, denies falls\par -hx chronic knee pain for which using Tylenol with help- followed by ortho provider, Dr. Alva, seen 6/18 w/o further intervention planned, advised to f/u after hip surgery.  \par \par denies fever or chills. \par denies GI or  complaints.\par \par hx chronic cough - states much improved with use of flonase and OTC allegra/claritin prn, last used > 1 week ago\par \par States able to walk 6  blocks and use stairs (has 14 steps in home, walks up and down) with cane use w/o sx's or limitations.\par \par  [FreeTextEntry6] : Denies personal or FH of adverse reactions to anesthesia.\par Denies personal or FH blood clots or abnormal bleeding.\par \par

## 2018-07-18 NOTE — PHYSICAL THERAPY INITIAL EVALUATION ADULT - MODIFIED CLINICAL TEST OF SENSORY INTEGRATION IN BALANCE TEST
5x Sit to Stand Test = (B hands used) 26.21 seconds, indicating significant impairment c functional mobility & strength  ; 2 Minute Walk Test = 300 feet with quad cane and rest stops

## 2018-07-18 NOTE — ASSESSMENT
[Patient Optimized for Surgery] : Patient optimized for surgery [No Further Testing Recommended] : no further testing recommended [High Risk Surgery - Intraperitoneal, Intrathoracic or Supringuinal Vascular Procedures] : High Risk Surgery - Intraperitoneal, Intrathoracic or Supringuinal Vascular Procedures - No (0) [Ischemic Heart Disease] : Ischemic Heart Disease - No (0) [Congestive Heart Failure] : Congestive Heart Failure - No (0) [Prior Cerebrovascular Accident or TIA] : Prior Cerebrovascular Accident or TIA - No (0) [Creatinine >= 2mg/dL (1 Point)] : Creatinine >= 2mg/dL - No (0) [Insulin-dependent Diabetic (1 Point)] : Insulin-dependent Diabetic - No (0) [0] : 0 , RCRI Class: I, Risk of Post-Op Cardiac Complications: 0.4%, Procedure Risk: Low-Risk [FreeTextEntry7] : Advised to avoid all NSAIDs 1 week prior to procedure. 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  \par -hx BM transplant at 21 yo ([facility]) from her brother, not currently on immunosuppressives.  \par -followed by shreya, Dr. Carballo ([facility])- seen 2018, labs done and no change made per pt.  Reports UTD on all vaccines for aplenia per shreya, asked to forward records.\par -followed by Dr. Katy browning- awaiting L-hip replacement\par -on oxycodone per ortho, rare use\par -ambulating with cane at home, wheelchair when out\par -Asked to forward records.\par \par hx intracranial hemorrhage s/p surgery 2007 with chronic HAs since.    Hx paresthesias s/p inpt stay 12/17 at [facility] with negative w/u for stroke reported and told 2/2 migraine HAs per pt; currently asx\par -followed by neurology\par -on Tylenol or advil prn for HAs\par -Asked to forward records.\par \par perDM- 7/18 5.7 (was 5.8 prior)\par -ADA diet advised\par \par HLD- 3/18 Tchol 238   HDL 42\par -low fat diet advised\par \par abnl LFT- hx similar since BM transplant per pt; asx\par -3/18 hep B/C serology negative\par -5/17 Alk 155\par -6/18 ALT 86 Alkph 184\par -US abd pending\par -to forward records\par \par chronic cough- likely 2/2 postnasal drip, improved with med use\par -cont flonase and claritin\par -followed by A/I - seen 3/18 with 12/17 cxr reviewed, found nl.  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 Corinne Dougherty per pt.  Hx amenorrhea since BM transplant at 21 yo per pt.\par - yearly derm skin screening advised.  Regular use of sun block for skin cancer prevention advised.\par \par \par Pt's cell: [phone]\par States mom (Lamar) can be called with medical care/labs discussed with her as needed if pt can't be reached, cell: [phone]\par \par Pt declines f/u appt, states will call back.

## 2018-07-24 ENCOUNTER — TRANSCRIPTION ENCOUNTER (OUTPATIENT)
Age: 28
End: 2018-07-24

## 2018-07-24 RX ORDER — ACETAMINOPHEN 500 MG
650 TABLET ORAL ONCE
Qty: 0 | Refills: 0 | Status: COMPLETED | OUTPATIENT
Start: 2018-07-25 | End: 2018-07-25

## 2018-07-24 RX ORDER — SODIUM CHLORIDE 9 MG/ML
3 INJECTION INTRAMUSCULAR; INTRAVENOUS; SUBCUTANEOUS EVERY 8 HOURS
Qty: 0 | Refills: 0 | Status: DISCONTINUED | OUTPATIENT
Start: 2018-07-25 | End: 2018-07-27

## 2018-07-24 RX ORDER — OXYCODONE HYDROCHLORIDE 5 MG/1
10 TABLET ORAL ONCE
Qty: 0 | Refills: 0 | Status: DISCONTINUED | OUTPATIENT
Start: 2018-07-25 | End: 2018-07-25

## 2018-07-25 ENCOUNTER — INPATIENT (INPATIENT)
Facility: HOSPITAL | Age: 28
LOS: 1 days | Discharge: HOME | End: 2018-07-27
Attending: ORTHOPAEDIC SURGERY | Admitting: ORTHOPAEDIC SURGERY
Payer: COMMERCIAL

## 2018-07-25 VITALS
OXYGEN SATURATION: 99 % | TEMPERATURE: 98 F | WEIGHT: 141.98 LBS | HEART RATE: 98 BPM | RESPIRATION RATE: 17 BRPM | DIASTOLIC BLOOD PRESSURE: 72 MMHG | HEIGHT: 64 IN | SYSTOLIC BLOOD PRESSURE: 115 MMHG

## 2018-07-25 DIAGNOSIS — Z98.890 OTHER SPECIFIED POSTPROCEDURAL STATES: Chronic | ICD-10-CM

## 2018-07-25 DIAGNOSIS — Z90.89 ACQUIRED ABSENCE OF OTHER ORGANS: Chronic | ICD-10-CM

## 2018-07-25 DIAGNOSIS — Z90.81 ACQUIRED ABSENCE OF SPLEEN: Chronic | ICD-10-CM

## 2018-07-25 DIAGNOSIS — Z96.60 PRESENCE OF UNSPECIFIED ORTHOPEDIC JOINT IMPLANT: Chronic | ICD-10-CM

## 2018-07-25 DIAGNOSIS — Z94.81 BONE MARROW TRANSPLANT STATUS: Chronic | ICD-10-CM

## 2018-07-25 LAB
ANION GAP SERPL CALC-SCNC: 11 MMOL/L — SIGNIFICANT CHANGE UP (ref 5–17)
BLD GP AB SCN SERPL QL: SIGNIFICANT CHANGE UP
BUN SERPL-MCNC: 10 MG/DL — SIGNIFICANT CHANGE UP (ref 7–23)
CALCIUM SERPL-MCNC: 8.1 MG/DL — LOW (ref 8.5–10.1)
CHLORIDE SERPL-SCNC: 110 MMOL/L — HIGH (ref 96–108)
CO2 SERPL-SCNC: 23 MMOL/L — SIGNIFICANT CHANGE UP (ref 22–31)
CREAT SERPL-MCNC: 0.57 MG/DL — SIGNIFICANT CHANGE UP (ref 0.5–1.3)
GLUCOSE SERPL-MCNC: 177 MG/DL — HIGH (ref 70–99)
HCT VFR BLD CALC: 26.9 % — LOW (ref 34.5–45)
HGB BLD-MCNC: 8.3 G/DL — LOW (ref 11.5–15.5)
MCHC RBC-ENTMCNC: 28.1 PG — SIGNIFICANT CHANGE UP (ref 27–34)
MCHC RBC-ENTMCNC: 30.9 GM/DL — LOW (ref 32–36)
MCV RBC AUTO: 91.2 FL — SIGNIFICANT CHANGE UP (ref 80–100)
NRBC # BLD: 0 /100 WBCS — SIGNIFICANT CHANGE UP (ref 0–0)
PLATELET # BLD AUTO: 226 K/UL — SIGNIFICANT CHANGE UP (ref 150–400)
POTASSIUM SERPL-MCNC: 3.8 MMOL/L — SIGNIFICANT CHANGE UP (ref 3.5–5.3)
POTASSIUM SERPL-SCNC: 3.8 MMOL/L — SIGNIFICANT CHANGE UP (ref 3.5–5.3)
RBC # BLD: 2.95 M/UL — LOW (ref 3.8–5.2)
RBC # FLD: 13.7 % — SIGNIFICANT CHANGE UP (ref 10.3–14.5)
SODIUM SERPL-SCNC: 144 MMOL/L — SIGNIFICANT CHANGE UP (ref 135–145)
WBC # BLD: 13.07 K/UL — HIGH (ref 3.8–10.5)
WBC # FLD AUTO: 13.07 K/UL — HIGH (ref 3.8–10.5)

## 2018-07-25 PROCEDURE — 88305 TISSUE EXAM BY PATHOLOGIST: CPT | Mod: 26

## 2018-07-25 PROCEDURE — 88331 PATH CONSLTJ SURG 1 BLK 1SPC: CPT | Mod: 26

## 2018-07-25 PROCEDURE — 72170 X-RAY EXAM OF PELVIS: CPT | Mod: 26,76

## 2018-07-25 PROCEDURE — 88311 DECALCIFY TISSUE: CPT | Mod: 26

## 2018-07-25 PROCEDURE — 88300 SURGICAL PATH GROSS: CPT | Mod: 26,59

## 2018-07-25 RX ORDER — ASCORBIC ACID 60 MG
500 TABLET,CHEWABLE ORAL
Qty: 0 | Refills: 0 | Status: DISCONTINUED | OUTPATIENT
Start: 2018-07-25 | End: 2018-07-27

## 2018-07-25 RX ORDER — HYDROMORPHONE HYDROCHLORIDE 2 MG/ML
1 INJECTION INTRAMUSCULAR; INTRAVENOUS; SUBCUTANEOUS EVERY 4 HOURS
Qty: 0 | Refills: 0 | Status: DISCONTINUED | OUTPATIENT
Start: 2018-07-25 | End: 2018-07-27

## 2018-07-25 RX ORDER — HYDROMORPHONE HYDROCHLORIDE 2 MG/ML
0.5 INJECTION INTRAMUSCULAR; INTRAVENOUS; SUBCUTANEOUS
Qty: 0 | Refills: 0 | Status: DISCONTINUED | OUTPATIENT
Start: 2018-07-25 | End: 2018-07-25

## 2018-07-25 RX ORDER — OXYCODONE HYDROCHLORIDE 5 MG/1
5 TABLET ORAL EVERY 4 HOURS
Qty: 0 | Refills: 0 | Status: DISCONTINUED | OUTPATIENT
Start: 2018-07-25 | End: 2018-07-26

## 2018-07-25 RX ORDER — CEFAZOLIN SODIUM 1 G
2000 VIAL (EA) INJECTION EVERY 8 HOURS
Qty: 0 | Refills: 0 | Status: DISCONTINUED | OUTPATIENT
Start: 2018-07-25 | End: 2018-07-26

## 2018-07-25 RX ORDER — OXYCODONE HYDROCHLORIDE 5 MG/1
10 TABLET ORAL EVERY 4 HOURS
Qty: 0 | Refills: 0 | Status: DISCONTINUED | OUTPATIENT
Start: 2018-07-25 | End: 2018-07-27

## 2018-07-25 RX ORDER — CELECOXIB 200 MG/1
200 CAPSULE ORAL ONCE
Qty: 0 | Refills: 0 | Status: COMPLETED | OUTPATIENT
Start: 2018-07-25 | End: 2018-07-25

## 2018-07-25 RX ORDER — FOLIC ACID 0.8 MG
1 TABLET ORAL DAILY
Qty: 0 | Refills: 0 | Status: DISCONTINUED | OUTPATIENT
Start: 2018-07-25 | End: 2018-07-27

## 2018-07-25 RX ORDER — POLYETHYLENE GLYCOL 3350 17 G/17G
17 POWDER, FOR SOLUTION ORAL DAILY
Qty: 0 | Refills: 0 | Status: DISCONTINUED | OUTPATIENT
Start: 2018-07-25 | End: 2018-07-27

## 2018-07-25 RX ORDER — OXYCODONE HYDROCHLORIDE 5 MG/1
5 TABLET ORAL EVERY 4 HOURS
Qty: 0 | Refills: 0 | Status: DISCONTINUED | OUTPATIENT
Start: 2018-07-25 | End: 2018-07-27

## 2018-07-25 RX ORDER — ZOLPIDEM TARTRATE 10 MG/1
5 TABLET ORAL AT BEDTIME
Qty: 0 | Refills: 0 | Status: DISCONTINUED | OUTPATIENT
Start: 2018-07-25 | End: 2018-07-27

## 2018-07-25 RX ORDER — HYDROMORPHONE HYDROCHLORIDE 2 MG/ML
0.5 INJECTION INTRAMUSCULAR; INTRAVENOUS; SUBCUTANEOUS
Qty: 0 | Refills: 0 | Status: DISCONTINUED | OUTPATIENT
Start: 2018-07-25 | End: 2018-07-27

## 2018-07-25 RX ORDER — ACETAMINOPHEN 500 MG
1000 TABLET ORAL ONCE
Qty: 0 | Refills: 0 | Status: COMPLETED | OUTPATIENT
Start: 2018-07-25 | End: 2018-07-25

## 2018-07-25 RX ORDER — ONDANSETRON 8 MG/1
4 TABLET, FILM COATED ORAL
Qty: 0 | Refills: 0 | Status: DISCONTINUED | OUTPATIENT
Start: 2018-07-25 | End: 2018-07-27

## 2018-07-25 RX ORDER — DEXAMETHASONE 0.5 MG/5ML
10 ELIXIR ORAL ONCE
Qty: 0 | Refills: 0 | Status: COMPLETED | OUTPATIENT
Start: 2018-07-26 | End: 2018-07-26

## 2018-07-25 RX ORDER — SENNA PLUS 8.6 MG/1
2 TABLET ORAL AT BEDTIME
Qty: 0 | Refills: 0 | Status: DISCONTINUED | OUTPATIENT
Start: 2018-07-25 | End: 2018-07-27

## 2018-07-25 RX ORDER — DOCUSATE SODIUM 100 MG
100 CAPSULE ORAL THREE TIMES A DAY
Qty: 0 | Refills: 0 | Status: DISCONTINUED | OUTPATIENT
Start: 2018-07-25 | End: 2018-07-27

## 2018-07-25 RX ORDER — SODIUM CHLORIDE 9 MG/ML
1000 INJECTION, SOLUTION INTRAVENOUS
Qty: 0 | Refills: 0 | Status: DISCONTINUED | OUTPATIENT
Start: 2018-07-25 | End: 2018-07-26

## 2018-07-25 RX ORDER — ONDANSETRON 8 MG/1
4 TABLET, FILM COATED ORAL EVERY 6 HOURS
Qty: 0 | Refills: 0 | Status: DISCONTINUED | OUTPATIENT
Start: 2018-07-25 | End: 2018-07-27

## 2018-07-25 RX ORDER — PANTOPRAZOLE SODIUM 20 MG/1
40 TABLET, DELAYED RELEASE ORAL DAILY
Qty: 0 | Refills: 0 | Status: DISCONTINUED | OUTPATIENT
Start: 2018-07-25 | End: 2018-07-27

## 2018-07-25 RX ORDER — FERROUS SULFATE 325(65) MG
325 TABLET ORAL
Qty: 0 | Refills: 0 | Status: DISCONTINUED | OUTPATIENT
Start: 2018-07-25 | End: 2018-07-27

## 2018-07-25 RX ORDER — ACETAMINOPHEN 500 MG
975 TABLET ORAL EVERY 8 HOURS
Qty: 0 | Refills: 0 | Status: DISCONTINUED | OUTPATIENT
Start: 2018-07-25 | End: 2018-07-27

## 2018-07-25 RX ORDER — TRANEXAMIC ACID 100 MG/ML
1000 INJECTION, SOLUTION INTRAVENOUS ONCE
Qty: 0 | Refills: 0 | Status: COMPLETED | OUTPATIENT
Start: 2018-07-25 | End: 2018-07-25

## 2018-07-25 RX ORDER — DIPHENHYDRAMINE HCL 50 MG
25 CAPSULE ORAL AT BEDTIME
Qty: 0 | Refills: 0 | Status: DISCONTINUED | OUTPATIENT
Start: 2018-07-25 | End: 2018-07-27

## 2018-07-25 RX ORDER — SODIUM CHLORIDE 9 MG/ML
1000 INJECTION, SOLUTION INTRAVENOUS
Qty: 0 | Refills: 0 | Status: DISCONTINUED | OUTPATIENT
Start: 2018-07-25 | End: 2018-07-25

## 2018-07-25 RX ORDER — ONDANSETRON 8 MG/1
4 TABLET, FILM COATED ORAL ONCE
Qty: 0 | Refills: 0 | Status: DISCONTINUED | OUTPATIENT
Start: 2018-07-25 | End: 2018-07-25

## 2018-07-25 RX ORDER — RIVAROXABAN 15 MG-20MG
10 KIT ORAL DAILY
Qty: 0 | Refills: 0 | Status: DISCONTINUED | OUTPATIENT
Start: 2018-07-26 | End: 2018-07-27

## 2018-07-25 RX ORDER — FENTANYL CITRATE 50 UG/ML
50 INJECTION INTRAVENOUS
Qty: 0 | Refills: 0 | Status: DISCONTINUED | OUTPATIENT
Start: 2018-07-25 | End: 2018-07-27

## 2018-07-25 RX ORDER — PETROLATUM,WHITE
1 JELLY (GRAM) TOPICAL
Qty: 0 | Refills: 0 | Status: DISCONTINUED | OUTPATIENT
Start: 2018-07-25 | End: 2018-07-27

## 2018-07-25 RX ORDER — CELECOXIB 200 MG/1
200 CAPSULE ORAL
Qty: 0 | Refills: 0 | Status: DISCONTINUED | OUTPATIENT
Start: 2018-07-25 | End: 2018-07-27

## 2018-07-25 RX ORDER — HYDROMORPHONE HYDROCHLORIDE 2 MG/ML
1 INJECTION INTRAMUSCULAR; INTRAVENOUS; SUBCUTANEOUS
Qty: 0 | Refills: 0 | Status: DISCONTINUED | OUTPATIENT
Start: 2018-07-25 | End: 2018-07-27

## 2018-07-25 RX ADMIN — OXYCODONE HYDROCHLORIDE 10 MILLIGRAM(S): 5 TABLET ORAL at 13:28

## 2018-07-25 RX ADMIN — Medication 1 APPLICATION(S): at 23:13

## 2018-07-25 RX ADMIN — TRANEXAMIC ACID 220 MILLIGRAM(S): 100 INJECTION, SOLUTION INTRAVENOUS at 18:59

## 2018-07-25 RX ADMIN — Medication 100 MILLIGRAM(S): at 23:12

## 2018-07-25 RX ADMIN — CELECOXIB 200 MILLIGRAM(S): 200 CAPSULE ORAL at 13:28

## 2018-07-25 RX ADMIN — Medication 1000 MILLIGRAM(S): at 18:59

## 2018-07-25 RX ADMIN — HYDROMORPHONE HYDROCHLORIDE 0.5 MILLIGRAM(S): 2 INJECTION INTRAMUSCULAR; INTRAVENOUS; SUBCUTANEOUS at 18:48

## 2018-07-25 RX ADMIN — OXYCODONE HYDROCHLORIDE 5 MILLIGRAM(S): 5 TABLET ORAL at 23:11

## 2018-07-25 RX ADMIN — Medication 650 MILLIGRAM(S): at 13:28

## 2018-07-25 RX ADMIN — HYDROMORPHONE HYDROCHLORIDE 0.5 MILLIGRAM(S): 2 INJECTION INTRAMUSCULAR; INTRAVENOUS; SUBCUTANEOUS at 18:59

## 2018-07-25 RX ADMIN — Medication 400 MILLIGRAM(S): at 18:48

## 2018-07-25 RX ADMIN — Medication 975 MILLIGRAM(S): at 23:11

## 2018-07-25 RX ADMIN — SODIUM CHLORIDE 3 MILLILITER(S): 9 INJECTION INTRAMUSCULAR; INTRAVENOUS; SUBCUTANEOUS at 23:14

## 2018-07-25 RX ADMIN — SODIUM CHLORIDE 150 MILLILITER(S): 9 INJECTION, SOLUTION INTRAVENOUS at 23:21

## 2018-07-25 NOTE — ASU PREOP CHECKLIST - TYPE OF SOLUTION
[facility] tried to obtain t&s as per pts request, aware it may need to be redrawn if not sufficient quantity

## 2018-07-25 NOTE — PATIENT PROFILE ADULT. - NS PRO ABUSE SCREEN AFRAID ANYONE YN
Pt rec'd supine in bed with family at bedside, endorses nausea with vomiting earlier, has since subsided with medication. Pain well controlled, cooperative with PT.
no

## 2018-07-25 NOTE — PATIENT PROFILE ADULT. - PSH
H/O bilateral hip replacements    H/O bone marrow transplant  2011  H/O brain surgery  intercranial bleeding  H/O splenectomy    History of tonsillectomy

## 2018-07-25 NOTE — PROGRESS NOTE ADULT - SUBJECTIVE AND OBJECTIVE BOX
INTERVAL HPI/OVERNIGHT EVENTS:  Pt. seen and examined at bedside resting comfortably s/p revision of Left AMBER POD 0. Pain well controlled. No complaints.  Denies fever/chills, chest pain, dyspnea, cough, dizziness.     Vital Signs Last 24 Hrs  T(C): 36.6 (25 Jul 2018 20:35), Max: 36.8 (25 Jul 2018 17:57)  T(F): 97.8 (25 Jul 2018 20:35), Max: 98.2 (25 Jul 2018 17:57)  HR: 94 (25 Jul 2018 20:35) (70 - 98)  BP: 111/68 (25 Jul 2018 20:35) (103/53 - 126/63)  RR: 16 (25 Jul 2018 20:35) (13 - 20)  SpO2: 98% (25 Jul 2018 20:35) (98% - 100%)    PHYSICAL EXAM:    GENERAL: NAD, alert and awake.   CHEST/LUNG: Clear to ausculation, bilaterally   HEART: S1S2  ABDOMEN: soft, NT/ND  EXTREMITIES:  LEFT LE -- prineo dressing C/D/I. Sensation intact throughout extremity. Motor function intact, +flex/ext knee, +dorsiflex/plantar flex ankle, +EHL/FHL. Compartments soft, calf nontender b/l. 2+ DP/PT pulses.   RIGHT LE neurovascularly intact. IPCs in place b/l.     I&O's Detail    25 Jul 2018 07:01  -  25 Jul 2018 21:42  --------------------------------------------------------  IN:    lactated ringers.: 200 mL    Solution: 100 mL    Solution: 110 mL  Total IN: 410 mL    OUT:  Total OUT: 0 mL    Total NET: 410 mL    LABS:                        8.3    13.07 )-----------( 226      ( 25 Jul 2018 19:07 )             26.9     07-25    144  |  110<H>  |  10  ----------------------------<  177<H>  3.8   |  23  |  0.57    Ca    8.1<L>      25 Jul 2018 19:07      RADIOLOGY & ADDITIONAL STUDIES:  < from: Xray Pelvis AP only (07.25.18 @ 18:01) >  IMPRESSION:    Status post revision of left total hip replacement    < end of copied text >      Impression: 28yo Female s/p revision of LEFT AMBER POD 0 , postop stable.     Plan:  -pain management prn, bowel regimen  -postop abx  -DVT prophylaxis: Xarelto and IPCs  -PT/OT, 25% weight bearing to LLE, posterior hip precautions as instructed, abduction pillow, encourage incentive spirometer  -continue local wound care  -Continue medical management/supportive care  -f/u AM labs  -will discuss pt. with ortho attending

## 2018-07-25 NOTE — BRIEF OPERATIVE NOTE - PROCEDURE
<<-----Click on this checkbox to enter Procedure Revision of total hip arthroplasty using DePuy S-ROM uncemented proximal modular femoral prosthesis  07/25/2018  psoterior approach.  Active  [id number]

## 2018-07-25 NOTE — CONSULT NOTE ADULT - SUBJECTIVE AND OBJECTIVE BOX
27 year old female admitted for elective left THR. Denies HA, fevers, CP, SOB,     abdominal pain, leg edema.  Remaining ROS all WNL.     PMH:       PSH:       Allergies:     Social:       PHYSICAL EXAMINATION:  Vital Signs Last 24 Hrs  T(C): 36.6 (25 Jul 2018 12:42), Max: 36.6 (25 Jul 2018 12:42)  T(F): 97.8 (25 Jul 2018 12:42), Max: 97.8 (25 Jul 2018 12:42)  HR: 98 (25 Jul 2018 12:42) (98 - 98)  BP: 115/72 (25 Jul 2018 12:42) (115/72 - 115/72)  BP(mean): --  RR: 17 (25 Jul 2018 12:42) (17 - 17)  SpO2: 99% (25 Jul 2018 12:42) (99% - 99%)  CAPILLARY BLOOD GLUCOSE          GENERAL: NAD, well-groomed, well-developed  HEAD:  atraumatic, normocephalic  EYES: sclera anicteric  ENMT: mucous membranes moist  NECK: supple, No JVD  CHEST/LUNG: clear to auscultation bilaterally; no rales, rhonchi, or wheezing b/l  HEART: normal S1, S2  ABDOMEN: BS+, soft, ND, NT   EXTREMITIES:  pulses palpable; no clubbing, cyanosis, or edema b/l LEs  NEURO: awake, alert, interactive; moves all extremities  SKIN: no rashes or lesions        LABS:

## 2018-07-25 NOTE — PATIENT PROFILE ADULT. - PMH
Blindness, legal    Cerebral vascular accident    Sickle cell disease Abnormal LFTs (liver function tests)    Amenorrhea    Anemia    Blindness, legal    Cerebral vascular accident    Cough    Knee pain, bilateral    Migraine headache    Postnasal drip    Right foot pain  chronic  Sickle cell disease

## 2018-07-26 ENCOUNTER — TRANSCRIPTION ENCOUNTER (OUTPATIENT)
Age: 28
End: 2018-07-26

## 2018-07-26 LAB
ANION GAP SERPL CALC-SCNC: 14 MMOL/L — SIGNIFICANT CHANGE UP (ref 5–17)
BUN SERPL-MCNC: 13 MG/DL — SIGNIFICANT CHANGE UP (ref 7–23)
CALCIUM SERPL-MCNC: 8.8 MG/DL — SIGNIFICANT CHANGE UP (ref 8.5–10.1)
CHLORIDE SERPL-SCNC: 107 MMOL/L — SIGNIFICANT CHANGE UP (ref 96–108)
CO2 SERPL-SCNC: 21 MMOL/L — LOW (ref 22–31)
CREAT SERPL-MCNC: 0.69 MG/DL — SIGNIFICANT CHANGE UP (ref 0.5–1.3)
GLUCOSE SERPL-MCNC: 124 MG/DL — HIGH (ref 70–99)
GRAM STN FLD: SIGNIFICANT CHANGE UP
HCT VFR BLD CALC: 25.6 % — LOW (ref 34.5–45)
HGB BLD-MCNC: 8.1 G/DL — LOW (ref 11.5–15.5)
MCHC RBC-ENTMCNC: 28.1 PG — SIGNIFICANT CHANGE UP (ref 27–34)
MCHC RBC-ENTMCNC: 31.6 GM/DL — LOW (ref 32–36)
MCV RBC AUTO: 88.9 FL — SIGNIFICANT CHANGE UP (ref 80–100)
NRBC # BLD: 0 /100 WBCS — SIGNIFICANT CHANGE UP (ref 0–0)
PLATELET # BLD AUTO: 245 K/UL — SIGNIFICANT CHANGE UP (ref 150–400)
POTASSIUM SERPL-MCNC: 4.7 MMOL/L — SIGNIFICANT CHANGE UP (ref 3.5–5.3)
POTASSIUM SERPL-SCNC: 4.7 MMOL/L — SIGNIFICANT CHANGE UP (ref 3.5–5.3)
RBC # BLD: 2.88 M/UL — LOW (ref 3.8–5.2)
RBC # FLD: 13.5 % — SIGNIFICANT CHANGE UP (ref 10.3–14.5)
SODIUM SERPL-SCNC: 142 MMOL/L — SIGNIFICANT CHANGE UP (ref 135–145)
SPECIMEN SOURCE: SIGNIFICANT CHANGE UP
WBC # BLD: 7.89 K/UL — SIGNIFICANT CHANGE UP (ref 3.8–10.5)
WBC # FLD AUTO: 7.89 K/UL — SIGNIFICANT CHANGE UP (ref 3.8–10.5)

## 2018-07-26 PROCEDURE — 99222 1ST HOSP IP/OBS MODERATE 55: CPT

## 2018-07-26 RX ORDER — PANTOPRAZOLE SODIUM 20 MG/1
1 TABLET, DELAYED RELEASE ORAL
Qty: 30 | Refills: 0
Start: 2018-07-26 | End: 2018-08-24

## 2018-07-26 RX ORDER — RIVAROXABAN 15 MG-20MG
1 KIT ORAL
Qty: 33 | Refills: 0 | OUTPATIENT
Start: 2018-07-26 | End: 2018-08-27

## 2018-07-26 RX ORDER — CELECOXIB 200 MG/1
1 CAPSULE ORAL
Qty: 60 | Refills: 0
Start: 2018-07-26 | End: 2018-08-24

## 2018-07-26 RX ORDER — OMEGA-3 ACID ETHYL ESTERS 1 G
0 CAPSULE ORAL
Qty: 0 | Refills: 0 | COMMUNITY

## 2018-07-26 RX ORDER — DOCUSATE SODIUM 100 MG
1 CAPSULE ORAL
Qty: 0 | Refills: 0 | COMMUNITY
Start: 2018-07-26

## 2018-07-26 RX ORDER — OXYCODONE HYDROCHLORIDE 5 MG/1
1 TABLET ORAL
Qty: 42 | Refills: 0
Start: 2018-07-26 | End: 2018-08-01

## 2018-07-26 RX ORDER — DOCUSATE SODIUM 100 MG
1 CAPSULE ORAL
Qty: 0 | Refills: 0 | DISCHARGE
Start: 2018-07-26

## 2018-07-26 RX ADMIN — OXYCODONE HYDROCHLORIDE 5 MILLIGRAM(S): 5 TABLET ORAL at 07:40

## 2018-07-26 RX ADMIN — Medication 500 MILLIGRAM(S): at 17:45

## 2018-07-26 RX ADMIN — RIVAROXABAN 10 MILLIGRAM(S): KIT at 14:00

## 2018-07-26 RX ADMIN — SODIUM CHLORIDE 3 MILLILITER(S): 9 INJECTION INTRAMUSCULAR; INTRAVENOUS; SUBCUTANEOUS at 05:11

## 2018-07-26 RX ADMIN — Medication 975 MILLIGRAM(S): at 13:55

## 2018-07-26 RX ADMIN — CELECOXIB 200 MILLIGRAM(S): 200 CAPSULE ORAL at 06:00

## 2018-07-26 RX ADMIN — Medication 325 MILLIGRAM(S): at 17:45

## 2018-07-26 RX ADMIN — CELECOXIB 200 MILLIGRAM(S): 200 CAPSULE ORAL at 05:15

## 2018-07-26 RX ADMIN — OXYCODONE HYDROCHLORIDE 5 MILLIGRAM(S): 5 TABLET ORAL at 19:09

## 2018-07-26 RX ADMIN — Medication 1 MILLIGRAM(S): at 13:56

## 2018-07-26 RX ADMIN — OXYCODONE HYDROCHLORIDE 10 MILLIGRAM(S): 5 TABLET ORAL at 22:35

## 2018-07-26 RX ADMIN — Medication 1 TABLET(S): at 17:45

## 2018-07-26 RX ADMIN — OXYCODONE HYDROCHLORIDE 5 MILLIGRAM(S): 5 TABLET ORAL at 18:24

## 2018-07-26 RX ADMIN — OXYCODONE HYDROCHLORIDE 5 MILLIGRAM(S): 5 TABLET ORAL at 04:10

## 2018-07-26 RX ADMIN — PANTOPRAZOLE SODIUM 40 MILLIGRAM(S): 20 TABLET, DELAYED RELEASE ORAL at 13:56

## 2018-07-26 RX ADMIN — OXYCODONE HYDROCHLORIDE 5 MILLIGRAM(S): 5 TABLET ORAL at 13:56

## 2018-07-26 RX ADMIN — OXYCODONE HYDROCHLORIDE 10 MILLIGRAM(S): 5 TABLET ORAL at 23:30

## 2018-07-26 RX ADMIN — Medication 500 MILLIGRAM(S): at 05:15

## 2018-07-26 RX ADMIN — Medication 100 MILLIGRAM(S): at 07:06

## 2018-07-26 RX ADMIN — Medication 1 TABLET(S): at 13:56

## 2018-07-26 RX ADMIN — Medication 325 MILLIGRAM(S): at 13:56

## 2018-07-26 RX ADMIN — OXYCODONE HYDROCHLORIDE 5 MILLIGRAM(S): 5 TABLET ORAL at 00:10

## 2018-07-26 RX ADMIN — Medication 325 MILLIGRAM(S): at 07:39

## 2018-07-26 RX ADMIN — Medication 975 MILLIGRAM(S): at 05:15

## 2018-07-26 RX ADMIN — OXYCODONE HYDROCHLORIDE 5 MILLIGRAM(S): 5 TABLET ORAL at 03:13

## 2018-07-26 RX ADMIN — OXYCODONE HYDROCHLORIDE 5 MILLIGRAM(S): 5 TABLET ORAL at 07:05

## 2018-07-26 RX ADMIN — CELECOXIB 200 MILLIGRAM(S): 200 CAPSULE ORAL at 17:45

## 2018-07-26 RX ADMIN — Medication 102 MILLIGRAM(S): at 05:14

## 2018-07-26 RX ADMIN — Medication 100 MILLIGRAM(S): at 05:15

## 2018-07-26 RX ADMIN — Medication 100 MILLIGRAM(S): at 13:56

## 2018-07-26 RX ADMIN — Medication 100 MILLIGRAM(S): at 21:20

## 2018-07-26 RX ADMIN — OXYCODONE HYDROCHLORIDE 5 MILLIGRAM(S): 5 TABLET ORAL at 14:50

## 2018-07-26 RX ADMIN — Medication 975 MILLIGRAM(S): at 21:20

## 2018-07-26 RX ADMIN — POLYETHYLENE GLYCOL 3350 17 GRAM(S): 17 POWDER, FOR SOLUTION ORAL at 13:56

## 2018-07-26 NOTE — DISCHARGE NOTE ADULT - MEDICATION SUMMARY - MEDICATIONS TO TAKE
I will START or STAY ON the medications listed below when I get home from the hospital:    celecoxib 200 mg oral capsule  -- 1 cap(s) by mouth 2 times a day MDD:2 Tabs  -- Indication: For Pain control    oxyCODONE 10 mg oral tablet  -- 1 tab(s) by mouth every 4 hours, As needed, Moderate Pain MDD:6 Tabs  -- Indication: For Pain control    Aspirin Enteric Coated 325 mg oral delayed release tablet  -- 1 tab(s) by mouth every 12 hours to prevent blood clots  MDD:2 tablets  -- Swallow whole.  Do not crush.  Take with food or milk.    -- Indication: For to prevent blood clots     acetaminophen 325 mg oral tablet  -- 3 tab(s) by mouth every 8 hours as needed for pain   -- Indication: For Pain control    polyethylene glycol 3350 oral powder for reconstitution  -- 17 gram(s) by mouth once a day as needed for constipation   -- Indication: For to prevent constipation    docusate sodium 100 mg oral capsule  -- 1 cap(s) by mouth 3 times a day as needed for constipation  -- Indication: For to prevent constipation    pantoprazole 40 mg oral delayed release tablet  -- 1 tab(s) by mouth once a day MDD:1 Tab  -- Indication: For to protect the lining of the stomach    sulfamethoxazole-trimethoprim 800 mg-160 mg oral tablet  -- 1 tab(s) by mouth every 12 hours MDD:2 tablets  -- Indication: For to prevent infection    Multiple Vitamins oral tablet  -- 1 tab(s) by mouth once a day  -- Indication: For wound healing    Vitamin D3  -- orally once a day  -- Indication: For Resume home medication    Vitamin C  -- Indication: For Resume home medication

## 2018-07-26 NOTE — OCCUPATIONAL THERAPY INITIAL EVALUATION ADULT - RANGE OF MOTION EXAMINATION, LOWER EXTREMITY
LLE AROM hip flexion limited by more then 25%, LLE AROM distally to hip WFL/Right LE Active ROM was WFL   (within functional limits)/Right LE Passive ROM was WFL  (within functional limits)

## 2018-07-26 NOTE — DISCHARGE NOTE ADULT - CARE PLAN
Principal Discharge DX:	Left hip pain  Goal:	Improve Function, Decrease Pain  Assessment and plan of treatment:	Keep Prineo Dressing Clean, Dry and Intact. May shower with Prineo Dressing. Please do not scrub, soak, peel or pick at the prineo dressing. No creams, lotions, or oils over dressing. May shower and let water run over incision, no baths. Pat dry once out of shower. Dressing to be removed in office at follow up visit in 2 weeks.

## 2018-07-26 NOTE — PHYSICAL THERAPY INITIAL EVALUATION ADULT - CRITERIA FOR SKILLED THERAPEUTIC INTERVENTIONS
anticipated equipment needs at discharge/anticipated discharge recommendation/rehab potential/predicted duration of therapy intervention/impairments found/therapy frequency/risk reduction/prevention

## 2018-07-26 NOTE — PHYSICAL THERAPY INITIAL EVALUATION ADULT - GAIT DEVIATIONS NOTED, PT EVAL
increased time in double stance/decreased step length/decreased weight-shifting ability/decreased stride length/decreased justo

## 2018-07-26 NOTE — CONSULT NOTE ADULT - SUBJECTIVE AND OBJECTIVE BOX
27 year old female admitted for revision of left THR. Seen on 1-E. Denies HA, CP, SOB, abdominal pain, leg swelling or fever.     Remaining ROS WNL.       PMH:       PSH:       Social:       Allergies:         PHYSICAL EXAMINATION:  Vital Signs Last 24 Hrs  T(C): 36.5 (26 Jul 2018 12:00), Max: 36.8 (25 Jul 2018 17:57)  T(F): 97.7 (26 Jul 2018 12:00), Max: 98.2 (25 Jul 2018 17:57)  HR: 85 (26 Jul 2018 15:27) (70 - 130)  BP: 115/66 (26 Jul 2018 15:27) (99/51 - 126/63)  BP(mean): 73 (26 Jul 2018 09:15) (73 - 73)  RR: 16 (26 Jul 2018 15:27) (13 - 20)  SpO2: 98% (26 Jul 2018 15:27) (96% - 100%)  CAPILLARY BLOOD GLUCOSE          GENERAL: NAD, well-groomed, well-developed  HEAD:  atraumatic, normocephalic  EYES: sclera anicteric  ENMT: mucous membranes moist  NECK: supple, No JVD  CHEST/LUNG: clear to auscultation bilaterally; no rales, rhonchi, or wheezing b/l  HEART: normal S1, S2  ABDOMEN: BS+, soft, ND, NT   EXTREMITIES:  pulses palpable; no clubbing, cyanosis, or edema b/l LEs  NEURO: awake, alert, interactive; moves all extremities  SKIN: no rashes or lesions          LABS:                        8.1    7.89  )-----------( 245      ( 26 Jul 2018 06:17 )             25.6     07-26    142  |  107  |  13  ----------------------------<  124<H>  4.7   |  21<L>  |  0.69    Ca    8.8      26 Jul 2018 06:17 27 year old female admitted for revision of left THR. 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 Left hip was infected and treated   with ABX. Now admitted for new left THR. Seen on 1-E. Denies HA, CP, SOB, abdominal pain, leg swelling or fever.   Remaining ROS WNL.       PMH: Sickle Cell cured with bone marrow transplant.        PSH: Prior THR bilaterally.        Social: No smoking, ETOH or IVDA.       Allergies: NKDA        PHYSICAL EXAMINATION:  Vital Signs Last 24 Hrs  T(C): 36.5 (26 Jul 2018 12:00), Max: 36.8 (25 Jul 2018 17:57)  T(F): 97.7 (26 Jul 2018 12:00), Max: 98.2 (25 Jul 2018 17:57)  HR: 85 (26 Jul 2018 15:27) (70 - 130)  BP: 115/66 (26 Jul 2018 15:27) (99/51 - 126/63)  BP(mean): 73 (26 Jul 2018 09:15) (73 - 73)  RR: 16 (26 Jul 2018 15:27) (13 - 20)  SpO2: 98% (26 Jul 2018 15:27) (96% - 100%)  CAPILLARY BLOOD GLUCOSE          GENERAL: NAD, POD 1, comfortable on 1 E  HEAD:  atraumatic, normocephalic  EYES: sclera anicteric  ENMT: mucous membranes moist  NECK: supple, No JVD  CHEST/LUNG: clear to auscultation bilaterally; no rales, rhonchi, or wheezing b/l  HEART: normal S1, S2  ABDOMEN: BS+, soft, ND, NT   EXTREMITIES:  pulses palpable; no clubbing, cyanosis, or edema b/l LEs  NEURO: awake, alert, interactive; moves all extremities  SKIN: no rashes or lesions          LABS:                        8.1    7.89  )-----------( 245      ( 26 Jul 2018 06:17 )             25.6     07-26    142  |  107  |  13  ----------------------------<  124<H>  4.7   |  21<L>  |  0.69    Ca    8.8      26 Jul 2018 06:17

## 2018-07-26 NOTE — DISCHARGE NOTE ADULT - PROCEDURE 1
Revision of total hip arthroplasty using [employer] S-ROM uncemented proximal modular femoral prosthesis

## 2018-07-26 NOTE — OCCUPATIONAL THERAPY INITIAL EVALUATION ADULT - TRANSFER SAFETY CONCERNS NOTED: SIT/STAND, REHAB EVAL
decreased step length/decreased balance during turns/decreased weight-shifting ability/decreased sequencing ability/inability to maintain weight-bearing restrictions w/o assist

## 2018-07-26 NOTE — OCCUPATIONAL THERAPY INITIAL EVALUATION ADULT - GENERAL OBSERVATIONS, REHAB EVAL
Pt was encountered supine in bed with family present; NAD, IV, pneumatic pumps, on, L THR revision POD 1 posterior approach, LLE PWB 25%, Abductor wedge present, L hip dressing clean ,dry and intact, AXOX4, cooperative, followed commands. PT Vadim and PT student present.

## 2018-07-26 NOTE — OCCUPATIONAL THERAPY INITIAL EVALUATION ADULT - SOCIAL CONCERNS
As per patient, "My family will be around to help me with any of my daily tasks and needs when I get home"./None

## 2018-07-26 NOTE — OCCUPATIONAL THERAPY INITIAL EVALUATION ADULT - TRANSFER SAFETY CONCERNS NOTED: TOILET, REHAB EVAL
decreased step length/inability to maintain weight-bearing restrictions w/o assist/decreased sequencing ability/decreased weight-shifting ability/decreased balance during turns

## 2018-07-26 NOTE — DISCHARGE NOTE ADULT - MEDICATION SUMMARY - MEDICATIONS TO STOP TAKING
I will STOP taking the medications listed below when I get home from the hospital:    Fish Oil oral capsule

## 2018-07-26 NOTE — PHYSICAL THERAPY INITIAL EVALUATION ADULT - ADDITIONAL COMMENTS
Per pre-op, "Pt is currently on hip precautions, and requires assistance at times with ADLs. Pt went to rehab after surgery in April. Pt is R hand dominant, does not wear glasses, does not drive. There are 4 steps to enter w B handrail, 14 steps with B handrail to 2nd floor. There is a bedroom on the first floor, bathrooms on both floors that are the same (shower stall, no grab bars). Pt ambulates with a quad cane, also has a rolling walker and wheelchair." Her bedroom is on the second floor and she would like to be able to go back to her room as soon as possible.

## 2018-07-26 NOTE — CONSULT NOTE ADULT - ASSESSMENT
27 year old female admitted for revision of left THR.     Plan: 27 year old female admitted for new left THR.     Plan: Take no meds for Sickle cell currently after bone marrow transplant. HGB acceptable t 8.1.     Agree Xarelto 10 mg/day  for DVT prevention is reasonable.     Continue all routine supplements and pain meds. Safe for discharge per ortho.

## 2018-07-26 NOTE — PHYSICAL THERAPY INITIAL EVALUATION ADULT - PERTINENT HX OF CURRENT PROBLEM, REHAB EVAL
Previous left total hip arthroplasty. Infection and inflammatory reaction due to internal joint prosthesis. Left total hip arthroplasty resection with placement of prosthetic hip system and antibiotic beads in April. Revision of total hip arthroplasty using DePuy S-ROM (posterior approach).

## 2018-07-26 NOTE — DISCHARGE NOTE ADULT - PATIENT PORTAL LINK FT
You can access the [facility] Patient Portal, offered by [facility], by registering with the following website: [email or web address]

## 2018-07-26 NOTE — OCCUPATIONAL THERAPY INITIAL EVALUATION ADULT - ADDITIONAL COMMENTS
Patient admitted to [facility] on April 1st due to Pyogenic arthritis of left hip, due to unspecified organism. Patient had a Left total hip arthroplasty resection with placement of prosthetic hip system and antibiotic beads on April 12th. Patient lives in a private house with 4 steps to enter with bilateral handrails. Once inside, the patient is staying on the first floor which has a bedroom and bathroom. The bathroom has a shower stall with a regular toilet and no DME. The patient main bedroom and bathroom is on the second floor which has 14 steps with a handrail on both sides which only goes up jail. The bathroom on the second floor has a shower stall with a regular toilet and no DME inside. The patient does report owning a versa frame with handbars on the side. Patient ambulates with a quad cane and owns a rolling walker. The patient is R handed, does not wear glasses (has a vision impairment) and does not drive.

## 2018-07-26 NOTE — DISCHARGE NOTE ADULT - HOSPITAL COURSE
27yFemale with history of S/P Left AMBER Explant and I&D presenting for Left AMBER Revision by Dr. Burns on 7/25/18. Risk and benefits of surgery were explained to the patient. The patient understood and agreed to proceed with surgery. Patient underwent the procedure with no intraoperative complications. Pt was brought in stable condition to the PACU. Once stable in PACU, pt was brought to the floor. During hospital stay pt was followed by Medicine, Pt had an uneventful hospital course. Pt is stable for discharge to Home with Home Care Services and PT

## 2018-07-26 NOTE — PHYSICAL THERAPY INITIAL EVALUATION ADULT - PLANNED THERAPY INTERVENTIONS, PT EVAL
manual therapy techniques/strengthening/stretching/gait training/joint mobilization/ROM/balance training/bed mobility training/transfer training

## 2018-07-26 NOTE — DISCHARGE NOTE ADULT - CARE PROVIDER_API CALL
Kervin Burns), Orthopaedic Surgery  [address] [place] [address]  Phone: [phone]  Fax: [phone]

## 2018-07-26 NOTE — PROGRESS NOTE ADULT - SUBJECTIVE AND OBJECTIVE BOX
POD#1 s/p Left AMBER Revision  27yFemale Patient seen and examined with Dr. Burns, Pain controlled  Patient Denies SOB, CP, N/V/D       PE: Left Hip/LE: Dressing C/D/I, Sensation/motor intact, DP 2+, FROM ankle/toes   B/L LE: Skin intact. +ROM hip/knee/ankle/toes. Ankle Dorsi/plantarflexion: 5/5. Calf: soft, compressible and nontender. DP/PT 2+ NVI.                           8.1    7.89  )-----------( 245      ( 26 Jul 2018 06:17 )             25.6       07-26    142  |  107  |  13  ----------------------------<  124<H>  4.7   |  21<L>  |  0.69    Ca    8.8      26 Jul 2018 06:17          A: As above   P: Pain Control       DVT Prophylaxis      Incentive spirometry      Total hip precautions Reviewed       PT PWBAT 25% LLE      Isometric exercises      Discharge Planning      All the above discussed and understood by pt       Ortho to F/U

## 2018-07-26 NOTE — PHYSICAL THERAPY INITIAL EVALUATION ADULT - GENERAL OBSERVATIONS, REHAB EVAL
Patient encountered supine in bed, vital signs as charted. Attachments: SCDs, hip abduction pillow, IVF (disconnected for session), & Dermabond Prineo dressing (intact). AAOx4. Denies pain or shortness of breath at rest.

## 2018-07-26 NOTE — DISCHARGE NOTE ADULT - ADDITIONAL INSTRUCTIONS
Please call your MD, if you have new onset of fevers, increased drainage, increased pain or increased redness around the incision site. Please return to the Emergency Department if you have chest pain or shortness of breath. Follow up with Dr. Burns in 2 weeks. Please call [phone] for appointment.      Please call your MD, if you have new onset of fevers, increased drainage, increased pain or increased redness around the incision site. Please return to the Emergency Department if you have chest pain or shortness of breath. Follow up with Dr. Burns in 2 weeks. Please call [phone] for appointment.      Please follow up with your primary care provider to monitor your hemoglobin/hematocrit (blood levels) within 3 - 5 days.     Please call your MD, if you have new onset of fevers, increased drainage, increased pain or increased redness around the incision site. Please return to the Emergency Department if you have chest pain or shortness of breath.

## 2018-07-26 NOTE — DISCHARGE NOTE ADULT - NS AS ACTIVITY OBS
Stairs allowed/Showering allowed/Walking-Indoors allowed/Do not drive or operate machinery/Walking-Outdoors allowed/No Heavy lifting/straining

## 2018-07-27 VITALS
HEART RATE: 93 BPM | RESPIRATION RATE: 16 BRPM | TEMPERATURE: 97 F | OXYGEN SATURATION: 100 % | DIASTOLIC BLOOD PRESSURE: 59 MMHG | SYSTOLIC BLOOD PRESSURE: 116 MMHG

## 2018-07-27 LAB
ANION GAP SERPL CALC-SCNC: 7 MMOL/L — SIGNIFICANT CHANGE UP (ref 5–17)
BUN SERPL-MCNC: 21 MG/DL — SIGNIFICANT CHANGE UP (ref 7–23)
CALCIUM SERPL-MCNC: 8.3 MG/DL — LOW (ref 8.5–10.1)
CHLORIDE SERPL-SCNC: 107 MMOL/L — SIGNIFICANT CHANGE UP (ref 96–108)
CO2 SERPL-SCNC: 27 MMOL/L — SIGNIFICANT CHANGE UP (ref 22–31)
CREAT SERPL-MCNC: 0.51 MG/DL — SIGNIFICANT CHANGE UP (ref 0.5–1.3)
GLUCOSE SERPL-MCNC: 141 MG/DL — HIGH (ref 70–99)
HCT VFR BLD CALC: 22.6 % — LOW (ref 34.5–45)
HGB BLD-MCNC: 7.2 G/DL — LOW (ref 11.5–15.5)
MCHC RBC-ENTMCNC: 28.1 PG — SIGNIFICANT CHANGE UP (ref 27–34)
MCHC RBC-ENTMCNC: 31.9 GM/DL — LOW (ref 32–36)
MCV RBC AUTO: 88.3 FL — SIGNIFICANT CHANGE UP (ref 80–100)
NRBC # BLD: 0 /100 WBCS — SIGNIFICANT CHANGE UP (ref 0–0)
PLATELET # BLD AUTO: 233 K/UL — SIGNIFICANT CHANGE UP (ref 150–400)
POTASSIUM SERPL-MCNC: 3.9 MMOL/L — SIGNIFICANT CHANGE UP (ref 3.5–5.3)
POTASSIUM SERPL-SCNC: 3.9 MMOL/L — SIGNIFICANT CHANGE UP (ref 3.5–5.3)
RBC # BLD: 2.56 M/UL — LOW (ref 3.8–5.2)
RBC # FLD: 13.6 % — SIGNIFICANT CHANGE UP (ref 10.3–14.5)
SODIUM SERPL-SCNC: 141 MMOL/L — SIGNIFICANT CHANGE UP (ref 135–145)
WBC # BLD: 10.28 K/UL — SIGNIFICANT CHANGE UP (ref 3.8–10.5)
WBC # FLD AUTO: 10.28 K/UL — SIGNIFICANT CHANGE UP (ref 3.8–10.5)

## 2018-07-27 RX ORDER — POLYETHYLENE GLYCOL 3350 17 G/17G
17 POWDER, FOR SOLUTION ORAL
Qty: 0 | Refills: 0 | DISCHARGE
Start: 2018-07-27

## 2018-07-27 RX ORDER — ASPIRIN/CALCIUM CARB/MAGNESIUM 324 MG
1 TABLET ORAL
Qty: 60 | Refills: 0
Start: 2018-07-27 | End: 2018-08-25

## 2018-07-27 RX ORDER — ACETAMINOPHEN 500 MG
3 TABLET ORAL
Qty: 0 | Refills: 0 | DISCHARGE
Start: 2018-07-27

## 2018-07-27 RX ADMIN — Medication 325 MILLIGRAM(S): at 08:34

## 2018-07-27 RX ADMIN — Medication 325 MILLIGRAM(S): at 12:56

## 2018-07-27 RX ADMIN — Medication 975 MILLIGRAM(S): at 14:29

## 2018-07-27 RX ADMIN — Medication 100 MILLIGRAM(S): at 06:32

## 2018-07-27 RX ADMIN — CELECOXIB 200 MILLIGRAM(S): 200 CAPSULE ORAL at 17:24

## 2018-07-27 RX ADMIN — Medication 1 TABLET(S): at 12:56

## 2018-07-27 RX ADMIN — SODIUM CHLORIDE 3 MILLILITER(S): 9 INJECTION INTRAMUSCULAR; INTRAVENOUS; SUBCUTANEOUS at 06:32

## 2018-07-27 RX ADMIN — Medication 1 TABLET(S): at 17:24

## 2018-07-27 RX ADMIN — Medication 1 MILLIGRAM(S): at 12:56

## 2018-07-27 RX ADMIN — Medication 500 MILLIGRAM(S): at 06:38

## 2018-07-27 RX ADMIN — Medication 325 MILLIGRAM(S): at 17:23

## 2018-07-27 RX ADMIN — CELECOXIB 200 MILLIGRAM(S): 200 CAPSULE ORAL at 17:23

## 2018-07-27 RX ADMIN — Medication 975 MILLIGRAM(S): at 06:32

## 2018-07-27 RX ADMIN — RIVAROXABAN 10 MILLIGRAM(S): KIT at 14:29

## 2018-07-27 RX ADMIN — CELECOXIB 200 MILLIGRAM(S): 200 CAPSULE ORAL at 06:38

## 2018-07-27 RX ADMIN — Medication 500 MILLIGRAM(S): at 17:23

## 2018-07-27 RX ADMIN — Medication 1 TABLET(S): at 06:32

## 2018-07-27 RX ADMIN — PANTOPRAZOLE SODIUM 40 MILLIGRAM(S): 20 TABLET, DELAYED RELEASE ORAL at 12:56

## 2018-07-27 NOTE — PROGRESS NOTE ADULT - SUBJECTIVE AND OBJECTIVE BOX
27yFemale s/p L AMBER revision POD#2. Pt seen and examined in NAD. Pain controlled. Pt denies any new complaints. Pt denies CP/SOB/N/V/D/numbness/tingling/bowel or bladder dysfunction.     PE:   LLE: dressing c/d/i. +ROM ankle/toes. Calf: soft, compressible and nontender. DP/PT 2+ NVI.                           8.1    7.89  )-----------( 245      ( 26 Jul 2018 06:17 )             25.6       07-26    142  |  107  |  13  ----------------------------<  124<H>  4.7   |  21<L>  |  0.69    Ca    8.8      26 Jul 2018 06:17          A/P: 27yFemale s/p L AMBER revision POD#2  Pain controlled  Total hip precautions  PT: partial weight bearing 25%  DVT ppx: SCDs/Xarelto while inpatient - plan to d/c pt on ASA 325mg BID outpatient   Wound care, Isometric exercises, incentive spirometry   Discharge: planning for home   All the above discussed and understood by pt 27yFemale s/p L AMBER revision POD#2. Pt seen and examined in NAD. Pain controlled. Pt denies any new complaints. Pt denies CP/SOB/N/V/D/numbness/tingling/bowel or bladder dysfunction.     PE:   LLE: dressing c/d/i. +ROM ankle/toes. Calf: soft, compressible and nontender. DP/PT 2+ NVI.                           8.1    7.89  )-----------( 245      ( 26 Jul 2018 06:17 )             25.6       07-26    142  |  107  |  13  ----------------------------<  124<H>  4.7   |  21<L>  |  0.69    Ca    8.8      26 Jul 2018 06:17          A/P: 27yFemale s/p L AMBER revision POD#2  Pain controlled  Total hip precautions  As per Dr. Burns will continue bactrim ds x 6 months   PT: partial weight bearing 25%  DVT ppx: SCDs/Xarelto while inpatient - plan to d/c pt on ASA 325mg BID outpatient   Wound care, Isometric exercises, incentive spirometry   Discharge: planning for home   All the above discussed and understood by pt 27yFemale s/p L AMBER revision POD#2. Pt seen and examined in NAD. Pain controlled. Pt denies any new complaints. Pt denies CP/SOB/N/V/D/numbness/tingling/bowel or bladder dysfunction.     PE:   LLE: dressing c/d/i. +ROM ankle/toes. Calf: soft, compressible and nontender. DP/PT 2+ NVI.                           8.1    7.89  )-----------( 245      ( 26 Jul 2018 06:17 )             25.6       07-26    142  |  107  |  13  ----------------------------<  124<H>  4.7   |  21<L>  |  0.69    Ca    8.8      26 Jul 2018 06:17          A/P: 27yFemale s/p L AMBER revision POD#2  F/u Am labs   Pain controlled  Total hip precautions  As per Dr. Burns will continue bactrim ds x 6 months   PT: partial weight bearing 25%  DVT ppx: SCDs/Xarelto while inpatient - plan to d/c pt on ASA 325mg BID outpatient   Wound care, Isometric exercises, incentive spirometry   Discharge: planning for home   All the above discussed and understood by pt

## 2018-07-30 LAB
CULTURE RESULTS: NO GROWTH — SIGNIFICANT CHANGE UP
GRAM STN FLD: SIGNIFICANT CHANGE UP
SPECIMEN SOURCE: SIGNIFICANT CHANGE UP

## 2018-08-01 ENCOUNTER — OUTPATIENT (OUTPATIENT)
Dept: OUTPATIENT SERVICES | Facility: HOSPITAL | Age: 28
LOS: 1 days | End: 2018-08-01
Payer: MEDICAID

## 2018-08-01 DIAGNOSIS — Z90.89 ACQUIRED ABSENCE OF OTHER ORGANS: Chronic | ICD-10-CM

## 2018-08-01 DIAGNOSIS — Z90.81 ACQUIRED ABSENCE OF SPLEEN: ICD-10-CM

## 2018-08-01 DIAGNOSIS — H54.8 LEGAL BLINDNESS, AS DEFINED IN USA: ICD-10-CM

## 2018-08-01 DIAGNOSIS — Z96.643 PRESENCE OF ARTIFICIAL HIP JOINT, BILATERAL: ICD-10-CM

## 2018-08-01 DIAGNOSIS — E55.9 VITAMIN D DEFICIENCY, UNSPECIFIED: ICD-10-CM

## 2018-08-01 DIAGNOSIS — M16.12 UNILATERAL PRIMARY OSTEOARTHRITIS, LEFT HIP: ICD-10-CM

## 2018-08-01 DIAGNOSIS — Z98.890 OTHER SPECIFIED POSTPROCEDURAL STATES: Chronic | ICD-10-CM

## 2018-08-01 DIAGNOSIS — T84.52XD INFECTION AND INFLAMMATORY REACTION DUE TO INTERNAL LEFT HIP PROSTHESIS, SUBSEQUENT ENCOUNTER: ICD-10-CM

## 2018-08-01 DIAGNOSIS — Z94.81 BONE MARROW TRANSPLANT STATUS: Chronic | ICD-10-CM

## 2018-08-01 DIAGNOSIS — I38 ENDOCARDITIS, VALVE UNSPECIFIED: ICD-10-CM

## 2018-08-01 DIAGNOSIS — Z90.81 ACQUIRED ABSENCE OF SPLEEN: Chronic | ICD-10-CM

## 2018-08-01 DIAGNOSIS — Z86.73 PERSONAL HISTORY OF TRANSIENT ISCHEMIC ATTACK (TIA), AND CEREBRAL INFARCTION WITHOUT RESIDUAL DEFICITS: ICD-10-CM

## 2018-08-01 DIAGNOSIS — Z96.60 PRESENCE OF UNSPECIFIED ORTHOPEDIC JOINT IMPLANT: Chronic | ICD-10-CM
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
## 2018-08-01 PROCEDURE — G9001: CPT

## 2018-08-28 DIAGNOSIS — Z71.89 OTHER SPECIFIED COUNSELING: ICD-10-CM

## 2018-08-28 PROBLEM — M25.561 PAIN IN RIGHT KNEE: Chronic | Status: ACTIVE | Noted: 2018-07-25

## 2018-08-28 PROBLEM — D64.9 ANEMIA, UNSPECIFIED: Chronic | Status: ACTIVE | Noted: 2018-07-25

## 2018-09-10 ENCOUNTER — APPOINTMENT (OUTPATIENT)
Dept: INTERNAL MEDICINE | Facility: CLINIC | Age: 28
End: 2018-09-10
Payer: MEDICAID

## 2018-09-10 VITALS
HEART RATE: 68 BPM | DIASTOLIC BLOOD PRESSURE: 66 MMHG | HEIGHT: 63 IN | BODY MASS INDEX: 28.88 KG/M2 | OXYGEN SATURATION: 97 % | TEMPERATURE: 98.5 F | WEIGHT: 163 LBS | SYSTOLIC BLOOD PRESSURE: 120 MMHG

## 2018-09-10 PROCEDURE — 99213 OFFICE O/P EST LOW 20 MIN: CPT

## 2018-09-10 RX ORDER — FLUTICASONE PROPIONATE 50 UG/1
50 SPRAY, METERED NASAL
Qty: 1 | Refills: 3 | Status: DISCONTINUED | COMMUNITY
Start: 2018-03-09 | End: 2018-09-10

## 2018-09-10 RX ORDER — LORATADINE 10 MG/1
10 TABLET ORAL
Qty: 1 | Refills: 1 | Status: DISCONTINUED | COMMUNITY
Start: 2018-03-09 | End: 2018-09-10

## 2018-09-10 NOTE — PLAN
[FreeTextEntry1] : \par - Likely constipation that is causing her nausea and abdominal cramping\par Exam unremarkable. \par Has MOM at home, will try that first.  If no improvement add colace.  \par Hold iron x 1 week (CBC in July normal), then add back in slowly.\par Lots of water, fibrous foods.\par Will f/u with phone call in 1 week, will discuss additional treatment if not resolved.  Advised to call/RTO if any worsening of symptoms over the week.  ER if severe abdominal pain, fever/chills, vomiting.  \par

## 2018-09-10 NOTE — HISTORY OF PRESENT ILLNESS
[FreeTextEntry8] : \par More frequent bowel movements, particularly at night, has been getting up about 4x/night, small amount of formed stool.  Sometimes straining.  During the day, not many bowel movements, feels like she has to move her bowels when urinates, but nothing eliminated except urine.  Mild, dull ache in the middle of the abdomen x 5 days.  Nausea and worsening of abdominal pain with any food intake, feels like a cramping sensation, pain does not radiate.  No problem with liquid intake.  No heartburn.  No vomiting. No dysuria, no urinary urgency or frequency, no vaginal discharge.  No menstruation since bone marrow transplant 7 yr. ago.  Not sexually active.  .  \par Smooth Move tea once - didn't really do anything.  Took laxative when hospitalized s/p hip replacement last month.  Didn't need any after discharge.  Continue oxycodone, night time only, pain continues to improve.  Continues daily iron.  \par Just eating a few spoonfuls of meals, nausea resolves after a short while.  \par No fever/chills, sweats, fatigue, body aches.  \par

## 2018-09-10 NOTE — PHYSICAL EXAM
[No Acute Distress] : no acute distress [Well-Appearing] : well-appearing [Normal Oropharynx] : the oropharynx was normal [No JVD] : no jugular venous distention [Supple] : supple [No Lymphadenopathy] : no lymphadenopathy [No Respiratory Distress] : no respiratory distress  [Clear to Auscultation] : lungs were clear to auscultation bilaterally [No Accessory Muscle Use] : no accessory muscle use [Normal Rate] : normal rate  [Regular Rhythm] : with a regular rhythm [Normal S1, S2] : normal S1 and S2 [No Murmur] : no murmur heard [Soft] : abdomen soft [Non Tender] : non-tender [Non-distended] : non-distended [No Masses] : no abdominal mass palpated [No HSM] : no HSM [Normal Bowel Sounds] : normal bowel sounds

## 2018-09-10 NOTE — REVIEW OF SYSTEMS
[Fever] : no fever [Chills] : no chills [Fatigue] : no fatigue [Night Sweats] : no night sweats [Abdominal Pain] : abdominal pain [Nausea] : nausea [Constipation] : constipation [Diarrhea] : diarrhea [Vomiting] : no vomiting [Heartburn] : no heartburn [Melena] : no melena [Negative] : Heme/Lymph [FreeTextEntry7] : see HPI [FreeTextEntry9] : hip and knee pain

## 2018-10-01 ENCOUNTER — MED ADMIN CHARGE (OUTPATIENT)
Age: 28
End: 2018-10-01

## 2018-10-01 ENCOUNTER — APPOINTMENT (OUTPATIENT)
Dept: INTERNAL MEDICINE | Facility: CLINIC | Age: 28
End: 2018-10-01
Payer: MEDICAID

## 2018-10-01 PROCEDURE — G0008: CPT

## 2018-10-01 PROCEDURE — 90686 IIV4 VACC NO PRSV 0.5 ML IM: CPT

## 2018-10-08 ENCOUNTER — APPOINTMENT (OUTPATIENT)
Dept: INTERNAL MEDICINE | Facility: CLINIC | Age: 28
End: 2018-10-08
Payer: MEDICAID

## 2018-10-08 VITALS
BODY MASS INDEX: 29.06 KG/M2 | TEMPERATURE: 98 F | WEIGHT: 164 LBS | OXYGEN SATURATION: 97 % | HEART RATE: 88 BPM | HEIGHT: 63 IN | SYSTOLIC BLOOD PRESSURE: 130 MMHG | DIASTOLIC BLOOD PRESSURE: 74 MMHG | RESPIRATION RATE: 14 BRPM

## 2018-10-08 PROCEDURE — 99214 OFFICE O/P EST MOD 30 MIN: CPT

## 2018-10-08 RX ORDER — OXYCODONE 5 MG/1
5 TABLET ORAL
Refills: 0 | Status: DISCONTINUED | COMMUNITY
End: 2018-10-08

## 2018-10-08 NOTE — PHYSICAL EXAM
[No Acute Distress] : no acute distress [Well-Appearing] : well-appearing [Normal Sclera/Conjunctiva] : normal sclera/conjunctiva [PERRL] : pupils equal round and reactive to light [EOMI] : extraocular movements intact [Normal Outer Ear/Nose] : the outer ears and nose were normal in appearance [Normal Oropharynx] : the oropharynx was normal [Supple] : supple [No Lymphadenopathy] : no lymphadenopathy [Thyroid Normal, No Nodules] : the thyroid was normal and there were no nodules present [No Respiratory Distress] : no respiratory distress  [Clear to Auscultation] : lungs were clear to auscultation bilaterally [Normal Rate] : normal rate  [Regular Rhythm] : with a regular rhythm [Normal S1, S2] : normal S1 and S2 [No Murmur] : no murmur heard [Pedal Pulses Present] : the pedal pulses are present [No Edema] : there was no peripheral edema [Soft] : abdomen soft [Non Tender] : non-tender [No HSM] : no HSM [No CVA Tenderness] : no CVA  tenderness [No Spinal Tenderness] : no spinal tenderness [No Joint Swelling] : no joint swelling [No Rash] : no rash [No Focal Deficits] : no focal deficits [Normal Affect] : the affect was normal [Alert and Oriented x3] : oriented to person, place, and time [de-identified] : b/l knees: mild diffuse tenderness, no rash/swelling; FROM w/o pain, negative drawer tests [de-identified] : left hip wound: well healed scar, no tenderness or rash [de-identified] : slow gait

## 2018-10-08 NOTE — REVIEW OF SYSTEMS
[Negative] : Respiratory [Fever] : no fever [Chills] : no chills [Chest Pain] : no chest pain [Palpitations] : no palpitations [Leg Claudication] : no leg claudication [Lower Ext Edema] : no lower extremity edema [Wheezing] : no wheezing [Cough] : no cough [Dyspnea on Exertion] : no dyspnea on exertion [Dizziness] : no dizziness [FreeTextEntry9] : see HPI

## 2018-10-08 NOTE — ASSESSMENT
[FreeTextEntry1] : \par 28 yo F pmhx preDM, abnormal LFTs, sickle cell disease (hx splenectomy at 9 yo, BM transplant at 21 yo, b/l hip replacement 2010 2/2 AVN), s/p removal of infected left hip prosthesis 4/18 s/p IV abx x 6 weeks, s/p L-\par \par \par hx sickle cell anemia- Hx splenectomy at 9yo.  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  \par -hx BM transplant at 21 yo ([facility]) from her brother, not currently on immunosuppressives.  \par -followed by heme, Dr. Carballo ([facility])- seen 2018, labs done and no change made per pt.  Reports UTD on all vaccines for aplenia per [facility], asked to forward records.\par -followed by ortho, Dr. Burns- s/p L-hip replacement 7/18.  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  hx recent xrays by outside ortho seen for hip showing patellar deterioration and advised MRI- order placed per request.  Ortho f/u advised.\par -hx PT\par -advised Tylenol prn\par -asked to forward record or ortho eval and xray of knees\par \par hx intracranial hemorrhage s/p surgery 2007 with chronic HAs since.    Hx paresthesias s/p inpt stay 12/17 at [facility] with negative w/u for stroke reported and told 2/2 migraine HAs per pt; currently asx\par -followed by neurology\par -on Tylenol or advil prn for HAs\par -Asked to forward records.\par \par perDM- 7/18 5.7 (was 5.8 prior)\par -ADA diet advised\par \par HLD- 3/18 Tchol 238   HDL 42\par -low fat diet advised\par \par abnl LFT- hx similar since BM transplant per pt; asx\par -3/18 hep B/C serology negative\par -5/17 Alk 155\par -6/18 ALT 86 Alkph 184\par -US abd pending\par -to forward records\par \par chronic cough- likely 2/2 postnasal drip, resolved with med use\par -cont flonase and claritin\par -followed by A/I - seen 3/18 with 12/17 cxr reviewed, found nl.  Allergy testing done- found +environmental, advised flonase/claritin prn\par -hx negative cxr, last 12/17 while inpt at [facility] \par \par \par \par HCM\par -hx CPE 12/17 by prior PMD, yearly advised\par -hx flu shot 10/18\par -hx Tdap < 10 yrs, to forward records\par -hx splenectomy s/p vaccinations per heme once, reports is UTD--> plans to forward records\par -hx negative PAP 2017 by GYN, Dr. Corinne Dougherty per pt.  Hx amenorrhea since BM transplant at 21 yo per pt.\par - yearly derm skin screening advised.  Regular use of sun block for skin cancer prevention advised.\par \par \par Pt's cell: [phone]\par States mom (Lamar) can be called with medical care/labs discussed with her as needed if pt can't be reached, cell: [phone]\par

## 2018-10-08 NOTE — HISTORY OF PRESENT ILLNESS
[FreeTextEntry8] : Accompanied by aunt\par \par 28 yo F pmhx preDM, abnormal LFTs, sickle cell disease (hx splenectomy at 9 yo, BM transplant at 21 yo, b/l hip replacement 2010 2/2 AVN), s/p removal of infected left hip prosthesis 4/18 s/p IV abx x 6 weeks, s/p L-THR 7/18\par \par States had L- THR 7/18 w/o complications.  Is doing PT and improving.  Using cane prn, feeling stronger.  Using ES Tylenol prn.\par -followed by ortho, Dr. Burns, seen last week for hip f/u- told doing well.  States told chronic knee pain is not explained by hip issues and advised MRI- wants Rx for it today.  States had xrays at ortho visit- told showed deteriorating knee cap (no report available) and is being referred to outside ortho for knee from current ortho doctor.\par \par hx knee pain- notes hx b/l pain since 16 yo, getting worse since 4/18\par -constant but worse with prolonged sitting and occ when standing.  \par -hx giving out\par -occ swelling\par -denies paresthesias, focal weakness or falls\par -denies hx trauma\par -using Tylenol w/o help\par -seen by ortho for this 6/18- f/u pending, plans to f/u with outside ortho\par \par denies fever or chills. \par denies GI or  complaints.\par \par

## 2019-01-29 ENCOUNTER — LABORATORY RESULT (OUTPATIENT)
Age: 29
End: 2019-01-29

## 2019-01-29 ENCOUNTER — TRANSCRIPTION ENCOUNTER (OUTPATIENT)
Age: 29
End: 2019-01-29

## 2019-01-29 ENCOUNTER — APPOINTMENT (OUTPATIENT)
Dept: INTERNAL MEDICINE | Facility: CLINIC | Age: 29
End: 2019-01-29
Payer: MEDICAID

## 2019-01-29 VITALS
TEMPERATURE: 98.4 F | HEIGHT: 63 IN | OXYGEN SATURATION: 98 % | DIASTOLIC BLOOD PRESSURE: 86 MMHG | HEART RATE: 79 BPM | WEIGHT: 167 LBS | SYSTOLIC BLOOD PRESSURE: 126 MMHG | BODY MASS INDEX: 29.59 KG/M2

## 2019-01-29 DIAGNOSIS — F51.01 PRIMARY INSOMNIA: ICD-10-CM

## 2019-01-29 PROCEDURE — 90471 IMMUNIZATION ADMIN: CPT

## 2019-01-29 PROCEDURE — 90715 TDAP VACCINE 7 YRS/> IM: CPT

## 2019-01-29 PROCEDURE — 36415 COLL VENOUS BLD VENIPUNCTURE: CPT

## 2019-01-29 PROCEDURE — 99395 PREV VISIT EST AGE 18-39: CPT | Mod: 25

## 2019-01-29 RX ORDER — FOLIC ACID 1 MG/1
1 TABLET ORAL
Refills: 0 | Status: COMPLETED | COMMUNITY
End: 2019-01-29

## 2019-01-29 RX ORDER — MELATONIN 5 MG
5 CAPSULE ORAL
Refills: 0 | Status: COMPLETED | COMMUNITY
End: 2019-01-29

## 2019-01-29 RX ORDER — CHLORHEXIDINE GLUCONATE 4 %
325 (65 FE) LIQUID (ML) TOPICAL 3 TIMES DAILY
Qty: 90 | Refills: 5 | Status: COMPLETED | COMMUNITY
End: 2019-01-29

## 2019-01-29 NOTE — HISTORY OF PRESENT ILLNESS
[FreeTextEntry1] : annual physical [de-identified] : 28 yo F pmhx preDM, abnormal LFTs, sickle cell disease (hx splenectomy at 9 yo, BM transplant at 19 yo, b/l hip replacement 2010 2/2 AVN), s/p removal of infected left hip prosthesis 4/18 s/p IV abx x 6 weeks, s/p L-THR 7/18 comes in for annual physical

## 2019-01-29 NOTE — ASSESSMENT
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

## 2019-01-29 NOTE — REVIEW OF SYSTEMS
[Vision Problems] : vision problems [Joint Pain] : joint pain [Fever] : no fever [Chills] : no chills [Recent Change In Weight] : ~T no recent weight change [Hearing Loss] : no hearing loss [Hoarseness] : no hoarseness [Chest Pain] : no chest pain [Palpitations] : no palpitations [Shortness Of Breath] : no shortness of breath [Dyspnea on Exertion] : no dyspnea on exertion [Abdominal Pain] : no abdominal pain [Constipation] : no constipation [Diarrhea] : diarrhea [Dysuria] : no dysuria [Hematuria] : no hematuria [Back Pain] : no back pain [Skin Rash] : no skin rash [Headache] : no headache [Dizziness] : no dizziness [Easy Bleeding] : no easy bleeding [Easy Bruising] : no easy bruising

## 2019-01-29 NOTE — PHYSICAL EXAM
[No Acute Distress] : no acute distress [Well Nourished] : well nourished [Well Developed] : well developed [Well-Appearing] : well-appearing [Normal Sclera/Conjunctiva] : normal sclera/conjunctiva [PERRL] : pupils equal round and reactive to light [Normal Outer Ear/Nose] : the outer ears and nose were normal in appearance [Normal Oropharynx] : the oropharynx was normal [Supple] : supple [No Lymphadenopathy] : no lymphadenopathy [Thyroid Normal, No Nodules] : the thyroid was normal and there were no nodules present [No Respiratory Distress] : no respiratory distress  [Clear to Auscultation] : lungs were clear to auscultation bilaterally [No Accessory Muscle Use] : no accessory muscle use [Normal Rate] : normal rate  [Regular Rhythm] : with a regular rhythm [Normal S1, S2] : normal S1 and S2 [No Murmur] : no murmur heard [Soft] : abdomen soft [Non Tender] : non-tender [Non-distended] : non-distended [No Masses] : no abdominal mass palpated [No HSM] : no HSM [Normal Bowel Sounds] : normal bowel sounds [No CVA Tenderness] : no CVA  tenderness [No Spinal Tenderness] : no spinal tenderness [No Rash] : no rash

## 2019-01-29 NOTE — HEALTH RISK ASSESSMENT
[Very Good] : ~his/her~ current health as very good [With Family] : lives with family [Single] : single [] : No

## 2019-01-30 LAB
ALBUMIN SERPL ELPH-MCNC: 4.1 G/DL
ALP BLD-CCNC: 144 U/L
ALT SERPL-CCNC: 49 U/L
ANION GAP SERPL CALC-SCNC: 12 MMOL/L
AST SERPL-CCNC: 32 U/L
BASOPHILS # BLD AUTO: 0.07 K/UL
BASOPHILS NFR BLD AUTO: 1.1 %
BILIRUB SERPL-MCNC: 0.2 MG/DL
BUN SERPL-MCNC: 9 MG/DL
CALCIUM SERPL-MCNC: 10 MG/DL
CHLORIDE SERPL-SCNC: 101 MMOL/L
CO2 SERPL-SCNC: 25 MMOL/L
CREAT SERPL-MCNC: 0.48 MG/DL
EOSINOPHIL # BLD AUTO: 0.22 K/UL
EOSINOPHIL NFR BLD AUTO: 3.4 %
ERYTHROCYTE [SEDIMENTATION RATE] IN BLOOD BY WESTERGREN METHOD: 39 MM/HR
FERRITIN SERPL-MCNC: 1994 NG/ML
GLUCOSE SERPL-MCNC: 101 MG/DL
HCT VFR BLD CALC: 36.9 %
HGB BLD-MCNC: 11.8 G/DL
IMM GRANULOCYTES NFR BLD AUTO: 0.8 %
LYMPHOCYTES # BLD AUTO: 3.28 K/UL
LYMPHOCYTES NFR BLD AUTO: 51.3 %
MAN DIFF?: NORMAL
MCHC RBC-ENTMCNC: 28.1 PG
MCHC RBC-ENTMCNC: 32 GM/DL
MCV RBC AUTO: 87.9 FL
MONOCYTES # BLD AUTO: 1.34 K/UL
MONOCYTES NFR BLD AUTO: 21 %
NEUTROPHILS # BLD AUTO: 1.43 K/UL
NEUTROPHILS NFR BLD AUTO: 22.4 %
PLATELET # BLD AUTO: 389 K/UL
POTASSIUM SERPL-SCNC: 4.4 MMOL/L
PROT SERPL-MCNC: 6.7 G/DL
RBC # BLD: 4.2 M/UL
RBC # FLD: 14.2 %
SODIUM SERPL-SCNC: 138 MMOL/L
WBC # FLD AUTO: 6.39 K/UL

## 2019-03-20 ENCOUNTER — APPOINTMENT (OUTPATIENT)
Dept: RHEUMATOLOGY | Facility: CLINIC | Age: 29
End: 2019-03-20
Payer: MEDICAID

## 2019-03-20 VITALS
HEART RATE: 64 BPM | WEIGHT: 167 LBS | HEIGHT: 63 IN | BODY MASS INDEX: 29.59 KG/M2 | OXYGEN SATURATION: 98 % | DIASTOLIC BLOOD PRESSURE: 78 MMHG | TEMPERATURE: 98.7 F | SYSTOLIC BLOOD PRESSURE: 115 MMHG

## 2019-03-20 PROCEDURE — 99204 OFFICE O/P NEW MOD 45 MIN: CPT

## 2019-03-21 ENCOUNTER — RX RENEWAL (OUTPATIENT)
Age: 29
End: 2019-03-21

## 2019-03-21 LAB
CREAT SPEC-SCNC: 127 MG/DL
CREAT/PROT UR: 0.2 RATIO
CRP SERPL-MCNC: 0.53 MG/DL
ERYTHROCYTE [SEDIMENTATION RATE] IN BLOOD BY WESTERGREN METHOD: 13 MM/HR
FERRITIN SERPL-MCNC: 1716 NG/ML
IRON SATN MFR SERPL: 55 %
IRON SERPL-MCNC: 113 UG/DL
PROT UR-MCNC: 25 MG/DL
PROT UR-MCNC: 25 MG/DL
RHEUMATOID FACT SER QL: <10 IU/ML
TIBC SERPL-MCNC: 207 UG/DL
UIBC SERPL-MCNC: 94 UG/DL

## 2019-03-21 RX ORDER — ASCORBIC ACID, CHOLECALCIFEROL, .ALPHA.-TOCOPHEROL ACETATE, DL-, PYRIDOXINE HYDROCHLORIDE, FOLIC ACID, CYANOCOBALAMIN, BIOTIN, CALCIUM CARBONATE, FERROUS ASPARTO GLYCINATE, IRON, POTASSIUM IODIDE, MAGNESIUM OXIDE, DOCONEXENT AND LOWBUSH BLUEBERRY 60; 1000; 10; 26; 400; 13; 280; 80; 9; 9; 150; 25; 350; 25; 600 MG/1; [IU]/1; [IU]/1; MG/1; UG/1; UG/1; UG/1; MG/1; MG/1; MG/1; UG/1; MG/1; MG/1; MG/1; UG/1
18-0.6-0.4-35 CAPSULE, GELATIN COATED ORAL
Qty: 90 | Refills: 3 | Status: DISCONTINUED | COMMUNITY
Start: 2019-03-20 | End: 2019-03-21

## 2019-03-22 LAB
ANA SER IF-ACNC: NEGATIVE
C3 SERPL-MCNC: 168 MG/DL
C4 SERPL-MCNC: 48 MG/DL
CCP AB SER IA-ACNC: <8 UNITS
DSDNA AB SER-ACNC: <12 IU/ML
ENA RNP AB SER IA-ACNC: 0.2 AL
ENA SCL70 IGG SER IA-ACNC: <0.2 AL
ENA SM AB SER IA-ACNC: <0.2 AL
ENA SS-A AB SER IA-ACNC: <0.2 AL
ENA SS-B AB SER IA-ACNC: <0.2 AL
RF+CCP IGG SER-IMP: NEGATIVE

## 2019-03-25 LAB
ALBUMIN MFR SERPL ELPH: 63.2 %
ALBUMIN SERPL-MCNC: 4.4 G/DL
ALBUMIN/GLOB SERPL: 1.7 RATIO
ALPHA1 GLOB MFR SERPL ELPH: 3.8 %
ALPHA1 GLOB SERPL ELPH-MCNC: 0.3 G/DL
ALPHA2 GLOB MFR SERPL ELPH: 10.5 %
ALPHA2 GLOB SERPL ELPH-MCNC: 0.7 G/DL
B-GLOBULIN MFR SERPL ELPH: 9.5 %
B-GLOBULIN SERPL ELPH-MCNC: 0.7 G/DL
DEPRECATED KAPPA LC FREE/LAMBDA SER: 0.57 RATIO
GAMMA GLOB FLD ELPH-MCNC: 0.9 G/DL
GAMMA GLOB MFR SERPL ELPH: 13 %
IGA SER QL IEP: 61 MG/DL
IGG SER QL IEP: 1009 MG/DL
IGM SER QL IEP: 31 MG/DL
INTERPRETATION SERPL IEP-IMP: NORMAL
KAPPA LC CSF-MCNC: 2.09 MG/DL
KAPPA LC SERPL-MCNC: 1.19 MG/DL
M PROTEIN SPEC IFE-MCNC: NORMAL
PROT SERPL-MCNC: 7 G/DL
PROT SERPL-MCNC: 7 G/DL

## 2019-05-13 ENCOUNTER — APPOINTMENT (OUTPATIENT)
Dept: INTERNAL MEDICINE | Facility: CLINIC | Age: 29
End: 2019-05-13
Payer: MEDICAID

## 2019-05-13 VITALS
DIASTOLIC BLOOD PRESSURE: 70 MMHG | OXYGEN SATURATION: 98 % | HEART RATE: 72 BPM | WEIGHT: 170 LBS | TEMPERATURE: 98.3 F | SYSTOLIC BLOOD PRESSURE: 114 MMHG | HEIGHT: 63 IN | BODY MASS INDEX: 30.12 KG/M2

## 2019-05-13 PROCEDURE — 99213 OFFICE O/P EST LOW 20 MIN: CPT

## 2019-05-13 NOTE — PHYSICAL EXAM
[No Acute Distress] : no acute distress [Normal Sclera/Conjunctiva] : normal sclera/conjunctiva [PERRL] : pupils equal round and reactive to light [EOMI] : extraocular movements intact [No Respiratory Distress] : no respiratory distress  [Normal Rate] : normal rate  [Clear to Auscultation] : lungs were clear to auscultation bilaterally [Regular Rhythm] : with a regular rhythm [No Murmur] : no murmur heard [Normal S1, S2] : normal S1 and S2 [de-identified] : mildly swollen eyelids

## 2019-05-13 NOTE — REVIEW OF SYSTEMS
[Discharge] : discharge [Redness] : no redness [Sore Throat] : sore throat [Nasal Discharge] : nasal discharge [Negative] : Cardiovascular

## 2019-05-13 NOTE — HISTORY OF PRESENT ILLNESS
[FreeTextEntry8] : Here for acute visit.\par \par Eyes are very watery, throat is itching. Feels like is getting hives on her cheeks.\par Also says that sometimes gets some dizziness.\par Mom was concerned because she was having some mucus so was concerned about pink eye.\par No fevers.\par Mom did some natural garlic, apple cider, etc. which helped her sneezing. \par Tried some OTC eye drops and claritin.\par Symptoms started about 1 week ago\par \par Follows with Derm (Dr. Stanley Silva)

## 2019-05-13 NOTE — ASSESSMENT
[FreeTextEntry1] : 29 y/o F with h/o sickle cell disease s/p splenectomy and BMT, bilateral hip replacement due to AVN, chronically elevated CRP/ferritin (undergoing Rheum workup) here for acute visit for sore throat/clear eye discharge, c/w seasonal allergies.\par - Recommend allegra, flonase\par - Can continue otc ketotifen\par \par RTC if sx persist or worsen.

## 2019-08-17 NOTE — PHYSICAL THERAPY INITIAL EVALUATION ADULT - DISCHARGE DISPOSITION, PT EVAL
----- Message from Leticia Meadows DO sent at 8/17/2019  9:22 AM CDT -----  pls call pt and let her know that her vaginal pathogen test came back positive for BV- bacterial vaginosis. Will treat her with flagyl. This is not an STD infection, however, still pending her GC/C results and therefore would not engage in any sexual activity until completion of this tx and results of the GC/C. Do not drink alcohol while taking the medication or the following week after finishing the medication as can make you very sick. F/U with PCP if no improvement of vaginal sx after tx.   (called in flagyl rx- Dr. Meadows)  
----- Message from Leticia Meadows DO sent at 8/17/2019  9:22 AM CDT -----  pls call pt and let her know that her vaginal pathogen test came back positive for BV- bacterial vaginosis. Will treat her with flagyl. This is not an STD infection, however, still pending her GC/C results and therefore would not engage in any sexual activity until completion of this tx and results of the GC/C. Do not drink alcohol while taking the medication or the following week after finishing the medication as can make you very sick. F/U with PCP if no improvement of vaginal sx after tx.   (called in flagyl rx- Dr. Meadows)  
Pt notified of the results.  LONNIE  
home w/ home PT

## 2019-09-13 ENCOUNTER — APPOINTMENT (OUTPATIENT)
Dept: INTERNAL MEDICINE | Facility: CLINIC | Age: 29
End: 2019-09-13
Payer: MEDICAID

## 2019-09-13 VITALS
WEIGHT: 152 LBS | OXYGEN SATURATION: 97 % | DIASTOLIC BLOOD PRESSURE: 80 MMHG | HEART RATE: 90 BPM | BODY MASS INDEX: 26.93 KG/M2 | SYSTOLIC BLOOD PRESSURE: 122 MMHG

## 2019-09-13 PROCEDURE — G0009: CPT

## 2019-09-13 PROCEDURE — 99213 OFFICE O/P EST LOW 20 MIN: CPT | Mod: 25

## 2019-09-13 PROCEDURE — 90732 PPSV23 VACC 2 YRS+ SUBQ/IM: CPT

## 2019-09-13 RX ORDER — TRETINOIN 0.25 MG/G
0.03 CREAM TOPICAL
Qty: 45 | Refills: 0 | Status: DISCONTINUED | COMMUNITY
Start: 2019-04-30 | End: 2019-09-13

## 2019-09-13 RX ORDER — CLINDAMYCIN PHOSPHATE 1 G/10ML
1 GEL TOPICAL
Qty: 60 | Refills: 0 | Status: DISCONTINUED | COMMUNITY
Start: 2019-04-30 | End: 2019-09-13

## 2019-09-13 RX ORDER — VITAMIN A ACETATE, .BETA.-CAROTENE, ASCORBIC ACID, CHOLECALCIFEROL, .ALPHA.-TOCOPHEROL ACETATE, DL-, THIAMINE MONONITRATE, RIBOFLAVIN, NIACINAMIDE, PYRIDOXINE HYDROCHLORIDE, FOLIC ACID, CYANOCOBALAMIN, CALCIUM CARBONATE, FERROUS FUMARATE, ZINC OXIDE, AND CUPRIC OXIDE 2000; 2000; 120; 400; 22; 1.84; 3; 20; 10; 1; 12; 200; 27; 25; 2 [IU]/1; [IU]/1; MG/1; [IU]/1; MG/1; MG/1; MG/1; MG/1; MG/1; MG/1; UG/1; MG/1; MG/1; MG/1; MG/1
27-1 TABLET ORAL DAILY
Qty: 90 | Refills: 3 | Status: DISCONTINUED | COMMUNITY
Start: 2019-03-21 | End: 2019-09-13

## 2019-09-13 RX ORDER — FEXOFENADINE HCL 60 MG/1
60 TABLET, FILM COATED ORAL TWICE DAILY
Qty: 60 | Refills: 3 | Status: DISCONTINUED | COMMUNITY
Start: 2019-05-13 | End: 2019-09-13

## 2019-09-13 RX ORDER — BETAMETHASONE DIPROPIONATE 0.5 MG/G
0.05 OINTMENT, AUGMENTED TOPICAL
Qty: 60 | Refills: 0 | Status: DISCONTINUED | COMMUNITY
Start: 2019-01-11 | End: 2019-09-13

## 2019-09-13 NOTE — ASSESSMENT
[FreeTextEntry1] : \par Bilateral knee pain\par -holding off on surgery for know\par -referral for PT\par \par Asplenia\par -Pneumovax due, given today\par \par Letter provided for return to school\par Immunization record printed\par

## 2019-09-13 NOTE — REVIEW OF SYSTEMS
[Negative] : Integumentary [FreeTextEntry3] : impaired vision, needs ophthalmology follow up  [FreeTextEntry9] : bilateral knee pain, no hip pain, no back pain

## 2019-09-13 NOTE — PHYSICAL EXAM
[Pedal Pulses Present] : the pedal pulses are present [No Edema] : there was no peripheral edema [No Extremity Clubbing/Cyanosis] : no extremity clubbing/cyanosis [Normal] : normal gait, coordination grossly intact, no focal deficits and deep tendon reflexes were 2+ and symmetric [de-identified] : tender throughout left knee, decreased strength bilateral lower extremities

## 2019-09-13 NOTE — HISTORY OF PRESENT ILLNESS
[de-identified] : \par States needs 3 things:\par 1. Letter that it is OK to return to school (had to leave d/t infected hip prosthetic spring 2018).  \par 2. Immunization record for school and updated PPSV 23 (last done 2013, h/o splenectomy).\par 3. PT referral.\par Will be returning to [employer].  Will be living at home and commuting.  Planning to major in sociology.  \par Feeling overall well.  \par Bilateral knee pain.  Pain may radiate up or down the legs.  Legs also sometimes feel week.  No past knee surgery, but will likely need in the future.  Per patient ortho advised to hold off on knee surgery for now.  PT has helped her in the past with the pain.  Pain is bilateral, sometimes worse on the left.  \par

## 2019-10-15 ENCOUNTER — TRANSCRIPTION ENCOUNTER (OUTPATIENT)
Age: 29
End: 2019-10-15

## 2019-11-22 NOTE — PHYSICAL THERAPY INITIAL EVALUATION ADULT - BALANCE DISTURBANCE, IDENTIFIED IMPAIRMENT CONTRIBUTE, REHAB EVAL
Implemented All Universal Safety Interventions:  [place] to call system. Call bell, personal items and telephone within reach. Instruct patient to call for assistance. Room bathroom lighting operational. Non-slip footwear when patient is off stretcher. Physically safe environment: no spills, clutter or unnecessary equipment. Stretcher in lowest position, wheels locked, appropriate side rails in place.
impaired motor control/impaired postural control

## 2019-12-16 ENCOUNTER — APPOINTMENT (OUTPATIENT)
Dept: HEPATOLOGY | Facility: CLINIC | Age: 29
End: 2019-12-16
Payer: MEDICAID

## 2019-12-16 VITALS
TEMPERATURE: 98.2 F | BODY MASS INDEX: 30.12 KG/M2 | WEIGHT: 170 LBS | DIASTOLIC BLOOD PRESSURE: 85 MMHG | HEART RATE: 73 BPM | RESPIRATION RATE: 14 BRPM | HEIGHT: 63 IN | SYSTOLIC BLOOD PRESSURE: 134 MMHG

## 2019-12-16 PROCEDURE — 99204 OFFICE O/P NEW MOD 45 MIN: CPT

## 2019-12-16 NOTE — ASSESSMENT
[FreeTextEntry1] : Ms. MOONEY is a 29 year old woman with h/o sickle cell disease, cured with bone marrow transplant in 2010.\par \par - elevated liver enzymes\par - NAFLD seen on US 10/2018, at risk for HENDRIX\par - h/o iron overload with elevated ferritin and TIBC saturation, in past treated with deferasirox, which caused eye damage\par  \par - obesity, BMI 30 with 40 lbs weight gain since 2013. Was on steroid eye drops for uveitis until 2017.\par - prediabetes\par - infrequent menstruation - last 2 months ago, i.e. reduced iron loss\par \par - currently undergoing evaluation for hip infection\par \par Plan:\par - labs as below\par - fibroscan\par - return in 1 month. Anticipate that liver biopsy will be needed to determine iron overload and HENDRIX and whether phlebotomies are required

## 2019-12-16 NOTE — PHYSICAL EXAM
[General Appearance - Alert] : alert [Sclera] : the sclera and conjunctiva were normal [General Appearance - In No Acute Distress] : in no acute distress [Extraocular Movements] : extraocular movements were intact [PERRL With Normal Accommodation] : pupils were equal in size, round, and reactive to light [Outer Ear] : the ears and nose were normal in appearance [Oropharynx] : the oropharynx was normal [Neck Cervical Mass (___cm)] : no neck mass was observed [Neck Appearance] : the appearance of the neck was normal [Jugular Venous Distention Increased] : there was no jugular-venous distention [Thyroid Diffuse Enlargement] : the thyroid was not enlarged [Thyroid Nodule] : there were no palpable thyroid nodules [Auscultation Breath Sounds / Voice Sounds] : lungs were clear to auscultation bilaterally [Heart Rate And Rhythm] : heart rate was normal and rhythm regular [Heart Sounds] : normal S1 and S2 [Heart Sounds Gallop] : no gallops [Murmurs] : no murmurs [Heart Sounds Pericardial Friction Rub] : no pericardial rub [Full Pulse] : the pedal pulses are present [Edema] : there was no peripheral edema [Bowel Sounds] : normal bowel sounds [Abdomen Soft] : soft [Abdomen Tenderness] : non-tender [Abdomen Mass (___ Cm)] : no abdominal mass palpated [Cervical Lymph Nodes Enlarged Anterior Bilaterally] : anterior cervical [Cervical Lymph Nodes Enlarged Posterior Bilaterally] : posterior cervical [Supraclavicular Lymph Nodes Enlarged Bilaterally] : supraclavicular [Axillary Lymph Nodes Enlarged Bilaterally] : axillary [Femoral Lymph Nodes Enlarged Bilaterally] : femoral [No CVA Tenderness] : no ~M costovertebral angle tenderness [Inguinal Lymph Nodes Enlarged Bilaterally] : inguinal [No Spinal Tenderness] : no spinal tenderness [Abnormal Walk] : normal gait [Nail Clubbing] : no clubbing  or cyanosis of the fingernails [Motor Tone] : muscle strength and tone were normal [Musculoskeletal - Swelling] : no joint swelling seen [Skin Color & Pigmentation] : normal skin color and pigmentation [] : no rash [Skin Turgor] : normal skin turgor [Deep Tendon Reflexes (DTR)] : deep tendon reflexes were 2+ and symmetric [Sensation] : the sensory exam was normal to light touch and pinprick [No Focal Deficits] : no focal deficits [Oriented To Time, Place, And Person] : oriented to person, place, and time [Impaired Insight] : insight and judgment were intact [Affect] : the affect was normal [Abdominal  Ascites] : no ascites [Splenomegaly] : no splenomegaly [Scleral Icterus] : No Scleral Icterus [Liver Palpable ___ Finger Breadths Below Costal Margin] : was not palpable below costal margin [Asterixis] : no asterixis observed [Jaundice] : No jaundice [Depression] : no depression [Palmar Erythema] : no Palmar Erythema

## 2019-12-24 ENCOUNTER — APPOINTMENT (OUTPATIENT)
Dept: RADIOLOGY | Facility: CLINIC | Age: 29
End: 2019-12-24
Payer: MEDICAID

## 2019-12-24 ENCOUNTER — OUTPATIENT (OUTPATIENT)
Dept: OUTPATIENT SERVICES | Facility: HOSPITAL | Age: 29
LOS: 1 days | End: 2019-12-24
Payer: MEDICAID

## 2019-12-24 DIAGNOSIS — Z94.81 BONE MARROW TRANSPLANT STATUS: Chronic | ICD-10-CM

## 2019-12-24 DIAGNOSIS — Z90.89 ACQUIRED ABSENCE OF OTHER ORGANS: Chronic | ICD-10-CM

## 2019-12-24 DIAGNOSIS — Z00.8 ENCOUNTER FOR OTHER GENERAL EXAMINATION: ICD-10-CM

## 2019-12-24 DIAGNOSIS — Z90.81 ACQUIRED ABSENCE OF SPLEEN: Chronic | ICD-10-CM

## 2019-12-24 DIAGNOSIS — Z96.60 PRESENCE OF UNSPECIFIED ORTHOPEDIC JOINT IMPLANT: Chronic | ICD-10-CM

## 2019-12-24 DIAGNOSIS — Z98.890 OTHER SPECIFIED POSTPROCEDURAL STATES: Chronic | ICD-10-CM

## 2019-12-24 PROCEDURE — 77002 NEEDLE LOCALIZATION BY XRAY: CPT

## 2019-12-24 PROCEDURE — 20610 DRAIN/INJ JOINT/BURSA W/O US: CPT | Mod: LT

## 2019-12-24 PROCEDURE — 20610 DRAIN/INJ JOINT/BURSA W/O US: CPT

## 2019-12-24 PROCEDURE — 77002 NEEDLE LOCALIZATION BY XRAY: CPT | Mod: 26

## 2019-12-26 LAB
A1AT PHENOTYP SERPL-IMP: NORMAL BANDS
A1AT SERPL-MCNC: 117 MG/DL
ALBUMIN SERPL ELPH-MCNC: 4.5 G/DL
ALP BLD-CCNC: 111 U/L
ALT SERPL-CCNC: 92 U/L
ANA SER IF-ACNC: NEGATIVE
ANION GAP SERPL CALC-SCNC: 10 MMOL/L
AST SERPL-CCNC: 46 U/L
BASOPHILS # BLD AUTO: 0.08 K/UL
BASOPHILS NFR BLD AUTO: 0.9 %
BILIRUB SERPL-MCNC: 0.4 MG/DL
BUN SERPL-MCNC: 11 MG/DL
CALCIUM SERPL-MCNC: 9.7 MG/DL
CERULOPLASMIN SERPL-MCNC: 27 MG/DL
CHLORIDE SERPL-SCNC: 102 MMOL/L
CHOLEST SERPL-MCNC: 246 MG/DL
CHOLEST/HDLC SERPL: 6.8 RATIO
CO2 SERPL-SCNC: 26 MMOL/L
CREAT SERPL-MCNC: 0.6 MG/DL
DEPRECATED KAPPA LC FREE/LAMBDA SER: 0.61 RATIO
EOSINOPHIL # BLD AUTO: 0.15 K/UL
EOSINOPHIL NFR BLD AUTO: 1.7 %
ESTIMATED AVERAGE GLUCOSE: 137 MG/DL
FERRITIN SERPL-MCNC: 2470 NG/ML
GGT SERPL-CCNC: 49 U/L
GLUCOSE SERPL-MCNC: 122 MG/DL
HBA1C MFR BLD HPLC: 6.4 %
HBV CORE IGG+IGM SER QL: NONREACTIVE
HBV SURFACE AB SER QL: NONREACTIVE
HBV SURFACE AG SER QL: NONREACTIVE
HCT VFR BLD CALC: 36.5 %
HCV AB SER QL: NONREACTIVE
HCV S/CO RATIO: 0.08 S/CO
HDLC SERPL-MCNC: 36 MG/DL
HEPATITIS A IGG ANTIBODY: REACTIVE
HGB BLD-MCNC: 11.6 G/DL
IGA SER QL IEP: 85 MG/DL
IGG SER QL IEP: 874 MG/DL
IGM SER QL IEP: 29 MG/DL
IMM GRANULOCYTES NFR BLD AUTO: 0.2 %
INR PPP: 1.04 RATIO
IRON SATN MFR SERPL: 58 %
IRON SERPL-MCNC: 126 UG/DL
KAPPA LC CSF-MCNC: 1.73 MG/DL
KAPPA LC SERPL-MCNC: 1.05 MG/DL
LDLC SERPL CALC-MCNC: 151 MG/DL
LYMPHOCYTES # BLD AUTO: 3.81 K/UL
LYMPHOCYTES NFR BLD AUTO: 43.6 %
MAN DIFF?: NORMAL
MCHC RBC-ENTMCNC: 27.7 PG
MCHC RBC-ENTMCNC: 31.8 GM/DL
MCV RBC AUTO: 87.1 FL
MITOCHONDRIA AB SER IF-ACNC: NORMAL
MONOCYTES # BLD AUTO: 0.74 K/UL
MONOCYTES NFR BLD AUTO: 8.5 %
NEUTROPHILS # BLD AUTO: 3.93 K/UL
NEUTROPHILS NFR BLD AUTO: 45.1 %
PLATELET # BLD AUTO: 320 K/UL
POTASSIUM SERPL-SCNC: 4.5 MMOL/L
PROT SERPL-MCNC: 6.6 G/DL
PT BLD: 11.9 SEC
RBC # BLD: 4.19 M/UL
RBC # FLD: 14.5 %
SMOOTH MUSCLE AB SER QL IF: NORMAL
SODIUM SERPL-SCNC: 138 MMOL/L
TIBC SERPL-MCNC: 218 UG/DL
TRIGL SERPL-MCNC: 297 MG/DL
UIBC SERPL-MCNC: 92 UG/DL
WBC # FLD AUTO: 8.73 K/UL

## 2020-01-17 ENCOUNTER — APPOINTMENT (OUTPATIENT)
Dept: INTERNAL MEDICINE | Facility: CLINIC | Age: 30
End: 2020-01-17
Payer: MEDICAID

## 2020-01-17 VITALS
WEIGHT: 170 LBS | BODY MASS INDEX: 30.12 KG/M2 | HEIGHT: 63 IN | DIASTOLIC BLOOD PRESSURE: 77 MMHG | HEART RATE: 75 BPM | TEMPERATURE: 98 F | OXYGEN SATURATION: 97 % | SYSTOLIC BLOOD PRESSURE: 115 MMHG

## 2020-01-17 DIAGNOSIS — J01.90 ACUTE SINUSITIS, UNSPECIFIED: ICD-10-CM

## 2020-01-17 LAB
FLUAV SPEC QL CULT: NEGATIVE
FLUBV AG SPEC QL IA: NEGATIVE

## 2020-01-17 PROCEDURE — 99214 OFFICE O/P EST MOD 30 MIN: CPT | Mod: 25

## 2020-01-17 PROCEDURE — 87804 INFLUENZA ASSAY W/OPTIC: CPT | Mod: QW

## 2020-01-17 NOTE — ASSESSMENT
[FreeTextEntry1] : Acute sinusitis \par -get poc rapid flu neg \par -increase fluids intake \par - start flonase 1 spray each nostril twice daily x 1week , augmentin 500 po bid with food \par -tessalon cough drops 100 po tid prn \par -xyzal 5 mg at bed time - if not covered take zyrtec 10 qhs \par - rtc if no improvement or go to ER\par

## 2020-01-17 NOTE — HISTORY OF PRESENT ILLNESS
[Cold Symptoms] : cold symptoms [Moderate] : moderate [___ Weeks ago] :  [unfilled] weeks ago [Paroxysmal] : paroxysmal [Cough] : cough [Congestion] : congestion [Sore Throat] : sore throat [Headache] : headache [Wheezing] : no wheezing [Chills] : no chills [Anorexia] : no anorexia [Shortness Of Breath] : no shortness of breath [Earache] : no earache [Fatigue] : not fatigue [FreeTextEntry8] : cold symptoms since 2 weeks - got worse now 2 days sinus pressure anf frontal headace - nose stuffy with yellow green phlem , mild sore throat \par no N / V or diarrhea, no fever \par cousin sick no travel [Fever] : no fever

## 2020-02-10 ENCOUNTER — APPOINTMENT (OUTPATIENT)
Dept: HEPATOLOGY | Facility: CLINIC | Age: 30
End: 2020-02-10
Payer: MEDICAID

## 2020-02-10 VITALS
HEART RATE: 70 BPM | TEMPERATURE: 98.3 F | BODY MASS INDEX: 30.3 KG/M2 | HEIGHT: 63 IN | WEIGHT: 171 LBS | RESPIRATION RATE: 16 BRPM | SYSTOLIC BLOOD PRESSURE: 141 MMHG | DIASTOLIC BLOOD PRESSURE: 84 MMHG

## 2020-02-10 VITALS
TEMPERATURE: 98 F | WEIGHT: 169 LBS | HEART RATE: 73 BPM | HEIGHT: 63 IN | BODY MASS INDEX: 29.95 KG/M2 | SYSTOLIC BLOOD PRESSURE: 130 MMHG | RESPIRATION RATE: 16 BRPM | DIASTOLIC BLOOD PRESSURE: 89 MMHG

## 2020-02-10 PROCEDURE — ZZZZZ: CPT

## 2020-02-10 PROCEDURE — 99214 OFFICE O/P EST MOD 30 MIN: CPT | Mod: 25

## 2020-02-10 PROCEDURE — 91200 LIVER ELASTOGRAPHY: CPT

## 2020-02-10 RX ORDER — AMOXICILLIN AND CLAVULANATE POTASSIUM 500; 125 MG/1; MG/1
500-125 TABLET, FILM COATED ORAL
Qty: 14 | Refills: 0 | Status: DISCONTINUED | COMMUNITY
Start: 2020-01-17 | End: 2020-02-10

## 2020-02-10 NOTE — HISTORY OF PRESENT ILLNESS
[de-identified] : - 2/10/20: returns after labs and fibroscan today, which showed no fibrosis, but stage 3 steatosis - interpreted and discussed. Doing well overall. \par - initial visit: Ms. 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 bleed s/p surgery, resulting decreased vision, h/o sickle cell crisis with coma, osteoporosis, hip replacement.\par \par Weight hx: 170 lbs, BMI 30. Gained 40 lbs between 2013 and 2019, Was on steroids for eyes off and on until 2017.\par \par Workup:\par - 2/10/20 fibroscan: 308 dB/m - stage 3 steatosis, 4.6 kPa - stage 0 fibrosis\par - 10/22/18 US (scanned): mild-moderate hepatomegaly with steatosis, cholelithiasis in a contracted GB.

## 2020-02-10 NOTE — ASSESSMENT
[FreeTextEntry1] : Ms. MOONEY is a 29 year old woman with h/o sickle cell disease, cured with bone marrow transplant in 2010.\par \par - elevated liver enzymes\par - NAFLD seen on US 10/2018 and fibroscan 2/2010 - stage 3 of 3 steatosis\par - no liver fibrosis: 2/10/20 fibroscan: 4.6 kPa - stage 0 fibrosis\par - h/o iron overload with elevated ferritin and TIBC saturation, in past treated with deferasirox, which caused eye damage\par  \par - obesity, BMI 30 with 40 lbs weight gain since 2013. Was on steroid eye drops for uveitis until 2017.\par - prediabetes\par - infrequent menstruation - last 2 months ago, i.e. reduced iron loss\par \par - currently undergoing evaluation for hip infection\par \par I discussed that the reason for her elevated liver enzymes may be fatty liver disease or iron overload and that I recommend a liver biopsy to direct therapy. I explained that she would have to lose weight in case of fatty liver disase, and that iron overload would necessitate therapeutic phlebotomies. I explained the procedure, possible complications including bleeding.\par \par Plan:\par - labs as below\par - liver biopsy \par - return in 1 month

## 2020-02-11 LAB
ALBUMIN SERPL ELPH-MCNC: 4.7 G/DL
ALP BLD-CCNC: 97 U/L
ALT SERPL-CCNC: 67 U/L
ANION GAP SERPL CALC-SCNC: 12 MMOL/L
AST SERPL-CCNC: 30 U/L
BASOPHILS # BLD AUTO: 0.06 K/UL
BASOPHILS NFR BLD AUTO: 0.9 %
BILIRUB SERPL-MCNC: 0.5 MG/DL
BUN SERPL-MCNC: 10 MG/DL
CALCIUM SERPL-MCNC: 10.4 MG/DL
CHLORIDE SERPL-SCNC: 104 MMOL/L
CO2 SERPL-SCNC: 26 MMOL/L
CREAT SERPL-MCNC: 0.59 MG/DL
EOSINOPHIL # BLD AUTO: 0.12 K/UL
EOSINOPHIL NFR BLD AUTO: 1.7 %
FERRITIN SERPL-MCNC: 2297 NG/ML
GLUCOSE SERPL-MCNC: 119 MG/DL
HCT VFR BLD CALC: 40.8 %
HGB BLD-MCNC: 12.7 G/DL
IMM GRANULOCYTES NFR BLD AUTO: 0.3 %
IRON SATN MFR SERPL: 63 %
IRON SERPL-MCNC: 145 UG/DL
LYMPHOCYTES # BLD AUTO: 3.53 K/UL
LYMPHOCYTES NFR BLD AUTO: 50.1 %
MAN DIFF?: NORMAL
MCHC RBC-ENTMCNC: 27.1 PG
MCHC RBC-ENTMCNC: 31.1 GM/DL
MCV RBC AUTO: 87.2 FL
MONOCYTES # BLD AUTO: 0.59 K/UL
MONOCYTES NFR BLD AUTO: 8.4 %
NEUTROPHILS # BLD AUTO: 2.73 K/UL
NEUTROPHILS NFR BLD AUTO: 38.6 %
PLATELET # BLD AUTO: 289 K/UL
POTASSIUM SERPL-SCNC: 5.5 MMOL/L
PROT SERPL-MCNC: 7 G/DL
RBC # BLD: 4.68 M/UL
RBC # FLD: 13.9 %
SODIUM SERPL-SCNC: 142 MMOL/L
TIBC SERPL-MCNC: 229 UG/DL
UIBC SERPL-MCNC: 84 UG/DL
WBC # FLD AUTO: 7.05 K/UL

## 2020-02-18 LAB
BASOPHILS # BLD AUTO: 0.06 K/UL
BASOPHILS NFR BLD AUTO: 0.9 %
EOSINOPHIL # BLD AUTO: 0.1 K/UL
EOSINOPHIL NFR BLD AUTO: 1.5 %
HCT VFR BLD CALC: 38.5 %
HGB BLD-MCNC: 12.3 G/DL
IMM GRANULOCYTES NFR BLD AUTO: 0.1 %
INR PPP: 1.01 RATIO
LYMPHOCYTES # BLD AUTO: 3.44 K/UL
LYMPHOCYTES NFR BLD AUTO: 51.5 %
MAN DIFF?: NORMAL
MCHC RBC-ENTMCNC: 27.6 PG
MCHC RBC-ENTMCNC: 31.9 GM/DL
MCV RBC AUTO: 86.5 FL
MONOCYTES # BLD AUTO: 0.65 K/UL
MONOCYTES NFR BLD AUTO: 9.7 %
NEUTROPHILS # BLD AUTO: 2.42 K/UL
NEUTROPHILS NFR BLD AUTO: 36.3 %
PLATELET # BLD AUTO: 280 K/UL
PT BLD: 11.6 SEC
RBC # BLD: 4.45 M/UL
RBC # FLD: 13.6 %
WBC # FLD AUTO: 6.68 K/UL

## 2020-02-19 ENCOUNTER — FORM ENCOUNTER (OUTPATIENT)
Age: 30
End: 2020-02-19

## 2020-02-20 ENCOUNTER — RESULT REVIEW (OUTPATIENT)
Age: 30
End: 2020-02-20

## 2020-02-20 ENCOUNTER — OUTPATIENT (OUTPATIENT)
Dept: OUTPATIENT SERVICES | Facility: HOSPITAL | Age: 30
LOS: 1 days | End: 2020-02-20
Payer: MEDICAID

## 2020-02-20 ENCOUNTER — APPOINTMENT (OUTPATIENT)
Dept: ULTRASOUND IMAGING | Facility: IMAGING CENTER | Age: 30
End: 2020-02-20
Payer: MEDICAID

## 2020-02-20 DIAGNOSIS — Z90.81 ACQUIRED ABSENCE OF SPLEEN: Chronic | ICD-10-CM

## 2020-02-20 DIAGNOSIS — Z96.60 PRESENCE OF UNSPECIFIED ORTHOPEDIC JOINT IMPLANT: Chronic | ICD-10-CM

## 2020-02-20 DIAGNOSIS — E83.19 OTHER DISORDERS OF IRON METABOLISM: ICD-10-CM

## 2020-02-20 DIAGNOSIS — Z94.81 BONE MARROW TRANSPLANT STATUS: Chronic | ICD-10-CM

## 2020-02-20 DIAGNOSIS — K76.0 FATTY (CHANGE OF) LIVER, NOT ELSEWHERE CLASSIFIED: ICD-10-CM

## 2020-02-20 DIAGNOSIS — Z90.89 ACQUIRED ABSENCE OF OTHER ORGANS: Chronic | ICD-10-CM

## 2020-02-20 DIAGNOSIS — R94.5 ABNORMAL RESULTS OF LIVER FUNCTION STUDIES: ICD-10-CM

## 2020-02-20 DIAGNOSIS — Z98.890 OTHER SPECIFIED POSTPROCEDURAL STATES: Chronic | ICD-10-CM

## 2020-02-20 PROCEDURE — 76942 ECHO GUIDE FOR BIOPSY: CPT | Mod: 26

## 2020-02-20 PROCEDURE — 88342 IMHCHEM/IMCYTCHM 1ST ANTB: CPT

## 2020-02-20 PROCEDURE — 88342 IMHCHEM/IMCYTCHM 1ST ANTB: CPT | Mod: 26

## 2020-02-20 PROCEDURE — 88313 SPECIAL STAINS GROUP 2: CPT

## 2020-02-20 PROCEDURE — 47000 NEEDLE BIOPSY OF LIVER PERQ: CPT

## 2020-02-20 PROCEDURE — 88307 TISSUE EXAM BY PATHOLOGIST: CPT

## 2020-02-20 PROCEDURE — 88307 TISSUE EXAM BY PATHOLOGIST: CPT | Mod: 26

## 2020-02-20 PROCEDURE — 88313 SPECIAL STAINS GROUP 2: CPT | Mod: 26

## 2020-02-20 PROCEDURE — 76942 ECHO GUIDE FOR BIOPSY: CPT

## 2020-02-26 LAB — SURGICAL PATHOLOGY STUDY: SIGNIFICANT CHANGE UP

## 2020-02-27 NOTE — PROGRESS NOTE ADULT - ASSESSMENT
[FreeTextEntry1] : 54 year old female with livedo rash and pANCA + 1:160, negative MPO and PR3\par No additional manifestations to suggest an autoimmune condition or vascluitis. \par Will check serologies today, for connective tissue diseases including APS, which can also cause livedo reticularis. Her biopsy appears to have been done in the erythematous part of the livedo, which should have been done in the white part ? Will d/w dermatology and refer to derm.\par Discussed at length the potential diagnosis and that at this time treatment may not be indicated at all given that she only has cutaneous manifestations without organ involvement. L AMBER revision POD#2

## 2020-03-11 LAB
FERRITIN SERPL-MCNC: 1876 NG/ML
IRON SATN MFR SERPL: 60 %
IRON SERPL-MCNC: 136 UG/DL
TIBC SERPL-MCNC: 225 UG/DL
UIBC SERPL-MCNC: 89 UG/DL

## 2020-03-16 LAB — TM INTERPRETATION: NORMAL

## 2020-03-17 ENCOUNTER — APPOINTMENT (OUTPATIENT)
Dept: HEPATOLOGY | Facility: CLINIC | Age: 30
End: 2020-03-17
Payer: MEDICAID

## 2020-04-29 ENCOUNTER — APPOINTMENT (OUTPATIENT)
Dept: HEPATOLOGY | Facility: CLINIC | Age: 30
End: 2020-04-29
Payer: MEDICAID

## 2020-04-29 PROCEDURE — 99215 OFFICE O/P EST HI 40 MIN: CPT | Mod: 95

## 2020-05-22 NOTE — REVIEW OF SYSTEMS
[facility]  Interdisciplinary Discharge Instructions    Date of Discharge: 05/22/20   Ambulatory: Yes  Time of Discharge: Pending   Transportation: Self  Residence Upon Discharge: Home           Sharps / Valuables Returned: Yes    Discharge Medications: Yes     Discharge Prescriptions: Yes     Instructions given by: RN  Patients's own medication returned: N/A          Written educational materials given: Yes  Satisfaction survey completed: Unknown          I have received instruction in these areas and have had an opportunity to ask questions, understand what has been discussed, and have received a copy of this form.        For therapeutic reasons of current patients and for your continued recovery, visitation is not allowed on this unit for thirty days.      _____________________________________ ________________  Signature of Patient     Date/Time      _____________________________________ ________________  Signature of POA/Guardian/Parent              Date/Time      _____________________________________ ________________  Signature of Caregiver    Date/Time          Medication Drop Box Locations - NO NEEDLES IN MEDICATION DROP BOXES    *Liquid Medications must be in ORIGINAL bottles and securely closed. Please place the   bottle in a sealed plastic bag before depositing it in the box.    *It is preferred that medications remain in their original packaging or prescription bottle.    Cross out your name & address, but DO NOT cover up the name of the medication.      [place] [employer]  [address] [facility]. [phone]    [place] [employer] Department [address]  [phone]    [place] Police Department [address] [facility]. [phone]    [place] [employer] [address]. [phone]    [place] [employer] [address] [facility]. [phone]      Where to dispose needles?    [facility]  Department in [place] maintains a free drop off box for needles.  Contaminated sharps must be placed in an approved red, plastic, puncture-proof container or a used plastic laundry detergent bottle labeled \"sharps\" - No larger than 11 x 7.5 inches.    [facility] Mobile Crisis #[phone]  Call 24/7 for any safety concerns    Utilize community support programs to supplement outpatient appointments:    All groups listed below hold their meetings at [address].     National [place] on Mental Illness  Meets the 3rd Thursday of the month at 7 p.m.  For friends, family members, and those personally affected by a mental illness.    Health and Hope Support Group  Meets once a week for six weeks at various times throughout the year. Participants must be receiving professional care and have approval from that provider in order to participate. The group is facilitated by a mental health professional.    For additional information about these support groups as well as others that meet in [facility], contact [facility] at [phone].       [facility]  [address] [place] [address]  [phone]  HOURS  Monday - Friday 9am-4pm    [facility] provides a safe and comfortable place for peers with mental illness and co-occurring disorders to learn essential wellness and recovery skills.  Drop-in to relax, socialize, meet new friends, learn new things, offer support, find resources and feel free to be you.    AA/NA Meetings  AA Hotline for [place]: 1-353.853.4961  Narcotics Anonymous: 4-488-089-8244  [facility] - [address] [place] [address] - [phone]  [facility] - [address] [place] [address] - [phone]    *If you need a meeting while practicing social distancing by staying home and away from groups, you can participate in online groups & meetings!    Alcoholics  Anonymous  aa-intergroup.org/directory.php  Onlinegroupaa.org  Aaonlinemeeting.net    Narcotics Anonymous  Virtual-na.org  Na.org/meetingsearch (for country select “web”)    SMART Recovery  Smartrecovery.org.smart-recovery-toolbox/smart-recovery-online  Smartrecovery.org/private-convenient-online-recovery-support    Other Resources  Addiction[employer].Diabetes Care Group/alcohol/apps-for-recovery  [place].Diabetes Care Group/[place]/home  Unityrecovery.[place].us/my/allrecovery        Apps & Online Resources:   Usrutdd7Mihwy, Moving Forward, MY3, A Friend Asks, Virtual Hope Box, Booster Jb (teens, young adults), Calm, Headspace, Talkspace  www.[email or web address].Diabetes Care Group  www.[employer]OND.Diabetes Care Group/groups/mental-health-dolly/    Crisis Text Line is free, 24/7 support for those in crisis.   Text \"Home\" to [phone] from anywhere in the [place] to text with a trained Crisis Counselor     [Arthralgias] : arthralgias [Joint Pain] : joint pain [Negative] : Heme/Lymph

## 2020-07-27 ENCOUNTER — APPOINTMENT (OUTPATIENT)
Dept: HEPATOLOGY | Facility: CLINIC | Age: 30
End: 2020-07-27

## 2020-08-17 ENCOUNTER — APPOINTMENT (OUTPATIENT)
Dept: INTERNAL MEDICINE | Facility: CLINIC | Age: 30
End: 2020-08-17

## 2020-09-21 ENCOUNTER — APPOINTMENT (OUTPATIENT)
Dept: INTERNAL MEDICINE | Facility: CLINIC | Age: 30
End: 2020-09-21
Payer: MEDICAID

## 2020-09-21 VITALS
HEART RATE: 60 BPM | SYSTOLIC BLOOD PRESSURE: 124 MMHG | OXYGEN SATURATION: 98 % | BODY MASS INDEX: 29.76 KG/M2 | DIASTOLIC BLOOD PRESSURE: 78 MMHG | WEIGHT: 168 LBS | TEMPERATURE: 98.3 F

## 2020-09-21 DIAGNOSIS — S83.002A UNSPECIFIED SUBLUXATION OF LEFT PATELLA, INITIAL ENCOUNTER: ICD-10-CM

## 2020-09-21 DIAGNOSIS — S83.001A UNSPECIFIED SUBLUXATION OF RIGHT PATELLA, INITIAL ENCOUNTER: ICD-10-CM

## 2020-09-21 PROCEDURE — 99395 PREV VISIT EST AGE 18-39: CPT | Mod: 25

## 2020-09-21 PROCEDURE — G0444 DEPRESSION SCREEN ANNUAL: CPT

## 2020-09-21 RX ORDER — BRIMONIDINE TARTRATE 1.5 MG/ML
0.15 SOLUTION/ DROPS OPHTHALMIC
Qty: 5 | Refills: 0 | Status: DISCONTINUED | COMMUNITY
Start: 2019-03-12 | End: 2020-09-21

## 2020-09-21 NOTE — ASSESSMENT
[FreeTextEntry1] : Hepatic steatosis:\par Due for hepatology follow up\par Repeat labs today\par May need heme follow up for elevated iron, recheck level today\par \par AVN:\par Continues ortho follow up\par May need right hip revision in near future\par \par Bilateral knee pain:\par Ortho advised against replacement at his time\par Exercise as tolerated\par \par Legally blind:\par Regular follow up with ophthalmology\par Recent burning and tearing, has follow up scheduled\par \par Chronic cough:\par Recent pulm eval, reports PFTs\par Diagnosed with mild asthma, started on inhalers with some improvement\par \par HM:\par UTD tdap\par UTD pap\par Flu shot today\par Skin screening advised with derm (follows with derm for eczema)\par

## 2020-09-21 NOTE — HISTORY OF PRESENT ILLNESS
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  \par \par Periods continue to be irregular.  \par \par Recent follow up with ortho.  Left hip doing well, some pain in the right hip.  Discussed revision if pain continues to get worse.  \par Continues to have bilateral knee pain, ortho prefers to hold off on knee replacements due to age.  Has had injections with temporary (about 1 month) relief.  \par \par Graduated [employer], then transferred to [employer].  Decided not to return to school this semester d/t COVID, her courses did not allow her to do fully remote work.  \par Currently working for a job.  Has degree to become .  \par \par Has been working on weight loss.  Motivated by fatty liver.  Has been baking food instead of frying.  Lots of veggies.  Has been trying to cut out the iced tea, drinking more water.  \par \par Continues follow up with ophthalmology.  \par \par Saw pulmonology about 1 month ago.  Started Flovent twice daily and PRN albuterol.  \par

## 2020-09-21 NOTE — HEALTH RISK ASSESSMENT
[Good] : ~his/her~  mood as  good [No] : In the past 12 months have you used drugs other than those required for medical reasons? 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

## 2020-09-21 NOTE — PHYSICAL EXAM
[Normal Sclera/Conjunctiva] : normal sclera/conjunctiva [PERRL] : pupils equal round and reactive to light [No Carotid Bruits] : no carotid bruits [Pedal Pulses Present] : the pedal pulses are present [No Edema] : there was no peripheral edema [Grossly Normal Strength/Tone] : grossly normal strength/tone [Normal] : affect was normal and insight and judgment were intact [de-identified] : dry skin

## 2020-09-21 NOTE — REVIEW OF SYSTEMS
[Discharge] : discharge [Vision Problems] : vision problems [Cough] : cough [Joint Pain] : joint pain [Negative] : Heme/Lymph [Fever] : no fever [Chills] : no chills [Fatigue] : no fatigue [Night Sweats] : no night sweats [Itching] : no itching [Chest Pain] : no chest pain [Palpitations] : no palpitations [Leg Claudication] : no leg claudication [Lower Ext Edema] : no lower extremity edema [Orthopnea] : no orthopnea [Paroxysmal Nocturnal Dyspnea] : no paroxysmal nocturnal dyspnea [Shortness Of Breath] : no shortness of breath [Wheezing] : no wheezing [Dyspnea on Exertion] : no dyspnea on exertion [Abdominal Pain] : no abdominal pain [Nausea] : no nausea [Constipation] : no constipation [Diarrhea] : diarrhea [Heartburn] : no heartburn [FreeTextEntry3] : legally blind, some burning and tearing

## 2020-09-22 ENCOUNTER — NON-APPOINTMENT (OUTPATIENT)
Age: 30
End: 2020-09-22

## 2020-09-24 LAB
ALBUMIN SERPL ELPH-MCNC: 4.8 G/DL
ALP BLD-CCNC: 108 U/L
ALT SERPL-CCNC: 56 U/L
ANION GAP SERPL CALC-SCNC: 9 MMOL/L
AST SERPL-CCNC: 30 U/L
BASOPHILS # BLD AUTO: 0.1 K/UL
BASOPHILS NFR BLD AUTO: 1.1 %
BILIRUB SERPL-MCNC: 0.3 MG/DL
BUN SERPL-MCNC: 11 MG/DL
CALCIUM SERPL-MCNC: 9.7 MG/DL
CHLORIDE SERPL-SCNC: 103 MMOL/L
CHOLEST SERPL-MCNC: 214 MG/DL
CHOLEST/HDLC SERPL: 5.6 RATIO
CO2 SERPL-SCNC: 27 MMOL/L
CREAT SERPL-MCNC: 0.57 MG/DL
EOSINOPHIL # BLD AUTO: 0.37 K/UL
EOSINOPHIL NFR BLD AUTO: 4.1 %
FERRITIN SERPL-MCNC: 1897 NG/ML
GLUCOSE SERPL-MCNC: 124 MG/DL
HCT VFR BLD CALC: 39.1 %
HDLC SERPL-MCNC: 38 MG/DL
HGB BLD-MCNC: 12.1 G/DL
IMM GRANULOCYTES NFR BLD AUTO: 0.2 %
LDLC SERPL CALC-MCNC: 127 MG/DL
LYMPHOCYTES # BLD AUTO: 4.02 K/UL
LYMPHOCYTES NFR BLD AUTO: 44.3 %
MAN DIFF?: NORMAL
MCHC RBC-ENTMCNC: 28 PG
MCHC RBC-ENTMCNC: 30.9 GM/DL
MCV RBC AUTO: 90.5 FL
MONOCYTES # BLD AUTO: 0.89 K/UL
MONOCYTES NFR BLD AUTO: 9.8 %
NEUTROPHILS # BLD AUTO: 3.68 K/UL
NEUTROPHILS NFR BLD AUTO: 40.5 %
PLATELET # BLD AUTO: 301 K/UL
POTASSIUM SERPL-SCNC: 4.5 MMOL/L
PROT SERPL-MCNC: 6.7 G/DL
RBC # BLD: 4.32 M/UL
RBC # FLD: 14.1 %
SARS-COV-2 IGG SERPL IA-ACNC: 131 INDEX
SARS-COV-2 IGG SERPL QL IA: POSITIVE
SODIUM SERPL-SCNC: 138 MMOL/L
TRIGL SERPL-MCNC: 242 MG/DL
WBC # FLD AUTO: 9.08 K/UL

## 2020-10-29 ENCOUNTER — MED ADMIN CHARGE (OUTPATIENT)
Age: 30
End: 2020-10-29

## 2020-10-29 ENCOUNTER — APPOINTMENT (OUTPATIENT)
Dept: INTERNAL MEDICINE | Facility: CLINIC | Age: 30
End: 2020-10-29
Payer: MEDICAID

## 2020-10-29 VITALS
DIASTOLIC BLOOD PRESSURE: 80 MMHG | HEART RATE: 74 BPM | OXYGEN SATURATION: 98 % | WEIGHT: 165 LBS | RESPIRATION RATE: 16 BRPM | SYSTOLIC BLOOD PRESSURE: 124 MMHG | BODY MASS INDEX: 29.23 KG/M2 | TEMPERATURE: 98.4 F

## 2020-10-29 DIAGNOSIS — M79.671 PAIN IN RIGHT FOOT: ICD-10-CM

## 2020-10-29 DIAGNOSIS — Z86.2 PERSONAL HISTORY OF DISEASES OF THE BLOOD AND BLOOD-FORMING ORGANS AND CERTAIN DISORDERS INVOLVING THE IMMUNE MECHANISM: ICD-10-CM

## 2020-10-29 DIAGNOSIS — T84.52XA INFECTION AND INFLAMMATORY REACTION DUE TO INTERNAL LEFT HIP PROSTHESIS, INITIAL ENCOUNTER: ICD-10-CM

## 2020-10-29 DIAGNOSIS — R94.5 ABNORMAL RESULTS OF LIVER FUNCTION STUDIES: ICD-10-CM

## 2020-10-29 DIAGNOSIS — R79.82 ELEVATED C-REACTIVE PROTEIN (CRP): ICD-10-CM

## 2020-10-29 DIAGNOSIS — E66.9 OBESITY, UNSPECIFIED: ICD-10-CM

## 2020-10-29 DIAGNOSIS — Z87.19 PERSONAL HISTORY OF OTHER DISEASES OF THE DIGESTIVE SYSTEM: ICD-10-CM

## 2020-10-29 DIAGNOSIS — Z11.59 ENCOUNTER FOR SCREENING FOR OTHER VIRAL DISEASES: ICD-10-CM

## 2020-10-29 DIAGNOSIS — M25.579 PAIN IN UNSPECIFIED ANKLE AND JOINTS OF UNSPECIFIED FOOT: ICD-10-CM

## 2020-10-29 DIAGNOSIS — Z87.898 PERSONAL HISTORY OF OTHER SPECIFIED CONDITIONS: ICD-10-CM

## 2020-10-29 DIAGNOSIS — R70.0 ELEVATED ERYTHROCYTE SEDIMENTATION RATE: ICD-10-CM

## 2020-10-29 DIAGNOSIS — G89.29 PAIN IN RIGHT FOOT: ICD-10-CM

## 2020-10-29 DIAGNOSIS — N91.1 SECONDARY AMENORRHEA: ICD-10-CM

## 2020-10-29 PROCEDURE — 99072 ADDL SUPL MATRL&STAF TM PHE: CPT

## 2020-10-29 PROCEDURE — 36415 COLL VENOUS BLD VENIPUNCTURE: CPT

## 2020-10-29 PROCEDURE — 99214 OFFICE O/P EST MOD 30 MIN: CPT | Mod: 25

## 2020-10-29 RX ORDER — PSYLLIUM HUSK 0.4 G
CAPSULE ORAL
Refills: 0 | Status: DISCONTINUED | COMMUNITY
End: 2020-10-29

## 2020-10-29 RX ORDER — NAPROXEN 500 MG/1
500 TABLET ORAL
Qty: 60 | Refills: 0 | Status: DISCONTINUED | COMMUNITY
Start: 2020-07-13 | End: 2020-10-29

## 2020-10-29 RX ORDER — BENZONATATE 100 MG/1
100 CAPSULE ORAL 3 TIMES DAILY
Qty: 30 | Refills: 0 | Status: DISCONTINUED | COMMUNITY
Start: 2020-01-17 | End: 2020-10-29

## 2020-10-29 NOTE — ASSESSMENT
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), s/p removal of infected left hip prosthesis 4/18 s/p IV abx x 6 weeks, s/p L-THR 7/18, chronic bilateral knee pain for f/u\par \par hx tooth pain-\par -oral surgeon referral given\par -followed by gen dentist, advised to see surgeon\par -on Tylenol prn, advised to monitor for fever (pre-Tylenol) and if fever or worsened sx's f/u with dentist promptly\par \par preDM- 12/19 A1c 6.4, intentionally losing wt.\par -ADA diet, exercise and cont'd wt loss encouraged\par -check A1c\par \par hx hepatic steatosis- asx\par -followed by hepatology\par -12/19 labs: hep A immune, not immune to hep B- willing for series, to start today- advised f/u in 1-2 mo for #2 and in 6mo for #3\par -wt loss encouraged\par \par hx asthma- feels is well controlled\par -followed by pulm, last seen 9/20 w/o meds changes and has f/u 12/20\par -on Flovent, using MDI infrequently\par -denies cough or sob\par \par hx hip replacement, b/l knee pains- stable\par -followed by ortho, avoiding knee replacement surgery due to young age\par \par \par HCM\par -hx CPE 9/20 with NP Ben\par -hx flu shot 9/20 at CPE\par -hx PVX 9/19\par -to start hep B series today\par -asked to forward vaccine records \par \par Pt's cell: [phone]\par Pt states her mom, Kayy, may be contacted re: her medical care as needed- cell: [phone]

## 2020-10-29 NOTE — HISTORY OF PRESENT ILLNESS
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 Correa ~ 2 weeks ago for regular check and cleaning when also reported tooth pain.  Told needs root canal and cavity clean up, states given ~ 7 shots of anesthetics w/o proper numbing obtained and advised to see oral surgeon for further management instead, states was given referral to one but was in [place] and is too far for her.  Also tried calling her insurance company but no provider found yet.\par \par Notes has mild right upper tooth and left lower tooth pains, helped with Tylenol (last taken > 24 hrs ago) and eating mainly soft foods\par -denies HA, fever, chills, cough, CP or sob\par \par Otherwise feels well.\par \par Reports intentional wt loss in past few months, was 180 and today is 165\par eating healthy, low carb\par exercising: walking video 30 mins/d 6x/wk- done w/o sx's or limitations\par \par hx asthma- feels is well controlled\par -followed by pulm, last seen 9/20 w/o meds changes and has f/u 12/20\par -on Flovent, using MDI infrequently\par -denies cough or sob\par \par hx hip replacement, b/l knee pains- stable\par -followed by ortho, avoiding knee replacement surgery due to young age\par -exercising w/o trouble\par \par Denies GI complaints.\par Denies  complaints, reports had been told menses would not return after had BM transplant but getting it regularly since 8/2020.\par

## 2020-10-29 NOTE — PHYSICAL EXAM
[No Acute Distress] : no acute distress [Well-Appearing] : well-appearing [Normal Outer Ear/Nose] : the outer ears and nose were normal in appearance [Supple] : supple [No Respiratory Distress] : no respiratory distress  [Clear to Auscultation] : lungs were clear to auscultation bilaterally [Normal Rate] : normal rate  [Regular Rhythm] : with a regular rhythm [Normal S1, S2] : normal S1 and S2 [No Murmur] : no murmur heard [No Edema] : there was no peripheral edema [Soft] : abdomen soft [Non Tender] : non-tender [No HSM] : no HSM [No Rash] : no rash [No Focal Deficits] : no focal deficits [Normal Gait] : normal gait [Normal Affect] : the affect was normal [Alert and Oriented x3] : oriented to person, place, and time

## 2020-10-30 LAB
ALBUMIN SERPL ELPH-MCNC: 4.9 G/DL
ALP BLD-CCNC: 121 U/L
ALT SERPL-CCNC: 51 U/L
ANION GAP SERPL CALC-SCNC: 13 MMOL/L
AST SERPL-CCNC: 27 U/L
BILIRUB SERPL-MCNC: 0.3 MG/DL
BUN SERPL-MCNC: 14 MG/DL
CALCIUM SERPL-MCNC: 9.9 MG/DL
CHLORIDE SERPL-SCNC: 102 MMOL/L
CO2 SERPL-SCNC: 22 MMOL/L
CREAT SERPL-MCNC: 0.52 MG/DL
ESTIMATED AVERAGE GLUCOSE: 128 MG/DL
GLUCOSE SERPL-MCNC: 129 MG/DL
HBA1C MFR BLD HPLC: 6.1 %
POTASSIUM SERPL-SCNC: 4.4 MMOL/L
PROT SERPL-MCNC: 7 G/DL
SODIUM SERPL-SCNC: 137 MMOL/L

## 2020-11-17 ENCOUNTER — APPOINTMENT (OUTPATIENT)
Dept: INTERNAL MEDICINE | Facility: CLINIC | Age: 30
End: 2020-11-17

## 2020-11-30 ENCOUNTER — APPOINTMENT (OUTPATIENT)
Dept: INTERNAL MEDICINE | Facility: CLINIC | Age: 30
End: 2020-11-30
Payer: MEDICAID

## 2020-11-30 PROCEDURE — G0010: CPT

## 2020-11-30 PROCEDURE — 90746 HEPB VACCINE 3 DOSE ADULT IM: CPT

## 2020-11-30 PROCEDURE — 99072 ADDL SUPL MATRL&STAF TM PHE: CPT

## 2021-04-26 ENCOUNTER — NON-APPOINTMENT (OUTPATIENT)
Age: 31
End: 2021-04-26

## 2021-04-26 ENCOUNTER — APPOINTMENT (OUTPATIENT)
Dept: INTERNAL MEDICINE | Facility: CLINIC | Age: 31
End: 2021-04-26
Payer: MEDICAID

## 2021-04-26 VITALS
SYSTOLIC BLOOD PRESSURE: 128 MMHG | BODY MASS INDEX: 29.58 KG/M2 | WEIGHT: 167 LBS | OXYGEN SATURATION: 98 % | DIASTOLIC BLOOD PRESSURE: 80 MMHG | HEART RATE: 94 BPM

## 2021-04-26 DIAGNOSIS — Z90.81 ACQUIRED ABSENCE OF SPLEEN: ICD-10-CM

## 2021-04-26 PROCEDURE — 93000 ELECTROCARDIOGRAM COMPLETE: CPT

## 2021-04-26 PROCEDURE — 99214 OFFICE O/P EST MOD 30 MIN: CPT | Mod: 25

## 2021-04-26 PROCEDURE — 99072 ADDL SUPL MATRL&STAF TM PHE: CPT

## 2021-04-26 RX ORDER — FLUTICASONE PROPIONATE 50 UG/1
50 SPRAY, METERED NASAL TWICE DAILY
Qty: 1 | Refills: 3 | Status: DISCONTINUED | COMMUNITY
Start: 2019-05-13 | End: 2021-04-26

## 2021-04-26 NOTE — HISTORY OF PRESENT ILLNESS
[No Pertinent Cardiac History] : no history of aortic stenosis, atrial fibrillation, coronary artery disease, recent myocardial infarction, or implantable device/pacemaker [Asthma] : asthma [(Patient denies any chest pain, claudication, dyspnea on exertion, orthopnea, palpitations or syncope)] : Patient denies any chest pain, claudication, dyspnea on exertion, orthopnea, palpitations or syncope [Good (7-10 METs)] : Good (7-10 METs) [Sleep Apnea] : no sleep apnea [Smoker] : not a smoker [Self] : no previous adverse anesthesia reaction [Chronic Anticoagulation] : no chronic anticoagulation [Chronic Kidney Disease] : no chronic kidney disease [Diabetes] : no diabetes [FreeTextEntry1] : teeth extraction  [FreeTextEntry2] : TBD [FreeTextEntry4] : 26 yo F pmhx preDM, hepatic steatosis, iron overload (treated with Exjade resulting in eye damage), sickle cell disease (hx splenectomy at 9 yo, BM transplant at 19 yo, 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 bleed s/p surgery, resulting decreased vision, osteoporosis.  Here for pre-op evaluation today prior to oral surgery/teeth extraction.  \par Exercising regularly at the gym.  Feels well with activity.  Denies chest pain, dyspnea with exertion, dizziness, palpitations.  \par Asthma manifested by worsening cough since stopping ICS d/t cost.  Continues PRN albuterol about every other day.  [de-identified] : Planning for oral surgery.  Teeth are very sensitive, pain all the time with teeth and gums, difficult to eat. Recommended to have extraction of all teeth, treat gum disease for 4 months, then will have dentures and possibly eventually implants.  \par \par Has been intermittent LUQ pain for about the past year.  Increasing in frequency recently.  Occurs several times/week.  Comes on randomly, not associated with eating or movement.  Can last for 5 seconds up to 2 minutes.  Doubled over in pain while it happens, pain level 10+/10.  No pain at other times.  No nausea, no heartburn, no diarrhea.  \par \par Has joined [employer] - uses cardio machines and weights.  Does PT there as well (hip and knee).  \par \par hx asthma\par -followed by pulmonology regularly, last seen last month.  \par -had been on Flovent, but high co-pay, has not renewed in a while.  Using albuterol rescue inhaler every other day, but feels she could use it more.  Felt better when taking Flovent. \par -main symptoms is coughing, not short of breath, no wheezing.  Cough is not worse with activity.  \par \par hx hip replacement, b/l knee pains, R>L- stable\par -followed by ortho, avoiding knee replacement surgery due to young age\par -exercising w/o trouble\par -continues PT for the knee\par \par Reports had been told menses would not return after had BM transplant but getting it regularly since 8/2020.\par

## 2021-04-26 NOTE — REVIEW OF SYSTEMS
[Cough] : cough [Abdominal Pain] : abdominal pain [Joint Pain] : joint pain [Negative] : Heme/Lymph [Fever] : no fever [Chills] : no chills [Fatigue] : no fatigue [Night Sweats] : no night sweats [Wheezing] : no wheezing [Dyspnea on Exertion] : no dyspnea on exertion [Nausea] : no nausea [Diarrhea] : diarrhea [Constipation] : no constipation [Heartburn] : no heartburn [FreeTextEntry6] : see HPI - coughing with worsening asthma  [FreeTextEntry7] : see HPI - LUQ abdominal pain  [FreeTextEntry9] : bilateral knee pain, R>L

## 2021-04-26 NOTE — ASSESSMENT
[High Risk Surgery - Intraperitoneal, Intrathoracic or Supringuinal Vascular Procedures] : High Risk Surgery - Intraperitoneal, Intrathoracic or Supringuinal Vascular Procedures - No (0) [Ischemic Heart Disease] : Ischemic Heart Disease - No (0) [Congestive Heart Failure] : Congestive Heart Failure - No (0) [Prior Cerebrovascular Accident or TIA] : Prior Cerebrovascular Accident or TIA - No (0) [Creatinine >= 2mg/dL (1 Point)] : Creatinine >= 2mg/dL - No (0) [Insulin-dependent Diabetic (1 Point)] : Insulin-dependent Diabetic - No (0) [0] : 0 , RCRI Class: I, Risk of Post-Op Cardiac Complications: 3.9%, 95% CI for Risk Estimate: 2.8% - 5.4% [As per surgery] : as per surgery [FreeTextEntry4] : Labs today\par EKG today - NSR, no acute ischemia\par Will need hepatology and hematology clearance  [FreeTextEntry1] : 28 yo F pmhx preDM, hepatic steatosis, iron overload, sickle cell disease (hx splenectomy at 9 yo, BM transplant at 19 yo, 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 bleed s/p surgery, resulting decreased vision, h/o sickle cell crisis with coma, osteoporosis.  Here for pre-op evaluation today prior to oral surgery.  Teeth extraction.  \par \par hx tooth pain-\par plans to schedule oral surgery\par \par preDM- 10/2020 A1c 6.1, intentionally losing wt.\par -ADA diet, exercise and cont'd wt loss encouraged\par \par hx hepatic steatosis, iron overload\par -followed by hepatology, prescribed pentoxifylline after last visit (TEB 1 year ago), but did not receive the medication\par -phlebotomy discussed for iron overload, but patient not aware \par -will check labs today\par -hepatology follow up, hematology follow up\par -continues weight loss efforts\par \par hx asthma\par -off Flovent d/t cost, cough has been worse with activity\par -follows regularly with pulmonology\par -will look into ICS options with her insurance, pharmacy discount card provided\par \par hx hip replacement, b/l knee pains- stable\par -followed by ortho, avoiding knee replacement surgery due to young age\par -continues PT for the knees, has been wearing a sleeve brace with improved pain \par \par LUQ pain:\par Scaring in the area of discomfort from past splenectomy and G-tube \par Discussed initial evaluation with US, but patient prefers to start with CT as US is always painful\par \par Received COVID vaccine series. \par \par Pt's cell: [phone]\par Pt states her mom, Kayy, may be contacted re: her medical care as needed- cell: [phone]

## 2021-04-28 ENCOUNTER — NON-APPOINTMENT (OUTPATIENT)
Age: 31
End: 2021-04-28

## 2021-04-28 LAB
25(OH)D3 SERPL-MCNC: 30.5 NG/ML
ALBUMIN SERPL ELPH-MCNC: 5.1 G/DL
ALP BLD-CCNC: 103 U/L
ALT SERPL-CCNC: 43 U/L
ANION GAP SERPL CALC-SCNC: 10 MMOL/L
APTT BLD: 32.9 SEC
AST SERPL-CCNC: 26 U/L
BASOPHILS # BLD AUTO: 0.08 K/UL
BASOPHILS NFR BLD AUTO: 1 %
BILIRUB SERPL-MCNC: 0.4 MG/DL
BUN SERPL-MCNC: 13 MG/DL
CALCIUM SERPL-MCNC: 10.5 MG/DL
CHLORIDE SERPL-SCNC: 103 MMOL/L
CO2 SERPL-SCNC: 28 MMOL/L
CREAT SERPL-MCNC: 0.61 MG/DL
EOSINOPHIL # BLD AUTO: 0.2 K/UL
EOSINOPHIL NFR BLD AUTO: 2.4 %
ESTIMATED AVERAGE GLUCOSE: 131 MG/DL
FERRITIN SERPL-MCNC: 1893 NG/ML
GLUCOSE SERPL-MCNC: 115 MG/DL
HBA1C MFR BLD HPLC: 6.2 %
HCT VFR BLD CALC: 40.1 %
HGB BLD-MCNC: 12.4 G/DL
IMM GRANULOCYTES NFR BLD AUTO: 0.2 %
INR PPP: 1.06 RATIO
IRON SATN MFR SERPL: 44 %
IRON SERPL-MCNC: 115 UG/DL
LYMPHOCYTES # BLD AUTO: 3.38 K/UL
LYMPHOCYTES NFR BLD AUTO: 41.3 %
MAN DIFF?: NORMAL
MCHC RBC-ENTMCNC: 27.7 PG
MCHC RBC-ENTMCNC: 30.9 GM/DL
MCV RBC AUTO: 89.5 FL
MONOCYTES # BLD AUTO: 0.75 K/UL
MONOCYTES NFR BLD AUTO: 9.2 %
NEUTROPHILS # BLD AUTO: 3.76 K/UL
NEUTROPHILS NFR BLD AUTO: 45.9 %
PLATELET # BLD AUTO: 300 K/UL
POTASSIUM SERPL-SCNC: 3.8 MMOL/L
PROT SERPL-MCNC: 7.3 G/DL
PT BLD: 12.5 SEC
RBC # BLD: 4.48 M/UL
RBC # FLD: 14 %
SODIUM SERPL-SCNC: 141 MMOL/L
TIBC SERPL-MCNC: 261 UG/DL
TSH SERPL-ACNC: 1.35 UIU/ML
UIBC SERPL-MCNC: 145 UG/DL
WBC # FLD AUTO: 8.19 K/UL

## 2021-05-05 ENCOUNTER — NON-APPOINTMENT (OUTPATIENT)
Age: 31
End: 2021-05-05

## 2021-05-11 NOTE — OCCUPATIONAL THERAPY INITIAL EVALUATION ADULT - LIGHT TOUCH SENSATION, LLE, REHAB EVAL
Dr Day Mckeon    [phone]    Patient states that someone called him, no message left?
Mary tried contacting patient earlier this morning, no answer and voice mail was full, so she wasn't able to leave message       I returned patient's call, as did Mary  Spoke to patient  Patient aware he has to  knee rollator @ [address] has been faxed there
within normal limits

## 2021-05-24 ENCOUNTER — APPOINTMENT (OUTPATIENT)
Dept: INTERNAL MEDICINE | Facility: CLINIC | Age: 31
End: 2021-05-24

## 2021-05-25 ENCOUNTER — APPOINTMENT (OUTPATIENT)
Dept: HEPATOLOGY | Facility: CLINIC | Age: 31
End: 2021-05-25
Payer: MEDICAID

## 2021-05-25 VITALS
WEIGHT: 165 LBS | BODY MASS INDEX: 29.23 KG/M2 | OXYGEN SATURATION: 99 % | HEIGHT: 63 IN | DIASTOLIC BLOOD PRESSURE: 86 MMHG | TEMPERATURE: 98 F | SYSTOLIC BLOOD PRESSURE: 133 MMHG | HEART RATE: 79 BPM

## 2021-05-25 DIAGNOSIS — E83.19 OTHER DISORDERS OF IRON METABOLISM: ICD-10-CM

## 2021-05-25 PROCEDURE — 99214 OFFICE O/P EST MOD 30 MIN: CPT

## 2021-05-25 RX ORDER — PENTOXIFYLLINE 400 MG/1
400 TABLET, EXTENDED RELEASE ORAL 3 TIMES DAILY
Qty: 90 | Refills: 2 | Status: DISCONTINUED | COMMUNITY
Start: 2020-04-29 | End: 2021-05-25

## 2021-05-25 NOTE — HISTORY OF PRESENT ILLNESS
[de-identified] : - 5/25/21: returns after liver biopsy 2/2020 and seeing Dr. Westbrook in 4/2020. Pentoxyphylline was started for HENDRIX. Has not lost weight, BMI still 29. LFTs 4/2021 normalized enzymes and transferring saturation. Ferritin still 1900. Feels great. \par \par - 2/10/20: returns after labs and fibroscan today, which showed no fibrosis, but stage 3 steatosis - interpreted and discussed. Doing well overall. \par - initial visit: Ms. 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 bleed s/p surgery, resulting decreased vision, h/o sickle cell crisis with coma, osteoporosis, hip replacement.\par \par Weight hx: 170 lbs, BMI 30. Gained 40 lbs between 2013 and 2019, Was on steroids for eyes off and on until 2017.\par \par Workup:\par - 2/20/20 liver biopsy: moderate steatosis 35%, mild steatohepatitis grade 1/3, stage 1/4 fibrosis. Hemochromatosis in hepatocytes and macrophages as well. Focal pericellular fibrosis.\par - 2/10/20 fibroscan: 308 dB/m - stage 3 steatosis, 4.6 kPa - stage 0 fibrosis\par - 10/22/18 US (scanned): mild-moderate hepatomegaly with steatosis, cholelithiasis in a contracted GB.

## 2021-05-25 NOTE — ASSESSMENT
[FreeTextEntry1] : Ms. MOONEY is a 30 year old woman with h/o sickle cell disease, cured with bone marrow transplant in 2010.\par \par - HENDRIX with moderate steatosis and mild steatohepatitis seen on liver biopsy 2/2020\par - also iron overload in hepatocytes and macrophages, normal HFE gene testing, due to transfusions\par   in past treated with deferasirox, which caused eye damage.\par   - transferrin saturation normalized as of 4/2021, ferritin still 1900 - likely due to HENDRIX and iron overload\par   - pentoxyphylline was prescribed, but she never got it\par \par - mild pericellular fibrosis seen on liver biopsy. 2/10/20 fibroscan: 4.6 kPa - stage 0 fibrosis\par - elevated liver enzymes - resolved as of 4/2021. May still have mild HENDRIX\par - NAFLD seen on US 10/2018 and fibroscan 2/2010 - stage 3 of 3 steatosis\par  \par  \par - obesity, BMI 30 with 40 lbs weight gain since 2013. Was on steroid eye drops for uveitis until 2017.\par - prediabetes\par - infrequent menstruation - i.e. reduced iron loss\par \par - s/p L hip replacement 2020 after infection\par \par Plan:\par - HENDRIX: weight loss - reduced caloric intake, exercise, refer to nutritionist. Pt. wants to try that first before considering medication for weight loss\par - start Mike 800 IU/d\par - transfusional iron overload: will observe ferritin as it may decrease with weight loss

## 2021-07-16 NOTE — OCCUPATIONAL THERAPY INITIAL EVALUATION ADULT - FINE MOTOR COORDINATION, LEFT HAND THUMB/FINGER OPPOSITION SKILLS, OT EVAL
[facility] Transitions Initial Follow Up Call    Call within 2 business days of discharge: Yes    Patient: Mari Cheney Patient : 1955   MRN: [id number]  Reason for Admission: SOB COPD   Discharge Date: 21 RARS: Readmission Risk Score: 30      Last Discharge [facility]       Complaint Diagnosis Description Type Department Provider    21 Shortness of Breath COPD exacerbation ([facility] [address] . .. ED to Hosp-Admission (Discharged) (ADMITTED) Christine Morris MD; Ritika Hernández. .. Spoke with: [address]: [facility]     Transitions of Care Initial Call    Was this an external facility discharge? No Discharge Facility: n/a      Challenges to be reviewed by the provider   Additional needs identified to be addressed with provider: No  none             Method of communication with provider : none      Advance Care Planning:   Does patient have an Advance Directive: not on file. Was this a readmission? Yes  Patient stated reason for admission: SOB   Patients top risk factors for readmission: medical condition-COPD     Care Transition Nurse (CTN) contacted the patient by telephone to perform post hospital discharge assessment. Verified name and  with patient as identifiers. Provided introduction to self, and explanation of the CTN role. CTN reviewed discharge instructions, medical action plan and red flags with patient who verbalized understanding. Patient given an opportunity to ask questions and does not have any further questions or concerns at this time. Were discharge instructions available to patient? Yes. Reviewed appropriate site of care based on symptoms and resources available to patient including: PCP, Specialist, Home health and When to call 911. The patient agrees to contact the PCP office for questions related to their healthcare. Medication reconciliation was performed with patient, who verbalizes understanding of administration of home medications. Patient declined full medication review, CTN did review new, changed, and stopped medications with patient. Reviewed and educated patient on any new and changed medications related to discharge diagnosis. CTN provided contact information. Plan for follow-up call in 3-5 days based on severity of symptoms and risk factors. Plan for next call: symptom management-COPD      Spoke with patient who reported she was feeling fairly well. Patient reported her breathing was stable and denied any worsening sob. Patient reported she is using her oxygen prn and currently can't locate her pulse oximeter at this time but will try and find it. CTN discussed the importance of measuring her pulse ox and to discuss parameters with Dr. Jonathan Coley at apt on 7/21. Patient reports at this time she is not smoking and doing well. CTN provided patient with contact number for [facility] to discuss portable oxygen tank. [facility] to resume care on Monday. Denies any acute needs at present time. Agreeable to f/u calls. Educated on the use of urgent care or physicians 24 hr access line if assistance is needed after hours & that they can always contact their home care provider to request a nurse visit even if it isn't their regularly scheduled day for their nurse to visit.            Care Transitions 24 Hour Call    Do you have any ongoing symptoms?: No  Do you have a copy of your discharge instructions?: Yes  Do you have all of your prescriptions and are they filled?: Yes  Have you been contacted by a [address]?: No  Have you scheduled your follow up appointment?: Yes  How are you going to get to your appointment?: Car - family or friend to transport  Were you discharged with any Home Care or Post Acute Services: Yes  Post Acute Services: [address] [place] [address] Gabe Vivas (Comment: [facility])  Do you feel like you have everything you need to keep you well at home?: Yes  Care Transitions Interventions         Follow Up  Future Appointments   Date Time Provider Department Center   7/21/2021  1:00 PM Myra Ba MD [facility]   12/3/2021 11:00 AM [facility] Y[facility] DENISEN, RN, [facility]  Care Transition Nurse  [phone] mobile normal performance

## 2021-07-26 ENCOUNTER — APPOINTMENT (OUTPATIENT)
Dept: INTERNAL MEDICINE | Facility: CLINIC | Age: 31
End: 2021-07-26

## 2021-09-14 ENCOUNTER — APPOINTMENT (OUTPATIENT)
Dept: HEPATOLOGY | Facility: CLINIC | Age: 31
End: 2021-09-14

## 2022-01-05 ENCOUNTER — NON-APPOINTMENT (OUTPATIENT)
Age: 32
End: 2022-01-05

## 2022-01-06 ENCOUNTER — NON-APPOINTMENT (OUTPATIENT)
Age: 32
End: 2022-01-06

## 2022-01-10 ENCOUNTER — APPOINTMENT (OUTPATIENT)
Dept: INTERNAL MEDICINE | Facility: CLINIC | Age: 32
End: 2022-01-10
Payer: MEDICAID

## 2022-01-10 PROCEDURE — 99214 OFFICE O/P EST MOD 30 MIN: CPT | Mod: 25

## 2022-01-10 PROCEDURE — G0008: CPT

## 2022-01-10 PROCEDURE — 90686 IIV4 VACC NO PRSV 0.5 ML IM: CPT

## 2022-01-12 NOTE — PHYSICAL EXAM
[No Acute Distress] : no acute distress [Well-Appearing] : well-appearing [Normal Voice/Communication] : normal voice/communication [Normal Sclera/Conjunctiva] : normal sclera/conjunctiva [PERRL] : pupils equal round and reactive to light [Normal Outer Ear/Nose] : the outer ears and nose were normal in appearance [Normal TMs] : both tympanic membranes were normal [Normal] : normal rate, regular rhythm, normal S1 and S2 and no murmur heard [Coordination Grossly Intact] : coordination grossly intact [No Focal Deficits] : no focal deficits [Normal Gait] : normal gait [de-identified] : poor dentition, but otherwise nothing notable on exam of oropharynx; scarring noted from craniotomy in 2007 end just above where she has the pain;  no pain at present, nontender through the area; no appreciable lymphadenopathy

## 2022-01-12 NOTE — REVIEW OF SYSTEMS
[Negative] : Heme/Lymph [Headache] : no headache [Dizziness] : no dizziness [Fainting] : no fainting [Memory Loss] : no memory loss [FreeTextEntry4] : see HPI - left facial pain  [FreeTextEntry9] : see HPI

## 2022-01-12 NOTE — HISTORY OF PRESENT ILLNESS
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 \par \par Presents today with c/o left ear pain (points to area in front of the ear, up towards the temple).  Has been occurring on and off the past several weeks.  Sometimes pain lasts for only a minute, some times lasts all day.  Pain is intense/sharp/stabbing, feels like she needs to squeeze something when it occurs because of the intensity.  No actual ear pain, no hearing change, no ear discharge, no eye pain or discharged,  no vision changes (however, minimal vision in the left eye).  Report tooth pain on bottom left, not especially  on the top.  No sinus congestion/rhinorrhea.  \par \par Did COVID swab yesterday, just to check.  No fever/chills, resp symptoms, GI symptoms.  \par \par Right hip has been more painful recently. \par Ortho f/u last week (Natalie).  Did x-rays.  Told right hip implant is sliding (arthroplasty done 12 years ago).  Will likely need to be redone. \par Knee has been worse recently, probably d/t her altered gait from the hip pain.  Now using a cane to bear some of the weight.  \par Ortho hoping to not have to do knee surgery.  \par Left hip continues to feel well.  \par \par \par Working at [employer] in [employer] school program (2pm-6pm).   Uses transportation services. \par \par Needs lots of dental work.  On a list for oral surgeon that takes her insurance.  \par Lots of pain to chew even bread.  Pain with liquids if too hot or too cold.  \par Has had wisdom teeth removed and some root canals and implants.  Still needs more work done, but needs to work with any oral surgeon to do it all at once.  Too painful to do with just local. \par \par Saw heme ([facility], Dr. Carballo) for follow up last year for iron overload as discussed in preparation for oral surgery at that time (but now still on that wait list).  Told no further heme f/u needed.  Thinks she did MRI of liver and heart, thought reports were forwarded.  \par

## 2022-01-12 NOTE — ASSESSMENT
[FreeTextEntry1] : Left facial pain:\par Unclear etiology.  Unremarkable exam and no pain today.\par Wonder if could be dental related, however she reports pain on this side with her bottom teeth, not with the top\par ? GCA.  Will check labs including ESR\par ?cluster headache\par Consider imaging/neuro eval if continues \par \par Right hip pain:\par s/p THR ~12 yrs ago 2/2 AVN, continues ortho f/u, will likely need revision soon\par \par Dental/jaw pain:\par Lots of bone/tooth pain, on wait list with oral surgeon or root canals and extractions as she has found it too painful with just local anesthesia\par \par Iron overload:\par Heme follow up last year, asked to forward reports.  Thinks she did MRI of liver and heart\par \par HENDRIX:\par Continues f/u with hepatology, last note recommends starting Vitamin E, which she was unaware of.  \par Will schedule f/u \par \par Teeth pain:\par Continues f/u with dental\par Hopeful to schedule with oral surgeon soon for more extensive work (mainly extractions and implants, per patient).  \par \par flu shot today\par completed covid primary series vaccination and recent booster vaccination\par

## 2022-01-20 ENCOUNTER — NON-APPOINTMENT (OUTPATIENT)
Age: 32
End: 2022-01-20

## 2022-01-20 LAB
ANION GAP SERPL CALC-SCNC: 11 MMOL/L
BASOPHILS # BLD AUTO: 0.08 K/UL
BASOPHILS NFR BLD AUTO: 1 %
BUN SERPL-MCNC: 16 MG/DL
CALCIUM SERPL-MCNC: 10.4 MG/DL
CHLORIDE SERPL-SCNC: 101 MMOL/L
CHOLEST SERPL-MCNC: 252 MG/DL
CO2 SERPL-SCNC: 28 MMOL/L
CREAT SERPL-MCNC: 0.64 MG/DL
EOSINOPHIL # BLD AUTO: 0.25 K/UL
EOSINOPHIL NFR BLD AUTO: 3 %
ERYTHROCYTE [SEDIMENTATION RATE] IN BLOOD BY WESTERGREN METHOD: 12 MM/HR
ESTIMATED AVERAGE GLUCOSE: 140 MG/DL
GLUCOSE SERPL-MCNC: 145 MG/DL
HBA1C MFR BLD HPLC: 6.5 %
HCT VFR BLD CALC: 37.3 %
HDLC SERPL-MCNC: 38 MG/DL
HGB BLD-MCNC: 11.9 G/DL
IMM GRANULOCYTES NFR BLD AUTO: 0.2 %
LDLC SERPL CALC-MCNC: NORMAL MG/DL
LYMPHOCYTES # BLD AUTO: 3.33 K/UL
LYMPHOCYTES NFR BLD AUTO: 40 %
MAN DIFF?: NORMAL
MCHC RBC-ENTMCNC: 27.2 PG
MCHC RBC-ENTMCNC: 31.9 GM/DL
MCV RBC AUTO: 85.4 FL
MONOCYTES # BLD AUTO: 0.64 K/UL
MONOCYTES NFR BLD AUTO: 7.7 %
NEUTROPHILS # BLD AUTO: 4.01 K/UL
NEUTROPHILS NFR BLD AUTO: 48.1 %
NONHDLC SERPL-MCNC: 214 MG/DL
PLATELET # BLD AUTO: 306 K/UL
POTASSIUM SERPL-SCNC: 4.6 MMOL/L
RBC # BLD: 4.37 M/UL
RBC # FLD: 14.6 %
SODIUM SERPL-SCNC: 140 MMOL/L
TRIGL SERPL-MCNC: 447 MG/DL
TSH SERPL-ACNC: 1.46 UIU/ML
WBC # FLD AUTO: 8.33 K/UL

## 2022-03-23 ENCOUNTER — APPOINTMENT (OUTPATIENT)
Dept: HEPATOLOGY | Facility: CLINIC | Age: 32
End: 2022-03-23
Payer: MEDICAID

## 2022-03-23 VITALS
WEIGHT: 157 LBS | TEMPERATURE: 96.2 F | OXYGEN SATURATION: 99 % | HEART RATE: 74 BPM | DIASTOLIC BLOOD PRESSURE: 80 MMHG | SYSTOLIC BLOOD PRESSURE: 110 MMHG | HEIGHT: 63 IN | BODY MASS INDEX: 27.82 KG/M2

## 2022-03-23 PROCEDURE — 99213 OFFICE O/P EST LOW 20 MIN: CPT

## 2022-03-23 NOTE — HISTORY OF PRESENT ILLNESS
[de-identified] : -3/23/22 Returns for follow up for HENDRIX.  Last saw Dr. Lucio on 5/25/21 and was advised to start Vit E but never did. She has lost 8 lbs since last visit, current weight 157 lbs, BMI 28. Feels well. She has right hip pain and is scheduled for surgery in April. Denies abdominal pain, nausea, vomiting, melena, hematochezia, or hematemesis. Last BW with liver chemistries was April 2021 ALT 43, AST 26, Tbil 0.4, albumin 5.1, INR 1.06.\par \par - 5/25/21: returns after liver biopsy 2/2020 and seeing Dr. Westbrook in 4/2020. Pentoxyphylline was prescribed for HENDRIX however pt never started. Has not lost weight, BMI still 29. LFTs 4/2021 normalized enzymes and transferring saturation. Ferritin still 1900. Has transfusional iron overload. Feels great. \par \par - 2/10/20: returns after labs and fibroscan today, which showed no fibrosis, but stage 3 steatosis - interpreted and discussed. Doing well overall. \par - initial visit: Ms. 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 bleed s/p surgery, resulting decreased vision, h/o sickle cell crisis with coma, osteoporosis, hip replacement.\par \par Weight hx: 170 lbs, BMI 30. Gained 40 lbs between 2013 and 2019, Was on steroids for eyes off and on until 2017.\par \par Workup:\par - 2/20/20 liver biopsy: moderate steatosis 35%, mild steatohepatitis grade 1/3, stage 1/4 fibrosis. Hemochromatosis in hepatocytes and macrophages as well. Focal pericellular fibrosis.\par - 2/10/20 fibroscan: 308 dB/m - stage 3 steatosis, 4.6 kPa - stage 0 fibrosis\par - 10/22/18 US (scanned): mild-moderate hepatomegaly with steatosis, cholelithiasis in a contracted GB.

## 2022-03-23 NOTE — ASSESSMENT
[FreeTextEntry1] : Ms. MOONEY is a 31 year old woman with h/o sickle cell disease, cured with bone marrow transplant in 2010 here for follow up for HENDRIX.\par \par - HENDRIX with moderate steatosis and mild steatohepatitis seen on liver biopsy 2/2020\par - also iron overload in hepatocytes and macrophages, normal HFE gene testing, due to transfusions\par   in past treated with deferasirox, which caused eye damage.\par  - transferrin saturation normalized as of 4/2021, ferritin still 1900 - likely due to HENDRIX and iron overload\par  - pentoxyphylline was prescribed, but she never got it. \par -was recommended to start Mike 800 IU/d May 2021 but never started. Will hold off for now since she is scheduled for hip surgery in April\par \par - mild pericellular fibrosis seen on liver biopsy. 2/10/20 fibroscan: 4.6 kPa - stage 0 fibrosis. Will repeat fibroscan test\par - elevated liver enzymes normalized as of 4/2021.  Will repeat BW today\par  -BMI 28, lost 8 lbs since May 2021. Discussed lifestyle modifications for management of HENDRIX. I recommended gradual weight loss of 5-10% of her body weight. I recommended increased consumption of vegetables and lean proteins, avoidance of red meat and high fructose corn syrup, and avoidance of calorie-containing beverages.  Discuss few diet modifications today. Unable to exercise due to hip pain. \par -Recommended visit with Dietitian, Monalisa Campbell contact information given\par - Trig 447 on 1/20/22 will check for DONG-D\par \par Plan:\par BW today, fibroscan test, appt with Dietitian, F/U in 3 months. \par \par \par

## 2022-03-23 NOTE — PHYSICAL EXAM
[Scleral Icterus] : No Scleral Icterus [Abdominal  Ascites] : no ascites [Asterixis] : no asterixis observed [Jaundice] : No jaundice [Palmar Erythema] : no Palmar Erythema [General Appearance - Alert] : alert [General Appearance - In No Acute Distress] : in no acute distress [Sclera] : the sclera and conjunctiva were normal [Neck Appearance] : the appearance of the neck was normal [Neck Cervical Mass (___cm)] : no neck mass was observed [] : no respiratory distress [Respiration, Rhythm And Depth] : normal respiratory rhythm and effort [Auscultation Breath Sounds / Voice Sounds] : lungs were clear to auscultation bilaterally [Heart Sounds] : normal S1 and S2 [Heart Rate And Rhythm] : heart rate was normal and rhythm regular [Edema] : there was no peripheral edema [Bowel Sounds] : normal bowel sounds [Abdomen Soft] : soft [Abdomen Tenderness] : non-tender [Abdomen Hernia] : no hernia was discovered [Nail Clubbing] : no clubbing  or cyanosis of the fingernails [Skin Turgor] : normal skin turgor [Oriented To Time, Place, And Person] : oriented to person, place, and time

## 2022-03-24 ENCOUNTER — APPOINTMENT (OUTPATIENT)
Dept: HEPATOLOGY | Facility: CLINIC | Age: 32
End: 2022-03-24
Payer: MEDICAID

## 2022-03-24 PROCEDURE — 91200 LIVER ELASTOGRAPHY: CPT

## 2022-03-25 LAB
ALBUMIN SERPL ELPH-MCNC: 5 G/DL
ALP BLD-CCNC: 99 U/L
ALT SERPL-CCNC: 44 U/L
ANION GAP SERPL CALC-SCNC: 13 MMOL/L
AST SERPL-CCNC: 26 U/L
BASOPHILS # BLD AUTO: 0.05 K/UL
BASOPHILS NFR BLD AUTO: 0.7 %
BILIRUB SERPL-MCNC: 0.3 MG/DL
BUN SERPL-MCNC: 13 MG/DL
CALCIUM SERPL-MCNC: 10.2 MG/DL
CHLORIDE SERPL-SCNC: 102 MMOL/L
CO2 SERPL-SCNC: 28 MMOL/L
CREAT SERPL-MCNC: 0.59 MG/DL
EGFR: 124 ML/MIN/1.73M2
EOSINOPHIL # BLD AUTO: 0.24 K/UL
EOSINOPHIL NFR BLD AUTO: 3.2 %
HCT VFR BLD CALC: 41.2 %
HGB BLD-MCNC: 12.6 G/DL
IMM GRANULOCYTES NFR BLD AUTO: 0.4 %
INR PPP: 1.03 RATIO
LYMPHOCYTES # BLD AUTO: 3.16 K/UL
LYMPHOCYTES NFR BLD AUTO: 42.5 %
MAN DIFF?: NORMAL
MCHC RBC-ENTMCNC: 27.6 PG
MCHC RBC-ENTMCNC: 30.6 GM/DL
MCV RBC AUTO: 90.4 FL
MONOCYTES # BLD AUTO: 0.56 K/UL
MONOCYTES NFR BLD AUTO: 7.5 %
NEUTROPHILS # BLD AUTO: 3.39 K/UL
NEUTROPHILS NFR BLD AUTO: 45.7 %
PLATELET # BLD AUTO: 363 K/UL
POTASSIUM SERPL-SCNC: 4.2 MMOL/L
PROT SERPL-MCNC: 7.3 G/DL
PT BLD: 12.1 SEC
RBC # BLD: 4.56 M/UL
RBC # FLD: 14 %
SODIUM SERPL-SCNC: 142 MMOL/L
WBC # FLD AUTO: 7.43 K/UL

## 2022-04-01 ENCOUNTER — APPOINTMENT (OUTPATIENT)
Dept: INTERNAL MEDICINE | Facility: CLINIC | Age: 32
End: 2022-04-01
Payer: MEDICAID

## 2022-04-01 ENCOUNTER — NON-APPOINTMENT (OUTPATIENT)
Age: 32
End: 2022-04-01

## 2022-04-01 VITALS
HEART RATE: 81 BPM | SYSTOLIC BLOOD PRESSURE: 146 MMHG | HEIGHT: 63 IN | DIASTOLIC BLOOD PRESSURE: 90 MMHG | WEIGHT: 163 LBS | BODY MASS INDEX: 28.88 KG/M2 | OXYGEN SATURATION: 98 % | TEMPERATURE: 98.1 F

## 2022-04-01 VITALS — SYSTOLIC BLOOD PRESSURE: 122 MMHG | DIASTOLIC BLOOD PRESSURE: 76 MMHG

## 2022-04-01 DIAGNOSIS — Z87.09 PERSONAL HISTORY OF OTHER DISEASES OF THE RESPIRATORY SYSTEM: ICD-10-CM

## 2022-04-01 PROCEDURE — 93000 ELECTROCARDIOGRAM COMPLETE: CPT

## 2022-04-01 PROCEDURE — 99214 OFFICE O/P EST MOD 30 MIN: CPT | Mod: 25

## 2022-04-01 RX ORDER — FLUTICASONE PROPIONATE 110 UG/1
110 AEROSOL, METERED RESPIRATORY (INHALATION)
Qty: 12 | Refills: 0 | Status: DISCONTINUED | COMMUNITY
Start: 2020-08-11 | End: 2022-04-01

## 2022-04-01 RX ORDER — ALBUTEROL SULFATE 90 UG/1
108 (90 BASE) INHALANT RESPIRATORY (INHALATION)
Qty: 8 | Refills: 0 | Status: COMPLETED | COMMUNITY
Start: 2020-08-11 | End: 2022-04-01

## 2022-04-04 PROBLEM — Z87.09 HISTORY OF SORE THROAT: Status: RESOLVED | Noted: 2018-06-12 | Resolved: 2022-04-04

## 2022-04-04 NOTE — PHYSICAL EXAM
[Normal Sclera/Conjunctiva] : normal sclera/conjunctiva [No Edema] : there was no peripheral edema [Deep Tendon Reflexes (DTR)] : deep tendon reflexes were 2+ and symmetric [Normal] : affect was normal and insight and judgment were intact

## 2022-04-04 NOTE — HISTORY OF PRESENT ILLNESS
[Asthma] : asthma [No Adverse Anesthesia Reaction] : no adverse anesthesia reaction in self or family member [(Patient denies any chest pain, claudication, dyspnea on exertion, orthopnea, palpitations or syncope)] : Patient denies any chest pain, claudication, dyspnea on exertion, orthopnea, palpitations or syncope [Moderate (4-6 METs)] : Moderate (4-6 METs) [Aortic Stenosis] : no aortic stenosis [Atrial Fibrillation] : no atrial fibrillation [Coronary Artery Disease] : no coronary artery disease [Recent Myocardial Infarction] : no recent myocardial infarction [Implantable Device/Pacemaker] : no implantable device/pacemaker [COPD] : no COPD [Sleep Apnea] : no sleep apnea [Smoker] : not a smoker [Chronic Anticoagulation] : no chronic anticoagulation [Chronic Kidney Disease] : no chronic kidney disease [Diabetes] : no diabetes [FreeTextEntry1] : right hip replacement [FreeTextEntry2] : 4/28/22 [FreeTextEntry3] : Dr. Cornell [FreeTextEntry4] : Patient with history of sickle cell disease s/p bone marrow biopsy without any sickle cell crises since, presents for preoperative examination for right hip replacement.  Patient has had avascular necrosis in the past due to her sickle cell disease is currently undergoing right hip replacement due to continued pain.  Taking Tylenol daily for relief of pain. \par \par For her asthma she has been using her rescue inhalers recently 2 to 3 times weekly due to the cold weather.  She is currently on Vitamin E as recommended by her hepatologist.  She reports having a stroke at the age of 17.  SHe occasionally has migraines which has not been an issue of late.  \par \par She is interested in follow up with an Hematologist as it has been sometime since her last visit.  [FreeTextEntry8] : limited activities due to pain

## 2022-04-07 LAB
LYSOSOMAL ACID LIPASE INTERPRETATION: NORMAL
LYSOSOMAL ACID LIPASE: 129 CD:384473389

## 2022-04-11 PROBLEM — Z11.59 SCREENING FOR VIRAL DISEASE: Status: RESOLVED | Noted: 2020-09-21 | Resolved: 2022-04-11

## 2022-04-14 ENCOUNTER — OUTPATIENT (OUTPATIENT)
Dept: OUTPATIENT SERVICES | Facility: HOSPITAL | Age: 32
LOS: 1 days | Discharge: HOME | End: 2022-04-14

## 2022-04-14 VITALS
OXYGEN SATURATION: 99 % | WEIGHT: 161.6 LBS | HEART RATE: 62 BPM | DIASTOLIC BLOOD PRESSURE: 63 MMHG | SYSTOLIC BLOOD PRESSURE: 134 MMHG | TEMPERATURE: 97 F | RESPIRATION RATE: 17 BRPM | HEIGHT: 64 IN

## 2022-04-14 DIAGNOSIS — Z01.818 ENCOUNTER FOR OTHER PREPROCEDURAL EXAMINATION: ICD-10-CM

## 2022-04-14 DIAGNOSIS — Z96.643 PRESENCE OF ARTIFICIAL HIP JOINT, BILATERAL: ICD-10-CM

## 2022-04-14 DIAGNOSIS — M16.11 UNILATERAL PRIMARY OSTEOARTHRITIS, RIGHT HIP: ICD-10-CM

## 2022-04-14 DIAGNOSIS — Z98.890 OTHER SPECIFIED POSTPROCEDURAL STATES: Chronic | ICD-10-CM

## 2022-04-14 DIAGNOSIS — Z94.81 BONE MARROW TRANSPLANT STATUS: Chronic | ICD-10-CM

## 2022-04-14 DIAGNOSIS — Z96.60 PRESENCE OF UNSPECIFIED ORTHOPEDIC JOINT IMPLANT: Chronic | ICD-10-CM

## 2022-04-14 DIAGNOSIS — Z90.89 ACQUIRED ABSENCE OF OTHER ORGANS: Chronic | ICD-10-CM

## 2022-04-14 DIAGNOSIS — Z90.81 ACQUIRED ABSENCE OF SPLEEN: Chronic | ICD-10-CM

## 2022-04-14 LAB
A1C WITH ESTIMATED AVERAGE GLUCOSE RESULT: 6.6 % — HIGH (ref 4–5.6)
ALBUMIN SERPL ELPH-MCNC: 4.1 G/DL — SIGNIFICANT CHANGE UP (ref 3.3–5)
ALP SERPL-CCNC: 86 U/L — SIGNIFICANT CHANGE UP (ref 40–120)
ALT FLD-CCNC: 55 U/L — SIGNIFICANT CHANGE UP (ref 12–78)
ANION GAP SERPL CALC-SCNC: 4 MMOL/L — LOW (ref 5–17)
APTT BLD: 35.4 SEC — SIGNIFICANT CHANGE UP (ref 27.5–35.5)
AST SERPL-CCNC: 26 U/L — SIGNIFICANT CHANGE UP (ref 15–37)
BASOPHILS # BLD AUTO: 0.07 K/UL — SIGNIFICANT CHANGE UP (ref 0–0.2)
BASOPHILS NFR BLD AUTO: 1 % — SIGNIFICANT CHANGE UP (ref 0–2)
BILIRUB SERPL-MCNC: 0.4 MG/DL — SIGNIFICANT CHANGE UP (ref 0.2–1.2)
BUN SERPL-MCNC: 12 MG/DL — SIGNIFICANT CHANGE UP (ref 7–23)
CALCIUM SERPL-MCNC: 9.9 MG/DL — SIGNIFICANT CHANGE UP (ref 8.5–10.1)
CHLORIDE SERPL-SCNC: 109 MMOL/L — HIGH (ref 96–108)
CO2 SERPL-SCNC: 29 MMOL/L — SIGNIFICANT CHANGE UP (ref 22–31)
CREAT SERPL-MCNC: 0.64 MG/DL — SIGNIFICANT CHANGE UP (ref 0.5–1.3)
EGFR: 122 ML/MIN/1.73M2 — SIGNIFICANT CHANGE UP
EOSINOPHIL # BLD AUTO: 0.16 K/UL — SIGNIFICANT CHANGE UP (ref 0–0.5)
EOSINOPHIL NFR BLD AUTO: 2.3 % — SIGNIFICANT CHANGE UP (ref 0–6)
ESTIMATED AVERAGE GLUCOSE: 143 MG/DL — HIGH (ref 68–114)
GLUCOSE SERPL-MCNC: 122 MG/DL — HIGH (ref 70–99)
HCG SERPL-ACNC: <1 MIU/ML — SIGNIFICANT CHANGE UP
HCT VFR BLD CALC: 37.5 % — SIGNIFICANT CHANGE UP (ref 34.5–45)
HGB BLD-MCNC: 12.1 G/DL — SIGNIFICANT CHANGE UP (ref 11.5–15.5)
IMM GRANULOCYTES NFR BLD AUTO: 0.1 % — SIGNIFICANT CHANGE UP (ref 0–1.5)
INR BLD: 1.05 RATIO — SIGNIFICANT CHANGE UP (ref 0.88–1.16)
LYMPHOCYTES # BLD AUTO: 3.18 K/UL — SIGNIFICANT CHANGE UP (ref 1–3.3)
LYMPHOCYTES # BLD AUTO: 44.7 % — HIGH (ref 13–44)
MCHC RBC-ENTMCNC: 27.8 PG — SIGNIFICANT CHANGE UP (ref 27–34)
MCHC RBC-ENTMCNC: 32.3 G/DL — SIGNIFICANT CHANGE UP (ref 32–36)
MCV RBC AUTO: 86.2 FL — SIGNIFICANT CHANGE UP (ref 80–100)
MONOCYTES # BLD AUTO: 0.64 K/UL — SIGNIFICANT CHANGE UP (ref 0–0.9)
MONOCYTES NFR BLD AUTO: 9 % — SIGNIFICANT CHANGE UP (ref 2–14)
MRSA PCR RESULT.: SIGNIFICANT CHANGE UP
NEUTROPHILS # BLD AUTO: 3.05 K/UL — SIGNIFICANT CHANGE UP (ref 1.8–7.4)
NEUTROPHILS NFR BLD AUTO: 42.9 % — LOW (ref 43–77)
NRBC # BLD: 0 /100 WBCS — SIGNIFICANT CHANGE UP (ref 0–0)
PLATELET # BLD AUTO: 300 K/UL — SIGNIFICANT CHANGE UP (ref 150–400)
POTASSIUM SERPL-MCNC: 4.3 MMOL/L — SIGNIFICANT CHANGE UP (ref 3.5–5.3)
POTASSIUM SERPL-SCNC: 4.3 MMOL/L — SIGNIFICANT CHANGE UP (ref 3.5–5.3)
PROT SERPL-MCNC: 7.6 GM/DL — SIGNIFICANT CHANGE UP (ref 6–8.3)
PROTHROM AB SERPL-ACNC: 12.5 SEC — SIGNIFICANT CHANGE UP (ref 10.5–13.4)
RBC # BLD: 4.35 M/UL — SIGNIFICANT CHANGE UP (ref 3.8–5.2)
RBC # FLD: 13.3 % — SIGNIFICANT CHANGE UP (ref 10.3–14.5)
S AUREUS DNA NOSE QL NAA+PROBE: SIGNIFICANT CHANGE UP
SODIUM SERPL-SCNC: 142 MMOL/L — SIGNIFICANT CHANGE UP (ref 135–145)
WBC # BLD: 7.11 K/UL — SIGNIFICANT CHANGE UP (ref 3.8–10.5)
WBC # FLD AUTO: 7.11 K/UL — SIGNIFICANT CHANGE UP (ref 3.8–10.5)

## 2022-04-14 RX ORDER — ACETAMINOPHEN 500 MG
650 TABLET ORAL ONCE
Refills: 0 | Status: COMPLETED | OUTPATIENT
Start: 2022-04-27 | End: 2022-04-27

## 2022-04-14 RX ORDER — CELECOXIB 200 MG/1
200 CAPSULE ORAL ONCE
Refills: 0 | Status: COMPLETED | OUTPATIENT
Start: 2022-04-27 | End: 2022-04-27

## 2022-04-14 RX ORDER — SODIUM CHLORIDE 9 MG/ML
3 INJECTION INTRAMUSCULAR; INTRAVENOUS; SUBCUTANEOUS EVERY 8 HOURS
Refills: 0 | Status: DISCONTINUED | OUTPATIENT
Start: 2022-04-27 | End: 2022-04-27

## 2022-04-14 NOTE — OCCUPATIONAL THERAPY INITIAL EVALUATION ADULT - PERTINENT HX OF CURRENT PROBLEM, REHAB EVAL
R hip hardware failure requiring revision of hip replacement. Pt is scheduled for R THR revision on 4/27/22.

## 2022-04-14 NOTE — PHYSICAL THERAPY INITIAL EVALUATION ADULT - 30 SECOND CHAIR STAND TEST, REHAB EVAL
30 second Sit to Stand Test: (reps: 10 adaptation: B UE used) Functional assessment of lower extremity strength and ability to maintain balance in standing, discriminates those with low and high levels of functional activity.

## 2022-04-14 NOTE — H&P PST ADULT - HISTORY OF PRESENT ILLNESS
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 Now with recurrent right hip  pain and decrease mobility scheduled for right hip arthroplasty revision   denies recent travels in the past 30 days. No fever, SOB, cough, flu like symptoms or body rash- covid screen  covid vaccine completed

## 2022-04-14 NOTE — H&P PST ADULT - NSICDXPASTSURGICALHX_GEN_ALL_CORE_FT
PAST SURGICAL HISTORY:  H/O bilateral hip replacements     H/O bone marrow transplant 2011    H/O brain surgery intercranial bleeding    H/O splenectomy     History of tonsillectomy

## 2022-04-14 NOTE — PHYSICAL THERAPY INITIAL EVALUATION ADULT - ADDITIONAL COMMENTS
Pt reporting that she lives in a private home with 4 steps to enter with B handrails that are far apart. Once inside there are 14 steps with B handrails that are far apart for the first 7 steps, then 1 handrail to R for the remaining 7. Pt added that there are 2 steps and a landing prior to starting the 14 steps. Bathroom is a walk in shower with about a 2 inch lip to step over (pt has difficulty with foot clearance here), no grab bars, fixed shower head, standard height toilet. Pt reports that a 3:1 commode can fit over toilet seat. Pt has a walking stick, no other dme. Pt reports that her vision has decreased since her last surgical procedure. Pt has had 3 L hip surgery (1 replacement and 2 revisions) and 1 R hip surgery (AMBER when she was about 20 years old). Pain is 4-5/10 at rest and can increase to 10/10 with transfers and positioning. Pt takes advil prn for pain, reports no adverse reactions to pain medications, no recent PT, no falls, experiences buckling often.

## 2022-04-14 NOTE — H&P PST ADULT - NSICDXPASTMEDICALHX_GEN_ALL_CORE_FT
PAST MEDICAL HISTORY:  Abnormal LFTs (liver function tests)     Amenorrhea     Anemia     Blindness, legal     Cerebral vascular accident age 17    Cough     Knee pain, bilateral     Migraine headache     Postnasal drip     Right foot pain chronic    Sickle cell disease

## 2022-04-14 NOTE — PHYSICAL THERAPY INITIAL EVALUATION ADULT - ADL SKILLS, REHAB EVAL
Pt has been wearing a R knee sleeve, does not drive, does not use glasses, no hearing impairments. Pt went to acute rehab after 1 of the L hip surgeries and went home after the last hip surgery./independent

## 2022-04-14 NOTE — H&P PST ADULT - ASSESSMENT
right hip osteoarthritis   CAPRINI SCORE    AGE RELATED RISK FACTORS                                                       MOBILITY RELATED FACTORS  [ ] Age 41-60 years                                            (1 Point)                  [ ] Bed rest                                                        (1 Point)  [ ] Age: 61-74 years                                           (2 Points)                [ ] Plaster cast                                                   (2 Points)  [ ] Age= 75 years                                              (3 Points)                 [ ] Bed bound for more than 72 hours                   (2 Points)    DISEASE RELATED RISK FACTORS                                               GENDER SPECIFIC FACTORS  [ ] Edema in the lower extremities                       (1 Point)                  [ ] Pregnancy                                                     (1 Point)  [ ] Varicose veins                                               (1 Point)                  [ ] Post-partum < 6 weeks                                   (1 Point)             x[ ] BMI > 25 Kg/m2                                            (1 Point)                  [ ] Hormonal therapy  or oral contraception            (1 Point)                 [ ] Sepsis (in the previous month)                        (1 Point)                  [ ] History of pregnancy complications  [ ] Pneumonia or serious lung disease                                               [ ] Unexplained or recurrent                       (1 Point)           (in the previous month)                               (1 Point)  [ ] Abnormal pulmonary function test                     (1 Point)                 SURGERY RELATED RISK FACTORS  [ ] Acute myocardial infarction                              (1 Point)                 [ ]  Section                                            (1 Point)  [ ] Congestive heart failure (in the previous month)  (1 Point)                 [ ] Minor surgery                                                 (1 Point)   [ ] Inflammatory bowel disease                             (1 Point)                 [ ] Arthroscopic surgery                                        (2 Points)  [ ] Central venous access                                    (2 Points)                [ ] General surgery lasting more than 45 minutes   (2 Points)       [ ] Stroke (in the previous month)                          (5 Points)               [ x] Elective arthroplasty                                        (5 Points)                                                                                                                                               HEMATOLOGY RELATED FACTORS                                                 TRAUMA RELATED RISK FACTORS  [ ] Prior episodes of VTE                                     (3 Points)                 [ ] Fracture of the hip, pelvis, or leg                       (5 Points)  [ ] Positive family history for VTE                         (3 Points)                 [ ] Acute spinal cord injury (in the previous month)  (5 Points)  [ ] Prothrombin 05921 A                                      (3 Points)                 [ ] Paralysis  (less than 1 month)                          (5 Points)  [ ] Factor V Leiden                                             (3 Points)                 [ ] Multiple Trauma within 1 month                         (5 Points)  [ ] Lupus anticoagulants                                     (3 Points)                                                           [ ] Anticardiolipin antibodies                                (3 Points)                                                       [ ] High homocysteine in the blood                      (3 Points)                                             [ ] Other congenital or acquired thrombophilia       (3 Points)                                                [ ] Heparin induced thrombocytopenia                  (3 Points)                                          Total Score [    6      ]  Caprini Score 0-2: Low risk, No VTE Prophylaxis required for most patient's, encourage ambulation  Caprini Score 3-6: At Risk, Pharmacologic VTE prophylaxis is indicated for most patients ( in the absence of a contraindication)  Caprini Score Greater than or = 7: High Risk , pharmacologic VTE is indicated for most patients ( in the absence of a contraindication)    Caprini score indicates that the patient is high risk for VTE event ( score 6 or greater). Surgical patient's in this group will benefit from both pharmacologic prophylaxis and intermittent compression devices . Surgical team will determine the balance between VTE  risk and bleeding risk and other clinical considerations

## 2022-04-14 NOTE — PHYSICAL THERAPY INITIAL EVALUATION ADULT - SINGLE LEG BALANCE TEST, REHAB EVAL
One Leg Stand Test (OLST): Right: 0 seconds Left: 0 seconds. Functional test to assess fall risk. Both gait and stair negotiation require components of OLST. Participants unable to perform the OLST for at least 5 seconds are at increased risk for injurious fall.

## 2022-04-14 NOTE — H&P PST ADULT - PROBLEM SELECTOR PLAN 2
Assessment and Plan: labs - cbc,pt/ptt,bmp,t&s,nose cx,ekg  M/C required  preop 3 day hibiclens instruction reviewed and given .instructed on if  nose cx positive use mupuricin 5 days and checklist given  take routine meds DOS with sips of water. avoid NSAID and OTC supplements. verbalized understanding  information on proper nutrition , increase protein and better food choices provided in packet  ensure clear  patient instructed on having covid19 swab 3 days prior to surgery

## 2022-04-14 NOTE — OCCUPATIONAL THERAPY INITIAL EVALUATION ADULT - ADDITIONAL COMMENTS
Pt lives with parents (Who can assist post op) in a private house with 4 steps to enter with bilateral handrails (close together). Once inside, the pt has 14 steps with bilateral handrails (Close together) Pt lives with parents (Who can assist post op) in a private house with 4 steps to enter with bilateral handrails (close together). Once inside, the pt has 14 steps with bilateral handrails (Close together) to reach the main floor where the bedroom and bathroom is. The pts bathroom has a step in shower, fixed shower head, standard toilet seat and no grab bars. The pt reports that a 3/1 commode can fit over the toilet at home. The pt ambulates with a walking stick (Due to vision) and does not own any other device for ambulation. The pt daily pain is a 4-5/10 at rest and a 9-10/10 with movement. The pt manages the pain with rest and Tylenol. The pt has no recent outpatient PT, no recent falls and has buckling of the legs on a weekly basis. The pt does not wear glasses, R handed, does not drive (uses ABLE ride) and has no hearing impairments.

## 2022-04-15 LAB — VIT D25+D1,25 OH+D1,25 PNL SERPL-MCNC: 70.5 PG/ML — SIGNIFICANT CHANGE UP (ref 19.9–79.3)

## 2022-04-25 ENCOUNTER — OUTPATIENT (OUTPATIENT)
Dept: OUTPATIENT SERVICES | Facility: HOSPITAL | Age: 32
LOS: 1 days | Discharge: HOME | End: 2022-04-25

## 2022-04-25 DIAGNOSIS — U07.1 COVID-19: ICD-10-CM

## 2022-04-25 DIAGNOSIS — Z90.81 ACQUIRED ABSENCE OF SPLEEN: Chronic | ICD-10-CM

## 2022-04-25 DIAGNOSIS — Z98.890 OTHER SPECIFIED POSTPROCEDURAL STATES: Chronic | ICD-10-CM

## 2022-04-25 DIAGNOSIS — Z90.89 ACQUIRED ABSENCE OF OTHER ORGANS: Chronic | ICD-10-CM

## 2022-04-25 DIAGNOSIS — Z96.60 PRESENCE OF UNSPECIFIED ORTHOPEDIC JOINT IMPLANT: Chronic | ICD-10-CM

## 2022-04-25 DIAGNOSIS — Z94.81 BONE MARROW TRANSPLANT STATUS: Chronic | ICD-10-CM

## 2022-04-25 PROBLEM — I63.9 CEREBRAL INFARCTION, UNSPECIFIED: Chronic | Status: ACTIVE | Noted: 2018-07-17

## 2022-04-25 LAB
FLUAV AG NPH QL: SIGNIFICANT CHANGE UP
FLUBV AG NPH QL: SIGNIFICANT CHANGE UP
SARS-COV-2 RNA SPEC QL NAA+PROBE: SIGNIFICANT CHANGE UP

## 2022-04-26 ENCOUNTER — TRANSCRIPTION ENCOUNTER (OUTPATIENT)
Age: 32
End: 2022-04-26

## 2022-04-27 ENCOUNTER — TRANSCRIPTION ENCOUNTER (OUTPATIENT)
Age: 32
End: 2022-04-27

## 2022-04-27 ENCOUNTER — APPOINTMENT (OUTPATIENT)
Dept: ORTHOPEDIC SURGERY | Facility: HOSPITAL | Age: 32
End: 2022-04-27
Payer: MEDICAID

## 2022-04-27 ENCOUNTER — INPATIENT (INPATIENT)
Facility: HOSPITAL | Age: 32
LOS: 1 days | Discharge: HOME | End: 2022-04-29
Attending: ORTHOPAEDIC SURGERY | Admitting: ORTHOPAEDIC SURGERY
Payer: MEDICAID

## 2022-04-27 ENCOUNTER — RESULT REVIEW (OUTPATIENT)
Age: 32
End: 2022-04-27

## 2022-04-27 VITALS
RESPIRATION RATE: 13 BRPM | SYSTOLIC BLOOD PRESSURE: 119 MMHG | OXYGEN SATURATION: 100 % | HEIGHT: 64 IN | WEIGHT: 162.92 LBS | DIASTOLIC BLOOD PRESSURE: 80 MMHG | HEART RATE: 68 BPM | TEMPERATURE: 98 F

## 2022-04-27 DIAGNOSIS — Z94.81 BONE MARROW TRANSPLANT STATUS: Chronic | ICD-10-CM

## 2022-04-27 DIAGNOSIS — Z90.81 ACQUIRED ABSENCE OF SPLEEN: Chronic | ICD-10-CM

## 2022-04-27 DIAGNOSIS — Z98.890 OTHER SPECIFIED POSTPROCEDURAL STATES: Chronic | ICD-10-CM

## 2022-04-27 DIAGNOSIS — Z90.89 ACQUIRED ABSENCE OF OTHER ORGANS: Chronic | ICD-10-CM

## 2022-04-27 DIAGNOSIS — Z96.60 PRESENCE OF UNSPECIFIED ORTHOPEDIC JOINT IMPLANT: Chronic | ICD-10-CM

## 2022-04-27 LAB
ANION GAP SERPL CALC-SCNC: 7 MMOL/L — SIGNIFICANT CHANGE UP (ref 5–17)
BUN SERPL-MCNC: 15 MG/DL — SIGNIFICANT CHANGE UP (ref 7–23)
CALCIUM SERPL-MCNC: 8.3 MG/DL — LOW (ref 8.5–10.1)
CHLORIDE SERPL-SCNC: 108 MMOL/L — SIGNIFICANT CHANGE UP (ref 96–108)
CO2 SERPL-SCNC: 24 MMOL/L — SIGNIFICANT CHANGE UP (ref 22–31)
CREAT SERPL-MCNC: 0.59 MG/DL — SIGNIFICANT CHANGE UP (ref 0.5–1.3)
EGFR: 124 ML/MIN/1.73M2 — SIGNIFICANT CHANGE UP
GLUCOSE BLDC GLUCOMTR-MCNC: 201 MG/DL — HIGH (ref 70–99)
GLUCOSE BLDC GLUCOMTR-MCNC: 217 MG/DL — HIGH (ref 70–99)
GLUCOSE SERPL-MCNC: 196 MG/DL — HIGH (ref 70–99)
HCG UR QL: NEGATIVE — SIGNIFICANT CHANGE UP
HCT VFR BLD CALC: 30.1 % — LOW (ref 34.5–45)
HGB BLD-MCNC: 9.9 G/DL — LOW (ref 11.5–15.5)
MCHC RBC-ENTMCNC: 27.8 PG — SIGNIFICANT CHANGE UP (ref 27–34)
MCHC RBC-ENTMCNC: 32.9 G/DL — SIGNIFICANT CHANGE UP (ref 32–36)
MCV RBC AUTO: 84.6 FL — SIGNIFICANT CHANGE UP (ref 80–100)
NRBC # BLD: 0 /100 WBCS — SIGNIFICANT CHANGE UP (ref 0–0)
PLATELET # BLD AUTO: 318 K/UL — SIGNIFICANT CHANGE UP (ref 150–400)
POTASSIUM SERPL-MCNC: 3.7 MMOL/L — SIGNIFICANT CHANGE UP (ref 3.5–5.3)
POTASSIUM SERPL-SCNC: 3.7 MMOL/L — SIGNIFICANT CHANGE UP (ref 3.5–5.3)
RBC # BLD: 3.56 M/UL — LOW (ref 3.8–5.2)
RBC # FLD: 13.1 % — SIGNIFICANT CHANGE UP (ref 10.3–14.5)
SODIUM SERPL-SCNC: 139 MMOL/L — SIGNIFICANT CHANGE UP (ref 135–145)
WBC # BLD: 21.72 K/UL — HIGH (ref 3.8–10.5)
WBC # FLD AUTO: 21.72 K/UL — HIGH (ref 3.8–10.5)

## 2022-04-27 PROCEDURE — 27134 REVISE HIP JOINT REPLACEMENT: CPT | Mod: RT

## 2022-04-27 PROCEDURE — 73501 X-RAY EXAM HIP UNI 1 VIEW: CPT | Mod: 26,RT

## 2022-04-27 PROCEDURE — 88300 SURGICAL PATH GROSS: CPT | Mod: 26,59

## 2022-04-27 PROCEDURE — 88305 TISSUE EXAM BY PATHOLOGIST: CPT | Mod: 26

## 2022-04-27 PROCEDURE — 27134 REVISE HIP JOINT REPLACEMENT: CPT | Mod: AS,RT

## 2022-04-27 DEVICE — WIRE 2806 0085S: Type: IMPLANTABLE DEVICE | Site: RIGHT | Status: FUNCTIONAL

## 2022-04-27 DEVICE — IMPLANTABLE DEVICE: Type: IMPLANTABLE DEVICE | Site: RIGHT | Status: FUNCTIONAL

## 2022-04-27 DEVICE — PINNACLE ALTRX  PLUS 4 NEUT 32 X 48: Type: IMPLANTABLE DEVICE | Site: RIGHT | Status: FUNCTIONAL

## 2022-04-27 DEVICE — HEAD BIOLOX CERAMIC PLUS5 32MM: Type: IMPLANTABLE DEVICE | Site: RIGHT | Status: FUNCTIONAL

## 2022-04-27 RX ORDER — ACETAMINOPHEN 500 MG
975 TABLET ORAL EVERY 8 HOURS
Refills: 0 | Status: DISCONTINUED | OUTPATIENT
Start: 2022-04-27 | End: 2022-04-29

## 2022-04-27 RX ORDER — PANTOPRAZOLE SODIUM 20 MG/1
40 TABLET, DELAYED RELEASE ORAL
Refills: 0 | Status: DISCONTINUED | OUTPATIENT
Start: 2022-04-27 | End: 2022-04-29

## 2022-04-27 RX ORDER — ONDANSETRON 8 MG/1
4 TABLET, FILM COATED ORAL ONCE
Refills: 0 | Status: DISCONTINUED | OUTPATIENT
Start: 2022-04-27 | End: 2022-04-27

## 2022-04-27 RX ORDER — HYDROMORPHONE HYDROCHLORIDE 2 MG/ML
0.5 INJECTION INTRAMUSCULAR; INTRAVENOUS; SUBCUTANEOUS ONCE
Refills: 0 | Status: COMPLETED | OUTPATIENT
Start: 2022-04-27

## 2022-04-27 RX ORDER — ONDANSETRON 8 MG/1
4 TABLET, FILM COATED ORAL EVERY 6 HOURS
Refills: 0 | Status: DISCONTINUED | OUTPATIENT
Start: 2022-04-27 | End: 2022-04-29

## 2022-04-27 RX ORDER — OXYCODONE HYDROCHLORIDE 5 MG/1
10 TABLET ORAL EVERY 4 HOURS
Refills: 0 | Status: DISCONTINUED | OUTPATIENT
Start: 2022-04-27 | End: 2022-04-29

## 2022-04-27 RX ORDER — ASPIRIN/CALCIUM CARB/MAGNESIUM 324 MG
81 TABLET ORAL
Refills: 0 | Status: DISCONTINUED | OUTPATIENT
Start: 2022-04-28 | End: 2022-04-29

## 2022-04-27 RX ORDER — MAGNESIUM HYDROXIDE 400 MG/1
30 TABLET, CHEWABLE ORAL DAILY
Refills: 0 | Status: DISCONTINUED | OUTPATIENT
Start: 2022-04-27 | End: 2022-04-29

## 2022-04-27 RX ORDER — HYDROMORPHONE HYDROCHLORIDE 2 MG/ML
0.5 INJECTION INTRAMUSCULAR; INTRAVENOUS; SUBCUTANEOUS
Refills: 0 | Status: DISCONTINUED | OUTPATIENT
Start: 2022-04-27 | End: 2022-04-27

## 2022-04-27 RX ORDER — CEFAZOLIN SODIUM 1 G
2000 VIAL (EA) INJECTION EVERY 8 HOURS
Refills: 0 | Status: COMPLETED | OUTPATIENT
Start: 2022-04-27 | End: 2022-04-28

## 2022-04-27 RX ORDER — KETOROLAC TROMETHAMINE 30 MG/ML
30 SYRINGE (ML) INJECTION EVERY 8 HOURS
Refills: 0 | Status: DISCONTINUED | OUTPATIENT
Start: 2022-04-27 | End: 2022-04-28

## 2022-04-27 RX ORDER — HYDROMORPHONE HYDROCHLORIDE 2 MG/ML
1 INJECTION INTRAMUSCULAR; INTRAVENOUS; SUBCUTANEOUS
Refills: 0 | Status: DISCONTINUED | OUTPATIENT
Start: 2022-04-27 | End: 2022-04-27

## 2022-04-27 RX ORDER — OXYCODONE HYDROCHLORIDE 5 MG/1
5 TABLET ORAL EVERY 4 HOURS
Refills: 0 | Status: DISCONTINUED | OUTPATIENT
Start: 2022-04-27 | End: 2022-04-29

## 2022-04-27 RX ORDER — ACETAMINOPHEN 500 MG
1000 TABLET ORAL ONCE
Refills: 0 | Status: COMPLETED | OUTPATIENT
Start: 2022-04-27 | End: 2022-04-27

## 2022-04-27 RX ORDER — OXYCODONE HYDROCHLORIDE 5 MG/1
5 TABLET ORAL EVERY 4 HOURS
Refills: 0 | Status: DISCONTINUED | OUTPATIENT
Start: 2022-04-27 | End: 2022-04-28

## 2022-04-27 RX ORDER — LANOLIN ALCOHOL/MO/W.PET/CERES
3 CREAM (GRAM) TOPICAL AT BEDTIME
Refills: 0 | Status: DISCONTINUED | OUTPATIENT
Start: 2022-04-28 | End: 2022-04-29

## 2022-04-27 RX ORDER — SENNA PLUS 8.6 MG/1
2 TABLET ORAL AT BEDTIME
Refills: 0 | Status: DISCONTINUED | OUTPATIENT
Start: 2022-04-27 | End: 2022-04-29

## 2022-04-27 RX ORDER — CELECOXIB 200 MG/1
200 CAPSULE ORAL EVERY 12 HOURS
Refills: 0 | Status: DISCONTINUED | OUTPATIENT
Start: 2022-04-28 | End: 2022-04-29

## 2022-04-27 RX ORDER — ACETAMINOPHEN 500 MG
1000 TABLET ORAL ONCE
Refills: 0 | Status: DISCONTINUED | OUTPATIENT
Start: 2022-04-27 | End: 2022-04-27

## 2022-04-27 RX ORDER — SODIUM CHLORIDE 9 MG/ML
1000 INJECTION, SOLUTION INTRAVENOUS
Refills: 0 | Status: DISCONTINUED | OUTPATIENT
Start: 2022-04-27 | End: 2022-04-27

## 2022-04-27 RX ORDER — ASCORBIC ACID 60 MG
500 TABLET,CHEWABLE ORAL
Refills: 0 | Status: DISCONTINUED | OUTPATIENT
Start: 2022-04-27 | End: 2022-04-29

## 2022-04-27 RX ORDER — SODIUM CHLORIDE 9 MG/ML
1000 INJECTION INTRAMUSCULAR; INTRAVENOUS; SUBCUTANEOUS
Refills: 0 | Status: DISCONTINUED | OUTPATIENT
Start: 2022-04-27 | End: 2022-04-28

## 2022-04-27 RX ORDER — BENZOCAINE AND MENTHOL 5; 1 G/100ML; G/100ML
1 LIQUID ORAL EVERY 4 HOURS
Refills: 0 | Status: DISCONTINUED | OUTPATIENT
Start: 2022-04-28 | End: 2022-04-29

## 2022-04-27 RX ADMIN — Medication 650 MILLIGRAM(S): at 14:01

## 2022-04-27 RX ADMIN — OXYCODONE HYDROCHLORIDE 5 MILLIGRAM(S): 5 TABLET ORAL at 22:50

## 2022-04-27 RX ADMIN — SODIUM CHLORIDE 100 MILLILITER(S): 9 INJECTION, SOLUTION INTRAVENOUS at 18:46

## 2022-04-27 RX ADMIN — Medication 400 MILLIGRAM(S): at 22:50

## 2022-04-27 RX ADMIN — SENNA PLUS 2 TABLET(S): 8.6 TABLET ORAL at 22:49

## 2022-04-27 RX ADMIN — Medication 30 MILLIGRAM(S): at 21:00

## 2022-04-27 RX ADMIN — HYDROMORPHONE HYDROCHLORIDE 0.5 MILLIGRAM(S): 2 INJECTION INTRAMUSCULAR; INTRAVENOUS; SUBCUTANEOUS at 18:45

## 2022-04-27 RX ADMIN — HYDROMORPHONE HYDROCHLORIDE 0.5 MILLIGRAM(S): 2 INJECTION INTRAMUSCULAR; INTRAVENOUS; SUBCUTANEOUS at 19:17

## 2022-04-27 RX ADMIN — OXYCODONE HYDROCHLORIDE 5 MILLIGRAM(S): 5 TABLET ORAL at 23:50

## 2022-04-27 RX ADMIN — Medication 30 MILLIGRAM(S): at 20:39

## 2022-04-27 RX ADMIN — CELECOXIB 200 MILLIGRAM(S): 200 CAPSULE ORAL at 14:01

## 2022-04-27 RX ADMIN — Medication 1000 MILLIGRAM(S): at 23:05

## 2022-04-27 RX ADMIN — Medication 100 MILLIGRAM(S): at 22:54

## 2022-04-27 NOTE — PHYSICAL THERAPY INITIAL EVALUATION ADULT - AMBULATION SKILLS, REHAB EVAL
Independent in home walking & in her job. For unknown & new places, needs someone to walk besides her 2* visual deficits   Pt has been wearing a R knee sleeve,/independent/needed assist

## 2022-04-27 NOTE — OCCUPATIONAL THERAPY INITIAL EVALUATION ADULT - ADDITIONAL COMMENTS
Pre-op confirmed: Pt lives with parents  in a private house with 4 steps to enter with bilateral handrails (close together). Once inside, the pt has 14 steps with bilateral handrails (Close together) to reach the main floor where the bedroom and bathroom is. The pts bathroom has a step in shower, fixed shower head, standard toilet seat and no grab bars. The pt reports that a 3/1 commode can fit over the toilet at home. The pt ambulates with a walking stick (Due to vision)  The pt does not wear glasses, R handed, does not drive (uses ABLE ride) and has no hearing impairments.

## 2022-04-27 NOTE — PHYSICAL THERAPY INITIAL EVALUATION ADULT - ADDITIONAL COMMENTS
Pt reporting that she lives in a private home with 4 steps to enter with B handrails that are close by per this eval but  far apart per PST eval . Once inside there are 14 steps with B handrails that are far apart for the first 7 steps, then 1 handrail to R for the remaining 7. Pt added that there are 2 steps and a landing prior to starting the 14 steps.  Pt has a walking stick, no other dme. Pt reports that her vision has decreased since her last surgical procedure. Pt has had 3 L hip surgery (1 replacement and 2 revisions) and 1 R hip surgery (AMBER when she was about 20 years old).  no recent PT, no falls, experiences buckling often.

## 2022-04-27 NOTE — OCCUPATIONAL THERAPY INITIAL EVALUATION ADULT - PRECAUTIONS/LIMITATIONS, REHAB EVAL
Pt has limited vision due to hx of sickle cell crisis./fall precautions/right hip precautions/vision precautions

## 2022-04-27 NOTE — PHYSICAL THERAPY INITIAL EVALUATION ADULT - GAIT TRAINING, PT EVAL
Pt will be able to ambulate using assistive device up to 200 ft or more, safely observing proper gait, posture and prevent falls.

## 2022-04-27 NOTE — OCCUPATIONAL THERAPY INITIAL EVALUATION ADULT - TRANSFER SAFETY CONCERNS NOTED: SIT/STAND, REHAB EVAL
s/p R AMBER revision/decreased sequencing ability/decreased step length/decreased weight-shifting ability

## 2022-04-27 NOTE — PHYSICAL THERAPY INITIAL EVALUATION ADULT - PERTINENT HX OF CURRENT PROBLEM, REHAB EVAL
Pt has h/o ch R hip pain- underwent elective Revision THR- Pt has extensive Medico-sx history - CVA @ age of 17, brain Sx, splenectomy, Bone marrow transplant, B/l THR , B/l knee pain +R foot pain    Pt went to acute rehab after 1 of the L hip surgeries and went home after the last hip surgery.

## 2022-04-27 NOTE — OCCUPATIONAL THERAPY INITIAL EVALUATION ADULT - GENERAL OBSERVATIONS, REHAB EVAL
Chart reviewed and event noted to date. Patient encountered supine in bed, NAD, A&Ox4, WBAT RLE, +abduction pillow in place, +dressing to R hip C/D/I, +NAVID dressing, c/o pain in R hip 4/10 at rest and 9/10 w/activity s/p R AMBER revision.

## 2022-04-27 NOTE — PHYSICAL THERAPY INITIAL EVALUATION ADULT - GENERAL OBSERVATIONS, REHAB EVAL
Patient received supine NAD.+ Abd pillow +NAVID, Mom @ bedside, staying overnight .Pt has dec vision B eyes L worse than R eye. A x O 4. Able to follow 2 step commands. THP reviewed throughout session. Patient received supine NAD.+ Abd pillow +NAVID, Mom @ bedside, staying overnight .Pt has dec vision B eyes L worse than R eye. A x O 4. Able to follow 2 step commands. THP reviewed throughout session. 4/10 R hip pain at begionning of PT session and 9/10 post session- RN made aware

## 2022-04-27 NOTE — CONSULT NOTE ADULT - SUBJECTIVE AND OBJECTIVE BOX
DARRELL MOONEY is a 31y Female s/p RIGHT TOTAL HIP ARTHROPLASTY REVISION EXTENDED TROCHANTERIC      w/ h/o Sickle cell disease    Blindness, legal    Cerebral vascular accident    Abnormal LFTs (liver function tests)    Amenorrhea    Anemia    Right foot pain    Migraine headache    Cough    Knee pain, bilateral    Postnasal drip      denies any chest pain shortness of breath palpitation dizziness lightheadedness nausea vomiting fever or chills    H/O bilateral hip replacements    H/O bone marrow transplant    History of tonsillectomy    H/O splenectomy    H/O brain surgery      No pertinent family history in first degree relatives      SH: doesnot smoke or drink at this time    apple (Anaphylaxis)  Exjade (Rash)    acetaminophen     Tablet .. 975 milliGRAM(s) Oral every 8 hours  acetaminophen   IVPB .. 1000 milliGRAM(s) IV Intermittent once  ascorbic acid 500 milliGRAM(s) Oral two times a day  ceFAZolin   IVPB 2000 milliGRAM(s) IV Intermittent every 8 hours  HYDROmorphone  Injectable 0.5 milliGRAM(s) IV Push once  ketorolac   Injectable 30 milliGRAM(s) IV Push every 8 hours  magnesium hydroxide Suspension 30 milliLiter(s) Oral daily PRN  multivitamin 1 Tablet(s) Oral daily  ondansetron Injectable 4 milliGRAM(s) IV Push every 6 hours PRN  oxyCODONE    IR 5 milliGRAM(s) Oral every 4 hours PRN  oxyCODONE    IR 10 milliGRAM(s) Oral every 4 hours PRN  oxyCODONE    IR 5 milliGRAM(s) Oral every 4 hours  pantoprazole    Tablet 40 milliGRAM(s) Oral before breakfast  senna 2 Tablet(s) Oral at bedtime  sodium chloride 0.9%. 1000 milliLiter(s) IV Continuous <Continuous>    T(C): 36.6 (04-27-22 @ 21:23), Max: 36.9 (04-27-22 @ 13:12)  HR: 96 (04-27-22 @ 21:23) (68 - 96)  BP: 133/81 (04-27-22 @ 21:23) (119/80 - 143/79)  RR: 16 (04-27-22 @ 21:23) (13 - 20)  SpO2: 96% (04-27-22 @ 21:23) (96% - 100%)  HEENT unremarkable  neck no JVD or bruit  heart normal S1 S2 RRR no gallops or rubs  chest clear to auscultation  abd sof nontender non distended +bs  ext no calf tenderness    A/P   DVT PX  pain control  bowel regimen   wound care as per ortho  GI PX  antiemetics prn  incentive spirometer

## 2022-04-27 NOTE — PROGRESS NOTE ADULT - SUBJECTIVE AND OBJECTIVE BOX
Postop Check    Patient tolerated the procedure well. Patient seen and examined at bedside.  No acute complaints at this time. Pain well controlled. Denies chest pain, shortness of breath, nausea or vomiting.     PE:  Vital Signs Last 24 Hrs  T(C): 36.5 (04-27-22 @ 19:18), Max: 36.9 (04-27-22 @ 13:12)  T(F): 97.7 (04-27-22 @ 19:18), Max: 98.5 (04-27-22 @ 13:12)  HR: 72 (04-27-22 @ 19:33) (68 - 91)  BP: 128/78 (04-27-22 @ 19:33) (119/80 - 141/77)  BP(mean): --  RR: 15 (04-27-22 @ 19:33) (13 - 20)  SpO2: 100% (04-27-22 @ 19:33) (100% - 100%)    General: NAD, resting comfortably in bed  RLE:   Dressing in place C/D/I, abduction pillow in place  SCD in place bilaterally  No calf tenderness   TA/EHL/FHL/GSC+  SILT L2-S1  DP+                          9.9    21.72 )-----------( 318      ( 27 Apr 2022 18:58 )             30.1     27 Apr 2022 18:58    139    |  108    |  15     ----------------------------<  196    3.7     |  24     |  0.59     Ca    8.3        27 Apr 2022 18:58          A/P:  31y f s/p R revision AMBER POD 0  -PT/OT -WBAT  -Pain Control  -DVT ppx A81 BID  -Continue perioperative abx x 24 hours  -FU AM Labs  -Rest, ice, compress the extremity as needed  -Incentive Spirometry  -Dispo pending PT evaluation    Ortho

## 2022-04-27 NOTE — PATIENT PROFILE ADULT - FALL HARM RISK - HARM RISK INTERVENTIONS
Assistance with ambulation/Assistance OOB with selected safe patient handling equipment/Communicate Risk of Fall with Harm to all staff/Discuss with provider need for PT consult/Monitor gait and stability/Reinforce activity limits and safety measures with patient and family/Tailored Fall Risk Interventions/Visual Cue: Yellow wristband and red socks/Bed in lowest position, wheels locked, appropriate side rails in place/Call bell, personal items and telephone in reach/Instruct patient to call for assistance before getting out of bed or chair/Non-slip footwear when patient is out of bed/[place] to call system/Physically safe environment - no spills, clutter or unnecessary equipment/Purposeful Proactive Rounding/Room/bathroom lighting operational, light cord in reach

## 2022-04-28 ENCOUNTER — TRANSCRIPTION ENCOUNTER (OUTPATIENT)
Age: 32
End: 2022-04-28

## 2022-04-28 LAB
ANION GAP SERPL CALC-SCNC: 10 MMOL/L — SIGNIFICANT CHANGE UP (ref 5–17)
BUN SERPL-MCNC: 12 MG/DL — SIGNIFICANT CHANGE UP (ref 7–23)
CALCIUM SERPL-MCNC: 8.8 MG/DL — SIGNIFICANT CHANGE UP (ref 8.5–10.1)
CHLORIDE SERPL-SCNC: 108 MMOL/L — SIGNIFICANT CHANGE UP (ref 96–108)
CO2 SERPL-SCNC: 22 MMOL/L — SIGNIFICANT CHANGE UP (ref 22–31)
CREAT SERPL-MCNC: 0.88 MG/DL — SIGNIFICANT CHANGE UP (ref 0.5–1.3)
EGFR: 91 ML/MIN/1.73M2 — SIGNIFICANT CHANGE UP
GLUCOSE SERPL-MCNC: 313 MG/DL — HIGH (ref 70–99)
GRAM STN FLD: SIGNIFICANT CHANGE UP
HCT VFR BLD CALC: 28.8 % — LOW (ref 34.5–45)
HGB BLD-MCNC: 9.6 G/DL — LOW (ref 11.5–15.5)
MCHC RBC-ENTMCNC: 28.3 PG — SIGNIFICANT CHANGE UP (ref 27–34)
MCHC RBC-ENTMCNC: 33.3 G/DL — SIGNIFICANT CHANGE UP (ref 32–36)
MCV RBC AUTO: 85 FL — SIGNIFICANT CHANGE UP (ref 80–100)
NRBC # BLD: 0 /100 WBCS — SIGNIFICANT CHANGE UP (ref 0–0)
PLATELET # BLD AUTO: 274 K/UL — SIGNIFICANT CHANGE UP (ref 150–400)
POTASSIUM SERPL-MCNC: 3.7 MMOL/L — SIGNIFICANT CHANGE UP (ref 3.5–5.3)
POTASSIUM SERPL-SCNC: 3.7 MMOL/L — SIGNIFICANT CHANGE UP (ref 3.5–5.3)
RBC # BLD: 3.39 M/UL — LOW (ref 3.8–5.2)
RBC # FLD: 13.3 % — SIGNIFICANT CHANGE UP (ref 10.3–14.5)
SODIUM SERPL-SCNC: 140 MMOL/L — SIGNIFICANT CHANGE UP (ref 135–145)
SPECIMEN SOURCE: SIGNIFICANT CHANGE UP
WBC # BLD: 14.1 K/UL — HIGH (ref 3.8–10.5)
WBC # FLD AUTO: 14.1 K/UL — HIGH (ref 3.8–10.5)

## 2022-04-28 RX ORDER — DEXAMETHASONE 0.5 MG/5ML
10 ELIXIR ORAL ONCE
Refills: 0 | Status: COMPLETED | OUTPATIENT
Start: 2022-04-28 | End: 2022-04-28

## 2022-04-28 RX ORDER — HYDROMORPHONE HYDROCHLORIDE 2 MG/ML
0.5 INJECTION INTRAMUSCULAR; INTRAVENOUS; SUBCUTANEOUS ONCE
Refills: 0 | Status: DISCONTINUED | OUTPATIENT
Start: 2022-04-28 | End: 2022-04-29

## 2022-04-28 RX ADMIN — OXYCODONE HYDROCHLORIDE 10 MILLIGRAM(S): 5 TABLET ORAL at 13:39

## 2022-04-28 RX ADMIN — Medication 500 MILLIGRAM(S): at 06:42

## 2022-04-28 RX ADMIN — Medication 500 MILLIGRAM(S): at 17:38

## 2022-04-28 RX ADMIN — Medication 100 MILLIGRAM(S): at 07:09

## 2022-04-28 RX ADMIN — OXYCODONE HYDROCHLORIDE 10 MILLIGRAM(S): 5 TABLET ORAL at 17:39

## 2022-04-28 RX ADMIN — Medication 975 MILLIGRAM(S): at 07:21

## 2022-04-28 RX ADMIN — Medication 30 MILLIGRAM(S): at 04:22

## 2022-04-28 RX ADMIN — CELECOXIB 200 MILLIGRAM(S): 200 CAPSULE ORAL at 17:44

## 2022-04-28 RX ADMIN — MAGNESIUM HYDROXIDE 30 MILLILITER(S): 400 TABLET, CHEWABLE ORAL at 15:12

## 2022-04-28 RX ADMIN — Medication 30 MILLIGRAM(S): at 11:13

## 2022-04-28 RX ADMIN — OXYCODONE HYDROCHLORIDE 10 MILLIGRAM(S): 5 TABLET ORAL at 14:38

## 2022-04-28 RX ADMIN — PANTOPRAZOLE SODIUM 40 MILLIGRAM(S): 20 TABLET, DELAYED RELEASE ORAL at 06:27

## 2022-04-28 RX ADMIN — SODIUM CHLORIDE 125 MILLILITER(S): 9 INJECTION INTRAMUSCULAR; INTRAVENOUS; SUBCUTANEOUS at 04:22

## 2022-04-28 RX ADMIN — Medication 30 MILLIGRAM(S): at 11:28

## 2022-04-28 RX ADMIN — Medication 975 MILLIGRAM(S): at 23:40

## 2022-04-28 RX ADMIN — OXYCODONE HYDROCHLORIDE 10 MILLIGRAM(S): 5 TABLET ORAL at 18:23

## 2022-04-28 RX ADMIN — CELECOXIB 200 MILLIGRAM(S): 200 CAPSULE ORAL at 06:28

## 2022-04-28 RX ADMIN — Medication 1 TABLET(S): at 11:13

## 2022-04-28 RX ADMIN — SENNA PLUS 2 TABLET(S): 8.6 TABLET ORAL at 22:42

## 2022-04-28 RX ADMIN — CELECOXIB 200 MILLIGRAM(S): 200 CAPSULE ORAL at 17:38

## 2022-04-28 RX ADMIN — Medication 975 MILLIGRAM(S): at 13:38

## 2022-04-28 RX ADMIN — Medication 975 MILLIGRAM(S): at 22:42

## 2022-04-28 RX ADMIN — Medication 81 MILLIGRAM(S): at 06:28

## 2022-04-28 RX ADMIN — Medication 30 MILLIGRAM(S): at 04:45

## 2022-04-28 RX ADMIN — Medication 975 MILLIGRAM(S): at 06:39

## 2022-04-28 RX ADMIN — Medication 81 MILLIGRAM(S): at 17:50

## 2022-04-28 RX ADMIN — OXYCODONE HYDROCHLORIDE 5 MILLIGRAM(S): 5 TABLET ORAL at 06:39

## 2022-04-28 RX ADMIN — Medication 102 MILLIGRAM(S): at 06:27

## 2022-04-28 RX ADMIN — OXYCODONE HYDROCHLORIDE 5 MILLIGRAM(S): 5 TABLET ORAL at 07:21

## 2022-04-28 RX ADMIN — SODIUM CHLORIDE 125 MILLILITER(S): 9 INJECTION INTRAMUSCULAR; INTRAVENOUS; SUBCUTANEOUS at 06:28

## 2022-04-28 RX ADMIN — CELECOXIB 200 MILLIGRAM(S): 200 CAPSULE ORAL at 07:21

## 2022-04-28 RX ADMIN — Medication 975 MILLIGRAM(S): at 14:38

## 2022-04-28 NOTE — PROGRESS NOTE ADULT - SUBJECTIVE AND OBJECTIVE BOX
Patient is seen and examined at bedside. Denies CP/SOB/Dizziness/N/V/D/HA. Pain is controlled.     Vital Signs Last 24 Hrs  T(C): 36.4 (28 Apr 2022 09:25), Max: 36.6 (27 Apr 2022 19:45)  T(F): 97.5 (28 Apr 2022 09:25), Max: 97.9 (27 Apr 2022 21:23)  HR: 92 (28 Apr 2022 09:25) (70 - 96)  BP: 114/77 (28 Apr 2022 09:25) (114/77 - 143/79)  BP(mean): --  RR: 16 (28 Apr 2022 09:25) (13 - 20)  SpO2: 95% (28 Apr 2022 09:25) (95% - 100%)    GEN: NAD  ABD: soft, NT/ND; no rebound or guarding;  Neurologic: AAOx3; CNS grossly intact; no focal deficits  RLE: Prineo dressing C/D/I. Motor intact + EHL/FHL/TA/GS. Sensation is grossly intact.  Extremities warm. . Compartments soft, compressible. No calf tenderness. DP 2+.  LLE:  Motor intact + EHL/FHL/TA/GS. Sensation is grossly intact. Extremities warm. Compartments soft, compressible. No calf tenderness.. DP 2+.    Labs:                          9.6    14.10 )-----------( 274      ( 28 Apr 2022 09:49 )             28.8       04-28    140  |  108  |  12  ----------------------------<  313<H>  3.7   |  22  |  0.88    Ca    8.8      28 Apr 2022 09:49        A/P: Patient is a 31y y/o Female s/p right AMBER, POD # 1  -wound care, isometric exercises, GI motility, new medications, hip precautions,  hospital course and discharge planning reviewed with pt  -Pain control/analgesia reviewed   -Inc spirometry reviewed and counseled  -Venodynes/foot pumps  -F/U AM Labs  -PT/OT/WBAT  -Anticoagulation: asa 81mg BID  -DC plan - home tomorrow  -Pt seen with DR Burns

## 2022-04-28 NOTE — DISCHARGE NOTE PROVIDER - NSDCFUADDINST_GEN_ALL_CORE_FT
Copy received from patient care.  
Keep Prineo Dressing Clean, Dry and Intact. May shower with Prineo Dressing. Please do not scrub, soak, peel or pick at the prineo dressing. No creams, lotions, or oils over dressing. May shower and let water run over incision, no baths. Pat dry once out of shower. Dressing to be removed in office at follow up visit in 2 weeks. There are no staples or stitches that need to be removed.    Hip replacement precautions: Abduction pillow. Elevate the leg (while keeping hip precautions) as often as possible to help control swelling. Incentive spirometer 10X/hr.

## 2022-04-28 NOTE — DISCHARGE NOTE PROVIDER - NSDCFUADDAPPT_GEN_ALL_CORE_FT
Follow up with your surgeon in two weeks. Call for appointment.  If you need more pain medication, call your surgeon's office. For medication refills or authorizations, please call [phone] xt [phone]  We recommend that you call and schedule a follow up appointment with your primary care physician for repeat blood work (CBC and BMP) for post hospital discharge follow-up care 2-4 weeks after your surgery.   Call your surgeon if you have increased redness/pain/drainage or fever. Return to ER for shortness of breath/calf tenderness. Follow up with your surgeon in two weeks. Call for appointment.  If you need more pain medication, call your surgeon's office. For medication refills or authorizations, please call [phone] xt [phone]  We recommend that you call and schedule a follow up appointment with your primary care physician for repeat blood work (CBC and BMP) for post hospital discharge follow-up care 2-4 weeks after your surgery.   Call your surgeon if you have increased redness/pain/drainage or fever. Return to ER for shortness of breath/calf tenderness.    If constipation continues, use Colace (prescribed) or a laxative from a pharamcy.

## 2022-04-28 NOTE — PROGRESS NOTE ADULT - SUBJECTIVE AND OBJECTIVE BOX
DARRELL MOONEY is a 31y Female s/p RIGHT TOTAL HIP ARTHROPLASTY REVISION EXTENDED TROCHANTERIC        denies any chest pain shortness of breath palpitation dizziness lightheadedness nausea vomiting fever or chills    T(C): 36.4 (04-28-22 @ 09:25), Max: 36.9 (04-27-22 @ 13:12)  HR: 92 (04-28-22 @ 09:25) (68 - 96)  BP: 114/77 (04-28-22 @ 09:25) (114/77 - 143/79)  RR: 16 (04-28-22 @ 09:25) (13 - 20)  SpO2: 95% (04-28-22 @ 09:25) (95% - 100%)  no jvd/bruit  s1 s2 rrr  cta  s/nt/nd  no calf tend                        9.6    14.10 )-----------( 274      ( 28 Apr 2022 09:49 )             28.8   04-27    139  |  108  |  15  ----------------------------<  196<H>  3.7   |  24  |  0.59    Ca    8.3<L>      27 Apr 2022 18:58        cont dvt px  pain control  bowel regimen  antiemetics  incentive spirometer

## 2022-04-28 NOTE — DISCHARGE NOTE PROVIDER - CARE PROVIDER_API CALL
Kervin Burns)  Orthopaedic Surgery  [address] [place] [address]  Phone: [phone]  Fax: [phone]  Follow Up Time:

## 2022-04-28 NOTE — DISCHARGE NOTE NURSING/CASE MANAGEMENT/SOCIAL WORK - PATIENT PORTAL LINK FT
You can access the FollowMyHealth Patient Portal offered by [facility] by registering at the following website: [email or web address]. By joining [facility]’s FollowMyHealth portal, you will also be able to view your health information using other applications (apps) compatible with our system.

## 2022-04-28 NOTE — DISCHARGE NOTE PROVIDER - NSDCCPCAREPLAN_GEN_ALL_CORE_FT
PRINCIPAL DISCHARGE DIAGNOSIS  Diagnosis: Presence of right artificial hip joint  Assessment and Plan of Treatment:

## 2022-04-28 NOTE — DISCHARGE NOTE PROVIDER - NSDCFUSCHEDAPPT_GEN_ALL_CORE_FT
[facility]  [facility] [address]  Scheduled Appointment: 05/10/2022    Maureen Lipscomb  [facility]  HEPATOLOGY [address]  Scheduled Appointment: 06/01/2022    Brittanie Preston  [facility]  Med Int [address]  Scheduled Appointment: 06/10/2022    Maritza Mauro  [facility]  Neuro [address]  Scheduled Appointment: 06/24/2022

## 2022-04-28 NOTE — DISCHARGE NOTE PROVIDER - NSDCMRMEDTOKEN_GEN_ALL_CORE_FT
acetaminophen 325 mg oral tablet: 3 tab(s) orally every 8 hours as needed for pain   Aspirin Enteric Coated 325 mg oral delayed release tablet: 1 tab(s) orally every 12 hours to prevent blood clots  MDD:2 tablets  celecoxib 200 mg oral capsule: 1 cap(s) orally 2 times a day MDD:2 Tabs  docusate sodium 100 mg oral capsule: 1 cap(s) orally 3 times a day as needed for constipation  Multiple Vitamins oral tablet: 1 tab(s) orally once a day  oxyCODONE 10 mg oral tablet: 1 tab(s) orally every 4 hours, As needed, Moderate Pain MDD:6 Tabs  pantoprazole 40 mg oral delayed release tablet: 1 tab(s) orally once a day MDD:1 Tab  polyethylene glycol 3350 oral powder for reconstitution: 17 gram(s) orally once a day as needed for constipation   sulfamethoxazole-trimethoprim 800 mg-160 mg oral tablet: 1 tab(s) orally every 12 hours MDD:2 tablets  Vitamin C:   Vitamin D3: orally once a day   acetaminophen 325 mg oral tablet: 3 tab(s) orally every 8 hours as needed for pain/fever  aspirin 81 mg oral delayed release tablet: 1 tab(s) orally 2 times a day  celecoxib 200 mg oral capsule: 1 cap(s) orally every 12 hours  docusate sodium 100 mg oral capsule: 1 cap(s) orally 3 times a day as needed for constipation  Multiple Vitamins oral tablet: 1 tab(s) orally once a day  oxyCODONE 10 mg oral tablet: 1 tab(s) orally every 4 hours, As needed, pain 6-10 MDD:6 tablets  pantoprazole 40 mg oral delayed release tablet: 1 tab(s) orally once a day (before a meal)  Vitamin C: 1 cap(s) orally once a day  Vitamin D3: orally once a day

## 2022-04-28 NOTE — DISCHARGE NOTE NURSING/CASE MANAGEMENT/SOCIAL WORK - NSDCFUADDAPPT_GEN_ALL_CORE_FT
Follow up with your surgeon in two weeks. Call for appointment.  If you need more pain medication, call your surgeon's office. For medication refills or authorizations, please call [phone]  We recommend that you call and schedule a follow up appointment with your primary care physician for repeat blood work (CBC and BMP) for post hospital discharge follow-up care 2-4 weeks after your surgery.   Call your surgeon if you have increased redness/pain/drainage or fever. Return to ER for shortness of breath/calf tenderness.

## 2022-04-28 NOTE — DISCHARGE NOTE PROVIDER - HOSPITAL COURSE
31yFemale with history of failure right total hip presenting for revision right AMBER by Dr Kervin Burns on  4/28/2022. Risk and benefits of surgery were explained to the patient. The patient understood and agreed to proceed with surgery. Patient underwent the procedure with no intraoperative complications. Pt was brought in stable condition to the PACU. Once stable in PACU, pt was brought to the floor. During hospital stay pt was followed by Medicine,  during this admission. Pt had an uneventful hospital course. Pt is stable for discharge to home on POD# 1 31yFemale with history of failure right total hip presenting for revision right AMBER by Dr Kervin Burns on  4/28/2022. Risk and benefits of surgery were explained to the patient. The patient understood and agreed to proceed with surgery. Patient underwent the procedure with no intraoperative complications. Pt was brought in stable condition to the PACU. Once stable in PACU, pt was brought to the floor. During hospital stay pt was followed by Medicine,  during this admission. Pt had an uneventful hospital course. Pt is stable for discharge to home on POD# 2

## 2022-04-28 NOTE — DISCHARGE NOTE PROVIDER - NSDCCPTREATMENT_GEN_ALL_CORE_FT
PRINCIPAL PROCEDURE  Procedure: Revision, major, total hip arthroplasty  Findings and Treatment: right

## 2022-04-28 NOTE — DISCHARGE NOTE NURSING/CASE MANAGEMENT/SOCIAL WORK - NSDCPEFALRISK_GEN_ALL_CORE
For information on Fall & Injury Prevention, visit: [email or web address] OR  [email or web address] OR  https://www.cdc.gov/steadi/patient.html

## 2022-04-29 VITALS — HEART RATE: 81 BPM | SYSTOLIC BLOOD PRESSURE: 115 MMHG | DIASTOLIC BLOOD PRESSURE: 59 MMHG | TEMPERATURE: 98 F

## 2022-04-29 LAB
ANION GAP SERPL CALC-SCNC: 5 MMOL/L — SIGNIFICANT CHANGE UP (ref 5–17)
BUN SERPL-MCNC: 19 MG/DL — SIGNIFICANT CHANGE UP (ref 7–23)
CALCIUM SERPL-MCNC: 8.6 MG/DL — SIGNIFICANT CHANGE UP (ref 8.5–10.1)
CHLORIDE SERPL-SCNC: 107 MMOL/L — SIGNIFICANT CHANGE UP (ref 96–108)
CO2 SERPL-SCNC: 26 MMOL/L — SIGNIFICANT CHANGE UP (ref 22–31)
CREAT SERPL-MCNC: 0.67 MG/DL — SIGNIFICANT CHANGE UP (ref 0.5–1.3)
EGFR: 121 ML/MIN/1.73M2 — SIGNIFICANT CHANGE UP
GLUCOSE SERPL-MCNC: 186 MG/DL — HIGH (ref 70–99)
HCT VFR BLD CALC: 26.7 % — LOW (ref 34.5–45)
HGB BLD-MCNC: 8.6 G/DL — LOW (ref 11.5–15.5)
MCHC RBC-ENTMCNC: 27.7 PG — SIGNIFICANT CHANGE UP (ref 27–34)
MCHC RBC-ENTMCNC: 32.2 G/DL — SIGNIFICANT CHANGE UP (ref 32–36)
MCV RBC AUTO: 85.9 FL — SIGNIFICANT CHANGE UP (ref 80–100)
NRBC # BLD: 0 /100 WBCS — SIGNIFICANT CHANGE UP (ref 0–0)
PLATELET # BLD AUTO: 281 K/UL — SIGNIFICANT CHANGE UP (ref 150–400)
POTASSIUM SERPL-MCNC: 4.2 MMOL/L — SIGNIFICANT CHANGE UP (ref 3.5–5.3)
POTASSIUM SERPL-SCNC: 4.2 MMOL/L — SIGNIFICANT CHANGE UP (ref 3.5–5.3)
RBC # BLD: 3.11 M/UL — LOW (ref 3.8–5.2)
RBC # FLD: 13.3 % — SIGNIFICANT CHANGE UP (ref 10.3–14.5)
SODIUM SERPL-SCNC: 138 MMOL/L — SIGNIFICANT CHANGE UP (ref 135–145)
WBC # BLD: 11.8 K/UL — HIGH (ref 3.8–10.5)
WBC # FLD AUTO: 11.8 K/UL — HIGH (ref 3.8–10.5)

## 2022-04-29 RX ORDER — SODIUM CHLORIDE 9 MG/ML
1000 INJECTION INTRAMUSCULAR; INTRAVENOUS; SUBCUTANEOUS ONCE
Refills: 0 | Status: COMPLETED | OUTPATIENT
Start: 2022-04-29 | End: 2022-04-29

## 2022-04-29 RX ORDER — DOCUSATE SODIUM 100 MG
1 CAPSULE ORAL
Qty: 42 | Refills: 0
Start: 2022-04-29 | End: 2022-05-12

## 2022-04-29 RX ORDER — PANTOPRAZOLE SODIUM 20 MG/1
1 TABLET, DELAYED RELEASE ORAL
Qty: 30 | Refills: 0
Start: 2022-04-29 | End: 2022-05-28

## 2022-04-29 RX ORDER — OXYCODONE HYDROCHLORIDE 5 MG/1
1 TABLET ORAL
Qty: 42 | Refills: 0
Start: 2022-04-29 | End: 2022-05-05

## 2022-04-29 RX ORDER — CELECOXIB 200 MG/1
1 CAPSULE ORAL
Qty: 60 | Refills: 0
Start: 2022-04-29 | End: 2022-05-28

## 2022-04-29 RX ORDER — LACTULOSE 10 G/15ML
10 SOLUTION ORAL ONCE
Refills: 0 | Status: COMPLETED | OUTPATIENT
Start: 2022-04-29 | End: 2022-04-29

## 2022-04-29 RX ORDER — ACETAMINOPHEN 500 MG
3 TABLET ORAL
Qty: 0 | Refills: 0 | DISCHARGE
Start: 2022-04-29

## 2022-04-29 RX ORDER — ASPIRIN/CALCIUM CARB/MAGNESIUM 324 MG
1 TABLET ORAL
Qty: 60 | Refills: 0
Start: 2022-04-29 | End: 2022-05-28

## 2022-04-29 RX ORDER — ASCORBIC ACID 60 MG
0 TABLET,CHEWABLE ORAL
Qty: 0 | Refills: 0 | DISCHARGE

## 2022-04-29 RX ADMIN — PANTOPRAZOLE SODIUM 40 MILLIGRAM(S): 20 TABLET, DELAYED RELEASE ORAL at 06:29

## 2022-04-29 RX ADMIN — Medication 975 MILLIGRAM(S): at 07:09

## 2022-04-29 RX ADMIN — SODIUM CHLORIDE 500 MILLILITER(S): 9 INJECTION INTRAMUSCULAR; INTRAVENOUS; SUBCUTANEOUS at 06:53

## 2022-04-29 RX ADMIN — Medication 975 MILLIGRAM(S): at 13:16

## 2022-04-29 RX ADMIN — Medication 975 MILLIGRAM(S): at 14:15

## 2022-04-29 RX ADMIN — Medication 500 MILLIGRAM(S): at 06:26

## 2022-04-29 RX ADMIN — LACTULOSE 10 GRAM(S): 10 SOLUTION ORAL at 11:55

## 2022-04-29 RX ADMIN — Medication 975 MILLIGRAM(S): at 06:25

## 2022-04-29 RX ADMIN — OXYCODONE HYDROCHLORIDE 10 MILLIGRAM(S): 5 TABLET ORAL at 04:00

## 2022-04-29 RX ADMIN — OXYCODONE HYDROCHLORIDE 5 MILLIGRAM(S): 5 TABLET ORAL at 14:15

## 2022-04-29 RX ADMIN — CELECOXIB 200 MILLIGRAM(S): 200 CAPSULE ORAL at 06:25

## 2022-04-29 RX ADMIN — CELECOXIB 200 MILLIGRAM(S): 200 CAPSULE ORAL at 07:09

## 2022-04-29 RX ADMIN — Medication 1 TABLET(S): at 11:55

## 2022-04-29 RX ADMIN — OXYCODONE HYDROCHLORIDE 5 MILLIGRAM(S): 5 TABLET ORAL at 13:15

## 2022-04-29 RX ADMIN — OXYCODONE HYDROCHLORIDE 10 MILLIGRAM(S): 5 TABLET ORAL at 03:06

## 2022-04-29 RX ADMIN — Medication 81 MILLIGRAM(S): at 06:25

## 2022-04-29 NOTE — PROGRESS NOTE ADULT - SUBJECTIVE AND OBJECTIVE BOX
31yFemale s/p R AMBER Revision POD#2. Pt seen and examined in NAD. Pain controlled. Pt denies any new complaints. Pt denies CP/SOB/N/V/D/numbness/tingling/bowel or bladder dysfunction. (+)voids (+)tolerating PO DIet    PE: RLE: dressing c/d/i. +ROM ankle/toes. Calf: soft, compressible and nontender. DP/PT 2+ NVI.                           8.6    11.80 )-----------( 281      ( 29 Apr 2022 10:06 )             26.7       04-29    138  |  107  |  19  ----------------------------<  186<H>  4.2   |  26  |  0.67    Ca    8.6      29 Apr 2022 10:06          A/P: 31yFemale s/p R AMBER POD#2    Pain control prn  (+) Total hip precautions  PT: WBAT    DVT ppx: SCDs and ASA BID  Wound care, Isometric exercises, incentive spirometry   Discharge: planning Home  All the above discussed and understood by pt

## 2022-04-29 NOTE — PROGRESS NOTE ADULT - SUBJECTIVE AND OBJECTIVE BOX
DARRELL MOONEY is a 31y Female s/p RIGHT TOTAL HIP ARTHROPLASTY REVISION EXTENDED TROCHANTERIC        denies any chest pain shortness of breath palpitation dizziness lightheadedness nausea vomiting fever or chills    T(C): 36.6 (04-29-22 @ 08:58), Max: 36.9 (04-28-22 @ 14:21)  HR: 70 (04-29-22 @ 08:58) (68 - 95)  BP: 94/61 (04-29-22 @ 08:58) (94/61 - 126/76)  RR: 16 (04-29-22 @ 08:58) (16 - 18)  SpO2: 100% (04-29-22 @ 08:58) (100% - 100%)  no jvd/bruit  s1 s2 rrr  cta  s/nt/nd  no calf tend                        8.6    11.80 )-----------( 281      ( 29 Apr 2022 10:06 )             26.7   04-28    140  |  108  |  12  ----------------------------<  313<H>  3.7   |  22  |  0.88    Ca    8.8      28 Apr 2022 09:49        cont dvt px  pain control  bowel regimen  antiemetics  incentive spirometer

## 2022-05-01 DIAGNOSIS — H54.8 LEGAL BLINDNESS, AS DEFINED IN USA: ICD-10-CM

## 2022-05-01 DIAGNOSIS — T84.090A OTHER MECHANICAL COMPLICATION OF INTERNAL RIGHT HIP PROSTHESIS, INITIAL ENCOUNTER: ICD-10-CM
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
## 2022-05-01 DIAGNOSIS — T84.018A BROKEN INTERNAL JOINT PROSTHESIS, OTHER SITE, INITIAL ENCOUNTER: ICD-10-CM

## 2022-05-01 DIAGNOSIS — D57.1 SICKLE-CELL DISEASE WITHOUT CRISIS: ICD-10-CM
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
## 2022-05-02 ENCOUNTER — NON-APPOINTMENT (OUTPATIENT)
Age: 32
End: 2022-05-02

## 2022-05-02 LAB — SURGICAL PATHOLOGY STUDY: SIGNIFICANT CHANGE UP

## 2022-05-10 ENCOUNTER — APPOINTMENT (OUTPATIENT)
Dept: ORTHOPEDIC SURGERY | Facility: CLINIC | Age: 32
End: 2022-05-10
Payer: MEDICAID

## 2022-05-10 VITALS — WEIGHT: 158 LBS | HEIGHT: 64 IN | BODY MASS INDEX: 26.98 KG/M2

## 2022-05-10 PROCEDURE — 73503 X-RAY EXAM HIP UNI 4/> VIEWS: CPT

## 2022-05-10 PROCEDURE — 99024 POSTOP FOLLOW-UP VISIT: CPT

## 2022-05-10 NOTE — IMAGING
[No loss of surgical correlation. Bony alignment acceptable. Hardware in appropriate position] : No loss of surgical correlation. Bony alignment acceptable. Hardware in appropriate position [de-identified] : Right hip \par Incision is clean and dry, no drainage, no sign of infection\par Ambulation with cane \par

## 2022-05-10 NOTE — HISTORY OF PRESENT ILLNESS
[Gradual] : gradual [7] : 7 [1] : 2 [Radiating] : radiating [Shooting] : shooting [Throbbing] : throbbing [de-identified] : 5/10/22: 13 days s/p R AMBER revision. Admits to mild pain and swelling. Overall she is doing well. Home therapy has been beneficial\par \par Prev doc\par Pt well Known to me for Marciano AMBER - her rt Hip has been getting worse - she has been seeing Dr Ames for insurance issues but I have keep in touch with her and have reviewed every xray - her pain has progressed over time and she is hve more pain on a daily basis [] : This patient has had an injection before: no [FreeTextEntry1] : R Hip [FreeTextEntry3] : N/A Chronic [FreeTextEntry5] : pt is a 30 y/o fem in for eval of the R Hip s/p R AMBER pt states recovery is progressing well  [FreeTextEntry7] : Down the R Leg  [de-identified] : PT 2-3x wkly  [de-identified] : 4/27/22 [de-identified] : CLOVER CLARK

## 2022-05-10 NOTE — DISCUSSION/SUMMARY
[de-identified] : The patient is doing well at this time. The patient will be started on a course of physical therapy. I recommended that the patient works on range of motion at home and was shown how to do this. I encouraged the patient to increase ambulation. The patient can continue to take Tylenol for occasional discomfort. The patient was advised not to do any dental work for the first three months following the surgery. We will see the patient  back for a follow-up for a repeat evaluation. The patient  will call or return earlier for any questions or concerns.  Signs and symptoms of infection reviewed and patient advised to call immediately for redness, fevers, and/or chills. \par Discussed timing and frequency of medication with the patient.\par I have consulted the  registry for the purpose of reviewing the patients controlled substance\par \par Progress note completed by Malika Alexander PA-C.

## 2022-05-10 NOTE — ASSESSMENT
[FreeTextEntry1] : rt AMBER has slowly and progressively abelino migrating into the lateral cortex of the femur - the prosthetic is broken and further delay in surgery may cause her to fracture - I have review all of her old xrays and I see slow migration over time - this is combined with worsening pain loss of motion on exam with tenderness at the lateral thigh - R/Bof surgery discussed with pt and mom\par \par 5/10/22: Nearly 2 weeks postop. She is doing well. refilled oxycodone. Begin outpatient PT. RTW 5/23/22. f/up in 6 weeks

## 2022-05-13 ENCOUNTER — APPOINTMENT (OUTPATIENT)
Dept: INTERNAL MEDICINE | Facility: CLINIC | Age: 32
End: 2022-05-13
Payer: MEDICAID

## 2022-05-13 ENCOUNTER — RESULT REVIEW (OUTPATIENT)
Age: 32
End: 2022-05-13

## 2022-05-13 ENCOUNTER — OUTPATIENT (OUTPATIENT)
Dept: OUTPATIENT SERVICES | Facility: HOSPITAL | Age: 32
LOS: 1 days | End: 2022-05-13
Payer: MEDICAID

## 2022-05-13 ENCOUNTER — APPOINTMENT (OUTPATIENT)
Dept: RADIOLOGY | Facility: CLINIC | Age: 32
End: 2022-05-13
Payer: MEDICAID

## 2022-05-13 VITALS — TEMPERATURE: 98 F | SYSTOLIC BLOOD PRESSURE: 122 MMHG | DIASTOLIC BLOOD PRESSURE: 74 MMHG | HEART RATE: 87 BPM

## 2022-05-13 DIAGNOSIS — Z96.60 PRESENCE OF UNSPECIFIED ORTHOPEDIC JOINT IMPLANT: Chronic | ICD-10-CM

## 2022-05-13 DIAGNOSIS — Z90.81 ACQUIRED ABSENCE OF SPLEEN: Chronic | ICD-10-CM

## 2022-05-13 DIAGNOSIS — M25.571 PAIN IN RIGHT ANKLE AND JOINTS OF RIGHT FOOT: ICD-10-CM

## 2022-05-13 DIAGNOSIS — Z94.81 BONE MARROW TRANSPLANT STATUS: Chronic | ICD-10-CM

## 2022-05-13 DIAGNOSIS — Z98.890 OTHER SPECIFIED POSTPROCEDURAL STATES: Chronic | ICD-10-CM

## 2022-05-13 DIAGNOSIS — Z90.89 ACQUIRED ABSENCE OF OTHER ORGANS: Chronic | ICD-10-CM

## 2022-05-13 PROCEDURE — 73610 X-RAY EXAM OF ANKLE: CPT | Mod: 26,RT

## 2022-05-13 PROCEDURE — 73610 X-RAY EXAM OF ANKLE: CPT

## 2022-05-13 PROCEDURE — 99213 OFFICE O/P EST LOW 20 MIN: CPT

## 2022-05-13 NOTE — REVIEW OF SYSTEMS
[Joint Pain] : joint pain [Joint Stiffness] : joint stiffness [Joint Swelling] : joint swelling [Negative] : Heme/Lymph [Back Pain] : no back pain

## 2022-05-13 NOTE — HISTORY OF PRESENT ILLNESS
[FreeTextEntry8] : 31F hx recent R THR who presents for an acute visit with right ankle pain.\par \par She had a R THR just over 2 weeks ago (5/27) which has been healing well and generally recovered, she had recent follow-up this week with her orthopedic and x-rays were taken and everything appeared to be healing well.\par She notes that she has had progressively worsening ankle pain since the operation. She notes chronic R ankle pain that she may have generally ignored because of her R hip pain, or may have not been as exacerbated because she wasn't using it.\par It worsened sometime since the surgery, but the pain especially increased as of this morning, at which time she could not put weight on it comfortably. Her pain improved to the point that she could bear weight after she took her celebrex and oxycodone. She notes considerable swelling and tenderness of the ankle, decreased mobility. \par When she recently saw her orthopedic this week he noted the swelling which may be normal post-op, but said that it should not be painful. She notes she forgot to tell him it was painful at the time and has also worsened since that time.

## 2022-05-13 NOTE — ASSESSMENT
[FreeTextEntry1] : 31F hx recent R THR who presents for an acute visit with right ankle pain.\par \par # R ankle pain and swelling: post THR with some post-op swelling expected, possible aggravation of prior injury now with increased use of RLE post THR. No specific trauma noted. However, degree of pain with weight bearing, tenderness to palpation, and pain with mobility particularly inversion, there is concern for acute injury.\par - X-ray R ankle\par - Rest, ice, elevation. Gentle compression with ACE wrap if no fracture on x-ray\par - May use ibuprofen after she has completed her course of celebrex/ASA\par - Ortho follow-up.

## 2022-05-13 NOTE — PHYSICAL EXAM
[No Acute Distress] : no acute distress [Well-Appearing] : well-appearing [No Rash] : no rash [Coordination Grossly Intact] : coordination grossly intact [Normal] : affect was normal and insight and judgment were intact [de-identified] : R ankle, foot, and lower extremity swelling and tenderness. Tenderness in maleolar region, lesser degree on foot, some in lower shin as well [de-identified] : tenderness in malleolar region, still very tender at lower fibular region, more mild in metatarsals. Swelling present throughout this area, faint ecchymosis just proximal to lateral malleolar region. Pain with mobility at ankle joint, particularly inversion [de-identified] : normal sensory and motor function

## 2022-05-20 ENCOUNTER — NON-APPOINTMENT (OUTPATIENT)
Age: 32
End: 2022-05-20

## 2022-06-01 ENCOUNTER — APPOINTMENT (OUTPATIENT)
Dept: HEPATOLOGY | Facility: CLINIC | Age: 32
End: 2022-06-01

## 2022-06-01 NOTE — DISCHARGE NOTE ADULT - FUNCTIONAL SCREEN CURRENT LEVEL: AMBULATION, MLM
Wt Readings from Last 3 Encounters:   06/01/22 131 kg (289 lb 11 oz)     · Last echo April 2021 shows EF 60% with G1DD  2d echo this admission shows normal EF and mild aortic stenosis  · Bilateral lower extremity edema present which is now clinically improving  · Home regimen is Maxzide, start Lasix 40mg iv  BID    Appears to be improving and hence DC IV Lasix and placed on Lasix 40 mg oral daily  · Per chart review, patient has refused Lasix as outpatient in the past    · Daily weights, I&O's (2) assistive person (3) assistive equipment and person

## 2022-06-10 ENCOUNTER — APPOINTMENT (OUTPATIENT)
Dept: INTERNAL MEDICINE | Facility: CLINIC | Age: 32
End: 2022-06-10

## 2022-06-21 ENCOUNTER — APPOINTMENT (OUTPATIENT)
Dept: HEPATOLOGY | Facility: CLINIC | Age: 32
End: 2022-06-21
Payer: MEDICAID

## 2022-06-21 VITALS
OXYGEN SATURATION: 98 % | TEMPERATURE: 96.9 F | SYSTOLIC BLOOD PRESSURE: 112 MMHG | WEIGHT: 157 LBS | HEIGHT: 64 IN | HEART RATE: 87 BPM | BODY MASS INDEX: 26.8 KG/M2 | DIASTOLIC BLOOD PRESSURE: 78 MMHG

## 2022-06-21 PROCEDURE — 99213 OFFICE O/P EST LOW 20 MIN: CPT

## 2022-06-21 RX ORDER — SODIUM FLUORIDE 6 MG/ML
1.1 PASTE DENTAL
Qty: 100 | Refills: 0 | Status: DISCONTINUED | COMMUNITY
Start: 2022-02-23

## 2022-06-21 RX ORDER — PANTOPRAZOLE 40 MG/1
40 TABLET, DELAYED RELEASE ORAL
Qty: 30 | Refills: 0 | Status: DISCONTINUED | COMMUNITY
Start: 2022-04-29

## 2022-06-21 RX ORDER — TRAMADOL HYDROCHLORIDE 50 MG/1
50 TABLET, COATED ORAL
Qty: 30 | Refills: 0 | Status: DISCONTINUED | COMMUNITY
Start: 2022-01-28

## 2022-06-21 RX ORDER — DOCUSATE SODIUM 100 MG/1
100 CAPSULE, LIQUID FILLED ORAL
Qty: 42 | Refills: 0 | Status: DISCONTINUED | COMMUNITY
Start: 2022-04-29

## 2022-06-21 RX ORDER — OXYCODONE 5 MG/1
5 TABLET ORAL
Qty: 30 | Refills: 0 | Status: DISCONTINUED | COMMUNITY
Start: 2022-05-10 | End: 2022-06-21

## 2022-06-21 RX ORDER — OXYCODONE 10 MG/1
10 TABLET ORAL
Qty: 42 | Refills: 0 | Status: DISCONTINUED | COMMUNITY
Start: 2022-04-29

## 2022-06-21 RX ORDER — CELECOXIB 200 MG/1
200 CAPSULE ORAL
Qty: 60 | Refills: 0 | Status: DISCONTINUED | COMMUNITY
Start: 2022-04-29

## 2022-06-21 RX ORDER — CLINDAMYCIN HYDROCHLORIDE 300 MG/1
300 CAPSULE ORAL
Qty: 28 | Refills: 0 | Status: DISCONTINUED | COMMUNITY
Start: 2022-02-23

## 2022-06-21 NOTE — REVIEW OF SYSTEMS
[Fever] : no fever [Chills] : no chills [As Noted in HPI] : as noted in HPI [Confused] : no confusion [Negative] : Integumentary

## 2022-06-21 NOTE — HISTORY OF PRESENT ILLNESS
[de-identified] : Ms. MOONEY is a 31 year old woman with h/o sickle cell disease, cured with bone marrow transplant in 2010 here for follow up for HENDRIX.\par \par -6/21/22: Had hip surgery April 2022 and is doing well with less pain. Still getting PT. Reports that she off pain meds but started few weeks ago on herbal supplements got on [employer] for pain but don't remember the name. BW from 4/14/22 ALT 55, AST 26, ALP 86, INR 1.05. \par \par -3/23/22 Returns for follow up for HENDRIX.  Last saw Dr. Lucio on 5/25/21 and was advised to start Vit E but never did. She has lost 8 lbs since last visit, current weight 157 lbs, BMI 28. Feels well. She has right hip pain and is scheduled for surgery in April. Denies abdominal pain, nausea, vomiting, melena, hematochezia, or hematemesis. Last BW with liver chemistries was April 2021 ALT 43, AST 26, Tbil 0.4, albumin 5.1, INR 1.06.\par \par - 5/25/21: returns after liver biopsy 2/2020 and seeing Dr. Westbrook in 4/2020. Pentoxyphylline was prescribed for HENDRIX however pt never started. Has not lost weight, BMI still 29. LFTs 4/2021 normalized enzymes and transferring saturation. Ferritin still 1900. Has transfusional iron overload. Feels great. \par \par - 2/10/20: returns after labs and fibroscan today, which showed no fibrosis, but stage 3 steatosis - interpreted and discussed. Doing well overall. \par - initial visit: Ms. 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 bleed s/p surgery, resulting decreased vision, h/o sickle cell crisis with coma, osteoporosis, hip replacement.\par \par Weight hx: 170 lbs, BMI 30. Gained 40 lbs between 2013 and 2019, Was on steroids for eyes off and on until 2017.\par \par Workup:\par - 2/20/20 liver biopsy: moderate steatosis 35%, mild steatohepatitis grade 1/3, stage 1/4 fibrosis. Hemochromatosis in hepatocytes and macrophages as well. Focal pericellular fibrosis.\par - 2/10/20 fibroscan: 308 dB/m - stage 3 steatosis, 4.6 kPa - stage 0 fibrosis\par -Fibroscan test 3/24/22 F0 (4.2 kPa) , S0 ()\par - 10/22/18 US (scanned): mild-moderate hepatomegaly with steatosis, cholelithiasis in a contracted GB.

## 2022-06-21 NOTE — ASSESSMENT
[FreeTextEntry1] : Ms. MOONEY is a 31 year old woman with h/o sickle cell disease, cured with bone marrow transplant in 2010 here for follow up for HENDRIX.\par \par - HENDRIX with moderate steatosis and mild steatohepatitis seen on liver biopsy 2/2020 with mild pericellular fibrosis seen on liver biopsy. 2/10/20 fibroscan: 4.6 kPa - stage 0 fibrosis. Fibroscan test 3/24/22 F0 (4.2 kPa) , S0 ()\par - also iron overload in hepatocytes and macrophages, normal HFE gene testing, due to transfusions\par   in past treated with deferasirox, which caused eye damage.\par -was recommended to start Mike 800 IU/d May 2021 but never started. Would like to start now but will check to see if liver enzymes are elevated especially started new herbal supplements recently. Will email me the name and ingredients. \par - mildly elevated liver enzymes on 4/14/22.  Will repeat BW today\par  -BMI 27, lost 11 lbs since May 2021. Discussed lifestyle modifications for management of HENDRIX. I recommended gradual weight loss of 5-10% of her body weight. I recommended increased consumption of vegetables and lean proteins, avoidance of red meat and high fructose corn syrup, and avoidance of calorie-containing beverages.  Discuss few diet modifications today. Unable to exercise due to hip pain and recent surgery. \par \par Plan:\par BW today and she will call me to discuss result. If no significant abnormalities,  F/U in 6 months with repeat BW and abdo US. \par \par \par

## 2022-06-22 ENCOUNTER — NON-APPOINTMENT (OUTPATIENT)
Age: 32
End: 2022-06-22

## 2022-06-22 LAB
ALBUMIN SERPL ELPH-MCNC: 4.8 G/DL
ALP BLD-CCNC: 125 U/L
ALT SERPL-CCNC: 21 U/L
AST SERPL-CCNC: 18 U/L
BILIRUB DIRECT SERPL-MCNC: 0.1 MG/DL
BILIRUB INDIRECT SERPL-MCNC: 0.2 MG/DL
BILIRUB SERPL-MCNC: 0.2 MG/DL
PROT SERPL-MCNC: 6.9 G/DL

## 2022-06-24 ENCOUNTER — APPOINTMENT (OUTPATIENT)
Dept: NEUROLOGY | Facility: CLINIC | Age: 32
End: 2022-06-24
Payer: MEDICAID

## 2022-06-24 VITALS
HEIGHT: 64 IN | WEIGHT: 157 LBS | SYSTOLIC BLOOD PRESSURE: 112 MMHG | DIASTOLIC BLOOD PRESSURE: 77 MMHG | BODY MASS INDEX: 26.8 KG/M2 | HEART RATE: 70 BPM

## 2022-06-24 DIAGNOSIS — Z86.73 PERSONAL HISTORY OF TRANSIENT ISCHEMIC ATTACK (TIA), AND CEREBRAL INFARCTION W/OUT RESIDUAL DEFICITS: ICD-10-CM

## 2022-06-24 PROCEDURE — 99204 OFFICE O/P NEW MOD 45 MIN: CPT

## 2022-06-24 NOTE — ASSESSMENT
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 \par \par Left facial pain- resolved. Likely dental pain vs trigeminal neuralgia. Will continue to monitor\par Hx of hemorrhagic stroke 2/2 SS with stable residual deficits- no change in management at this time. \par \par Return to clinic 1 year, or PRN.\par \par A detailed chart review was completed prior to visit.\par \par The above plan was discussed with DARRELL MOONEY in great detail.  DARRELL MOONEY verbalized understanding and agrees with plan as detailed above.  She was advised to call our clinic at [phone] for any new or worsening symptoms, or with any questions or concerns. In case of acute onset of neurological symptoms or worsening presentation, patient was advised to present to nearest emergency room for further evaluation. DARRELL MOONEY expressed understanding and all her questions/concerns were addressed.\par \par Maritza Mauro M.D\par

## 2022-06-24 NOTE — PHYSICAL EXAM
[FreeTextEntry1] : PHYSICAL EXAM\par Constitutional: Alert, no acute distress \par Neck: Full range of motion\par Psychiatric: appropriate affect and mood\par Pulmonary: No respiratory distress, stable on room air\par \par NEUROLOGICAL EXAM\par Mental status: The patient is alert, attentive, and conversational memory intact\par Speech/language: Clear and fluent with intact comprehension, intact repetition and naming \par Cranial nerves:\par CN II:  Pupil size equal and briskly reactive to light (right pupil reacts more briskly). Constricted visual fields OU (left > right). OS- able to see in medial superior quadrant and OD- medially. \par CN III, IV, VI: EOMI, no nystagmus, no ptosis\par CN V: Facial sensation is intact to pinprick in all 3 divisions bilaterally.\par CN VII: Face is symmetric with normal eye closure and smile.\par CN VII: Hearing is normal to rubbing fingers\par CN IX, X: Palate elevates symmetrically. Phonation is normal.\par CN XI: Head turning and shoulder shrug are intact\par CN XII: Tongue is midline with normal movements and no atrophy.\par Motor: Strength is full bilaterally. 5/5 muscle power in bilateral UE and LE, EXCEPT 4/5 right hip flexion. \par Reflexes: Reflexes are 2+ and symmetric at the biceps, knees, and ankles. Plantar responses are flexor on right and up going on left.\par Sensory: Intact sensations to light touch in upper and lower extremities. \par Gait/Stance: Walks with a folding cane for blind. Wide based antalgic gait. \par \par \par \par \par

## 2022-06-24 NOTE — HISTORY OF PRESENT ILLNESS
[FreeTextEntry1] : HPI (initial visit Jun 24, 2022)- 31 year old female with a hx of HENDRIX, Sickle cell disease complicated by L hemorrhagic stroke at age 17 s/p craniotomy (w/ mild residual expressive aphasia, and right sided hemiparesis) s/p bone marrow transplant 2010, s/p b/l AMBER and right AMBER revision 4/2022, Visual impairment OU (?medication side effect vs CVA) referred by Jailene Contreras (SUKHDEEP) for hx of CVA and facial pain. \par \par She reports her facial pain has now resolved. She had the pain for "awhile" back in the winter 2021/2022 It was over the left side of her face, around temple and in front of left ear. It was a throbbing pain, if she held her face the pain would improve. Not associated with any vision changes. It was recommended she followed up with neurology for any further work up. She reports she has chronic dental issues, "my teeth hurt all the time"- "I cannot drink hot or cold". She has her wisdom teeth removed. She follows with a dentist and says her "bones are weak", from her sickle cell.

## 2022-06-28 ENCOUNTER — APPOINTMENT (OUTPATIENT)
Dept: ORTHOPEDIC SURGERY | Facility: CLINIC | Age: 32
End: 2022-06-28
Payer: MEDICAID

## 2022-06-28 VITALS — HEIGHT: 64 IN | BODY MASS INDEX: 26.8 KG/M2 | WEIGHT: 157 LBS

## 2022-06-28 DIAGNOSIS — Z96.649 PRESENCE OF UNSPECIFIED ARTIFICIAL HIP JOINT: ICD-10-CM

## 2022-06-28 PROCEDURE — 99024 POSTOP FOLLOW-UP VISIT: CPT

## 2022-06-28 PROCEDURE — 73503 X-RAY EXAM HIP UNI 4/> VIEWS: CPT | Mod: RT

## 2022-06-28 NOTE — DISCUSSION/SUMMARY
[de-identified] : The patient is doing well at this time. The patient will be started on a course of physical therapy. I recommended that the patient works on range of motion at home and was shown how to do this. I encouraged the patient to increase ambulation. The patient can continue to take Tylenol for occasional discomfort. The patient was advised not to do any dental work for the first three months following the surgery. We will see the patient  back for a follow-up for a repeat evaluation. The patient  will call or return earlier for any questions or concerns.  Signs and symptoms of infection reviewed and patient advised to call immediately for redness, fevers, and/or chills. \par Discussed timing and frequency of medication with the patient.\par I have consulted the  registry for the purpose of reviewing the patients controlled substance\par \par Progress note completed by Danna Zamorano

## 2022-06-28 NOTE — IMAGING
[Right] : right hip with pelvis [All Views] : anteroposterior, lateral [No loss of surgical correlation. Bony alignment acceptable. Hardware in appropriate position] : No loss of surgical correlation. Bony alignment acceptable. Hardware in appropriate position [de-identified] : Right hip \par Incision is clean and dry, no drainage, no sign of infection\par \par

## 2022-06-28 NOTE — HISTORY OF PRESENT ILLNESS
[Gradual] : gradual [3] : 3 [0] : 0 [Radiating] : radiating [Shooting] : shooting [Throbbing] : throbbing [Intermittent] : intermittent [Meds] : meds [Physical therapy] : physical therapy [Exercising] : exercising [de-identified] : 6/28/22: 9 weeks s/p R AMBER revision doing well with minimal pain. She is doing PT with significant benefit. Has some knee pain but likely from PT and muscle weakness\par \par Prev doc\par Pt well Known to me for Marciano AMBER - her rt Hip has been getting worse - she has been seeing Dr Ames for insurance issues but I have keep in touch with her and have reviewed every xray - her pain has progressed over time and she is hve more pain on a daily basis\par 5/10/22: 13 days s/p R AMBER revision. Admits to mild pain and swelling. Overall she is doing well. Home therapy has been beneficial [] : This patient has had an injection before: no [FreeTextEntry1] : R Hip and knee [FreeTextEntry7] : Down the R thigh [de-identified] : no xrays on the knee [de-identified] : PT 2-3x wkly  [de-identified] : 4/27/22 [de-identified] : [facility]

## 2022-06-28 NOTE — ASSESSMENT
[FreeTextEntry1] : rt AMBER has slowly and progressively abelino migrating into the lateral cortex of the femur - the prosthetic is broken and further delay in surgery may cause her to fracture - I have review all of her old xrays and I see slow migration over time - this is combined with worsening pain loss of motion on exam with tenderness at the lateral thigh - R/Bof surgery discussed with pt and mom\par 5/10/22: Nearly 2 weeks postop. She is doing well. refilled oxycodone. Begin outpatient PT. RTW 5/23/22. f/up in 6 weeks \par \par 6/28/22: 9 weeks postop doing well with no significant pain. Will continue PT and FU at 1 year postop. If she continues to have knee pain consider inj

## 2022-07-05 ENCOUNTER — APPOINTMENT (OUTPATIENT)
Dept: INTERNAL MEDICINE | Facility: CLINIC | Age: 32
End: 2022-07-05

## 2022-07-05 VITALS
OXYGEN SATURATION: 97 % | SYSTOLIC BLOOD PRESSURE: 121 MMHG | WEIGHT: 158 LBS | HEART RATE: 73 BPM | BODY MASS INDEX: 26.98 KG/M2 | TEMPERATURE: 97.7 F | HEIGHT: 64 IN | DIASTOLIC BLOOD PRESSURE: 82 MMHG

## 2022-07-05 VITALS — DIASTOLIC BLOOD PRESSURE: 76 MMHG | SYSTOLIC BLOOD PRESSURE: 120 MMHG

## 2022-07-05 DIAGNOSIS — H54.7 UNSPECIFIED VISUAL LOSS: ICD-10-CM

## 2022-07-05 DIAGNOSIS — H54.8 LEGAL BLINDNESS, AS DEFINED IN USA: ICD-10-CM

## 2022-07-05 DIAGNOSIS — Z11.3 ENCOUNTER FOR SCREENING FOR INFECTIONS WITH A PREDOMINANTLY SEXUAL MODE OF TRANSMISSION: ICD-10-CM

## 2022-07-05 DIAGNOSIS — M81.0 AGE-RELATED OSTEOPOROSIS W/OUT CURRENT PATHOLOGICAL FRACTURE: ICD-10-CM

## 2022-07-05 DIAGNOSIS — N93.9 ABNORMAL UTERINE AND VAGINAL BLEEDING, UNSPECIFIED: ICD-10-CM

## 2022-07-05 PROCEDURE — 99214 OFFICE O/P EST MOD 30 MIN: CPT | Mod: 25

## 2022-07-05 PROCEDURE — 36415 COLL VENOUS BLD VENIPUNCTURE: CPT

## 2022-07-05 NOTE — COUNSELING
[Healthy eating counseling provided] : healthy eating [Activity counseling provided] : activity [Decrease Portions] : Decrease food portions [Keep Food Diary] : Keep food diary [Walking] : Walking

## 2022-07-05 NOTE — PHYSICAL EXAM
[Normal Sclera/Conjunctiva] : normal sclera/conjunctiva [No Lymphadenopathy] : no lymphadenopathy [No Edema] : there was no peripheral edema [Deep Tendon Reflexes (DTR)] : deep tendon reflexes were 2+ and symmetric [Normal] : affect was normal and insight and judgment were intact

## 2022-07-06 LAB
BASOPHILS # BLD AUTO: 0.06 K/UL
BASOPHILS NFR BLD AUTO: 0.8 %
EOSINOPHIL # BLD AUTO: 0.27 K/UL
EOSINOPHIL NFR BLD AUTO: 3.6 %
ESTIMATED AVERAGE GLUCOSE: 134 MG/DL
HBA1C MFR BLD HPLC: 6.3 %
HCT VFR BLD CALC: 37.3 %
HGB BLD-MCNC: 11.8 G/DL
IMM GRANULOCYTES NFR BLD AUTO: 0.1 %
LYMPHOCYTES # BLD AUTO: 3.48 K/UL
LYMPHOCYTES NFR BLD AUTO: 45.9 %
MAN DIFF?: NORMAL
MCHC RBC-ENTMCNC: 27.5 PG
MCHC RBC-ENTMCNC: 31.6 GM/DL
MCV RBC AUTO: 86.9 FL
MONOCYTES # BLD AUTO: 0.69 K/UL
MONOCYTES NFR BLD AUTO: 9.1 %
NEUTROPHILS # BLD AUTO: 3.07 K/UL
NEUTROPHILS NFR BLD AUTO: 40.5 %
PLATELET # BLD AUTO: 259 K/UL
RBC # BLD: 4.29 M/UL
RBC # FLD: 14 %
WBC # FLD AUTO: 7.58 K/UL

## 2022-07-18 NOTE — ASSESSMENT
[High Risk Surgery - Intraperitoneal, Intrathoracic or Supringuinal Vascular Procedures] : High Risk Surgery - Intraperitoneal, Intrathoracic or Supringuinal Vascular Procedures - No (0) [Ischemic Heart Disease] : Ischemic Heart Disease - No (0) [Congestive Heart Failure] : Congestive Heart Failure - No (0) [Prior Cerebrovascular Accident or TIA] : Prior Cerebrovascular Accident or TIA - No (0) [Creatinine >= 2mg/dL (1 Point)] : Creatinine >= 2mg/dL - No (0) [Insulin-dependent Diabetic (1 Point)] : Insulin-dependent Diabetic - No (0) [0] : 0 , RCRI Class: I, Risk of Post-Op Cardiac Complications: 3.9%, 95% CI for Risk Estimate: 2.8% - 5.4% [Patient Optimized for Surgery] : Patient optimized for surgery [No Further Testing Recommended] : no further testing recommended [Stress Test] : stress test [As per surgery] : as per surgery [FreeTextEntry4] : 30 yo female with h/o sickle cell disease s/p bone marrow transplant achieving cure, several avascular necrosis of joints/bones s/p surgical intervention and osteoporosis presents for preoperative clearance for dental procedures of extractions with implants.Patient in good health generally, with prediabetes needing confirmation labs, patient is medically optimized for this low risk procedure.\par \par Obtaining a1c and cbc but unlikely to interfere with ability to proceed with surgical intervention.

## 2022-07-18 NOTE — REVIEW OF SYSTEMS
[Negative] : Heme/Lymph [FreeTextEntry3] : Legally blind, able to make out shapes in right eye only [FreeTextEntry8] : Reports that after years of not menstruating after the bone marrow transplant, she has had vaginal bleeding.Saw Gyne and given medication.  Has not followed up.

## 2022-07-18 NOTE — HISTORY OF PRESENT ILLNESS
[No Pertinent Cardiac History] : no history of aortic stenosis, atrial fibrillation, coronary artery disease, recent myocardial infarction, or implantable device/pacemaker [Asthma] : asthma [No Adverse Anesthesia Reaction] : no adverse anesthesia reaction in self or family member [(Patient denies any chest pain, claudication, dyspnea on exertion, orthopnea, palpitations or syncope)] : Patient denies any chest pain, claudication, dyspnea on exertion, orthopnea, palpitations or syncope [Good (7-10 METs)] : Good (7-10 METs) [COPD] : no COPD [Sleep Apnea] : no sleep apnea [Smoker] : not a smoker [Chronic Anticoagulation] : no chronic anticoagulation [Chronic Kidney Disease] : no chronic kidney disease [Diabetes] : no diabetes [FreeTextEntry1] : Dental treatment under anesthesia [FreeTextEntry3] : Dr. Barney Chand [FreeTextEntry4] : Patient presents for medical clearance for her Teeth extraction and have artificial teeth placement.  Patient reports that dentist is concerned because she used to have sickle cell and is s/p bone marrow transplant which resolved the sickle cell. Hence, they want to make sure that there aren't any active issues.  Patient Recently had a hip replacement, did well with that procedure without any complications from anesthesia or procedure.  Patient reports that she was placed on blood thinners briefly afterwards, but all is done at present. \par \par Regarding her asthma she has had on occasion a cough.  Patient reports that at last visit she was supposed to receive the symbicort from the pharmacy but never received it.

## 2022-07-18 NOTE — ADDENDUM
[FreeTextEntry1] : 07/18/2022  4:45 PM   Note ammended to reflect patient to undergo teeth extraction under general anesthesia, patient continues to be medically optimized. Mohan

## 2022-07-22 NOTE — CONSULT NOTE ADULT - PROBLEM SELECTOR RECOMMENDATION 9
richard on antibiotics per medicine, may require IR aspiration and drainage and fluid sent for cell count with differential and for culture, NPO for possible intervention, ESR and CRP ordered. Dr. Donovan to see.
with leukocytosis which is worsening and tachycardia (resolved )   No fever   on vanco  and s/p rocephin
"I have no energy."

## 2022-08-09 ENCOUNTER — APPOINTMENT (OUTPATIENT)
Dept: INTERNAL MEDICINE | Facility: CLINIC | Age: 32
End: 2022-08-09

## 2022-08-09 VITALS
SYSTOLIC BLOOD PRESSURE: 108 MMHG | HEIGHT: 64 IN | TEMPERATURE: 98.2 F | BODY MASS INDEX: 26.8 KG/M2 | HEART RATE: 83 BPM | OXYGEN SATURATION: 97 % | DIASTOLIC BLOOD PRESSURE: 73 MMHG | WEIGHT: 157 LBS

## 2022-08-09 VITALS — SYSTOLIC BLOOD PRESSURE: 100 MMHG | DIASTOLIC BLOOD PRESSURE: 68 MMHG

## 2022-08-09 DIAGNOSIS — E78.2 MIXED HYPERLIPIDEMIA: ICD-10-CM

## 2022-08-09 DIAGNOSIS — E66.3 OVERWEIGHT: ICD-10-CM

## 2022-08-09 DIAGNOSIS — Z11.59 ENCOUNTER FOR SCREENING FOR OTHER VIRAL DISEASES: ICD-10-CM

## 2022-08-09 PROCEDURE — 99395 PREV VISIT EST AGE 18-39: CPT

## 2022-08-09 RX ORDER — LIDOCAINE HYDROCHLORIDE 20 MG/ML
2 SOLUTION ORAL; TOPICAL
Qty: 75 | Refills: 0 | Status: COMPLETED | COMMUNITY
Start: 2022-07-05

## 2022-08-09 RX ORDER — HALOBETASOL PROPIONATE 0.5 MG/G
0.05 CREAM TOPICAL
Qty: 100 | Refills: 0 | Status: COMPLETED | COMMUNITY
Start: 2022-07-05

## 2022-08-09 RX ORDER — PNV NO.95/FERROUS FUM/FOLIC AC 28MG-0.8MG
400 TABLET ORAL DAILY
Qty: 60 | Refills: 5 | Status: COMPLETED | COMMUNITY
Start: 2021-05-25 | End: 2022-08-09

## 2022-08-09 NOTE — HISTORY OF PRESENT ILLNESS
[FreeTextEntry1] : annual check up [de-identified] : Annual exam, no concerns.  Reports that her dental surgery has been moved to around September instead of October.  Doing well for her asthma: Using inhaler which works very well, one pump of the inhaler, hasn't used since.  \par \par Regarding her menses has been irregular since her bone marrow transplant:\par Normal menses since November, last 2 months no menses, went to see Gynecology who told her everything is good. Had pap smear done in July of this year, everything is good.  \par \par Patient reports that she had her period after the bone marrow transplant which she had at age 20 or 21, told by Gynecology that due to this transplant her menses would be irregular.  \par \par No longer needs vitamin E as per Hepatologist, told has fatty liver.\par \par Saw eye specialist who stated that she has a Tear in the right side, wants her to use refresh in the eye. \par \par Hips doing well for the most part, but thinks may need to redo her left hip that was redone 5 years ago due to an infection.

## 2022-08-09 NOTE — HEALTH RISK ASSESSMENT
[Patient reported PAP Smear was normal] : Patient reported PAP Smear was normal [None] : None [With Family] : lives with family [Employed] : employed [College] : College [Single] : single [Significant Other] : lives with significant other [# Of Children ___] : has [unfilled] children [Sexually Active] : not sexually active [High Risk Behavior] : no high risk behavior [Feels Safe at Home] : Feels safe at home [Reports changes in hearing] : Reports no changes in hearing [Reports changes in vision] : Reports no changes in vision [Reports changes in dental health] : Reports no changes in dental health [PapSmearDate] : 07/22 [de-identified] : Got her associate's degree [FreeTextEntry2] : A

## 2022-08-09 NOTE — ASSESSMENT
[FreeTextEntry1] : Assessment as indicated above in impressions with plans through orders below. Follow up in 6 to 12 months for face to face visit, will be due for annual in August or September 2023.\par \par Labs only with hep B to see status of titer, as noticed 2 dosages of hep B vaccination, confirm positive. Will also check on lipids at that time as well.

## 2022-08-09 NOTE — PHYSICAL EXAM
[Normal Sclera/Conjunctiva] : normal sclera/conjunctiva [Normal TMs] : both tympanic membranes were normal [No Lymphadenopathy] : no lymphadenopathy [No Edema] : there was no peripheral edema [Deep Tendon Reflexes (DTR)] : deep tendon reflexes were 2+ and symmetric [Normal] : affect was normal and insight and judgment were intact

## 2022-08-09 NOTE — COUNSELING
[Encouraged to increase physical activity] : Encouraged to increase physical activity [Decrease Portions] : decrease portions [Good understanding] : Patient has a good understanding of disease, goals and obesity follow-up plan [FreeTextEntry2] : continue with reduce salt intake, eating healthy vegetables

## 2022-09-29 NOTE — H&P ADULT - CLICK TO LAUNCH ORM
well developed, well nourished , in no acute distress , ambulating without difficulty , normal communication ability
.

## 2022-11-04 NOTE — ASSESSMENT
----- Message from Priya Samantha sent at 11/4/2022  8:07 AM CDT -----  Contact: pt  Type: Same Day Appointment    Caller is requesting a same day appointment.  Caller declined first available appt listed below.    Name of caller:pt  When is the first available appt:11/17/2022  Symptoms:dx with tonsilitis 2 weeks ago at urgent care, she is still having cough, scratchy throat, and [id number]  Best Call back number:[phone]    Additional Info:Please advise-Thank you~        [High Risk Surgery - Intraperitoneal, Intrathoracic or Supringuinal Vascular Procedures] : High Risk Surgery - Intraperitoneal, Intrathoracic or Supringuinal Vascular Procedures - Yes (1) [Ischemic Heart Disease] : Ischemic Heart Disease - No (0) [Congestive Heart Failure] : Congestive Heart Failure - No (0) [Prior Cerebrovascular Accident or TIA] : Prior Cerebrovascular Accident or TIA - Yes (1) [Creatinine >= 2mg/dL (1 Point)] : Creatinine >= 2mg/dL - No (0) [Insulin-dependent Diabetic (1 Point)] : Insulin-dependent Diabetic - No (0) [2] : 2 , RCRI Class: III, Risk of Post-Op Cardiac Complications: 10.1%, 95% CI for Risk Estimate: 8.1% - 12.6% [Patient Optimized for Surgery] : Patient optimized for surgery [As per surgery] : as per surgery [FreeTextEntry4] : 30 yo female with h/o sickle cell anemia s/p bone marrow transplant, history of stroke, visual impairment and several hip replacements secondary to complications from sickle cell in the past presents for preoperative optimization for upcoming surgery.  Patient without any other concerns, is considered at moderate risk for this moderate risk surgery.  [FreeTextEntry6] : Patient advised to avoid any NSAIDs such as ibuprofen, naproxyn, aspirin, etc.  For pain only use Tylenol.

## 2022-11-08 NOTE — PROGRESS NOTE ADULT - ASSESSMENT
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   s/p hip aspiration- infected hip  Clinically stable for planned ortho intervention/surgery  Will proceed Epidermal Sutures: 4-0 Polypropylene

## 2022-12-06 ENCOUNTER — APPOINTMENT (OUTPATIENT)
Dept: INTERNAL MEDICINE | Facility: CLINIC | Age: 32
End: 2022-12-06

## 2022-12-06 VITALS
SYSTOLIC BLOOD PRESSURE: 121 MMHG | RESPIRATION RATE: 15 BRPM | HEART RATE: 80 BPM | BODY MASS INDEX: 26.98 KG/M2 | WEIGHT: 158 LBS | HEIGHT: 64 IN | DIASTOLIC BLOOD PRESSURE: 78 MMHG | OXYGEN SATURATION: 99 %

## 2022-12-06 DIAGNOSIS — M25.471 PAIN IN RIGHT ANKLE AND JOINTS OF RIGHT FOOT: ICD-10-CM

## 2022-12-06 DIAGNOSIS — M25.561 PAIN IN RIGHT KNEE: ICD-10-CM

## 2022-12-06 DIAGNOSIS — M25.562 PAIN IN RIGHT KNEE: ICD-10-CM

## 2022-12-06 DIAGNOSIS — M25.571 PAIN IN RIGHT ANKLE AND JOINTS OF RIGHT FOOT: ICD-10-CM

## 2022-12-06 DIAGNOSIS — M25.512 PAIN IN LEFT SHOULDER: ICD-10-CM

## 2022-12-06 DIAGNOSIS — Z86.2 PERSONAL HISTORY OF DISEASES OF THE BLOOD AND BLOOD-FORMING ORGANS AND CERTAIN DISORDERS INVOLVING THE IMMUNE MECHANISM: ICD-10-CM

## 2022-12-06 DIAGNOSIS — Z87.898 PERSONAL HISTORY OF OTHER SPECIFIED CONDITIONS: ICD-10-CM

## 2022-12-06 DIAGNOSIS — M25.551 PAIN IN RIGHT HIP: ICD-10-CM

## 2022-12-06 DIAGNOSIS — Z96.649 PRESENCE OF UNSPECIFIED ARTIFICIAL HIP JOINT: ICD-10-CM

## 2022-12-06 PROCEDURE — G0008: CPT

## 2022-12-06 PROCEDURE — 90686 IIV4 VACC NO PRSV 0.5 ML IM: CPT

## 2022-12-06 PROCEDURE — 99214 OFFICE O/P EST MOD 30 MIN: CPT | Mod: 25

## 2022-12-06 RX ORDER — NAPROXEN 500 MG/1
500 TABLET ORAL
Qty: 1 | Refills: 3 | Status: COMPLETED | COMMUNITY
Start: 2022-12-06 | End: 2023-04-05

## 2022-12-06 RX ORDER — IBUPROFEN 400 MG/1
400 TABLET, FILM COATED ORAL
Qty: 20 | Refills: 0 | Status: COMPLETED | COMMUNITY
Start: 2022-09-22

## 2022-12-06 NOTE — PHYSICAL EXAM
[Normal] : no acute distress, well nourished, well developed and well-appearing [de-identified] : left shoulder with limited range of motion, tenderness along shoulder, scapula and also biceps.  No tenderness at elbow.

## 2022-12-06 NOTE — HISTORY OF PRESENT ILLNESS
[FreeTextEntry1] : left shoulder and elbow hurts.  Also wants to know what her blood type. [de-identified] : Left elbow and shoulder hurting for a few months, limited range of motion.  Doesn't recall any injury. \par \pretty Hurts to sleep on it. Pain is at a 5/10, at its worse 11/10.  Pain is worsened by carrying things and lifting her arm up.  Didn't try any medications.  Attempted a bengay like that didn't \par \pretty Has shoes for her ankles, not very comfortable.  \par \par Needs antibiotics prior to dental work: wants to know if she can get it from this provider rather than the surgeon, as it takes forever to reach him.  Needs as prophylaxis for her hips.  \par \par Patient reports that when she goes to the dentist, gets a lot of pain.  Told by dental to get percocet. \par \par Patient needs to get filling, and other procedures, told by dentist that if she doesn't want to get complete dentures to avoid, recommend getting Percocet prior to her dental visits. Patient's insurance does not take any of the dentist's prescriptions, it needs to be given by her primary care provider. \par \par Going to the dentist on the 15th.  Patient is to take the Percocet an hour prior to her visits.

## 2022-12-06 NOTE — REVIEW OF SYSTEMS
[Joint Pain] : joint pain [Joint Stiffness] : no joint stiffness [Muscle Pain] : muscle pain [Negative] : Constitutional

## 2022-12-08 LAB
HBV CORE IGG+IGM SER QL: NONREACTIVE
HBV SURFACE AB SER QL: REACTIVE
HBV SURFACE AB SERPL IA-ACNC: 51.7 MIU/ML
HGB A MFR BLD: 61.4 %
HGB A2 MFR BLD: 3.1 %
HGB FRACT BLD-IMP: NORMAL
HGB S BLD QL SOLY: POSITIVE
HGB S MFR BLD: 35.5 %
SICKLE SCREEN: POSITIVE

## 2022-12-21 ENCOUNTER — APPOINTMENT (OUTPATIENT)
Dept: HEPATOLOGY | Facility: CLINIC | Age: 32
End: 2022-12-21

## 2022-12-21 VITALS
TEMPERATURE: 96.9 F | HEART RATE: 72 BPM | OXYGEN SATURATION: 98 % | WEIGHT: 158 LBS | BODY MASS INDEX: 26.98 KG/M2 | DIASTOLIC BLOOD PRESSURE: 68 MMHG | SYSTOLIC BLOOD PRESSURE: 120 MMHG | HEIGHT: 64 IN

## 2022-12-21 DIAGNOSIS — K75.81 NONALCOHOLIC STEATOHEPATITIS (NASH): ICD-10-CM

## 2022-12-21 PROCEDURE — 99214 OFFICE O/P EST MOD 30 MIN: CPT

## 2022-12-21 NOTE — HISTORY OF PRESENT ILLNESS
[de-identified] : Ms. MOONEY is a 32 year old woman with h/o sickle cell disease, cured with bone marrow transplant in 2010 here for follow up for HENDRIX.\par \par -12/21/22 No abdo pain. Had hip surgery in April and is walking better with less pain, Done with PT. Now with left elbow and shoulder pain. Was recently seen by PCP and was prescribed diclofenac gel but has not use yet. Last BW in June ALT 21, AST 18, . BW 12/6/22 showed immunity for hep B via vaccination. \par \par -6/21/22: Had hip surgery April 2022 and is doing well with less pain. Still getting PT. Reports that she off pain meds but started few weeks ago on herbal supplements got on [employer] for pain but don't remember the name. BW from 4/14/22 ALT 55, AST 26, ALP 86, INR 1.05. \par \par -3/23/22 Returns for follow up for HENDRIX.  Last saw Dr. Lucio on 5/25/21 and was advised to start Vit E but never did. She has lost 8 lbs since last visit, current weight 157 lbs, BMI 28. Feels well. She has right hip pain and is scheduled for surgery in April. Denies abdominal pain, nausea, vomiting, melena, hematochezia, or hematemesis. Last BW with liver chemistries was April 2021 ALT 43, AST 26, Tbil 0.4, albumin 5.1, INR 1.06.\par \par - 5/25/21: returns after liver biopsy 2/2020 and seeing Dr. Westbrook in 4/2020. Pentoxyphylline was prescribed for HENDRIX however pt never started. Has not lost weight, BMI still 29. LFTs 4/2021 normalized enzymes and transferring saturation. Ferritin still 1900. Has transfusional iron overload. Feels great. \par \par - 2/10/20: returns after labs and fibroscan today, which showed no fibrosis, but stage 3 steatosis - interpreted and discussed. Doing well overall. \par - initial visit: Ms. 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 bleed s/p surgery, resulting decreased vision, h/o sickle cell crisis with coma, osteoporosis, hip replacement.\par \par Weight hx: 170 lbs, BMI 30. Gained 40 lbs between 2013 and 2019, Was on steroids for eyes off and on until 2017.\par \par Workup:\par - 2/20/20 liver biopsy: moderate steatosis 35%, mild steatohepatitis grade 1/3, stage 1/4 fibrosis. Hemochromatosis in hepatocytes and macrophages as well. Focal pericellular fibrosis.\par - 2/10/20 fibroscan: 308 dB/m - stage 3 steatosis, 4.6 kPa - stage 0 fibrosis\par -Fibroscan test 3/24/22 F0 (4.2 kPa) , S0 ()\par - 10/22/18 US (scanned): mild-moderate hepatomegaly with steatosis, cholelithiasis in a contracted GB.

## 2022-12-21 NOTE — ASSESSMENT
[FreeTextEntry1] : Ms. MOONEY is a 32 year old woman with h/o sickle cell disease, cured with bone marrow transplant in 2010 here for follow up for HENDRIX.\par \par - HENDRIX with moderate steatosis and mild steatohepatitis seen on liver biopsy 2/2020 with mild pericellular fibrosis seen on liver biopsy. 2/10/20 fibroscan: 4.6 kPa - stage 0 fibrosis. Fibroscan test 3/24/22 F0 (4.2 kPa) , S0 ()\par - also iron overload in hepatocytes and macrophages, normal HFE gene testing, due to transfusions\par   in past treated with deferasirox, which caused eye damage.\par -Normal liver enzymes in June and will repeat BW today. \par  -BMI 27, lost 11 lbs since May 2021. Discussed lifestyle modifications for management of HENDRIX. I recommended gradual weight loss of 5-10% of her body weight. I recommended increased consumption of vegetables and lean proteins, avoidance of red meat and high fructose corn syrup, and avoidance of calorie-containing beverages.  Advised to walk for exercise as much as she can tolerated. \par -Was prescribed diclofenac gel but not started yet. Explained that this can increased LFTs and should be monitored. \par \par Plan:\par BW today and she will call me to discuss result. If no significant abnormalities,  F/U in 6 months with repeat BW. \par \par \par

## 2022-12-21 NOTE — PHYSICAL EXAM
[General Appearance - Alert] : alert [General Appearance - In No Acute Distress] : in no acute distress [Sclera] : the sclera and conjunctiva were normal [Neck Appearance] : the appearance of the neck was normal [Neck Cervical Mass (___cm)] : no neck mass was observed [] : no respiratory distress [Respiration, Rhythm And Depth] : normal respiratory rhythm and effort [Auscultation Breath Sounds / Voice Sounds] : lungs were clear to auscultation bilaterally [Heart Rate And Rhythm] : heart rate was normal and rhythm regular [Heart Sounds] : normal S1 and S2 [Edema] : there was no peripheral edema [Bowel Sounds] : normal bowel sounds [Abdomen Soft] : soft [Abdomen Tenderness] : non-tender [Abdomen Hernia] : no hernia was discovered [Oriented To Time, Place, And Person] : oriented to person, place, and time [Affect] : the affect was normal [Scleral Icterus] : No Scleral Icterus [Spider Angioma] : No spider angioma(s) were observed [Abdominal Bruit] : no abdominal bruit [Abdominal  Ascites] : no ascites [Asterixis] : no asterixis observed [Jaundice] : No jaundice [Palmar Erythema] : no Palmar Erythema

## 2022-12-21 NOTE — REVIEW OF SYSTEMS
[As Noted in HPI] : as noted in HPI [Limb Pain] : limb pain [Negative] : Integumentary [Fever] : no fever [Chills] : no chills [Confused] : no confusion [FreeTextEntry9] : muscle pain

## 2022-12-23 ENCOUNTER — NON-APPOINTMENT (OUTPATIENT)
Age: 32
End: 2022-12-23

## 2022-12-23 LAB
ALBUMIN SERPL ELPH-MCNC: 4.7 G/DL
ALP BLD-CCNC: 100 U/L
ALT SERPL-CCNC: 40 U/L
AST SERPL-CCNC: 28 U/L
BILIRUB DIRECT SERPL-MCNC: 0.1 MG/DL
BILIRUB INDIRECT SERPL-MCNC: 0.2 MG/DL
BILIRUB SERPL-MCNC: 0.2 MG/DL
GGT SERPL-CCNC: 42 U/L
PROT SERPL-MCNC: 6.6 G/DL

## 2023-01-27 NOTE — H&P PST ADULT - HEMATOLOGY/LYMPHATICS
Problem: Discharge Planning  Goal: Discharge to home or other facility with appropriate resources  1/27/2023 1746 by Coretta Dubin, RN  Outcome: Progressing     Problem: Safety - Adult  Goal: Free from fall injury  1/27/2023 1746 by Coretta Dubin, RN  Outcome: Progressing negative

## 2023-03-07 ENCOUNTER — APPOINTMENT (OUTPATIENT)
Dept: ORTHOPEDIC SURGERY | Facility: CLINIC | Age: 33
End: 2023-03-07
Payer: MEDICAID

## 2023-03-07 VITALS — BODY MASS INDEX: 26.98 KG/M2 | HEIGHT: 64 IN | WEIGHT: 158 LBS

## 2023-03-07 PROCEDURE — 99214 OFFICE O/P EST MOD 30 MIN: CPT

## 2023-03-07 PROCEDURE — 72100 X-RAY EXAM L-S SPINE 2/3 VWS: CPT

## 2023-03-07 PROCEDURE — 73522 X-RAY EXAM HIPS BI 3-4 VIEWS: CPT

## 2023-03-07 NOTE — HISTORY OF PRESENT ILLNESS
[Gradual] : gradual [7] : 7 [5] : 5 [Dull/Aching] : dull/aching [Radiating] : radiating [Sharp] : sharp [Shooting] : shooting [Constant] : constant [Disabled] : Work status: disabled [de-identified] : 03/07/2023 Ms. DARRELL MOONEY is a 32 years old  F who   presents today for evaluation of  Marciano hip pt states she has shooting pain radiating from her hip down her knees pt states she has total hip replacement.  \par \par Right side done in 6/28/2022 revision AMBER by dr yu for loosening.\par thigh pain in left leg with ambulation [] : no [FreeTextEntry7] : john kneen  [de-identified] : gigi

## 2023-03-07 NOTE — IMAGING

## 2023-03-07 NOTE — ASSESSMENT
[FreeTextEntry1] : 32 year F with loosening of left femoral component\par - discussed eventual revision of the left femoral component.\par - 3 month follow up\par \par

## 2023-03-22 LAB
M TB IFN-G BLD-IMP: NEGATIVE
QUANTIFERON TB PLUS MITOGEN MINUS NIL: 8.4 IU/ML
QUANTIFERON TB PLUS NIL: 0.02 IU/ML
QUANTIFERON TB PLUS TB1 MINUS NIL: 0 IU/ML
QUANTIFERON TB PLUS TB2 MINUS NIL: 0.01 IU/ML

## 2023-03-22 NOTE — PATIENT PROFILE ADULT. - HARM RISK FACTORS
The patient has been examined and the H&P has been reviewed:    I concur with the findings and no changes have occurred since H&P was written.    Procedure risks, benefits and alternative options discussed and understood by patient/family.          There are no hospital problems to display for this patient.     no

## 2023-04-05 NOTE — PROGRESS NOTE ADULT - PROBLEM SELECTOR PLAN 2
mri done of hip  d/w ortho plan as above   for aspiration of joint fluid on Monday under general anesthesia Detail Level: Simple Additional Notes: ** Patient requests fan in light box be off during treatment always Render Risk Assessment In Note?: no

## 2023-04-27 NOTE — PHYSICAL THERAPY INITIAL EVALUATION ADULT - LEVEL OF CONSCIOUSNESS, REHAB EVAL
alert Alar Island Pedicle Flap Text: The defect edges were debeveled with a #15 scalpel blade.  Given the location of the defect, shape of the defect and the proximity to the alar rim an island pedicle advancement flap was deemed most appropriate.  Using a sterile surgical marker, an appropriate advancement flap was drawn incorporating the defect, outlining the appropriate donor tissue and placing the expected incisions within the nasal ala running parallel to the alar rim. The area thus outlined was incised with a #15 scalpel blade.  The skin margins were undermined minimally to an appropriate distance in all directions around the primary defect and laterally outward around the island pedicle utilizing iris scissors.  There was minimal undermining beneath the pedicle flap.

## 2023-05-18 ENCOUNTER — APPOINTMENT (OUTPATIENT)
Dept: INTERNAL MEDICINE | Facility: CLINIC | Age: 33
End: 2023-05-18
Payer: MEDICAID

## 2023-05-18 ENCOUNTER — RX CHANGE (OUTPATIENT)
Age: 33
End: 2023-05-18

## 2023-05-18 VITALS
BODY MASS INDEX: 27.83 KG/M2 | HEART RATE: 74 BPM | OXYGEN SATURATION: 98 % | WEIGHT: 163 LBS | TEMPERATURE: 98.2 F | SYSTOLIC BLOOD PRESSURE: 137 MMHG | HEIGHT: 64 IN | DIASTOLIC BLOOD PRESSURE: 85 MMHG

## 2023-05-18 DIAGNOSIS — J30.2 OTHER SEASONAL ALLERGIC RHINITIS: ICD-10-CM

## 2023-05-18 PROCEDURE — 99214 OFFICE O/P EST MOD 30 MIN: CPT

## 2023-05-18 RX ORDER — METFORMIN ER 500 MG 500 MG/1
500 TABLET ORAL
Qty: 60 | Refills: 0 | Status: COMPLETED | COMMUNITY
Start: 2022-09-08 | End: 2023-05-18

## 2023-05-18 RX ORDER — OLOPATADINE HCL 1 MG/ML
0.1 SOLUTION/ DROPS OPHTHALMIC TWICE DAILY
Qty: 5 | Refills: 3 | Status: DISCONTINUED | COMMUNITY
Start: 2023-05-18 | End: 2023-05-18

## 2023-05-18 NOTE — HISTORY OF PRESENT ILLNESS
[FreeTextEntry8] : Thinks might have allergies.\par \par Having some watery eyes.\par Once gets outside starts coughing, runny nose.\par But while inside with no windows open.\par Sx started a few weeks ago. Daily sx.\par Took aleggra-D (fexofenoadine, pseudoephedrine) and sx helped.\par Tried zyrtec but didn't help. Benadryl knocks her out.\par \par no fevers. otherwise feels well. no sick contacts.\par \par H/O sickle cell syndrome s/p BMT (at 21 y/o)\par Had L hip replacement >10 years ago, R hip replacement 2 years ago.

## 2023-05-18 NOTE — REVIEW OF SYSTEMS
[Fever] : no fever [Chills] : no chills [Fatigue] : fatigue [Discharge] : discharge [Itching] : itching [Earache] : no earache [Nosebleed] : no nosebleeds [Nasal Discharge] : nasal discharge [Sore Throat] : no sore throat [Postnasal Drip] : no postnasal drip [Shortness Of Breath] : no shortness of breath [Cough] : cough [Negative] : Gastrointestinal

## 2023-05-18 NOTE — PHYSICAL EXAM
[Conjunctival Watery Discharge Both Eyes] : a watery discharge [Normal Oropharynx] : the oropharynx was normal [Normal TMs] : both tympanic membranes were normal [Normal] : normal rate, regular rhythm, normal S1 and S2 and no murmur heard

## 2023-05-18 NOTE — ASSESSMENT
[FreeTextEntry1] : 33 y/o F with sickle cell s/p BMT, s/p bilateral hip replacements here for acute visit for watery eyes/nasal discharge/coughing, c/w seasonal allergies\par - Switch to fexofenadine (without decongestant) daily. Can take Allegra-D only as needed\par - start patanol and flonase\par \par RTC if sx persist or worsen.

## 2023-06-06 ENCOUNTER — APPOINTMENT (OUTPATIENT)
Dept: ORTHOPEDIC SURGERY | Facility: CLINIC | Age: 33
End: 2023-06-06
Payer: MEDICAID

## 2023-06-06 VITALS — HEIGHT: 64 IN | BODY MASS INDEX: 31.24 KG/M2 | WEIGHT: 183 LBS

## 2023-06-06 DIAGNOSIS — M87.051 IDIOPATHIC ASEPTIC NECROSIS OF RIGHT FEMUR: ICD-10-CM

## 2023-06-06 PROCEDURE — 73503 X-RAY EXAM HIP UNI 4/> VIEWS: CPT | Mod: LT

## 2023-06-06 PROCEDURE — 99215 OFFICE O/P EST HI 40 MIN: CPT

## 2023-06-06 NOTE — HISTORY OF PRESENT ILLNESS
[Gradual] : gradual [5] : 5 [Dull/Aching] : dull/aching [Radiating] : radiating [Sharp] : sharp [Shooting] : shooting [Constant] : constant [Disabled] : Work status: disabled [10] : 10 [de-identified] : 03/07/2023 Ms. DARRELL MOONEY is a 32 years old  F who   presents today for evaluation of  Marciano hip pt states she has shooting pain radiating from her hip down her knees pt states she has total hip replacement.  \par \par Right side done in 6/28/2022 revision AMBER by dr yu for loosening.\par thigh pain in left leg with ambulation\par \par 06/06/2023 follow up visit Marciano hip, pt states when she sit for a long period of time nd try to get up she get a lot of pains. Continues with PT/ HEP. Takes tylenol with mild help.  [] : no [FreeTextEntry7] : john kneen  [de-identified] : gigi

## 2023-06-06 NOTE — IMAGING
[de-identified] : Constitutional: well developed and well nourished, able to communicate\par Cardiovascular: Peripheral vascular exam is grossly normal\par Neurologic: Alert and oriented, no acute distress.\par Skin: normal skin with no ulcers, rashes, or lesions\par Pulmonary: No respiratory distress, breathing comfortably on room air\par Lymphatics: No obvious lymphadenopathy or lymphedema in areas examined\par \par LOWER EXTREMITY/LEFT HIP EXAM\par Standing pelvic alignment: Symmetric with no Trendelenburg\par Atrophy: none\par Ecchymosis/swelling: none\par \par Range of Motion\par Hip: Flexion/extension/ER/IR     / EX 20/ ER 45/ IR 20 / AB 60 /AD 20\par Impingement with flexion adduction and internal rotation: negative\par Contracture: none\par Snapping hip: negative\par Greater trochanter: no tenderness\par \par Neurovascular\par Distal extremities: warm to touch\par Sensation to light touch: intact\par Muscle strength: 5/5\par \par IMAGING:\par 03/07/2023 Xrays of the Left hip 3v were taken demonstrating slow migration of implant on the femoral component compared to prior 2019 xrays\par lumbar no signs of fractures, dislocations,or significant arthritis. \par 06/06/2023 Xrays of the Left hip 3v were taken demonstrating stable implant with lateral hypertrophy

## 2023-06-06 NOTE — ASSESSMENT
[FreeTextEntry1] : 32 year F with loosening of left femoral component\par - discussed eventual revision of the left femoral component.\par - 3 month follow up\par \par 06/06/2023 would like to plan for revision of left AMBER. will plan in august for revision.\par I,Shaun Cates M.D. discussed the patient’s diagnosis and treatment options, non-surgical and surgical, with the patient and/or legal guardian including the potential benefits and complications of each. Potential complications of non-surgical treatments discussed include residual pain and/or disability, non-healing, progression of symptoms and/or disease. Potential complications of surgery discussed include infection, nerve injury, vascular injury, cartilage injury, ligament injury, tendon injury, muscle injury, skin injury, stiffness, instability, weakness, persistent pain, technical or hardware failure, need for additional surgery, re-injury, medical complication, anesthetic related complication, and/or death. All questions were answered. The patient and/or legal guardian has elected to proceed with surgery. Informed consent obtained for Left revision AMBER\par \par                   \par Preoperative evaluation and testing as needed is advised within 30 days prior to the procedure.\par  \par

## 2023-06-15 ENCOUNTER — RX RENEWAL (OUTPATIENT)
Age: 33
End: 2023-06-15

## 2023-06-26 ENCOUNTER — APPOINTMENT (OUTPATIENT)
Dept: NEUROLOGY | Facility: CLINIC | Age: 33
End: 2023-06-26

## 2023-07-05 ENCOUNTER — APPOINTMENT (OUTPATIENT)
Dept: HEPATOLOGY | Facility: CLINIC | Age: 33
End: 2023-07-05

## 2023-07-12 NOTE — PROGRESS NOTE ADULT - PROBLEM SELECTOR PLAN 3
Pt s/p bone marrow transplant Acute left leg wound infection status post recent injury.  No fever or signs of systemic infection.  Patient on antirejection drugs for kidney transplant.  We will start p.o. antibiotics, outpatient follow-up with wound care center, and primary care.

## 2023-07-18 ENCOUNTER — NON-APPOINTMENT (OUTPATIENT)
Age: 33
End: 2023-07-18

## 2023-07-18 ENCOUNTER — APPOINTMENT (OUTPATIENT)
Dept: INTERNAL MEDICINE | Facility: CLINIC | Age: 33
End: 2023-07-18
Payer: MEDICAID

## 2023-07-18 VITALS
TEMPERATURE: 98 F | WEIGHT: 179 LBS | BODY MASS INDEX: 30.56 KG/M2 | HEIGHT: 64 IN | OXYGEN SATURATION: 98 % | HEART RATE: 82 BPM | DIASTOLIC BLOOD PRESSURE: 72 MMHG | SYSTOLIC BLOOD PRESSURE: 110 MMHG

## 2023-07-18 DIAGNOSIS — K08.89 OTHER SPECIFIED DISORDERS OF TEETH AND SUPPORTING STRUCTURES: ICD-10-CM

## 2023-07-18 DIAGNOSIS — J45.909 UNSPECIFIED ASTHMA, UNCOMPLICATED: ICD-10-CM

## 2023-07-18 DIAGNOSIS — Z96.642 PAIN IN LEFT HIP: ICD-10-CM

## 2023-07-18 DIAGNOSIS — Z94.81 BONE MARROW TRANSPLANT STATUS: ICD-10-CM

## 2023-07-18 DIAGNOSIS — M25.552 PAIN IN LEFT HIP: ICD-10-CM

## 2023-07-18 DIAGNOSIS — Z96.642 PRESENCE OF LEFT ARTIFICIAL HIP JOINT: ICD-10-CM

## 2023-07-18 DIAGNOSIS — Z11.1 ENCOUNTER FOR SCREENING FOR RESPIRATORY TUBERCULOSIS: ICD-10-CM

## 2023-07-18 DIAGNOSIS — G89.29 PAIN IN LEFT HIP: ICD-10-CM

## 2023-07-18 PROCEDURE — 93000 ELECTROCARDIOGRAM COMPLETE: CPT

## 2023-07-18 PROCEDURE — 99214 OFFICE O/P EST MOD 30 MIN: CPT | Mod: 25

## 2023-07-18 RX ORDER — AMOXICILLIN 500 MG/1
500 TABLET, FILM COATED ORAL
Qty: 12 | Refills: 4 | Status: COMPLETED | COMMUNITY
Start: 2022-11-01 | End: 2022-12-21

## 2023-07-24 PROBLEM — K08.89 TOOTH PAIN: Status: RESOLVED | Noted: 2020-10-29 | Resolved: 2023-07-24

## 2023-07-24 PROBLEM — M25.552 CHRONIC HIP PAIN AFTER TOTAL REPLACEMENT OF LEFT HIP JOINT: Status: ACTIVE | Noted: 2023-07-18

## 2023-07-24 PROBLEM — Z94.81 S/P BONE MARROW TRANSPLANT: Status: ACTIVE | Noted: 2018-03-09

## 2023-07-24 PROBLEM — J45.909 ASTHMA: Status: ACTIVE | Noted: 2020-10-29

## 2023-07-24 PROBLEM — Z11.1 SCREENING-PULMONARY TB: Status: RESOLVED | Noted: 2023-03-15 | Resolved: 2023-07-24

## 2023-07-24 PROBLEM — Z96.642 HISTORY OF TOTAL LEFT HIP REPLACEMENT: Status: ACTIVE | Noted: 2023-03-07

## 2023-07-24 RX ORDER — ACETAMINOPHEN AND CODEINE PHOSPHATE 300; 15 MG/1; MG/1
300-15 TABLET ORAL
Qty: 48 | Refills: 0 | Status: COMPLETED | COMMUNITY
Start: 2022-12-06 | End: 2022-12-10

## 2023-07-24 RX ORDER — BUDESONIDE AND FORMOTEROL FUMARATE DIHYDRATE 80; 4.5 UG/1; UG/1
80-4.5 AEROSOL RESPIRATORY (INHALATION)
Qty: 1 | Refills: 11 | Status: ACTIVE | COMMUNITY
Start: 2022-04-01 | End: 1900-01-01

## 2023-07-24 NOTE — PHYSICAL EXAM
[No Acute Distress] : no acute distress [Normal Voice/Communication] : normal voice/communication [No Lymphadenopathy] : no lymphadenopathy [No Edema] : there was no peripheral edema [Normal] : affect was normal and insight and judgment were intact

## 2023-07-25 ENCOUNTER — APPOINTMENT (OUTPATIENT)
Dept: ORTHOPEDIC SURGERY | Facility: CLINIC | Age: 33
End: 2023-07-25
Payer: MEDICAID

## 2023-07-25 VITALS — BODY MASS INDEX: 30.56 KG/M2 | WEIGHT: 179 LBS | HEIGHT: 64 IN

## 2023-07-25 PROCEDURE — 73503 X-RAY EXAM HIP UNI 4/> VIEWS: CPT | Mod: LT

## 2023-07-25 PROCEDURE — 99215 OFFICE O/P EST HI 40 MIN: CPT

## 2023-07-25 NOTE — ASSESSMENT
[FreeTextEntry1] : 32 year F with loosening of left femoral component\par - discussed eventual revision of the left femoral component.\par - 3 month follow up\par \par 06/06/2023 would like to plan for revision of left AMBER. will plan in august for revision.\par I,Shaun Cates M.D. discussed the patient’s diagnosis and treatment options, non-surgical and surgical, with the patient and/or legal guardian including the potential benefits and complications of each. Potential complications of non-surgical treatments discussed include residual pain and/or disability, non-healing, progression of symptoms and/or disease. Potential complications of surgery discussed include infection, nerve injury, vascular injury, cartilage injury, ligament injury, tendon injury, muscle injury, skin injury, stiffness, instability, weakness, persistent pain, technical or hardware failure, need for additional surgery, re-injury, medical complication, anesthetic related complication, and/or death. All questions were answered. The patient and/or legal guardian has elected to proceed with surgery. Informed consent obtained for Left revision AMBER\par \par                   \par Preoperative evaluation and testing as needed is advised within 30 days prior to the procedure.\par \par 07/25/2023 preoperative discussion had, xrays reviewed today. Plan for revision Left AMBER - polyexchange and stem revision.\par \par

## 2023-07-25 NOTE — IMAGING

## 2023-07-25 NOTE — HISTORY OF PRESENT ILLNESS
[Gradual] : gradual [10] : 10 [5] : 5 [Dull/Aching] : dull/aching [Radiating] : radiating [Sharp] : sharp [Shooting] : shooting [Constant] : constant [Disabled] : Work status: disabled [de-identified] : 03/07/2023 Ms. DARRELL MOONEY is a 32 years old  F who   presents today for evaluation of  Marciano hip pt states she has shooting pain radiating from her hip down her knees pt states she has total hip replacement.  \par \par Right side done in 6/28/2022 revision AMBER by dr yu for loosening.\par thigh pain in left leg with ambulation\par \par 06/06/2023 follow up visit Marciano hip, pt states when she sit for a long period of time nd try to get up she get a lot of pains. Continues with PT/ HEP. Takes tylenol with mild help. \par \par 07/25/2023 pain worsened lateral side due to walking at work [] : no [FreeTextEntry7] : john kneen  [de-identified] : gigi

## 2023-07-26 ENCOUNTER — OUTPATIENT (OUTPATIENT)
Dept: OUTPATIENT SERVICES | Facility: HOSPITAL | Age: 33
LOS: 1 days | Discharge: HOME | End: 2023-07-26

## 2023-07-26 VITALS
OXYGEN SATURATION: 99 % | SYSTOLIC BLOOD PRESSURE: 121 MMHG | TEMPERATURE: 99 F | HEIGHT: 64 IN | RESPIRATION RATE: 16 BRPM | DIASTOLIC BLOOD PRESSURE: 74 MMHG | HEART RATE: 94 BPM | WEIGHT: 153.22 LBS

## 2023-07-26 DIAGNOSIS — Z96.642 PRESENCE OF LEFT ARTIFICIAL HIP JOINT: ICD-10-CM

## 2023-07-26 DIAGNOSIS — Z94.81 BONE MARROW TRANSPLANT STATUS: Chronic | ICD-10-CM

## 2023-07-26 DIAGNOSIS — Z90.81 ACQUIRED ABSENCE OF SPLEEN: Chronic | ICD-10-CM

## 2023-07-26 DIAGNOSIS — Z98.890 OTHER SPECIFIED POSTPROCEDURAL STATES: Chronic | ICD-10-CM

## 2023-07-26 DIAGNOSIS — Z96.60 PRESENCE OF UNSPECIFIED ORTHOPEDIC JOINT IMPLANT: Chronic | ICD-10-CM

## 2023-07-26 DIAGNOSIS — Z01.818 ENCOUNTER FOR OTHER PREPROCEDURAL EXAMINATION: ICD-10-CM

## 2023-07-26 DIAGNOSIS — Z90.89 ACQUIRED ABSENCE OF OTHER ORGANS: Chronic | ICD-10-CM

## 2023-07-26 LAB
A1C WITH ESTIMATED AVERAGE GLUCOSE RESULT: 6.8 % — HIGH (ref 4–5.6)
ALBUMIN SERPL ELPH-MCNC: 3.7 G/DL — SIGNIFICANT CHANGE UP (ref 3.3–5)
ALP SERPL-CCNC: 89 U/L — SIGNIFICANT CHANGE UP (ref 40–120)
ALT FLD-CCNC: 60 U/L — SIGNIFICANT CHANGE UP (ref 12–78)
ANION GAP SERPL CALC-SCNC: 6 MMOL/L — SIGNIFICANT CHANGE UP (ref 5–17)
APTT BLD: 32.2 SEC — SIGNIFICANT CHANGE UP (ref 24.5–35.6)
AST SERPL-CCNC: 25 U/L — SIGNIFICANT CHANGE UP (ref 15–37)
BASOPHILS # BLD AUTO: 0.07 K/UL — SIGNIFICANT CHANGE UP (ref 0–0.2)
BASOPHILS NFR BLD AUTO: 1 % — SIGNIFICANT CHANGE UP (ref 0–2)
BILIRUB SERPL-MCNC: 0.4 MG/DL — SIGNIFICANT CHANGE UP (ref 0.2–1.2)
BUN SERPL-MCNC: 17 MG/DL — SIGNIFICANT CHANGE UP (ref 7–23)
CALCIUM SERPL-MCNC: 9.1 MG/DL — SIGNIFICANT CHANGE UP (ref 8.5–10.1)
CHLORIDE SERPL-SCNC: 108 MMOL/L — SIGNIFICANT CHANGE UP (ref 96–108)
CO2 SERPL-SCNC: 28 MMOL/L — SIGNIFICANT CHANGE UP (ref 22–31)
CREAT SERPL-MCNC: 0.61 MG/DL — SIGNIFICANT CHANGE UP (ref 0.5–1.3)
EGFR: 122 ML/MIN/1.73M2 — SIGNIFICANT CHANGE UP
EOSINOPHIL # BLD AUTO: 0.21 K/UL — SIGNIFICANT CHANGE UP (ref 0–0.5)
EOSINOPHIL NFR BLD AUTO: 2.9 % — SIGNIFICANT CHANGE UP (ref 0–6)
ESTIMATED AVERAGE GLUCOSE: 148 MG/DL — HIGH (ref 68–114)
GLUCOSE SERPL-MCNC: 178 MG/DL — HIGH (ref 70–99)
HCG SERPL-ACNC: <1 MIU/ML — SIGNIFICANT CHANGE UP
HCT VFR BLD CALC: 36.1 % — SIGNIFICANT CHANGE UP (ref 34.5–45)
HGB BLD-MCNC: 11.7 G/DL — SIGNIFICANT CHANGE UP (ref 11.5–15.5)
IMM GRANULOCYTES NFR BLD AUTO: 0.3 % — SIGNIFICANT CHANGE UP (ref 0–0.9)
INR BLD: 0.9 RATIO — SIGNIFICANT CHANGE UP (ref 0.85–1.18)
LYMPHOCYTES # BLD AUTO: 2.85 K/UL — SIGNIFICANT CHANGE UP (ref 1–3.3)
LYMPHOCYTES # BLD AUTO: 39.7 % — SIGNIFICANT CHANGE UP (ref 13–44)
MCHC RBC-ENTMCNC: 27.5 PG — SIGNIFICANT CHANGE UP (ref 27–34)
MCHC RBC-ENTMCNC: 32.4 G/DL — SIGNIFICANT CHANGE UP (ref 32–36)
MCV RBC AUTO: 84.7 FL — SIGNIFICANT CHANGE UP (ref 80–100)
MONOCYTES # BLD AUTO: 0.61 K/UL — SIGNIFICANT CHANGE UP (ref 0–0.9)
MONOCYTES NFR BLD AUTO: 8.5 % — SIGNIFICANT CHANGE UP (ref 2–14)
NEUTROPHILS # BLD AUTO: 3.42 K/UL — SIGNIFICANT CHANGE UP (ref 1.8–7.4)
NEUTROPHILS NFR BLD AUTO: 47.6 % — SIGNIFICANT CHANGE UP (ref 43–77)
NRBC # BLD: 0 /100 WBCS — SIGNIFICANT CHANGE UP (ref 0–0)
PLATELET # BLD AUTO: 271 K/UL — SIGNIFICANT CHANGE UP (ref 150–400)
POTASSIUM SERPL-MCNC: 3.7 MMOL/L — SIGNIFICANT CHANGE UP (ref 3.5–5.3)
POTASSIUM SERPL-SCNC: 3.7 MMOL/L — SIGNIFICANT CHANGE UP (ref 3.5–5.3)
PROT SERPL-MCNC: 7 GM/DL — SIGNIFICANT CHANGE UP (ref 6–8.3)
PROTHROM AB SERPL-ACNC: 10.8 SEC — SIGNIFICANT CHANGE UP (ref 9.5–13)
RBC # BLD: 4.26 M/UL — SIGNIFICANT CHANGE UP (ref 3.8–5.2)
RBC # FLD: 13.8 % — SIGNIFICANT CHANGE UP (ref 10.3–14.5)
SODIUM SERPL-SCNC: 142 MMOL/L — SIGNIFICANT CHANGE UP (ref 135–145)
WBC # BLD: 7.18 K/UL — SIGNIFICANT CHANGE UP (ref 3.8–10.5)
WBC # FLD AUTO: 7.18 K/UL — SIGNIFICANT CHANGE UP (ref 3.8–10.5)

## 2023-07-26 NOTE — H&P PST ADULT - NSICDXFAMILYHX_GEN_ALL_CORE_FT
FAMILY HISTORY:  No pertinent family history in first degree relatives     FAMILY HISTORY:  Mother  Still living? Yes, Estimated age: 51-60  FH: type 2 diabetes, Age at diagnosis: Age Unknown

## 2023-07-26 NOTE — OCCUPATIONAL THERAPY INITIAL EVALUATION ADULT - GENERAL OBSERVATIONS, REHAB EVAL
Chart reviewed. Patient encountered seated in chair in rehab preop room in [facility]. Patient underwent occupational therapy pre-operative consultation to determine current functional ADL limitations in order to provide the right equipment for patient to perform functional ADL post operation.

## 2023-07-26 NOTE — OCCUPATIONAL THERAPY INITIAL EVALUATION ADULT - LIVES WITH, PROFILE
her mother in a private house with 4 equipped with bilateral hand rails  for the last 3 steps that cannot be reached simultaneously .

## 2023-07-26 NOTE — OCCUPATIONAL THERAPY INITIAL EVALUATION ADULT - PRECAUTIONS/LIMITATIONS, REHAB EVAL
Pt's mobility and ADL management is limited due to pain in left hip./fall precautions/vision precautions

## 2023-07-26 NOTE — OCCUPATIONAL THERAPY INITIAL EVALUATION ADULT - PERTINENT HX OF CURRENT PROBLEM, REHAB EVAL
Pt is a 31 y/o female slated for elective surgery for revision of left THR  with MD Cates on 8/10/2023 due to OA, pain and DJD. Pt has the initial left THR 14 years ago.

## 2023-07-26 NOTE — H&P PST ADULT - NEUROLOGICAL COMMENTS
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

## 2023-07-26 NOTE — H&P PST ADULT - ATTENDING COMMENTS
I discussed this dx with the family at length. In order to restore the patient's ability to ambulate with minimal pain and prevent failure of the implant, a Left revision total hip replacement was recommended. The risks of the procedure were discussed at length which included but not limited to infection, bleeding, and damage to neurovascular structures. Informed consent was obtained.

## 2023-07-26 NOTE — H&P PST ADULT - PROBLEM SELECTOR PLAN 1
revision of left total hip arthroplasty.    Labs - CBC, BMP, PT/PTT, vitamin d, T&S, nose culture and EKG done   Medical eval advised, seen by PCP  Preop 3 day antibacterial wash  instruction reviewed and given, MRSA and MSSA screening done today.  Avoid NSAID and OTC supplements for 7 days  Continue all prescribed meds as instructed  NPO after 11pm the day before surgery. Take medicines as advised the morning of surgery with sips of water.  Vaccinated for covid.   verbalized understanding of all instructions.

## 2023-07-26 NOTE — OCCUPATIONAL THERAPY INITIAL EVALUATION ADULT - PATIENT/FAMILY/SIGNIFICANT OTHER GOALS STATEMENT, OT EVAL
Increase metoprolol as ordered. Call primary doctor in the morning for follow up.    General Instructions:  You are considered stable for discharge from the emergency department. Please carefully follow all the instructions you were given verbally as well as the written instructions given below. In general, immediately return to the emergency department if the symptoms you presented with today increase in severity, change in any way, and/or do not improve in what you consider an acceptable time frame.  Return if you develop fever >101, vomiting, oral liquid intolerance, chest pain, shortness of breath, weakness, change in behavior, or any other concerns.    Medication(s) Instructions (if applicable):  If you were given any medication(s) upon discharge, please strictly follow the directions as prescribed for taking the medication(s). Should you feel you develop any type of reaction to the medication(s), including, but not limited to, rash, swelling of the lips or face, or difficulty breathing, immediately discontinue the use of the medication(s) and seek prompt medical care. Please read the medication(s) insert provided by the pharmacy and follow all guidelines and recommendations. Please take all medications as prescribed for your symptoms or diagnoses.                Follow-Up Instructions:  If you were given instructions to follow-up with a health care provider upon discharge, please be sure to do so.  It is your responsibility to call and/or make an appointment with the health care providers listed on your discharge papers and/or your primary care provider in the appropriate time frame given.  Please take a copy of your discharge papers with you to your follow-up appointment(s). Please follow up with your primary care physician in 3 days or earlier if needed for your symptoms. Please follow up with any specialist provider in clinic that your were given instructions to see as an outpatient.     YOU MUST CALL THE  NUMBER LISTED ON THE DISCHARGE PAPERWORK TO CONFIRM YOUR APPOINTMENT.    Special Information / Instructions:  Please read and follow all attached discharge instructions.      Please call the Emergency Department/Urgent care at which you were seen with any questions or concerns:    [facility] Emergency Department at [phone]   [facility] Emergency Department at [phone]  [facility] Emergency Department at [phone]  [facility] [phone]   [facility] at [phone]    Please return to the Emergency Department if you have persistence or worsening of your symptoms.     Pt would like improve mobility and function.

## 2023-07-26 NOTE — OCCUPATIONAL THERAPY INITIAL EVALUATION ADULT - ADDITIONAL COMMENTS
At this time pt is functioning in her roles, self sufficient, & ambulating independently in the community without any assistive devices. Pt has an antalgic gait. Pt c/o 5/10 pain in her left hip at rest and 7/10 at worst. The pain is exacerbated, by walking, prolonged standing, negotiating steps and is relieved with Tylenol. Pt is right hand dominant and wears glasses for reading.

## 2023-07-26 NOTE — H&P PST ADULT - ASSESSMENT
osteoarthritis left hip. s/p bilateral hip replacements in the past osteoarthritis left hip. for revision of left hip replacement  s/p bilateral hip replacements in the past  CAPRINI SCORE [CLOT]    AGE RELATED RISK FACTORS                                                       MOBILITY RELATED FACTORS  [x ] Age 41-60 years                                            (1 Point)                  [ ] Bed rest                                                        (1 Point)  [ ] Age: 61-74 years                                           (2 Points)                 [ ] Plaster cast                                                   (2 Points)  [ ] Age= 75 years                                              (3 Points)                 [ ] Bed bound for more than 72 hours                 (2 Points)    DISEASE RELATED RISK FACTORS                                               GENDER SPECIFIC FACTORS  [ ] Edema in the lower extremities                       (1 Point)                  [ ] Pregnancy                                                     (1 Point)  [ ] Varicose veins                                               (1 Point)                  [ ] Post-partum < 6 weeks                                   (1 Point)             [x ] BMI > 25 Kg/m2                                            (1 Point)                  [ ] Hormonal therapy  or oral contraception          (1 Point)                 [ ] Sepsis (in the previous month)                        (1 Point)                  [ ] History of pregnancy complications                 (1 point)  [ ] Pneumonia or serious lung disease                                               [ ] Unexplained or recurrent                     (1 Point)           (in the previous month)                               (1 Point)  [ ] Abnormal pulmonary function test                     (1 Point)                 SURGERY RELATED RISK FACTORS  [ ] Acute myocardial infarction                              (1 Point)                 [ ]  Section                                             (1 Point)  [ ] Congestive heart failure (in the previous month)  (1 Point)               [ ] Minor surgery                                                  (1 Point)   [ ] Inflammatory bowel disease                             (1 Point)                 [ ] Arthroscopic surgery                                        (2 Points)  [ ] Central venous access                                      (2 Points)                [ ] General surgery lasting more than 45 minutes   (2 Points)       [ ] Stroke (in the previous month)                          (5 Points)               [x Elective arthroplasty                                         (5 Points)                                                                                                                                               HEMATOLOGY RELATED FACTORS                                                 TRAUMA RELATED RISK FACTORS  [ ] Prior episodes of VTE                                     (3 Points)                [ ] Fracture of the hip, pelvis, or leg                       (5 Points)  [ ] Positive family history for VTE                         (3 Points)                 [ ] Acute spinal cord injury (in the previous month)  (5 Points)  [ ] Prothrombin 50310 A                                     (3 Points)                 [ ] Paralysis  (less than 1 month)                             (5 Points)  [ ] Factor V Leiden                                             (3 Points)                  [ ] Multiple Trauma within 1 month                        (5 Points)  [ ] Lupus anticoagulants                                     (3 Points)                                                           [ ] Anticardiolipin antibodies                               (3 Points)                                                       [ ] High homocysteine in the blood                      (3 Points)                                             [ ] Other congenital or acquired thrombophilia      (3 Points)                                                [ ] Heparin induced thrombocytopenia                  (3 Points)                                          Total Score [   7       ]    Caprini Score 0 - 2:  Low Risk, No VTE Prophylaxis required for most patients, encourage ambulation  Caprini Score 3 - 6:  At Risk, pharmacologic VTE prophylaxis is indicated for most patients (in the absence of a contraindication)  Caprini Score Greater than or = 7:  High Risk, pharmacologic VTE prophylaxis is indicated for most patients (in the absence of a contraindication)

## 2023-07-26 NOTE — H&P PST ADULT - MUSCULOSKELETAL COMMENTS
bilatewral Hip replacements in the past bilateral Hip replacements in the past, now for left hip revision

## 2023-07-26 NOTE — H&P PST ADULT - MUSCULOSKELETAL
details… decreased ROM due to pain/strength 5/5 bilateral upper extremities/strength 5/5 bilateral lower extremities/extremities exam

## 2023-07-26 NOTE — OCCUPATIONAL THERAPY INITIAL EVALUATION ADULT - 2-POINT DISCRIMINATION, LUE, OT EVAL
JULIEN CALLING FOR REFILL FOR LISINOPRIL-HYDROCLOROZAIDE 20-12.5 MG -SENT ELECTRONICALL BUT HAVE NOT HEARD BACK. PLEASE CALL IN REFILL [phone]  
within normal limits

## 2023-07-26 NOTE — H&P PST ADULT - NSICDXPASTMEDICALHX_GEN_ALL_CORE_FT
PAST MEDICAL HISTORY:  Abnormal LFTs (liver function tests)     Amenorrhea     Anemia     Blindness, legal     Cerebral vascular accident age 17    Knee pain, bilateral     Migraine headache     Postnasal drip     Right foot pain chronic    Sickle cell disease      PAST MEDICAL HISTORY:  Anemia     Blindness, legal     Cerebral vascular accident age 17    Knee pain, bilateral     Sickle cell disease      PAST MEDICAL HISTORY:  Anemia     Blindness, legal     Cerebral vascular accident age 17    Knee pain, bilateral     Sickle cell disease     T2DM (type 2 diabetes mellitus)

## 2023-07-26 NOTE — OCCUPATIONAL THERAPY INITIAL EVALUATION ADULT - NSOTDISCHREC_GEN_A_CORE
postoperatively to prevent falls, optimize pt's ability for ADL management & safely navigate in all terrains ./Home OT

## 2023-07-26 NOTE — H&P PST ADULT - HISTORY OF PRESENT ILLNESS
33 y/o female, old woman with PMH of sickle cell complicated by intracranial hemorrhage as a child and b/l hip arthroplasty, s/p bone marrow transplant in 2011.  Seen by Dr Cates scheduled for left total hip  revision on 8/10/23. Pre op testing today.   33 y/o female, with PMH of sickle cell complicated by intracranial hemorrhage as a child and b/l hip arthroplasty over 10 yrsd ago, s/p bone marrow transplant in 2011(pt claims no more sickle cell disease after bone marrow transplant).   C/o pain in the left hip due to derangement of the parts. Seen by Dr Cates a few weeks ago and then yesterday,  scheduled for left total hip  revision on 8/10/23. Pre op testing today.   31 y/o female, with PMH of sickle cell complicated by intracranial hemorrhage as a child and b/l hip arthroplasty over 10 yrsd ago, s/p bone marrow transplant in 2011(pt claims no more sickle cell disease after bone marrow transplant).   C/o pain in the left hip due to derangement of the parts. Seen by Dr Cates a few weeks ago and then yesterday,  scheduled for left total hip  revision on 8/10/23. Pre op testing today.   Goal: to be able to walk without pain.

## 2023-07-27 LAB
MRSA PCR RESULT.: SIGNIFICANT CHANGE UP
S AUREUS DNA NOSE QL NAA+PROBE: SIGNIFICANT CHANGE UP
VIT D25+D1,25 OH+D1,25 PNL SERPL-MCNC: 79.1 PG/ML — SIGNIFICANT CHANGE UP (ref 19.9–79.3)

## 2023-08-02 PROBLEM — E11.9 TYPE 2 DIABETES MELLITUS WITHOUT COMPLICATIONS: Chronic | Status: ACTIVE | Noted: 2023-07-27

## 2023-08-02 LAB
ALBUMIN SERPL ELPH-MCNC: 4.6 G/DL
ALP BLD-CCNC: 94 U/L
ALT SERPL-CCNC: 29 U/L
ANION GAP SERPL CALC-SCNC: 12 MMOL/L
APPEARANCE: CLEAR
APTT BLD: 32.6 SEC
AST SERPL-CCNC: 18 U/L
BILIRUB SERPL-MCNC: 0.2 MG/DL
BILIRUBIN URINE: NEGATIVE
BLOOD URINE: NEGATIVE
BUN SERPL-MCNC: 11 MG/DL
CALCIUM SERPL-MCNC: 9.5 MG/DL
CHLORIDE SERPL-SCNC: 103 MMOL/L
CHOLEST SERPL-MCNC: 250 MG/DL
CO2 SERPL-SCNC: 24 MMOL/L
COLOR: YELLOW
CREAT SERPL-MCNC: 0.64 MG/DL
EGFR: 121 ML/MIN/1.73M2
ESTIMATED AVERAGE GLUCOSE: 154 MG/DL
GLUCOSE QUALITATIVE U: 250 MG/DL
GLUCOSE SERPL-MCNC: 150 MG/DL
HBA1C MFR BLD HPLC: 7 %
HDLC SERPL-MCNC: 43 MG/DL
INR PPP: 0.99 RATIO
KETONES URINE: NEGATIVE MG/DL
LDLC SERPL CALC-MCNC: 163 MG/DL
LEUKOCYTE ESTERASE URINE: NEGATIVE
NITRITE URINE: NEGATIVE
NONHDLC SERPL-MCNC: 207 MG/DL
PH URINE: 6
POTASSIUM SERPL-SCNC: 4 MMOL/L
PROT SERPL-MCNC: 6.7 G/DL
PROTEIN URINE: NEGATIVE MG/DL
PT BLD: 11.6 SEC
SODIUM SERPL-SCNC: 139 MMOL/L
SPECIFIC GRAVITY URINE: 1.02
TRIGL SERPL-MCNC: 236 MG/DL
UROBILINOGEN URINE: 0.2 MG/DL

## 2023-08-02 NOTE — REVIEW OF SYSTEMS
[Joint Pain] : joint pain [Negative] : Heme/Lymph [Muscle Weakness] : no muscle weakness [Back Pain] : no back pain

## 2023-08-02 NOTE — ADDENDUM
[FreeTextEntry1] : 08/02/2023 07:53 AM Reviewed lab results obtained for presurgical evaluation, noted elevated a1c suggestive of new onset diabetes.  Received also results from Orthopedic office with labs obtained on July 26th with a1c at 6.8 consistent with diabetes.  All other labs within reasonable limits. Random glucose of 178 is considered within acceptable level and supportive of controlled diabetes.  Hence, patient continues to be optimized for surgical intervention.    Will communicate with patient results of labs. Mohan

## 2023-08-02 NOTE — HISTORY OF PRESENT ILLNESS
[No Pertinent Cardiac History] : no history of aortic stenosis, atrial fibrillation, coronary artery disease, recent myocardial infarction, or implantable device/pacemaker [Asthma] : asthma [No Adverse Anesthesia Reaction] : no adverse anesthesia reaction in self or family member [(Patient denies any chest pain, claudication, dyspnea on exertion, orthopnea, palpitations or syncope)] : Patient denies any chest pain, claudication, dyspnea on exertion, orthopnea, palpitations or syncope [Unable to assess] : unable to assess [COPD] : no COPD [Sleep Apnea] : no sleep apnea [Smoker] : not a smoker [Chronic Anticoagulation] : no chronic anticoagulation [Chronic Kidney Disease] : no chronic kidney disease [Diabetes] : no diabetes [FreeTextEntry1] : August 10, 2023 [FreeTextEntry2] : left hip replacement [FreeTextEntry3] : Dr. Shaun Cates [FreeTextEntry4] : Patient presents for perioperative evaluation for optimization of a left hip replacement revision.  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  Now in need of revision of the left hip replacement.  She is without any other concerns today.   Patient is also in need of her annual exam and wonders if today's visit can be used towards her need for dental extractions that is upcoming in September.

## 2023-08-02 NOTE — ASSESSMENT
[High Risk Surgery - Intraperitoneal, Intrathoracic or Supringuinal Vascular Procedures] : High Risk Surgery - Intraperitoneal, Intrathoracic or Supringuinal Vascular Procedures - No (0) [Ischemic Heart Disease] : Ischemic Heart Disease - No (0) [Congestive Heart Failure] : Congestive Heart Failure - No (0) [Prior Cerebrovascular Accident or TIA] : Prior Cerebrovascular Accident or TIA - Yes (1) [Creatinine >= 2mg/dL (1 Point)] : Creatinine >= 2mg/dL - No (0) [Insulin-dependent Diabetic (1 Point)] : Insulin-dependent Diabetic - No (0) [1] : 1 , RCRI Class: II, Risk of Post-Op Cardiac Complications: 6.0%, 95% CI for Risk Estimate: 4.9% - 7.4% [Patient Optimized for Surgery] : Patient optimized for surgery [No Further Testing Recommended] : no further testing recommended [As per surgery] : as per surgery [FreeTextEntry4] : Patient is medically optimized for surgical intervention for left hip replacement repair pending labs obtained to assist with optimization.  Patient is advised to avoid any medications 7 days prior to surgery that may interfere with bleeding such as any NSAIDs or aspirins, patient may take acetaminophen or Tylenol if needed for pain.

## 2023-08-09 RX ORDER — HYDROMORPHONE HYDROCHLORIDE 2 MG/ML
0.5 INJECTION INTRAMUSCULAR; INTRAVENOUS; SUBCUTANEOUS
Refills: 0 | Status: COMPLETED | OUTPATIENT
Start: 2023-08-10 | End: 2023-08-17

## 2023-08-09 RX ORDER — ASPIRIN/CALCIUM CARB/MAGNESIUM 324 MG
81 TABLET ORAL
Refills: 0 | Status: DISCONTINUED | OUTPATIENT
Start: 2023-08-11 | End: 2023-08-15

## 2023-08-09 RX ORDER — LANOLIN ALCOHOL/MO/W.PET/CERES
3 CREAM (GRAM) TOPICAL AT BEDTIME
Refills: 0 | Status: DISCONTINUED | OUTPATIENT
Start: 2023-08-10 | End: 2023-08-15

## 2023-08-09 RX ORDER — PANTOPRAZOLE SODIUM 20 MG/1
40 TABLET, DELAYED RELEASE ORAL
Refills: 0 | Status: DISCONTINUED | OUTPATIENT
Start: 2023-08-10 | End: 2023-08-15

## 2023-08-09 RX ORDER — CYCLOBENZAPRINE HYDROCHLORIDE 10 MG/1
10 TABLET, FILM COATED ORAL THREE TIMES A DAY
Refills: 0 | Status: DISCONTINUED | OUTPATIENT
Start: 2023-08-10 | End: 2023-08-15

## 2023-08-09 RX ORDER — SODIUM CHLORIDE 9 MG/ML
1000 INJECTION, SOLUTION INTRAVENOUS
Refills: 0 | Status: DISCONTINUED | OUTPATIENT
Start: 2023-08-10 | End: 2023-08-15

## 2023-08-09 RX ORDER — OXYCODONE HYDROCHLORIDE 5 MG/1
10 TABLET ORAL EVERY 4 HOURS
Refills: 0 | Status: DISCONTINUED | OUTPATIENT
Start: 2023-08-10 | End: 2023-08-15

## 2023-08-09 RX ORDER — ACETAMINOPHEN 500 MG
650 TABLET ORAL EVERY 6 HOURS
Refills: 0 | Status: COMPLETED | OUTPATIENT
Start: 2023-08-10 | End: 2023-08-13

## 2023-08-09 RX ORDER — ONDANSETRON 8 MG/1
4 TABLET, FILM COATED ORAL EVERY 6 HOURS
Refills: 0 | Status: DISCONTINUED | OUTPATIENT
Start: 2023-08-10 | End: 2023-08-15

## 2023-08-09 RX ORDER — OXYCODONE HYDROCHLORIDE 5 MG/1
5 TABLET ORAL EVERY 4 HOURS
Refills: 0 | Status: DISCONTINUED | OUTPATIENT
Start: 2023-08-10 | End: 2023-08-15

## 2023-08-09 RX ORDER — GABAPENTIN 400 MG/1
300 CAPSULE ORAL
Refills: 0 | Status: DISCONTINUED | OUTPATIENT
Start: 2023-08-10 | End: 2023-08-15

## 2023-08-09 RX ORDER — POLYETHYLENE GLYCOL 3350 17 G/17G
17 POWDER, FOR SOLUTION ORAL AT BEDTIME
Refills: 0 | Status: DISCONTINUED | OUTPATIENT
Start: 2023-08-10 | End: 2023-08-15

## 2023-08-09 RX ORDER — SENNA PLUS 8.6 MG/1
2 TABLET ORAL AT BEDTIME
Refills: 0 | Status: DISCONTINUED | OUTPATIENT
Start: 2023-08-10 | End: 2023-08-15

## 2023-08-09 RX ORDER — CELECOXIB 200 MG/1
200 CAPSULE ORAL EVERY 12 HOURS
Refills: 0 | Status: DISCONTINUED | OUTPATIENT
Start: 2023-08-11 | End: 2023-08-15

## 2023-08-10 ENCOUNTER — RESULT REVIEW (OUTPATIENT)
Age: 33
End: 2023-08-10

## 2023-08-10 ENCOUNTER — INPATIENT (INPATIENT)
Facility: HOSPITAL | Age: 33
LOS: 4 days | Discharge: HOME HEALTH | End: 2023-08-15
Attending: ORTHOPAEDIC SURGERY | Admitting: ORTHOPAEDIC SURGERY
Payer: MEDICAID

## 2023-08-10 ENCOUNTER — TRANSCRIPTION ENCOUNTER (OUTPATIENT)
Age: 33
End: 2023-08-10

## 2023-08-10 ENCOUNTER — APPOINTMENT (OUTPATIENT)
Dept: ORTHOPEDIC SURGERY | Facility: HOSPITAL | Age: 33
End: 2023-08-10
Payer: MEDICAID

## 2023-08-10 VITALS
WEIGHT: 145.06 LBS | HEIGHT: 64 IN | DIASTOLIC BLOOD PRESSURE: 83 MMHG | OXYGEN SATURATION: 99 % | TEMPERATURE: 98 F | RESPIRATION RATE: 17 BRPM | SYSTOLIC BLOOD PRESSURE: 121 MMHG | HEART RATE: 65 BPM

## 2023-08-10 DIAGNOSIS — Z94.81 BONE MARROW TRANSPLANT STATUS: ICD-10-CM

## 2023-08-10 DIAGNOSIS — D57.1 SICKLE-CELL DISEASE WITHOUT CRISIS: ICD-10-CM

## 2023-08-10 DIAGNOSIS — Z96.643 PRESENCE OF ARTIFICIAL HIP JOINT, BILATERAL: ICD-10-CM

## 2023-08-10 DIAGNOSIS — Z96.60 PRESENCE OF UNSPECIFIED ORTHOPEDIC JOINT IMPLANT: Chronic | ICD-10-CM

## 2023-08-10 DIAGNOSIS — Z94.81 BONE MARROW TRANSPLANT STATUS: Chronic | ICD-10-CM

## 2023-08-10 DIAGNOSIS — Z98.890 OTHER SPECIFIED POSTPROCEDURAL STATES: Chronic | ICD-10-CM

## 2023-08-10 DIAGNOSIS — Z90.89 ACQUIRED ABSENCE OF OTHER ORGANS: Chronic | ICD-10-CM

## 2023-08-10 DIAGNOSIS — Z90.81 ACQUIRED ABSENCE OF SPLEEN: ICD-10-CM

## 2023-08-10 DIAGNOSIS — Z90.81 ACQUIRED ABSENCE OF SPLEEN: Chronic | ICD-10-CM

## 2023-08-10 DIAGNOSIS — T84.038A MECHANICAL LOOSENING OF OTHER INTERNAL PROSTHETIC JOINT, INITIAL ENCOUNTER: ICD-10-CM

## 2023-08-10 DIAGNOSIS — R73.03 PREDIABETES: ICD-10-CM

## 2023-08-10 DIAGNOSIS — Z86.73 PERSONAL HISTORY OF TRANSIENT ISCHEMIC ATTACK (TIA), AND CEREBRAL INFARCTION WITHOUT RESIDUAL DEFICITS: ICD-10-CM

## 2023-08-10 DIAGNOSIS — H54.8 LEGAL BLINDNESS, AS DEFINED IN USA: ICD-10-CM

## 2023-08-10 LAB
ANION GAP SERPL CALC-SCNC: 6 MMOL/L — SIGNIFICANT CHANGE UP (ref 5–17)
BUN SERPL-MCNC: 17 MG/DL — SIGNIFICANT CHANGE UP (ref 7–23)
CALCIUM SERPL-MCNC: 8.4 MG/DL — LOW (ref 8.5–10.1)
CHLORIDE SERPL-SCNC: 112 MMOL/L — HIGH (ref 96–108)
CO2 SERPL-SCNC: 25 MMOL/L — SIGNIFICANT CHANGE UP (ref 22–31)
CREAT SERPL-MCNC: 0.79 MG/DL — SIGNIFICANT CHANGE UP (ref 0.5–1.3)
EGFR: 102 ML/MIN/1.73M2 — SIGNIFICANT CHANGE UP
GLUCOSE BLDC GLUCOMTR-MCNC: 168 MG/DL — HIGH (ref 70–99)
GLUCOSE BLDC GLUCOMTR-MCNC: 175 MG/DL — HIGH (ref 70–99)
GLUCOSE BLDC GLUCOMTR-MCNC: 181 MG/DL — HIGH (ref 70–99)
GLUCOSE SERPL-MCNC: 186 MG/DL — HIGH (ref 70–99)
GRAM STN FLD: SIGNIFICANT CHANGE UP
HCG UR QL: NEGATIVE — SIGNIFICANT CHANGE UP
HCT VFR BLD CALC: 29.4 % — LOW (ref 34.5–45)
HGB BLD-MCNC: 9.2 G/DL — LOW (ref 11.5–15.5)
MCHC RBC-ENTMCNC: 27.4 PG — SIGNIFICANT CHANGE UP (ref 27–34)
MCHC RBC-ENTMCNC: 31.3 G/DL — LOW (ref 32–36)
MCV RBC AUTO: 87.5 FL — SIGNIFICANT CHANGE UP (ref 80–100)
NRBC # BLD: 0 /100 WBCS — SIGNIFICANT CHANGE UP (ref 0–0)
PLATELET # BLD AUTO: 232 K/UL — SIGNIFICANT CHANGE UP (ref 150–400)
POTASSIUM SERPL-MCNC: 4.4 MMOL/L — SIGNIFICANT CHANGE UP (ref 3.5–5.3)
POTASSIUM SERPL-SCNC: 4.4 MMOL/L — SIGNIFICANT CHANGE UP (ref 3.5–5.3)
RBC # BLD: 3.36 M/UL — LOW (ref 3.8–5.2)
RBC # FLD: 13.8 % — SIGNIFICANT CHANGE UP (ref 10.3–14.5)
SODIUM SERPL-SCNC: 143 MMOL/L — SIGNIFICANT CHANGE UP (ref 135–145)
SPECIMEN SOURCE: SIGNIFICANT CHANGE UP
WBC # BLD: 14.74 K/UL — HIGH (ref 3.8–10.5)
WBC # FLD AUTO: 14.74 K/UL — HIGH (ref 3.8–10.5)

## 2023-08-10 PROCEDURE — 27138 REVISE HIP JOINT REPLACEMENT: CPT | Mod: 22,LT

## 2023-08-10 PROCEDURE — 88300 SURGICAL PATH GROSS: CPT | Mod: 26,59

## 2023-08-10 PROCEDURE — 73502 X-RAY EXAM HIP UNI 2-3 VIEWS: CPT | Mod: 26,LT

## 2023-08-10 PROCEDURE — 73502 X-RAY EXAM HIP UNI 2-3 VIEWS: CPT | Mod: 26,LT,77

## 2023-08-10 PROCEDURE — 27138 REVISE HIP JOINT REPLACEMENT: CPT | Mod: AS,22,LT

## 2023-08-10 PROCEDURE — 73501 X-RAY EXAM HIP UNI 1 VIEW: CPT | Mod: 26,LT,59

## 2023-08-10 DEVICE — FEM HD CERAMIC ART BIOLOX DELTA: Type: IMPLANTABLE DEVICE | Site: LEFT | Status: FUNCTIONAL

## 2023-08-10 DEVICE — IMPLANTABLE DEVICE: Type: IMPLANTABLE DEVICE | Site: LEFT | Status: FUNCTIONAL

## 2023-08-10 DEVICE — GUID WIRE INSTR: Type: IMPLANTABLE DEVICE | Site: LEFT | Status: FUNCTIONAL

## 2023-08-10 RX ORDER — CELECOXIB 200 MG/1
200 CAPSULE ORAL ONCE
Refills: 0 | Status: COMPLETED | OUTPATIENT
Start: 2023-08-10 | End: 2023-08-10

## 2023-08-10 RX ORDER — DEXTROSE 50 % IN WATER 50 %
15 SYRINGE (ML) INTRAVENOUS ONCE
Refills: 0 | Status: DISCONTINUED | OUTPATIENT
Start: 2023-08-10 | End: 2023-08-15

## 2023-08-10 RX ORDER — SODIUM CHLORIDE 9 MG/ML
1000 INJECTION, SOLUTION INTRAVENOUS
Refills: 0 | Status: DISCONTINUED | OUTPATIENT
Start: 2023-08-10 | End: 2023-08-15

## 2023-08-10 RX ORDER — ACETAMINOPHEN 500 MG
1000 TABLET ORAL ONCE
Refills: 0 | Status: COMPLETED | OUTPATIENT
Start: 2023-08-10 | End: 2023-08-10

## 2023-08-10 RX ORDER — INSULIN LISPRO 100/ML
VIAL (ML) SUBCUTANEOUS
Refills: 0 | Status: DISCONTINUED | OUTPATIENT
Start: 2023-08-10 | End: 2023-08-15

## 2023-08-10 RX ORDER — DEXTROSE 50 % IN WATER 50 %
12.5 SYRINGE (ML) INTRAVENOUS ONCE
Refills: 0 | Status: DISCONTINUED | OUTPATIENT
Start: 2023-08-10 | End: 2023-08-15

## 2023-08-10 RX ORDER — HYDROMORPHONE HYDROCHLORIDE 2 MG/ML
0.5 INJECTION INTRAMUSCULAR; INTRAVENOUS; SUBCUTANEOUS
Refills: 0 | Status: DISCONTINUED | OUTPATIENT
Start: 2023-08-10 | End: 2023-08-10

## 2023-08-10 RX ORDER — DEXTROSE 50 % IN WATER 50 %
25 SYRINGE (ML) INTRAVENOUS ONCE
Refills: 0 | Status: DISCONTINUED | OUTPATIENT
Start: 2023-08-10 | End: 2023-08-15

## 2023-08-10 RX ORDER — HYDROMORPHONE HYDROCHLORIDE 2 MG/ML
0.5 INJECTION INTRAMUSCULAR; INTRAVENOUS; SUBCUTANEOUS
Refills: 0 | Status: DISCONTINUED | OUTPATIENT
Start: 2023-08-10 | End: 2023-08-15

## 2023-08-10 RX ORDER — DEXAMETHASONE 0.5 MG/5ML
10 ELIXIR ORAL ONCE
Refills: 0 | Status: COMPLETED | OUTPATIENT
Start: 2023-08-11 | End: 2023-08-11

## 2023-08-10 RX ORDER — ACETAMINOPHEN 500 MG
650 TABLET ORAL ONCE
Refills: 0 | Status: COMPLETED | OUTPATIENT
Start: 2023-08-10 | End: 2023-08-10

## 2023-08-10 RX ORDER — ONDANSETRON 8 MG/1
4 TABLET, FILM COATED ORAL ONCE
Refills: 0 | Status: DISCONTINUED | OUTPATIENT
Start: 2023-08-10 | End: 2023-08-10

## 2023-08-10 RX ORDER — CEFAZOLIN SODIUM 1 G
2000 VIAL (EA) INJECTION EVERY 8 HOURS
Refills: 0 | Status: COMPLETED | OUTPATIENT
Start: 2023-08-10 | End: 2023-08-11

## 2023-08-10 RX ORDER — KETOROLAC TROMETHAMINE 30 MG/ML
15 SYRINGE (ML) INJECTION EVERY 6 HOURS
Refills: 0 | Status: DISCONTINUED | OUTPATIENT
Start: 2023-08-10 | End: 2023-08-11

## 2023-08-10 RX ORDER — ASCORBIC ACID 60 MG
500 TABLET,CHEWABLE ORAL
Refills: 0 | Status: DISCONTINUED | OUTPATIENT
Start: 2023-08-10 | End: 2023-08-10

## 2023-08-10 RX ORDER — SODIUM CHLORIDE 9 MG/ML
3 INJECTION INTRAMUSCULAR; INTRAVENOUS; SUBCUTANEOUS EVERY 8 HOURS
Refills: 0 | Status: DISCONTINUED | OUTPATIENT
Start: 2023-08-10 | End: 2023-08-10

## 2023-08-10 RX ORDER — SODIUM CHLORIDE 9 MG/ML
1000 INJECTION, SOLUTION INTRAVENOUS
Refills: 0 | Status: DISCONTINUED | OUTPATIENT
Start: 2023-08-10 | End: 2023-08-10

## 2023-08-10 RX ORDER — GLUCAGON INJECTION, SOLUTION 0.5 MG/.1ML
1 INJECTION, SOLUTION SUBCUTANEOUS ONCE
Refills: 0 | Status: DISCONTINUED | OUTPATIENT
Start: 2023-08-10 | End: 2023-08-15

## 2023-08-10 RX ADMIN — OXYCODONE HYDROCHLORIDE 10 MILLIGRAM(S): 5 TABLET ORAL at 22:19

## 2023-08-10 RX ADMIN — SODIUM CHLORIDE 115 MILLILITER(S): 9 INJECTION, SOLUTION INTRAVENOUS at 16:01

## 2023-08-10 RX ADMIN — Medication 650 MILLIGRAM(S): at 06:48

## 2023-08-10 RX ADMIN — Medication 15 MILLIGRAM(S): at 17:59

## 2023-08-10 RX ADMIN — SENNA PLUS 2 TABLET(S): 8.6 TABLET ORAL at 22:19

## 2023-08-10 RX ADMIN — Medication 3 MILLIGRAM(S): at 22:20

## 2023-08-10 RX ADMIN — Medication 100 MILLIGRAM(S): at 16:01

## 2023-08-10 RX ADMIN — OXYCODONE HYDROCHLORIDE 10 MILLIGRAM(S): 5 TABLET ORAL at 15:35

## 2023-08-10 RX ADMIN — POLYETHYLENE GLYCOL 3350 17 GRAM(S): 17 POWDER, FOR SOLUTION ORAL at 22:19

## 2023-08-10 RX ADMIN — OXYCODONE HYDROCHLORIDE 10 MILLIGRAM(S): 5 TABLET ORAL at 16:35

## 2023-08-10 RX ADMIN — OXYCODONE HYDROCHLORIDE 10 MILLIGRAM(S): 5 TABLET ORAL at 23:19

## 2023-08-10 RX ADMIN — Medication 1: at 16:01

## 2023-08-10 RX ADMIN — HYDROMORPHONE HYDROCHLORIDE 0.5 MILLIGRAM(S): 2 INJECTION INTRAMUSCULAR; INTRAVENOUS; SUBCUTANEOUS at 14:56

## 2023-08-10 RX ADMIN — HYDROMORPHONE HYDROCHLORIDE 0.5 MILLIGRAM(S): 2 INJECTION INTRAMUSCULAR; INTRAVENOUS; SUBCUTANEOUS at 14:41

## 2023-08-10 RX ADMIN — ONDANSETRON 4 MILLIGRAM(S): 8 TABLET, FILM COATED ORAL at 19:19

## 2023-08-10 RX ADMIN — Medication 15 MILLIGRAM(S): at 17:44

## 2023-08-10 RX ADMIN — Medication 400 MILLIGRAM(S): at 16:35

## 2023-08-10 RX ADMIN — GABAPENTIN 300 MILLIGRAM(S): 400 CAPSULE ORAL at 17:42

## 2023-08-10 RX ADMIN — Medication 1000 MILLIGRAM(S): at 17:05

## 2023-08-10 RX ADMIN — SODIUM CHLORIDE 50 MILLILITER(S): 9 INJECTION, SOLUTION INTRAVENOUS at 14:07

## 2023-08-10 RX ADMIN — CELECOXIB 200 MILLIGRAM(S): 200 CAPSULE ORAL at 06:48

## 2023-08-10 NOTE — PHYSICAL THERAPY INITIAL EVALUATION ADULT - BED MOBILITY TRAINING, PT EVAL
Diabetes Education Scheduling Outreach #2:    Call to patient to schedule. Patient declined at this time. [facility]    [facility]  Diabetes and Nutrition Scheduling     Patient will be independent with bed mobility to prevent pressure injuries by discharge.

## 2023-08-10 NOTE — DISCHARGE NOTE PROVIDER - HOSPITAL COURSE
32yFemale with history of aseptic loosening left AMBER presenting for left AMBER revision by Dr. Shaun Cates on 8/10/23. Risk and benefits of surgery were explained to the patient. The patient understood and agreed to proceed with surgery. Patient underwent the procedure with no intraoperative complications. Pt was brought in stable condition to the PACU. Once stable in PACU, pt was brought to the floor. During hospital stay pt was followed by Medicine,  during this admission. Pt hospital course was XX. Pt is stable for discharge to XX on POD# 32yFemale with history of aseptic loosening left AMBER presenting for left AMBER revision by Dr. Shaun Cates on 8/10/23. Risk and benefits of surgery were explained to the patient. The patient understood and agreed to proceed with surgery. Patient underwent the procedure with no intraoperative complications. Pt was brought in stable condition to the PACU. Once stable in PACU, pt was brought to the floor. During hospital stay pt was followed by Medicine,  during this admission. Pt hospital course was unremarkable. Pt is stable for discharge to home on POD# 1 32yFemale with history of aseptic loosening left AMBER presenting for left AMBER revision by Dr. Shaun Cates on 8/10/23. Risk and benefits of surgery were explained to the patient. The patient understood and agreed to proceed with surgery. Patient underwent the procedure with no intraoperative complications. Pt was brought in stable condition to the PACU. Once stable in PACU, pt was brought to the floor. During hospital stay pt was followed by Medicine,  during this admission. Pt is stable for discharge to home on POD# 1 but her OR gram stain came back with gram positive rods. She was placed on IV antibiotics and ID consult was obtained. 32yFemale with history of aseptic loosening left AMBER presenting for left AMBER revision by Dr. Shaun Cates on 8/10/23. Risk and benefits of surgery were explained to the patient. The patient understood and agreed to proceed with surgery. Patient underwent the procedure with no intraoperative complications. Pt was brought in stable condition to the PACU. Once stable in PACU, pt was brought to the floor. During hospital stay pt was followed by Medicine,  during this admission. Pt is stable for discharge to home on POD# 1 but her OR gram stain came back with gram positive rods. ID consult was obtained. 32yFemale with history of aseptic loosening left AMBER presenting for left AMBER revision by Dr. Shaun Cates on 8/10/23. Risk and benefits of surgery were explained to the patient. The patient understood and agreed to proceed with surgery. Patient underwent the procedure with no intraoperative complications. Pt was brought in stable condition to the PACU. Once stable in PACU, pt was brought to the floor. During hospital stay pt was followed by Medicine,  during this admission. Pt is stable for discharge to home on POD# 1 but her OR gram stain came back with gram positive rods. ID consult was obtained. Her OR cultures were closely followed and she was discharged home without antibiotics as there was only one positive gram stain with a GPR which can be a procurement contaminant.

## 2023-08-10 NOTE — DISCHARGE NOTE PROVIDER - NSDCFUADDAPPT_GEN_ALL_CORE_FT
Follow up with your surgeon in two weeks. Call for appointment.    If you need more pain medication, call your surgeon's office. For medication refills or authorizations, please call [phone]    We recommend that you call and schedule a follow up appointment with your primary care physician for repeat blood work (CBC and BMP) for post hospital discharge follow-up care 2-4 weeks after your surgery.     Make sure to have a bowel movement by 2 days after surgery. Take stool softeners and laxatives as needed.     Call your surgeon if you have increased redness/pain/drainage or fever. Return to ER for shortness of breath/calf tenderness.

## 2023-08-10 NOTE — DISCHARGE NOTE PROVIDER - CARE PROVIDER_API CALL
Shaun Cates  Orthopaedic Surgery  [address], [address]  [place] [address]  Phone: [phone]  Fax: [phone]  Follow Up Time:

## 2023-08-10 NOTE — DISCHARGE NOTE PROVIDER - NSDCCPCAREPLAN_GEN_ALL_CORE_FT
PRINCIPAL DISCHARGE DIAGNOSIS  Diagnosis: Aseptic loosening of prosthetic hip  Assessment and Plan of Treatment: left

## 2023-08-10 NOTE — PHYSICAL THERAPY INITIAL EVALUATION ADULT - PERTINENT HX OF CURRENT PROBLEM, REHAB EVAL
Patient S/P revision of left total hip arthroplasty posterior approach #POD 0. Patient S/P revision of left posterior total hip arthroplasty posterior approach #POD 0.

## 2023-08-10 NOTE — PHYSICAL THERAPY INITIAL EVALUATION ADULT - GENERAL OBSERVATIONS, REHAB EVAL
Chart reviewed, events noted to date. Patient encountered supine in bed, HOB elevated with +B pneumatic TEDS and +IV, +hip pillow, C/D/I L hip dressing, +brianna. A&Ox4, in no distress.

## 2023-08-10 NOTE — BRIEF OPERATIVE NOTE - NSICDXBRIEFPROCEDURE_GEN_ALL_CORE_FT
PROCEDURES:  Revision of left total hip arthroplasty by posterior approach 10-Aug-2023 12:40:58  Nicholas Moraes

## 2023-08-10 NOTE — PHYSICAL THERAPY INITIAL EVALUATION ADULT - STRENGTHENING, PT EVAL
Patient will increase L hip strength by one grade in 2-3 weeks to help improve mobility and activity.

## 2023-08-10 NOTE — DISCHARGE NOTE PROVIDER - NSDCCPTREATMENT_GEN_ALL_CORE_FT
PRINCIPAL PROCEDURE  Procedure: Revision of left total hip arthroplasty by posterior approach  Findings and Treatment:

## 2023-08-10 NOTE — DISCHARGE NOTE PROVIDER - NSDCFUADDINST_GEN_ALL_CORE_FT
Hip replacement precautions: Abduction pillow. Elevate the leg (while keeping hip precautions) as often as possible to help control swelling. Incentive spirometer 10X/hr.   Keep Prineo Dressing Clean, Dry and Intact. May shower with Prineo Dressing. Please do not scrub, soak, peel or pick at the prineo dressing. No creams, lotions, or oils over dressing. May shower and let water run over incision, no baths. Pat dry once out of shower. Dressing to be removed in office at follow up visit in 2 weeks. There are no staples or stitches that need to be removed. Hip replacement precautions: Abduction pillow. Elevate the leg (while keeping hip precautions) as often as possible to help control swelling. Incentive spirometer 10X/hr.   Keep NAVID Dressing Clean, Dry and Intact. May shower with NAVID Dressing. Please do not scrub, soak, peel or pick at the NAVID dressing. No creams, lotions, or oils over dressing. May shower and let water run over dressing, no baths. Pat dry once out of shower. Dressing to be removed in 7 days. Discard dressing and battery in garbage. If dressing is saturated from border to border - may remove and replace with clean dry dressing.  Shower instructions for NAVID Dressing: Place battery pack in a water sealed bag (such as a Ziplock bag) and keep battery out of the water.   Alternately you can turn battery pack off (press orange button.) Twist tubing OFF battery pack before entering shower. Once done with showering. Pat dressing dry. Reconnect tubing and twist ON battery pack after you are dry. Then turn battery pack on (press orange button.)  Keep Prineo Dressing Clean, Dry and Intact. May shower with Prineo Dressing. Please do not scrub, soak, peel or pick at the prineo dressing. No creams, lotions, or oils over dressing. May shower and let water run over incision, no baths. Pat dry once out of shower. Dressing to be removed in office at follow up visit in 2 weeks. There are no staples or stitches that need to be removed.

## 2023-08-10 NOTE — DISCHARGE NOTE PROVIDER - NSDCFUSCHEDAPPT_GEN_ALL_CORE_FT
[facility]  [facility] [id number]  Scheduled Appointment: 08/22/2023    Brittanie Preston  [place] [facility]  [facility] [id number] [address]  Scheduled Appointment: 09/13/2023

## 2023-08-10 NOTE — DISCHARGE NOTE PROVIDER - NSDCMRMEDTOKEN_GEN_ALL_CORE_FT
Do you wish to refill norco?  Last fill 4/17/17 #30 no RF  Dosing is .5 tablet q 6 hrs       acetaminophen 325 mg oral tablet: 3 tab(s) orally every 8 hours as needed for pain/fever  Multiple Vitamins oral tablet: 1 tab(s) orally once a day  Vitamin C: 1 cap(s) orally once a day  Vitamin D3: orally once a day   acetaminophen 325 mg oral tablet: 3 tab(s) orally every 8 hours as needed for pain/fever  Aspirin Enteric Coated 81 mg oral delayed release tablet: 1 tab(s) orally 2 times a day MDD: 2  celecoxib 200 mg oral capsule: 1 cap(s) orally every 12 hours  cyclobenzaprine 10 mg oral tablet: 1 tab(s) orally 3 times a day MDD: 3  gabapentin 300 mg oral capsule: 1 cap(s) orally every 12 hours for two weeks MDD: 2  Multiple Vitamins oral tablet: 1 tab(s) orally once a day  Narcan 4 mg/0.1 mL nasal spray: 4 milligram(s) intranasally once , repeat as necessary.   As needed. For suspected opiate overdose   Follow instructions on packet MDD: 0.2 ml  oxyCODONE 5 mg oral tablet: 1 tab(s) orally every 4 hours as needed for  pain 1 tab for mild/moderate pain, 2 tabs for severe pain MDD: 6  pantoprazole 40 mg oral delayed release tablet: 1 tab(s) orally once a day (before a meal) MDD: 1  polyethylene glycol 3350 oral powder for reconstitution: 17 gram(s) orally once a day (at bedtime)  senna leaf extract oral tablet: 2 tab(s) orally once a day (at bedtime)

## 2023-08-10 NOTE — PHYSICAL THERAPY INITIAL EVALUATION ADULT - NSPTDISCHREC_GEN_A_CORE
Unable to assess at this time due to patient feeling nauseous. Pending completion of functional assessment

## 2023-08-10 NOTE — PHYSICAL THERAPY INITIAL EVALUATION ADULT - ACTIVE RANGE OF MOTION EXAMINATION, REHAB EVAL
L posterior total hip replacement precautions./bilateral upper extremity Active ROM was WFL (within functional limits)/bilateral  lower extremity Active ROM was WFL (within functional limits)/deficits as listed below L hip within posterior precautions/bilateral upper extremity Active ROM was WFL (within functional limits)/bilateral  lower extremity Active ROM was WFL (within functional limits)/deficits as listed below

## 2023-08-10 NOTE — PHYSICAL THERAPY INITIAL EVALUATION ADULT - TRANSFER TRAINING, PT EVAL
Patient will be independent in transferring from a seated position to a standing position in 2-3 days with a rolling walker.

## 2023-08-10 NOTE — PHYSICAL THERAPY INITIAL EVALUATION ADULT - ADDITIONAL COMMENTS
Patient lives in a private house with 4 equipped with B hand rails close together and 3 steps with B handrails that are far apart. Once inside there is a flight of stairs to her bedroom. Patients mother is around to take care of her and help her. She does not use any DME prior to surgery. As per pre-op and reviewed with patient POD #0: Patient lives in a private house with 4 equipped with B hand rails close together and 3 steps with B handrails that are far apart. Once inside there is a flight of stairs to her bedroom. Patients mother is around to take care of her and help her. She does not use any DME prior to surgery.

## 2023-08-10 NOTE — PHYSICAL THERAPY INITIAL EVALUATION ADULT - PRECAUTIONS/LIMITATIONS, REHAB EVAL
Legally Blind/left hip precautions Legally Blind, posterior/fall precautions/left hip precautions/surgical precautions

## 2023-08-11 ENCOUNTER — TRANSCRIPTION ENCOUNTER (OUTPATIENT)
Age: 33
End: 2023-08-11

## 2023-08-11 LAB
ANION GAP SERPL CALC-SCNC: 4 MMOL/L — LOW (ref 5–17)
BUN SERPL-MCNC: 12 MG/DL — SIGNIFICANT CHANGE UP (ref 7–23)
CALCIUM SERPL-MCNC: 8.6 MG/DL — SIGNIFICANT CHANGE UP (ref 8.5–10.1)
CHLORIDE SERPL-SCNC: 111 MMOL/L — HIGH (ref 96–108)
CO2 SERPL-SCNC: 29 MMOL/L — SIGNIFICANT CHANGE UP (ref 22–31)
CREAT SERPL-MCNC: 0.6 MG/DL — SIGNIFICANT CHANGE UP (ref 0.5–1.3)
EGFR: 123 ML/MIN/1.73M2 — SIGNIFICANT CHANGE UP
GLUCOSE BLDC GLUCOMTR-MCNC: 193 MG/DL — HIGH (ref 70–99)
GLUCOSE BLDC GLUCOMTR-MCNC: 263 MG/DL — HIGH (ref 70–99)
GLUCOSE BLDC GLUCOMTR-MCNC: 272 MG/DL — HIGH (ref 70–99)
GLUCOSE SERPL-MCNC: 144 MG/DL — HIGH (ref 70–99)
HCT VFR BLD CALC: 26.7 % — LOW (ref 34.5–45)
HGB BLD-MCNC: 8.7 G/DL — LOW (ref 11.5–15.5)
MCHC RBC-ENTMCNC: 28.1 PG — SIGNIFICANT CHANGE UP (ref 27–34)
MCHC RBC-ENTMCNC: 32.6 G/DL — SIGNIFICANT CHANGE UP (ref 32–36)
MCV RBC AUTO: 86.1 FL — SIGNIFICANT CHANGE UP (ref 80–100)
NRBC # BLD: 0 /100 WBCS — SIGNIFICANT CHANGE UP (ref 0–0)
PLATELET # BLD AUTO: 229 K/UL — SIGNIFICANT CHANGE UP (ref 150–400)
POTASSIUM SERPL-MCNC: 4.3 MMOL/L — SIGNIFICANT CHANGE UP (ref 3.5–5.3)
POTASSIUM SERPL-SCNC: 4.3 MMOL/L — SIGNIFICANT CHANGE UP (ref 3.5–5.3)
RBC # BLD: 3.1 M/UL — LOW (ref 3.8–5.2)
RBC # FLD: 14.3 % — SIGNIFICANT CHANGE UP (ref 10.3–14.5)
SODIUM SERPL-SCNC: 144 MMOL/L — SIGNIFICANT CHANGE UP (ref 135–145)
SURGICAL PATHOLOGY STUDY: SIGNIFICANT CHANGE UP
WBC # BLD: 8.97 K/UL — SIGNIFICANT CHANGE UP (ref 3.8–10.5)
WBC # FLD AUTO: 8.97 K/UL — SIGNIFICANT CHANGE UP (ref 3.8–10.5)

## 2023-08-11 PROCEDURE — 99222 1ST HOSP IP/OBS MODERATE 55: CPT

## 2023-08-11 RX ORDER — CYCLOBENZAPRINE HYDROCHLORIDE 10 MG/1
1 TABLET, FILM COATED ORAL
Qty: 21 | Refills: 0
Start: 2023-08-11 | End: 2023-08-17

## 2023-08-11 RX ORDER — ASPIRIN/CALCIUM CARB/MAGNESIUM 324 MG
1 TABLET ORAL
Qty: 60 | Refills: 0
Start: 2023-08-11 | End: 2023-09-09

## 2023-08-11 RX ORDER — ASCORBIC ACID 60 MG
1 TABLET,CHEWABLE ORAL
Qty: 0 | Refills: 0 | DISCHARGE

## 2023-08-11 RX ORDER — POLYETHYLENE GLYCOL 3350 17 G/17G
17 POWDER, FOR SOLUTION ORAL
Qty: 0 | Refills: 0 | DISCHARGE
Start: 2023-08-11

## 2023-08-11 RX ORDER — OXYCODONE HYDROCHLORIDE 5 MG/1
1 TABLET ORAL
Qty: 42 | Refills: 0
Start: 2023-08-11 | End: 2023-08-17

## 2023-08-11 RX ORDER — GABAPENTIN 400 MG/1
1 CAPSULE ORAL
Qty: 28 | Refills: 0
Start: 2023-08-11 | End: 2023-08-24

## 2023-08-11 RX ORDER — PANTOPRAZOLE SODIUM 20 MG/1
1 TABLET, DELAYED RELEASE ORAL
Qty: 30 | Refills: 0
Start: 2023-08-11 | End: 2023-09-09

## 2023-08-11 RX ORDER — CELECOXIB 200 MG/1
1 CAPSULE ORAL
Qty: 60 | Refills: 0
Start: 2023-08-11 | End: 2023-09-09

## 2023-08-11 RX ORDER — NALOXONE HYDROCHLORIDE 4 MG/.1ML
4 SPRAY NASAL
Qty: 1 | Refills: 0
Start: 2023-08-11 | End: 2023-08-11

## 2023-08-11 RX ORDER — CHOLECALCIFEROL (VITAMIN D3) 125 MCG
0 CAPSULE ORAL
Qty: 0 | Refills: 0 | DISCHARGE

## 2023-08-11 RX ORDER — SENNA PLUS 8.6 MG/1
2 TABLET ORAL
Qty: 0 | Refills: 0 | DISCHARGE
Start: 2023-08-11

## 2023-08-11 RX ADMIN — Medication 81 MILLIGRAM(S): at 17:58

## 2023-08-11 RX ADMIN — Medication 1 TABLET(S): at 11:27

## 2023-08-11 RX ADMIN — Medication 15 MILLIGRAM(S): at 11:31

## 2023-08-11 RX ADMIN — Medication 650 MILLIGRAM(S): at 23:12

## 2023-08-11 RX ADMIN — Medication 15 MILLIGRAM(S): at 06:11

## 2023-08-11 RX ADMIN — Medication 650 MILLIGRAM(S): at 12:31

## 2023-08-11 RX ADMIN — Medication 81 MILLIGRAM(S): at 06:11

## 2023-08-11 RX ADMIN — SODIUM CHLORIDE 115 MILLILITER(S): 9 INJECTION, SOLUTION INTRAVENOUS at 06:10

## 2023-08-11 RX ADMIN — OXYCODONE HYDROCHLORIDE 10 MILLIGRAM(S): 5 TABLET ORAL at 22:38

## 2023-08-11 RX ADMIN — OXYCODONE HYDROCHLORIDE 10 MILLIGRAM(S): 5 TABLET ORAL at 21:38

## 2023-08-11 RX ADMIN — OXYCODONE HYDROCHLORIDE 10 MILLIGRAM(S): 5 TABLET ORAL at 14:36

## 2023-08-11 RX ADMIN — Medication 3 MILLIGRAM(S): at 23:12

## 2023-08-11 RX ADMIN — CELECOXIB 200 MILLIGRAM(S): 200 CAPSULE ORAL at 17:57

## 2023-08-11 RX ADMIN — OXYCODONE HYDROCHLORIDE 10 MILLIGRAM(S): 5 TABLET ORAL at 07:55

## 2023-08-11 RX ADMIN — GABAPENTIN 300 MILLIGRAM(S): 400 CAPSULE ORAL at 06:13

## 2023-08-11 RX ADMIN — Medication 650 MILLIGRAM(S): at 01:52

## 2023-08-11 RX ADMIN — Medication 102 MILLIGRAM(S): at 06:13

## 2023-08-11 RX ADMIN — CELECOXIB 200 MILLIGRAM(S): 200 CAPSULE ORAL at 06:55

## 2023-08-11 RX ADMIN — Medication 650 MILLIGRAM(S): at 06:11

## 2023-08-11 RX ADMIN — CELECOXIB 200 MILLIGRAM(S): 200 CAPSULE ORAL at 06:12

## 2023-08-11 RX ADMIN — Medication 15 MILLIGRAM(S): at 06:55

## 2023-08-11 RX ADMIN — Medication 15 MILLIGRAM(S): at 01:52

## 2023-08-11 RX ADMIN — Medication 650 MILLIGRAM(S): at 18:45

## 2023-08-11 RX ADMIN — Medication 650 MILLIGRAM(S): at 17:58

## 2023-08-11 RX ADMIN — SODIUM CHLORIDE 115 MILLILITER(S): 9 INJECTION, SOLUTION INTRAVENOUS at 00:55

## 2023-08-11 RX ADMIN — OXYCODONE HYDROCHLORIDE 10 MILLIGRAM(S): 5 TABLET ORAL at 13:36

## 2023-08-11 RX ADMIN — Medication 650 MILLIGRAM(S): at 11:31

## 2023-08-11 RX ADMIN — Medication 3: at 16:08

## 2023-08-11 RX ADMIN — OXYCODONE HYDROCHLORIDE 10 MILLIGRAM(S): 5 TABLET ORAL at 18:38

## 2023-08-11 RX ADMIN — Medication 15 MILLIGRAM(S): at 00:52

## 2023-08-11 RX ADMIN — CELECOXIB 200 MILLIGRAM(S): 200 CAPSULE ORAL at 18:45

## 2023-08-11 RX ADMIN — OXYCODONE HYDROCHLORIDE 10 MILLIGRAM(S): 5 TABLET ORAL at 17:38

## 2023-08-11 RX ADMIN — Medication 650 MILLIGRAM(S): at 00:52

## 2023-08-11 RX ADMIN — PANTOPRAZOLE SODIUM 40 MILLIGRAM(S): 20 TABLET, DELAYED RELEASE ORAL at 06:11

## 2023-08-11 RX ADMIN — Medication 100 MILLIGRAM(S): at 00:52

## 2023-08-11 RX ADMIN — POLYETHYLENE GLYCOL 3350 17 GRAM(S): 17 POWDER, FOR SOLUTION ORAL at 21:37

## 2023-08-11 RX ADMIN — Medication 1: at 07:58

## 2023-08-11 RX ADMIN — OXYCODONE HYDROCHLORIDE 10 MILLIGRAM(S): 5 TABLET ORAL at 06:55

## 2023-08-11 RX ADMIN — SENNA PLUS 2 TABLET(S): 8.6 TABLET ORAL at 21:38

## 2023-08-11 RX ADMIN — Medication 650 MILLIGRAM(S): at 06:55

## 2023-08-11 RX ADMIN — Medication 3: at 11:16

## 2023-08-11 RX ADMIN — GABAPENTIN 300 MILLIGRAM(S): 400 CAPSULE ORAL at 17:58

## 2023-08-11 RX ADMIN — ONDANSETRON 4 MILLIGRAM(S): 8 TABLET, FILM COATED ORAL at 06:56

## 2023-08-11 RX ADMIN — Medication 15 MILLIGRAM(S): at 11:46

## 2023-08-11 NOTE — DISCHARGE NOTE NURSING/CASE MANAGEMENT/SOCIAL WORK - PATIENT PORTAL LINK FT
You can access the FollowMyHealth Patient Portal offered by [facility] by registering at the following website: [email or web address]. By joining [facility]’s FollowMyHealth portal, you will also be able to view your health information using other applications (apps) compatible with our system.

## 2023-08-11 NOTE — DISCHARGE NOTE NURSING/CASE MANAGEMENT/SOCIAL WORK - NSDCPEFALRISK_GEN_ALL_CORE
For information on Fall & Injury Prevention, visit: [email or web address] OR  [email or web address] OR  https://www.cdc.gov/steadi/patient.html
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
## 2023-08-11 NOTE — DISCHARGE NOTE NURSING/CASE MANAGEMENT/SOCIAL WORK - NSDCFUADDAPPT_GEN_ALL_CORE_FT
Follow up with your surgeon in two weeks. Call for appointment.    If you need more pain medication, call your surgeon's office. For medication refills or authorizations, please call [phone]    We recommend that you call and schedule a follow up appointment with your primary care physician for repeat blood work (CBC and BMP) for post hospital discharge follow-up care 2-4 weeks after your surgery.     Make sure to have a bowel movement by 2 days after surgery. Take stool softeners and laxatives as needed.     Call your surgeon if you have increased redness/pain/drainage or fever. Return to ER for shortness of breath/calf tenderness.

## 2023-08-11 NOTE — OCCUPATIONAL THERAPY INITIAL EVALUATION ADULT - GENERAL OBSERVATIONS, REHAB EVAL
Pt encountered sitting in recliner, NAD, pt had no pain s/p left posterior total hip replacement revision.

## 2023-08-11 NOTE — OCCUPATIONAL THERAPY INITIAL EVALUATION ADULT - ADDITIONAL COMMENTS
Pre op assessment - Pt lives with her mother in a private house with 4 equipped with bilateral hand rails  for the last 3 steps that cannot be reached simultaneously. Pt endorsed that her mother will be able to assist her after she is discharged home post-operatively.

## 2023-08-11 NOTE — OCCUPATIONAL THERAPY INITIAL EVALUATION ADULT - MD/RN NOTIFIED
Patient informed of lab results and also informed would review with Dr Mack when he returns to office.  Patient reports he is fatigued and has a cough. Symptoms since 9/18/2021. Denies other covid symptoms. Reports he has long coughing spasms at times. Informed patient he should be tested for Covid and reviewed testing locations. Patient states he has been using robitussin and likely will add benedryl tonight.    yes
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
## 2023-08-11 NOTE — PROGRESS NOTE ADULT - ASSESSMENT
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 Conservative management has failed to alleviate the pain. The patient was thus indicated for the above mentioned procedure.    All risks and benefits of the procedure were explained to the patient. The patient fully understood the procedure and freely consented.    PROCEDURE IN DETAIL:  The patient was taken to the operating room and after anesthetic induction, was placed onto the surgical table in a lateral decubitus position. The bony prominences were well padded and a pegboard was used position the body. The skin and operative site were prepped and draped in the usual sterile fashion. A proper timeout was performed prior to the incision.    An 11 blade was used to make 3 stable holes on the iliac crest and 3 parallel pins were inserted into the iliac crest and an array was placed. Next an incision was made over the posterolateral aspect of the femur both superior and inferior to the greater trochanter. The incision was deepened down to the fascia and IT band. The IT band was split parallel to the fibers and extended proximally along the fibers of the gluteus dede. A charnley retractor was inserted to retract the dede. Next the posterior aspect of the trochanter was exposed and checkpoint was placed. This was used to register the leg lengths.    Next the external rotators and piriformis were identified. The piriformis was preserved and the ER/conjoint tendon was released along with the capsule and tagged. The hip was dislocated and the planned neck cut was made. Next retractors were placed around the acetabulum  in order to expose it. The residual labrum was removed. A checkpoint was placed in the superior acetabulum and the acetabulum was registered. Once the proper registration was confirmed, reaming was performed robotically in the proper version abd abduction. There were excessive osteophytes both anterior and superior that were removed to prevent impingement.     The cup was implanted in the proper abduction and version and confirmed via the LEONIE system. The poly liner was impacted in place. Attention was now turned to the femur. The femur was elevated and proper lateralization was performed. The femur was broached up to the planned size. Next trial reduction of the hip was performed and it was found to be stable at all physiologic ranges of motion. The SH[employer] software was used to check leg lengths and offset which was confirmed to match the preop plan.    The final femoral stem and head were placed and once again leg lengths and offset were confirmed. All the arrays and checkpoints were removed at this time. The wound was copiously irrigated and the deep fascia was closed with a barbed suture as was the subcutaneus layer and skin. A sterile occlusive dressing was placed. The patient was taken to the recovery room in stable condition. There were no complications during the procedure.  The assistance of a skilled physician assistant was required for the completion of the surgery. Due to the significant osteoarthritis, surrouding bone spurs, and elevated BMI, this procedure took 50% more time than usual.    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 Radiographs demonstrated progressive loosening of the femoral stem was distal lateral migration of the stem. She has been ambulating without assistive devices however the thigh pain has been handicapping her.     All risks and benefits of the procedure were explained to the patient. The patient fully understood the procedure and freely consented.    PROCEDURE IN DETAIL:  The patient was taken to the operating room and after anesthetic induction, was placed onto the surgical table in a lateral decubitus position. The bony prominences were well padded and a pegboard was used position the body. The skin and operative site were prepped and draped in the usual sterile fashion. A proper timeout was performed prior to the incision.    Once that fascia was found, an incision was made through the fascia which exposed the attachment of the abductors and as well as the gluteus dede musculature.  The musculature was split along the muscle raphe and carried down through the posterior third of the femur.  Once we were able to expose the pericapsular fat we were able to place the Charnley retractors accordingly.  The pericapsular fat was peeled away for us to be able to palpate posterior capsule.  The piriformis tendon was not very prominent due to the prior surgeries. A retractor was placed over the superior femoral neck to protect the abductors. Cautery was used to transect the posterior capsule and external rotators in one layer.      The hip was dislocated and the head was removed. There was no obvious loosening of the stem.       The Gluteus dede tendon was reattached and the deep fascial layer was closed with #1 Stratafix and the , subcutaneous layer with 2-0 Vicryl, and the skin was closed with staples.  Dry sterile dressings were placed and the patient was taken to the recovery room in stable condition.  There were no complications during the procedure. Patient was taken to the recovery room in stable condition.          confirmed, reaming was performed robotically in the proper version abd abduction. There were excessive osteophytes both anterior and superior that were removed to prevent impingement.     The cup was implanted in the proper abduction and version and confirmed via the zuuka! system. The poly liner was impacted in place. Attention was now turned to the femur. The femur was elevated and proper lateralization was performed. The femur was broached up to the planned size. Next trial reduction of the hip was performed and it was found to be stable at all physiologic ranges of motion. The zuuka! software was used to check leg lengths and offset which was confirmed to match the preop plan.      The final femoral stem and head were placed and once again leg lengths and offset were confirmed. All the arrays and checkpoints were removed at this time. The wound was copiously irrigated and the deep fascia was closed with a barbed suture as was the subcutaneus layer and skin. A sterile occlusive dressing was placed. The patient was taken to the recovery room in stable condition. There were no complications during the procedure.  The assistance of a skilled physician assistant was required for the completion of the surgery. Due to the significant osteoarthritis, surrouding bone spurs, and elevated BMI, this procedure took 50% more time than usual.     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 Radiographs demonstrated progressive loosening of the femoral stem was distal lateral migration of the stem. She has been ambulating without assistive devices however the thigh pain has been handicapping her.     All risks and benefits of the procedure were explained to the patient. The patient fully understood the procedure and freely consented.    PROCEDURE IN DETAIL:  The patient was taken to the operating room and after anesthetic induction, was placed onto the surgical table in a lateral decubitus position. The bony prominences were well padded and a pegboard was used position the body. The skin and operative site were prepped and draped in the usual sterile fashion. A proper timeout was performed prior to the incision.    Once that fascia was found, an incision was made through the fascia which exposed the attachment of the abductors and as well as the gluteus dede musculature.  The musculature was split along the muscle raphe and carried down through the posterior third of the femur.  Once we were able to expose the pericapsular fat we were able to place the Charnley retractors accordingly.  The pericapsular fat was peeled away for us to be able to palpate posterior capsule.  The piriformis tendon was not very prominent due to the prior surgeries. A retractor was placed over the superior femoral neck to protect the abductors. Cautery was used to transect the posterior capsule and external rotators in one layer.      The hip was dislocated and the head was removed. There was no obvious loosening of the stem. The cheikh interface of the prosthesis was exposed with a pencil tip britney. The shoulder of the implant was cleared out and bone overlying the implant on the greater trochanter was also removed. Next the interface between the stem and sleeve was exposed. Stacked osteotomes were used to attempt to disengage the stem from the sleeve. Next a backslap was utilized and the stem was removed from the femur. There was no significant bone loss from      Dry sterile dressings were placed and the patient was taken to the recovery room in stable condition.  There were no complications during the procedure. Patient was taken to the recovery room in stable condition.          confirmed, reaming was performed robotically in the proper version abd abduction. There were excessive osteophytes both anterior and superior that were removed to prevent impingement.     The cup was implanted in the proper abduction and version and confirmed via the Forerun system. The poly liner was impacted in place. Attention was now turned to the femur. The femur was elevated and proper lateralization was performed. The femur was broached up to the planned size. Next trial reduction of the hip was performed and it was found to be stable at all physiologic ranges of motion. The Forerun software was used to check leg lengths and offset which was confirmed to match the preop plan.      The final femoral stem and head were placed and once again leg lengths and offset were confirmed. All the arrays and checkpoints were removed at this time. The wound was copiously irrigated and the deep fascia was closed with a barbed suture as was the subcutaneus layer and skin. A sterile occlusive dressing was placed. The patient was taken to the recovery room in stable condition. There were no complications during the procedure.  The assistance of a skilled physician assistant was required for the completion of the surgery. Due to the significant osteoarthritis, surrouding bone spurs, and elevated BMI, this procedure took 50% more time than usual.     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 Radiographs demonstrated progressive loosening of the femoral stem was distal lateral migration of the stem. She has been ambulating without assistive devices however the thigh pain has been handicapping her.     All risks and benefits of the procedure were explained to the patient. The patient fully understood the procedure and freely consented.    PROCEDURE IN DETAIL:  The patient was taken to the operating room and after anesthetic induction, was placed onto the surgical table in a lateral decubitus position. The bony prominences were well padded and a pegboard was used position the body. The skin and operative site were prepped and draped in the usual sterile fashion. A proper timeout was performed prior to the incision.    Once that fascia was found, an incision was made through the fascia which exposed the attachment of the abductors and as well as the gluteus dede musculature.  The musculature was split along the muscle raphe and carried down through the posterior third of the femur.  Once we were able to expose the pericapsular fat we were able to place the Charnley retractors accordingly.  The pericapsular fat was peeled away for us to be able to palpate posterior capsule.  The piriformis tendon was not very prominent due to the prior surgeries. A retractor was placed over the superior femoral neck to protect the abductors. Cautery was used to transect the posterior capsule and external rotators in one layer.      The hip was dislocated and the head was removed. There was no obvious loosening of the stem. The cheikh interface of the prosthesis was exposed with a pencil tip britney. The shoulder of the implant was cleared out and bone overlying the implant on the greater trochanter was also removed. Next the interface between the stem and sleeve was exposed. Stacked osteotomes were used to attempt to disengage the stem from the sleeve. Next a backslap was utilized and the stem was removed from the femur. There was no significant bone loss around the stem. Next a guide wire was inserted down the canal. There was distal pedestal and thus using a long drill bit, an opening was made in the distal pedestal.    Next the guidewire was threaded down the canal and intramedullary location was confirmed on both AP and lateral radiographs intraoperatively. Progressive sized flexible reamers was used to open up the canal       Dry sterile dressings were placed and the patient was taken to the recovery room in stable condition.  There were no complications during the procedure. Patient was taken to the recovery room in stable condition.          confirmed, reaming was performed robotically in the proper version abd abduction. There were excessive osteophytes both anterior and superior that were removed to prevent impingement.     The cup was implanted in the proper abduction and version and confirmed via the [employer] system. The poly liner was impacted in place. Attention was now turned to the femur. The femur was elevated and proper lateralization was performed. The femur was broached up to the planned size. Next trial reduction of the hip was performed and it was found to be stable at all physiologic ranges of motion. The [employer] software was used to check leg lengths and offset which was confirmed to match the preop plan.      The final femoral stem and head were placed and once again leg lengths and offset were confirmed. All the arrays and checkpoints were removed at this time. The wound was copiously irrigated and the deep fascia was closed with a barbed suture as was the subcutaneus layer and skin. A sterile occlusive dressing was placed. The patient was taken to the recovery room in stable condition. There were no complications during the procedure.  The assistance of a skilled physician assistant was required for the completion of the surgery. Due to the significant osteoarthritis, surrouding bone spurs, and elevated BMI, this procedure took 50% more time than usual.     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 Radiographs demonstrated progressive loosening of the femoral stem was distal lateral migration of the stem. She has been ambulating without assistive devices however the thigh pain has been handicapping her.     All risks and benefits of the procedure were explained to the patient. The patient fully understood the procedure and freely consented.    PROCEDURE IN DETAIL:  The patient was taken to the operating room and after anesthetic induction, was placed onto the surgical table in a lateral decubitus position. The bony prominences were well padded and a pegboard was used position the body. The skin and operative site were prepped and draped in the usual sterile fashion. A proper timeout was performed prior to the incision.    Once that fascia was found, an incision was made through the fascia which exposed the attachment of the abductors and as well as the gluteus dede musculature.  The musculature was split along the muscle raphe and carried down through the posterior third of the femur.  Once we were able to expose the pericapsular fat we were able to place the Charnley retractors accordingly.  The pericapsular fat was peeled away for us to be able to palpate posterior capsule.  The piriformis tendon was not very prominent due to the prior surgeries. A retractor was placed over the superior femoral neck to protect the abductors. Cautery was used to transect the posterior capsule and external rotators in one layer.      The hip was dislocated and the head was removed. There was no obvious loosening of the stem. The cheikh interface of the prosthesis was exposed with a pencil tip britney. The shoulder of the implant was cleared out and bone overlying the implant on the greater trochanter was also removed. Next the interface between the stem and sleeve was exposed. Stacked osteotomes were used to attempt to disengage the stem from the sleeve. Next a backslap was utilized and the stem was removed from the femur. There was no significant bone loss around the stem. Next a guide wire was inserted down the canal. There was distal pedestal and thus using a long drill bit, an opening was made in the distal pedestal.    Next the guidewire was threaded down the canal and intramedullary location was confirmed on both AP and lateral radiographs intraoperatively. Progressive sized flexible reamers was used to open up the canal. A 140 stem trial was placed down and the radiographic evaluation showed insufficient length to bypass the lateral cortical defect in addition to varus alignment of stem. It was determined the dense cortical bone on the medial aspect of the femur was redirecting the reamers and thus the canal was reamed for a 190 stem with medial      Dry sterile dressings were placed and the patient was taken to the recovery room in stable condition.  There were no complications during the procedure. Patient was taken to the recovery room in stable condition.          confirmed, reaming was performed robotically in the proper version abd abduction. There were excessive osteophytes both anterior and superior that were removed to prevent impingement.     The cup was implanted in the proper abduction and version and confirmed via the Superfly system. The poly liner was impacted in place. Attention was now turned to the femur. The femur was elevated and proper lateralization was performed. The femur was broached up to the planned size. Next trial reduction of the hip was performed and it was found to be stable at all physiologic ranges of motion. The Superfly software was used to check leg lengths and offset which was confirmed to match the preop plan.      The final femoral stem and head were placed and once again leg lengths and offset were confirmed. All the arrays and checkpoints were removed at this time. The wound was copiously irrigated and the deep fascia was closed with a barbed suture as was the subcutaneus layer and skin. A sterile occlusive dressing was placed. The patient was taken to the recovery room in stable condition. There were no complications during the procedure.  The assistance of a skilled physician assistant was required for the completion of the surgery. Due to the significant osteoarthritis, surrouding bone spurs, and elevated BMI, this procedure took 50% more time than usual.     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 Radiographs demonstrated progressive loosening of the femoral stem was distal lateral migration of the stem. She has been ambulating without assistive devices however the thigh pain has been handicapping her.     All risks and benefits of the procedure were explained to the patient. The patient fully understood the procedure and freely consented.    PROCEDURE IN DETAIL:  The patient was taken to the operating room and after anesthetic induction, was placed onto the surgical table in a lateral decubitus position. The bony prominences were well padded and a pegboard was used position the body. The skin and operative site were prepped and draped in the usual sterile fashion. A proper timeout was performed prior to the incision.    Once that fascia was found, an incision was made through the fascia which exposed the attachment of the abductors and as well as the gluteus dede musculature.  The musculature was split along the muscle raphe and carried down through the posterior third of the femur.  Once we were able to expose the pericapsular fat we were able to place the Charnley retractors accordingly.  The pericapsular fat was peeled away for us to be able to palpate posterior capsule.  The piriformis tendon was not very prominent due to the prior surgeries. A retractor was placed over the superior femoral neck to protect the abductors. Cautery was used to transect the posterior capsule and external rotators in one layer.      The hip was dislocated and the head was removed. There was no obvious loosening of the stem. The cheikh interface of the prosthesis was exposed with a pencil tip britney. The shoulder of the implant was cleared out and bone overlying the implant on the greater trochanter was also removed. Next the interface between the stem and sleeve was exposed. Stacked osteotomes were used to attempt to disengage the stem from the sleeve. Next a backslap was utilized and the stem was removed from the femur. There was no significant bone loss around the stem. Next a guide wire was inserted down the canal. There was distal pedestal and thus using a long drill bit, an opening was made in the distal pedestal.    Next the guidewire was threaded down the canal and intramedullary location was confirmed on both AP and lateral radiographs intraoperatively. Progressive sized flexible reamers was used to open up the canal. A 140 stem trial was placed down and the radiographic evaluation showed insufficient length to bypass the lateral cortical defect in addition to varus alignment of stem. It was determined the dense cortical bone on the medial aspect of the femur was redirecting the reamers and thus the canal was reamed for a 190 stem with redirection of reaming. A trial stem was placed in. Radiographs demonstrated lateral defect has been bypassed  with intramedullary localization and sufficient fill of the canal and good alignment.     A trial reduction was performed with the stem and body. There was good range of motion and stability in all physiologic ranges. legs length and offset was acceptable. Next the final stem was placed in and preparation for the proximal body was performed.     Dry sterile dressings were placed and the patient was taken to the recovery room in stable condition.  There were no complications during the procedure. Patient was taken to the recovery room in stable condition.          confirmed, reaming was performed robotically in the proper version abd abduction. There were excessive osteophytes both anterior and superior that were removed to prevent impingement.     The cup was implanted in the proper abduction and version and confirmed via the LEONIE system. The poly liner was impacted in place. Attention was now turned to the femur. The femur was elevated and proper lateralization was performed. The femur was broached up to the planned size. Next trial reduction of the hip was performed and it was found to be stable at all physiologic ranges of motion. The [employer] software was used to check leg lengths and offset which was confirmed to match the preop plan.      The final femoral stem and head were placed and once again leg lengths and offset were confirmed. All the arrays and checkpoints were removed at this time. The wound was copiously irrigated and the deep fascia was closed with a barbed suture as was the subcutaneus layer and skin. A sterile occlusive dressing was placed. The patient was taken to the recovery room in stable condition. There were no complications during the procedure.  The assistance of a skilled physician assistant was required for the completion of the surgery. Due to the significant osteoarthritis, surrouding bone spurs, and elevated BMI, this procedure took 50% more time than usual.     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 Radiographs demonstrated progressive loosening of the femoral stem was distal lateral migration of the stem. She has been ambulating without assistive devices however the thigh pain has been handicapping her.     All risks and benefits of the procedure were explained to the patient. The patient fully understood the procedure and freely consented.    PROCEDURE IN DETAIL:  The patient was taken to the operating room and after anesthetic induction, was placed onto the surgical table in a lateral decubitus position. The bony prominences were well padded and a pegboard was used to position the body. The skin and operative site were prepped and draped in the usual sterile fashion. A proper timeout was performed prior to the incision.    The prior incision was opened and dissection was performed down to the deep fascia. Once that fascia was found, an incision was made through the fascia which exposed the attachment of the abductors and as well as the gluteus dede musculature.  The musculature was split along the muscle raphe and carried down through the posterior third of the femur.  Once we were able to expose the pericapsular fat we were able to place the Charnley retractors accordingly.  The pericapsular fat was peeled away for us to be able to palpate posterior capsule.  The piriformis tendon was not very prominent due to the prior surgeries. A retractor was placed over the superior femoral neck to protect the abductors. Cautery was used to transect the posterior capsule and external rotators in one layer.      The hip was dislocated and the head was removed. There was no obvious loosening of the stem. The cheikh interface of the prosthesis was exposed with a pencil tip britney. The shoulder of the implant was cleared out and bone overlying the implant on the greater trochanter was also removed. Next the interface between the stem and sleeve was exposed. Stacked osteotomes were used to attempt to disengage the stem from the sleeve. Next a backslap was utilized and the stem was removed from the femur along with the sleeve. There was no significant bone loss around the stem. Next a guide wire was inserted down the canal. There was distal pedestal and thus using a long drill bit, an opening was made in the distal pedestal.    Next the guidewire was threaded down the canal and intramedullary location was confirmed on both AP and lateral radiographs intraoperatively. Progressive sized flexible reamers was used to open up the canal. A 140 stem trial was placed down and the radiographic evaluation showed insufficient length to bypass the lateral cortical defect in addition to varus alignment of stem. It was determined the dense cortical bone on the medial aspect of the femur was redirecting the reamers and thus the canal was reamed for a 190 stem with redirection of reaming. A trial stem was placed in. Radiographs demonstrated lateral defect has been bypassed  with intramedullary localization and sufficient fill of the canal and good alignment.     A trial reduction was performed with the stem and body. There was good range of motion and stability in all physiologic ranges. Leg length and offset was acceptable. Next the final stem was placed in and preparation for the proximal body was performed. The body was impacted in placed in the proper version. Trials heads were again used to assess leg lengths. The appropriate femoral head size was selected and impacted in place. The hip was placed through full physiologic range of motion without instability. The hip was thoroughly irrigated with normal saline and irrisept. The posterior capsule was closed as was deep fascial layer.  The subcutaneous tissue and skin were closed in the usual sterile fashion. A incisional wound vac was placed and the patient was taken to the recovery room in stable condition.      The assistance of a skilled physician assistant was required for the completion of the surgery. Due to the fibrotic tissue that has developed from a multiply revised hip, distorted anatomy, and sclerotic bone due to sickle cell disease, the procedure took 50% longer than usual.

## 2023-08-11 NOTE — DISCHARGE NOTE NURSING/CASE MANAGEMENT/SOCIAL WORK - NSDPLANG ASIS_GEN_ALL_CORE
-- Message is from the [facility]--    Reason for Call: patient would like a call back to schedule an annual physical with dr Garcia her last of was 08/25/2016     Caller Information       Type Contact Phone    08/09/2019 03:33 PM Phone (Incoming) Daiana Levy (Self) [phone] (M)          Alternative phone number: [phone]    Turnaround time given to caller:   \"This message will be sent to [state Provider's name]. The clinical team will fulfill your request as soon as they review your message.\"     No

## 2023-08-12 LAB
ANION GAP SERPL CALC-SCNC: 4 MMOL/L — LOW (ref 5–17)
BUN SERPL-MCNC: 15 MG/DL — SIGNIFICANT CHANGE UP (ref 7–23)
CALCIUM SERPL-MCNC: 8.8 MG/DL — SIGNIFICANT CHANGE UP (ref 8.5–10.1)
CHLORIDE SERPL-SCNC: 113 MMOL/L — HIGH (ref 96–108)
CO2 SERPL-SCNC: 28 MMOL/L — SIGNIFICANT CHANGE UP (ref 22–31)
CREAT SERPL-MCNC: 0.63 MG/DL — SIGNIFICANT CHANGE UP (ref 0.5–1.3)
CRP SERPL-MCNC: 97 MG/L — HIGH
EGFR: 122 ML/MIN/1.73M2 — SIGNIFICANT CHANGE UP
ERYTHROCYTE [SEDIMENTATION RATE] IN BLOOD: 21 MM/HR — HIGH (ref 0–15)
GLUCOSE BLDC GLUCOMTR-MCNC: 133 MG/DL — HIGH (ref 70–99)
GLUCOSE BLDC GLUCOMTR-MCNC: 142 MG/DL — HIGH (ref 70–99)
GLUCOSE BLDC GLUCOMTR-MCNC: 165 MG/DL — HIGH (ref 70–99)
GLUCOSE BLDC GLUCOMTR-MCNC: 173 MG/DL — HIGH (ref 70–99)
GLUCOSE BLDC GLUCOMTR-MCNC: 312 MG/DL — HIGH (ref 70–99)
GLUCOSE SERPL-MCNC: 144 MG/DL — HIGH (ref 70–99)
HCT VFR BLD CALC: 24.3 % — LOW (ref 34.5–45)
HGB BLD-MCNC: 7.8 G/DL — LOW (ref 11.5–15.5)
MCHC RBC-ENTMCNC: 27.7 PG — SIGNIFICANT CHANGE UP (ref 27–34)
MCHC RBC-ENTMCNC: 32.1 G/DL — SIGNIFICANT CHANGE UP (ref 32–36)
MCV RBC AUTO: 86.2 FL — SIGNIFICANT CHANGE UP (ref 80–100)
NRBC # BLD: 0 /100 WBCS — SIGNIFICANT CHANGE UP (ref 0–0)
PLATELET # BLD AUTO: 210 K/UL — SIGNIFICANT CHANGE UP (ref 150–400)
POTASSIUM SERPL-MCNC: 4.3 MMOL/L — SIGNIFICANT CHANGE UP (ref 3.5–5.3)
POTASSIUM SERPL-SCNC: 4.3 MMOL/L — SIGNIFICANT CHANGE UP (ref 3.5–5.3)
RBC # BLD: 2.82 M/UL — LOW (ref 3.8–5.2)
RBC # FLD: 14.2 % — SIGNIFICANT CHANGE UP (ref 10.3–14.5)
SODIUM SERPL-SCNC: 145 MMOL/L — SIGNIFICANT CHANGE UP (ref 135–145)
WBC # BLD: 8.87 K/UL — SIGNIFICANT CHANGE UP (ref 3.8–10.5)
WBC # FLD AUTO: 8.87 K/UL — SIGNIFICANT CHANGE UP (ref 3.8–10.5)

## 2023-08-12 RX ORDER — BENZOCAINE AND MENTHOL 5; 1 G/100ML; G/100ML
1 LIQUID ORAL EVERY 6 HOURS
Refills: 0 | Status: DISCONTINUED | OUTPATIENT
Start: 2023-08-12 | End: 2023-08-15

## 2023-08-12 RX ADMIN — GABAPENTIN 300 MILLIGRAM(S): 400 CAPSULE ORAL at 18:09

## 2023-08-12 RX ADMIN — Medication 650 MILLIGRAM(S): at 00:12

## 2023-08-12 RX ADMIN — HYDROMORPHONE HYDROCHLORIDE 0.5 MILLIGRAM(S): 2 INJECTION INTRAMUSCULAR; INTRAVENOUS; SUBCUTANEOUS at 06:24

## 2023-08-12 RX ADMIN — Medication 4: at 11:49

## 2023-08-12 RX ADMIN — PANTOPRAZOLE SODIUM 40 MILLIGRAM(S): 20 TABLET, DELAYED RELEASE ORAL at 08:50

## 2023-08-12 RX ADMIN — Medication 650 MILLIGRAM(S): at 06:24

## 2023-08-12 RX ADMIN — GABAPENTIN 300 MILLIGRAM(S): 400 CAPSULE ORAL at 05:34

## 2023-08-12 RX ADMIN — Medication 81 MILLIGRAM(S): at 05:34

## 2023-08-12 RX ADMIN — HYDROMORPHONE HYDROCHLORIDE 0.5 MILLIGRAM(S): 2 INJECTION INTRAMUSCULAR; INTRAVENOUS; SUBCUTANEOUS at 14:00

## 2023-08-12 RX ADMIN — Medication 650 MILLIGRAM(S): at 05:33

## 2023-08-12 RX ADMIN — HYDROMORPHONE HYDROCHLORIDE 0.5 MILLIGRAM(S): 2 INJECTION INTRAMUSCULAR; INTRAVENOUS; SUBCUTANEOUS at 06:40

## 2023-08-12 RX ADMIN — HYDROMORPHONE HYDROCHLORIDE 0.5 MILLIGRAM(S): 2 INJECTION INTRAMUSCULAR; INTRAVENOUS; SUBCUTANEOUS at 13:38

## 2023-08-12 RX ADMIN — Medication 650 MILLIGRAM(S): at 22:16

## 2023-08-12 RX ADMIN — SENNA PLUS 2 TABLET(S): 8.6 TABLET ORAL at 22:16

## 2023-08-12 RX ADMIN — Medication 650 MILLIGRAM(S): at 11:49

## 2023-08-12 RX ADMIN — CELECOXIB 200 MILLIGRAM(S): 200 CAPSULE ORAL at 06:24

## 2023-08-12 RX ADMIN — Medication 650 MILLIGRAM(S): at 23:16

## 2023-08-12 RX ADMIN — POLYETHYLENE GLYCOL 3350 17 GRAM(S): 17 POWDER, FOR SOLUTION ORAL at 22:16

## 2023-08-12 RX ADMIN — OXYCODONE HYDROCHLORIDE 10 MILLIGRAM(S): 5 TABLET ORAL at 21:04

## 2023-08-12 RX ADMIN — OXYCODONE HYDROCHLORIDE 10 MILLIGRAM(S): 5 TABLET ORAL at 20:04

## 2023-08-12 RX ADMIN — CELECOXIB 200 MILLIGRAM(S): 200 CAPSULE ORAL at 05:34

## 2023-08-12 RX ADMIN — Medication 1 TABLET(S): at 11:50

## 2023-08-12 RX ADMIN — Medication 650 MILLIGRAM(S): at 18:10

## 2023-08-12 RX ADMIN — CELECOXIB 200 MILLIGRAM(S): 200 CAPSULE ORAL at 18:10

## 2023-08-12 RX ADMIN — Medication 81 MILLIGRAM(S): at 18:09

## 2023-08-12 RX ADMIN — Medication 650 MILLIGRAM(S): at 12:30

## 2023-08-13 LAB
ANION GAP SERPL CALC-SCNC: 1 MMOL/L — LOW (ref 5–17)
BUN SERPL-MCNC: 10 MG/DL — SIGNIFICANT CHANGE UP (ref 7–23)
CALCIUM SERPL-MCNC: 9.1 MG/DL — SIGNIFICANT CHANGE UP (ref 8.5–10.1)
CHLORIDE SERPL-SCNC: 108 MMOL/L — SIGNIFICANT CHANGE UP (ref 96–108)
CO2 SERPL-SCNC: 32 MMOL/L — HIGH (ref 22–31)
CREAT SERPL-MCNC: 0.6 MG/DL — SIGNIFICANT CHANGE UP (ref 0.5–1.3)
EGFR: 123 ML/MIN/1.73M2 — SIGNIFICANT CHANGE UP
GLUCOSE BLDC GLUCOMTR-MCNC: 142 MG/DL — HIGH (ref 70–99)
GLUCOSE BLDC GLUCOMTR-MCNC: 146 MG/DL — HIGH (ref 70–99)
GLUCOSE BLDC GLUCOMTR-MCNC: 163 MG/DL — HIGH (ref 70–99)
GLUCOSE BLDC GLUCOMTR-MCNC: 176 MG/DL — HIGH (ref 70–99)
GLUCOSE SERPL-MCNC: 182 MG/DL — HIGH (ref 70–99)
HCT VFR BLD CALC: 23.8 % — LOW (ref 34.5–45)
HGB BLD-MCNC: 7.9 G/DL — LOW (ref 11.5–15.5)
MCHC RBC-ENTMCNC: 28.5 PG — SIGNIFICANT CHANGE UP (ref 27–34)
MCHC RBC-ENTMCNC: 33.2 G/DL — SIGNIFICANT CHANGE UP (ref 32–36)
MCV RBC AUTO: 85.9 FL — SIGNIFICANT CHANGE UP (ref 80–100)
NRBC # BLD: 0 /100 WBCS — SIGNIFICANT CHANGE UP (ref 0–0)
PLATELET # BLD AUTO: 245 K/UL — SIGNIFICANT CHANGE UP (ref 150–400)
POTASSIUM SERPL-MCNC: 4.1 MMOL/L — SIGNIFICANT CHANGE UP (ref 3.5–5.3)
POTASSIUM SERPL-SCNC: 4.1 MMOL/L — SIGNIFICANT CHANGE UP (ref 3.5–5.3)
RBC # BLD: 2.77 M/UL — LOW (ref 3.8–5.2)
RBC # FLD: 14.3 % — SIGNIFICANT CHANGE UP (ref 10.3–14.5)
SODIUM SERPL-SCNC: 141 MMOL/L — SIGNIFICANT CHANGE UP (ref 135–145)
WBC # BLD: 10.98 K/UL — HIGH (ref 3.8–10.5)
WBC # FLD AUTO: 10.98 K/UL — HIGH (ref 3.8–10.5)

## 2023-08-13 RX ADMIN — PANTOPRAZOLE SODIUM 40 MILLIGRAM(S): 20 TABLET, DELAYED RELEASE ORAL at 05:37

## 2023-08-13 RX ADMIN — CELECOXIB 200 MILLIGRAM(S): 200 CAPSULE ORAL at 05:43

## 2023-08-13 RX ADMIN — OXYCODONE HYDROCHLORIDE 10 MILLIGRAM(S): 5 TABLET ORAL at 19:49

## 2023-08-13 RX ADMIN — OXYCODONE HYDROCHLORIDE 10 MILLIGRAM(S): 5 TABLET ORAL at 20:49

## 2023-08-13 RX ADMIN — Medication 81 MILLIGRAM(S): at 05:37

## 2023-08-13 RX ADMIN — Medication 1 TABLET(S): at 11:39

## 2023-08-13 RX ADMIN — OXYCODONE HYDROCHLORIDE 10 MILLIGRAM(S): 5 TABLET ORAL at 05:37

## 2023-08-13 RX ADMIN — Medication 650 MILLIGRAM(S): at 05:36

## 2023-08-13 RX ADMIN — OXYCODONE HYDROCHLORIDE 10 MILLIGRAM(S): 5 TABLET ORAL at 14:45

## 2023-08-13 RX ADMIN — Medication 650 MILLIGRAM(S): at 11:41

## 2023-08-13 RX ADMIN — Medication 81 MILLIGRAM(S): at 18:28

## 2023-08-13 RX ADMIN — GABAPENTIN 300 MILLIGRAM(S): 400 CAPSULE ORAL at 18:28

## 2023-08-13 RX ADMIN — Medication 650 MILLIGRAM(S): at 06:36

## 2023-08-13 RX ADMIN — CELECOXIB 200 MILLIGRAM(S): 200 CAPSULE ORAL at 18:28

## 2023-08-13 RX ADMIN — POLYETHYLENE GLYCOL 3350 17 GRAM(S): 17 POWDER, FOR SOLUTION ORAL at 22:40

## 2023-08-13 RX ADMIN — Medication 1: at 11:38

## 2023-08-13 RX ADMIN — GABAPENTIN 300 MILLIGRAM(S): 400 CAPSULE ORAL at 05:36

## 2023-08-13 RX ADMIN — SENNA PLUS 2 TABLET(S): 8.6 TABLET ORAL at 22:41

## 2023-08-13 RX ADMIN — CELECOXIB 200 MILLIGRAM(S): 200 CAPSULE ORAL at 06:36

## 2023-08-13 RX ADMIN — Medication 1: at 16:17

## 2023-08-13 RX ADMIN — OXYCODONE HYDROCHLORIDE 10 MILLIGRAM(S): 5 TABLET ORAL at 06:37

## 2023-08-14 LAB
GLUCOSE BLDC GLUCOMTR-MCNC: 144 MG/DL — HIGH (ref 70–99)
GLUCOSE BLDC GLUCOMTR-MCNC: 154 MG/DL — HIGH (ref 70–99)
GLUCOSE BLDC GLUCOMTR-MCNC: 187 MG/DL — HIGH (ref 70–99)
GLUCOSE BLDC GLUCOMTR-MCNC: 198 MG/DL — HIGH (ref 70–99)

## 2023-08-14 RX ORDER — LACTULOSE 10 G/15ML
20 SOLUTION ORAL ONCE
Refills: 0 | Status: COMPLETED | OUTPATIENT
Start: 2023-08-14 | End: 2023-08-14

## 2023-08-14 RX ADMIN — OXYCODONE HYDROCHLORIDE 5 MILLIGRAM(S): 5 TABLET ORAL at 22:15

## 2023-08-14 RX ADMIN — CELECOXIB 200 MILLIGRAM(S): 200 CAPSULE ORAL at 17:35

## 2023-08-14 RX ADMIN — Medication 81 MILLIGRAM(S): at 17:35

## 2023-08-14 RX ADMIN — OXYCODONE HYDROCHLORIDE 5 MILLIGRAM(S): 5 TABLET ORAL at 21:15

## 2023-08-14 RX ADMIN — PANTOPRAZOLE SODIUM 40 MILLIGRAM(S): 20 TABLET, DELAYED RELEASE ORAL at 06:48

## 2023-08-14 RX ADMIN — Medication 81 MILLIGRAM(S): at 06:48

## 2023-08-14 RX ADMIN — OXYCODONE HYDROCHLORIDE 10 MILLIGRAM(S): 5 TABLET ORAL at 06:49

## 2023-08-14 RX ADMIN — OXYCODONE HYDROCHLORIDE 10 MILLIGRAM(S): 5 TABLET ORAL at 02:34

## 2023-08-14 RX ADMIN — CELECOXIB 200 MILLIGRAM(S): 200 CAPSULE ORAL at 17:46

## 2023-08-14 RX ADMIN — GABAPENTIN 300 MILLIGRAM(S): 400 CAPSULE ORAL at 17:35

## 2023-08-14 RX ADMIN — CELECOXIB 200 MILLIGRAM(S): 200 CAPSULE ORAL at 06:48

## 2023-08-14 RX ADMIN — OXYCODONE HYDROCHLORIDE 10 MILLIGRAM(S): 5 TABLET ORAL at 02:04

## 2023-08-14 RX ADMIN — Medication 1: at 08:14

## 2023-08-14 RX ADMIN — Medication 1: at 16:33

## 2023-08-14 RX ADMIN — Medication 1 TABLET(S): at 11:47

## 2023-08-14 RX ADMIN — OXYCODONE HYDROCHLORIDE 10 MILLIGRAM(S): 5 TABLET ORAL at 06:55

## 2023-08-14 RX ADMIN — GABAPENTIN 300 MILLIGRAM(S): 400 CAPSULE ORAL at 06:48

## 2023-08-14 RX ADMIN — LACTULOSE 20 GRAM(S): 10 SOLUTION ORAL at 11:47

## 2023-08-14 RX ADMIN — Medication 3 MILLIGRAM(S): at 21:15

## 2023-08-15 VITALS
SYSTOLIC BLOOD PRESSURE: 110 MMHG | HEART RATE: 76 BPM | OXYGEN SATURATION: 98 % | DIASTOLIC BLOOD PRESSURE: 72 MMHG | RESPIRATION RATE: 18 BRPM | TEMPERATURE: 98 F

## 2023-08-15 LAB
CULTURE RESULTS: SIGNIFICANT CHANGE UP
CULTURE RESULTS: SIGNIFICANT CHANGE UP
GLUCOSE BLDC GLUCOMTR-MCNC: 173 MG/DL — HIGH (ref 70–99)
GLUCOSE BLDC GLUCOMTR-MCNC: 173 MG/DL — HIGH (ref 70–99)
GRAM STN FLD: SIGNIFICANT CHANGE UP
GRAM STN FLD: SIGNIFICANT CHANGE UP
SPECIMEN SOURCE: SIGNIFICANT CHANGE UP
SPECIMEN SOURCE: SIGNIFICANT CHANGE UP

## 2023-08-15 RX ADMIN — CELECOXIB 200 MILLIGRAM(S): 200 CAPSULE ORAL at 06:53

## 2023-08-15 RX ADMIN — OXYCODONE HYDROCHLORIDE 10 MILLIGRAM(S): 5 TABLET ORAL at 10:13

## 2023-08-15 RX ADMIN — Medication 81 MILLIGRAM(S): at 06:10

## 2023-08-15 RX ADMIN — Medication 1: at 11:20

## 2023-08-15 RX ADMIN — OXYCODONE HYDROCHLORIDE 5 MILLIGRAM(S): 5 TABLET ORAL at 04:09

## 2023-08-15 RX ADMIN — CELECOXIB 200 MILLIGRAM(S): 200 CAPSULE ORAL at 06:09

## 2023-08-15 RX ADMIN — PANTOPRAZOLE SODIUM 40 MILLIGRAM(S): 20 TABLET, DELAYED RELEASE ORAL at 07:42

## 2023-08-15 RX ADMIN — OXYCODONE HYDROCHLORIDE 5 MILLIGRAM(S): 5 TABLET ORAL at 05:09

## 2023-08-15 RX ADMIN — GABAPENTIN 300 MILLIGRAM(S): 400 CAPSULE ORAL at 06:09

## 2023-08-15 RX ADMIN — Medication 1: at 07:42

## 2023-08-15 RX ADMIN — OXYCODONE HYDROCHLORIDE 10 MILLIGRAM(S): 5 TABLET ORAL at 11:00

## 2023-08-15 RX ADMIN — Medication 1 TABLET(S): at 11:20

## 2023-08-15 NOTE — PROGRESS NOTE ADULT - SUBJECTIVE AND OBJECTIVE BOX
DARRELL MOONEY is a 32y Female s/p REVISION OF LEFT TOTAL HIP ARTHROPLASTY        denies any chest pain shortness of breath palpitation dizziness lightheadedness nausea vomiting fever or chills    T(C): 36.7 (08-15-23 @ 05:03), Max: 36.8 (08-14-23 @ 17:07)  HR: 76 (08-15-23 @ 05:03) (76 - 81)  BP: 115/69 (08-15-23 @ 05:03) (100/66 - 119/80)  RR: 18 (08-15-23 @ 05:03) (18 - 18)  SpO2: 100% (08-15-23 @ 05:03) (99% - 100%)  no jvd/bruit  s1 s2 rrr  cta  s/nt/nd  no calf tend      cont dvt px  pain control  bowel regimen  antiemetics  incentive spirometer
Patient is seen and examined at bedside. Denies CP/SOB/Dizziness/N/V/D/HA. Pain is controlled.         Vital Signs (24 Hrs):  T(C): 36.4 (08-12-23 @ 12:01), Max: 36.9 (08-12-23 @ 05:09)  HR: 94 (08-12-23 @ 12:01) (80 - 100)  BP: 109/68 (08-12-23 @ 12:01) (99/63 - 126/96)  RR: 17 (08-12-23 @ 12:01) (16 - 18)  SpO2: 99% (08-12-23 @ 12:01) (97% - 100%)  Wt(kg): --    LABS:                          7.8    8.87  )-----------( 210      ( 12 Aug 2023 06:15 )             24.3     08-12    145  |  113<H>  |  15  ----------------------------<  144<H>  4.3   |  28  |  0.63    Ca    8.8      12 Aug 2023 06:15          GEN: NAD  ABD: soft, NT/ND; no rebound or guarding;  Neurologic: AAOx3; CNS grossly intact; no focal deficits  RLE:  Motor intact + EHL/FHL/TA/GS. Sensation is grossly intact.  Extremities warm. . Compartments soft, compressible. No calf tenderness. DP 2+.  LLE: NAVID dressing C/D/I. Battery flashing green/ok.  Motor intact + EHL/FHL/TA/GS. Sensation is grossly intact. Extremities warm. Compartments soft, compressible. No calf tenderness.. DP 2+.      Cultures:    1/3 gram stain +  -Remaining cultures NGTD      A/P: Patient is a 32y y/o Female s/p left AMBER, POD # 2  -wound care, isometric exercises, GI motility, new medications, hip precautions,  hospital course and discharge planning reviewed with pt  -Pain control/analgesia reviewed   -Inc spirometry reviewed and counseled  -Venodynes/foot pumps  -PT/OT/WBAT  -Anticoagulation: asa 81mg BID  -Medical consult reviewed   -DC plan: pending cultures/ID recs  -D/W Darryn  
DARRELL MOONEY is a 32y Female s/p REVISION OF LEFT TOTAL HIP ARTHROPLASTY        denies any chest pain shortness of breath palpitation dizziness lightheadedness nausea vomiting fever or chills    T(C): 36.6 (08-11-23 @ 13:17), Max: 36.7 (08-11-23 @ 01:00)  HR: 98 (08-11-23 @ 13:17) (71 - 102)  BP: 117/75 (08-11-23 @ 13:17) (102/70 - 125/78)  RR: 18 (08-11-23 @ 13:17) (14 - 18)  SpO2: 98% (08-11-23 @ 13:17) (98% - 100%)  no jvd/bruit  s1 s2 rrr  cta  s/nt/nd  no calf tend                        8.7    8.97  )-----------( 229      ( 11 Aug 2023 06:09 )             26.7   08-11    144  |  111<H>  |  12  ----------------------------<  144<H>  4.3   |  29  |  0.60    Ca    8.6      11 Aug 2023 06:09        cont dvt px  pain control  bowel regimen  antiemetics  incentive spirometer
Patient is s/p L revision AMBER POD#0  Pt tolerated procedure well without any intra-op complications.   Pt doing well at this time. Pain is controlled.   Denies CP/SOB/Dizziness/N/V/D/HA.     Vital Signs Last 24 Hrs  T(C): 36.4 (10 Aug 2023 15:27), Max: 36.9 (10 Aug 2023 06:26)  T(F): 97.5 (10 Aug 2023 15:27), Max: 98.5 (10 Aug 2023 06:26)  HR: 102 (10 Aug 2023 15:27) (65 - 104)  BP: 120/83 (10 Aug 2023 15:27) (88/47 - 121/83)  BP(mean): --  RR: 16 (10 Aug 2023 15:27) (11 - 17)  SpO2: 98% (10 Aug 2023 15:27) (96% - 100%)    Parameters below as of 10 Aug 2023 15:27  Patient On (Oxygen Delivery Method): room air        PE:  General: A&Ox3 NAD  LLE: Dressing C/D/I. abduction pillow in place. Motor intact + EHL/FHL/TA/GS. Sensation is grossly intact. Extremity warm. Compartments soft, compressible, no calf tenderness. DP 2+   RLE: Motor intact + EHL/FHL/TA/GS. Sensation is grossly intact. Extremity warm. Compartments soft, compressible, no calf tenderness. DP 2+     Labs:                          9.2    14.74 )-----------( 232      ( 10 Aug 2023 13:50 )             29.4       08-10    143  |  112<H>  |  17  ----------------------------<  186<H>  4.4   |  25  |  0.79    Ca    8.4<L>      10 Aug 2023 13:50        A/P: Patient is a 32y y/o Female s/p L revision AMBER, POD #0  -wound care, isometric exercises, GI motility, new medications, hospital course reviewed with pt  -Pain control/analgesia  -Inc spirometry reviewed and counseled  -DVT ppx with venodynes and ASA 81 BID  -F/U AM Labs  -PT/OT/WBAT, Posterior hip precautions,   -Antibiotic post op  -Medical consult  -Discharge planning    
Patient is seen and examined at bedside. Denies CP/SOB/Dizziness/N/V/D/HA. Pain is controlled.       LABS:                        7.8    8.87  )-----------( 210      ( 12 Aug 2023 06:15 )             24.3     08-12    145  |  113<H>  |  15  ----------------------------<  144<H>  4.3   |  28  |  0.63    Ca    8.8      12 Aug 2023 06:15        Urinalysis Basic - ( 12 Aug 2023 06:15 )    Color: x / Appearance: x / SG: x / pH: x  Gluc: 144 mg/dL / Ketone: x  / Bili: x / Urobili: x   Blood: x / Protein: x / Nitrite: x   Leuk Esterase: x / RBC: x / WBC x   Sq Epi: x / Non Sq Epi: x / Bacteria: x        VITAL SIGNS:  T(C): 36.8 (08-13-23 @ 05:19), Max: 36.8 (08-13-23 @ 05:19)  HR: 90 (08-13-23 @ 05:19) (62 - 94)  BP: 119/84 (08-13-23 @ 05:19) (102/68 - 119/84)  RR: 18 (08-13-23 @ 05:19) (16 - 18)  SpO2: 98% (08-13-23 @ 05:19) (98% - 100%)      GEN: NAD  ABD: soft, NT/ND; no rebound or guarding;  Neurologic: AAOx3; CNS grossly intact; no focal deficits  RLE:  Motor intact + EHL/FHL/TA/GS. Sensation is grossly intact.  Extremities warm. . Compartments soft, compressible. No calf tenderness. DP 2+.  LLE: NAVID dressing C/D/I. Battery flashing green/ok.  Motor intact + EHL/FHL/TA/GS. Sensation is grossly intact. Extremities warm. Compartments soft, compressible. No calf tenderness.. DP 2+.      Cultures:    1/3 gram stain +  -Remaining cultures NGTD      A/P: Patient is a 32y y/o Female s/p left AMBER, POD # 3  -wound care, isometric exercises, GI motility, new medications, hip precautions,  hospital course and discharge planning reviewed with pt  -Pain control/analgesia reviewed   -Inc spirometry reviewed and counseled  -Venodynes/foot pumps  -PT/OT/WBAT  -Anticoagulation: asa 81mg BID  -Medical consult reviewed   -DC plan: pending cultures/ID recs  -D/W Darryn  
Patient is seen and examined at bedside. Denies CP/SOB/Dizziness/N/V/D/HA. Pain is controlled.     Vital Signs Last 24 Hrs  T(C): 36.4 (11 Aug 2023 09:37), Max: 36.7 (11 Aug 2023 01:00)  T(F): 97.6 (11 Aug 2023 09:37), Max: 98 (11 Aug 2023 01:00)  HR: 71 (11 Aug 2023 09:55) (71 - 104)  BP: 110/70 (11 Aug 2023 09:55) (88/47 - 125/78)  BP(mean): --  RR: 18 (11 Aug 2023 09:55) (11 - 18)  SpO2: 98% (11 Aug 2023 09:55) (96% - 100%)    Parameters below as of 11 Aug 2023 09:55  Patient On (Oxygen Delivery Method): room air        GEN: NAD  ABD: soft, NT/ND; no rebound or guarding;  Neurologic: AAOx3; CNS grossly intact; no focal deficits  RLE:  Motor intact + EHL/FHL/TA/GS. Sensation is grossly intact.  Extremities warm. . Compartments soft, compressible. No calf tenderness. DP 2+.  LLE: NAVID dressing C/D/I. Battery flashing green/ok.  Motor intact + EHL/FHL/TA/GS. Sensation is grossly intact. Extremities warm. Compartments soft, compressible. No calf tenderness.. DP 2+.    Labs:                          8.7    8.97  )-----------( 229      ( 11 Aug 2023 06:09 )             26.7       08-11    144  |  111<H>  |  12  ----------------------------<  144<H>  4.3   |  29  |  0.60    Ca    8.6      11 Aug 2023 06:09        A/P: Patient is a 32y y/o Female s/p left AMBER, POD # 1  -wound care, isometric exercises, GI motility, new medications, hip precautions,  hospital course and discharge planning reviewed with pt  -Pain control/analgesia reviewed   -Inc spirometry reviewed and counseled  -Venodynes/foot pumps  -PT/OT/WBAT  -Anticoagulation: asa 81mg BID  -Medical consult reviewed   -DC plan: for home today with home care  -D/W Darryn  
Patient is seen and examined at bedside. Denies CP/SOB/Dizziness/N/V/D/HA. Pain is controlled.     Vital Signs Last 24 Hrs  T(C): 36.8 (14 Aug 2023 05:03), Max: 36.8 (13 Aug 2023 17:44)  T(F): 98.2 (14 Aug 2023 05:03), Max: 98.3 (13 Aug 2023 17:44)  HR: 78 (14 Aug 2023 05:03) (69 - 85)  BP: 100/65 (14 Aug 2023 05:03) (100/65 - 114/67)  BP(mean): --  RR: 17 (14 Aug 2023 05:03) (16 - 17)  SpO2: 99% (14 Aug 2023 05:03) (97% - 99%)        GEN: NAD  ABD: soft, NT/ND; no rebound or guarding;  Neurologic: AAOx3; CNS grossly intact; no focal deficits  RLE:  Motor intact + EHL/FHL/TA/GS. Sensation is grossly intact.  Extremities warm. . Compartments soft, compressible. No calf tenderness. DP 2+.  LLE: NAVID dressing with small sanguinous stains not extending to border. Battery flashing green/ok.  Motor intact + EHL/FHL/TA/GS. Sensation is grossly intact. Extremities warm. Compartments soft, compressible. No calf tenderness.. DP 2+.    Labs:                          7.9    10.98 )-----------( 245      ( 13 Aug 2023 09:48 )             23.8       08-13    141  |  108  |  10  ----------------------------<  182<H>  4.1   |  32<H>  |  0.60    Ca    9.1      13 Aug 2023 09:48        A/P: Patient is a 32y y/o Female s/p left AMBER, POD # 4  -wound care, isometric exercises, GI motility, new medications, hip precautions,  hospital course and discharge planning reviewed with pt  -Pain control/analgesia reviewed   -F/U BM- lactulose X1 dose  -Inc spirometry reviewed and counseled  -Venodynes/foot pumps  -Gram stain: GPR. OR cx's - NGTD, Bcx: NGTD. Per ID: no abx at this time pending OR cx's.   -PT/OT/WBAT  -Anticoagulation: asa 81mg BID  -DC planning - pending final ID recommendations.   
Patient is seen and examined at bedside. Denies CP/SOB/Dizziness/N/V/D/HA. Pain is controlled. +BM.     Vital Signs Last 24 Hrs  T(C): 37 (15 Aug 2023 11:37), Max: 37 (15 Aug 2023 11:37)  T(F): 98.6 (15 Aug 2023 11:37), Max: 98.6 (15 Aug 2023 11:37)  HR: 77 (15 Aug 2023 11:37) (76 - 81)  BP: 106/74 (15 Aug 2023 11:37) (100/66 - 116/68)  BP(mean): --  RR: 16 (15 Aug 2023 11:37) (16 - 18)  SpO2: 98% (15 Aug 2023 11:37) (98% - 100%)    Parameters below as of 15 Aug 2023 11:37  Patient On (Oxygen Delivery Method): room air            GEN: NAD  ABD: soft, NT/ND; no rebound or guarding;  Neurologic: AAOx3; CNS grossly intact; no focal deficits  RLE:  Motor intact + EHL/FHL/TA/GS. Sensation is grossly intact.  Extremities warm. . Compartments soft, compressible. No calf tenderness. DP 2+.  LLE: NAVID dressing with small sanguinous stains not extending to border. Battery flashing green/ok.  Motor intact + EHL/FHL/TA/GS. Sensation is grossly intact. Extremities warm. Compartments soft, compressible. No calf tenderness.. DP 2+.    Labs:                          7.9    10.98 )-----------( 245      ( 13 Aug 2023 09:48 )             23.8       08-13    141  |  108  |  10  ----------------------------<  182<H>  4.1   |  32<H>  |  0.60    Ca    9.1      13 Aug 2023 09:48        A/P: Patient is a 32y y/o Female s/p left AMBER, POD # 5  -wound care, isometric exercises, GI motility, new medications, hip precautions,  hospital course and discharge planning reviewed with pt  -Pain control/analgesia reviewed   -Inc spirometry reviewed and counseled  -Venodynes/foot pumps  -Gram stain: GPR. OR cx's - NGTD, Bcx: NGTD. Per ID: no abx at this time pending OR cx's.   -PT/OT/WBAT  -Anticoagulation: asa 81mg BID  -DC planning - per ID pt may be DC home without abx as OR cx's negative.   -D/W DR Cates

## 2023-08-15 NOTE — PROGRESS NOTE ADULT - PROVIDER SPECIALTY LIST ADULT
Internal Medicine
Orthopedics
Internal Medicine
Orthopedics

## 2023-08-17 LAB
CULTURE RESULTS: SIGNIFICANT CHANGE UP
CULTURE RESULTS: SIGNIFICANT CHANGE UP
SPECIMEN SOURCE: SIGNIFICANT CHANGE UP
SPECIMEN SOURCE: SIGNIFICANT CHANGE UP

## 2023-08-18 LAB
CULTURE RESULTS: SIGNIFICANT CHANGE UP
GRAM STN FLD: SIGNIFICANT CHANGE UP
SPECIMEN SOURCE: SIGNIFICANT CHANGE UP

## 2023-08-22 ENCOUNTER — APPOINTMENT (OUTPATIENT)
Dept: ORTHOPEDIC SURGERY | Facility: CLINIC | Age: 33
End: 2023-08-22
Payer: MEDICAID

## 2023-08-22 VITALS — HEIGHT: 64 IN | BODY MASS INDEX: 30.56 KG/M2 | WEIGHT: 179 LBS

## 2023-08-22 PROBLEM — Z86.79 PERSONAL HISTORY OF OTHER DISEASES OF THE CIRCULATORY SYSTEM: Chronic | Status: ACTIVE | Noted: 2023-08-10

## 2023-08-22 PROCEDURE — 73503 X-RAY EXAM HIP UNI 4/> VIEWS: CPT | Mod: LT

## 2023-08-22 PROCEDURE — 99024 POSTOP FOLLOW-UP VISIT: CPT

## 2023-08-22 NOTE — IMAGING

## 2023-08-22 NOTE — HISTORY OF PRESENT ILLNESS
[Gradual] : gradual [10] : 10 [5] : 5 [Dull/Aching] : dull/aching [Radiating] : radiating [Sharp] : sharp [Shooting] : shooting [Constant] : constant [Disabled] : Work status: disabled [de-identified] : 03/07/2023 Ms. DARRELL MOONEY is a 32 years old  F who   presents today for evaluation of  [facility] hip pt states she has shooting pain radiating from her hip down her knees pt states she has total hip replacement.  \par  \par  Right side done in 6/28/2022 revision AMBER by dr yu for loosening.\par  thigh pain in left leg with ambulation\par  \par  06/06/2023 follow up visit [facility] hip, pt states when she sit for a long period of time nd try to get up she get a lot of pains. Continues with PT/ HEP. Takes tylenol with mild help. \par  \par  07/25/2023 pain worsened lateral side due to walking at work [] : no [FreeTextEntry7] : john kneen  [de-identified] : gigi

## 2023-08-22 NOTE — PHYSICAL EXAM
[de-identified] : Constitutional: well developed and well nourished, able to communicate Cardiovascular: Peripheral vascular exam is grossly normal Neurologic: Alert and oriented, no acute distress. Skin: normal skin with no ulcers, rashes, or lesions Pulmonary: No respiratory distress, breathing comfortably on room air Lymphatics: No obvious lymphadenopathy or lymphedema in areas examined  POSTOP LEFT HIP EXAM Edema: mild well healing surgical incision without surrounding erythema/drainage  ROM 0-90 degrees of flexion No pain with IR/ER ROM  Neurovascular exam Motor function 5/5 distal lower extremity Sensation to light touch: intact Distal pulses: 2+  XR of the L hip today demonstrate AMBER in place w/o signs of interval changes from postoperative XR

## 2023-08-22 NOTE — ASSESSMENT
[FreeTextEntry1] : 32 year F with loosening of left femoral component - discussed eventual revision of the left femoral component. - 3 month follow up  06/06/2023 would like to plan for revision of left AMBER. will plan in august for revision. Shaun REYNA M.D. discussed the patient's diagnosis and treatment options, non-surgical and surgical, with the patient and/or legal guardian including the potential benefits and complications of each. Potential complications of non-surgical treatments discussed include residual pain and/or disability, non-healing, progression of symptoms and/or disease. Potential complications of surgery discussed include infection, nerve injury, vascular injury, cartilage injury, ligament injury, tendon injury, muscle injury, skin injury, stiffness, instability, weakness, persistent pain, technical or hardware failure, need for additional surgery, re-injury, medical complication, anesthetic related complication, and/or death. All questions were answered. The patient and/or legal guardian has elected to proceed with surgery. Informed consent obtained for Left revision AMBER                     Preoperative evaluation and testing as needed is advised within 30 days prior to the procedure.  07/25/2023 preoperative discussion had, xrays reviewed today. Plan for revision Left AMBER - polyexchange and stem revision.  08/22/2023 doing well from surgery. PT outpatinet and 4 week fu

## 2023-08-23 NOTE — CHART NOTE - NSCHARTNOTESELECT_GEN_ALL_CORE
Event Note
Patient continues to refuse PO carafate/lasix/maalox. Patient states " Carafate and maalox give her diarrhea". Patient states " I don't want to take lasix, there is nothing wrong with my lungs".

## 2023-08-30 ENCOUNTER — APPOINTMENT (OUTPATIENT)
Dept: INFECTIOUS DISEASE | Facility: CLINIC | Age: 33
End: 2023-08-30
Payer: MEDICAID

## 2023-08-30 ENCOUNTER — OUTPATIENT (OUTPATIENT)
Dept: OUTPATIENT SERVICES | Facility: HOSPITAL | Age: 33
LOS: 1 days | End: 2023-08-30
Payer: MEDICAID

## 2023-08-30 VITALS
SYSTOLIC BLOOD PRESSURE: 118 MMHG | HEIGHT: 64 IN | TEMPERATURE: 97.6 F | DIASTOLIC BLOOD PRESSURE: 78 MMHG | HEART RATE: 91 BPM | OXYGEN SATURATION: 97 %

## 2023-08-30 DIAGNOSIS — Z96.60 PRESENCE OF UNSPECIFIED ORTHOPEDIC JOINT IMPLANT: Chronic | ICD-10-CM

## 2023-08-30 DIAGNOSIS — B97.89 OTHER VIRAL AGENTS AS THE CAUSE OF DISEASES CLASSIFIED ELSEWHERE: ICD-10-CM

## 2023-08-30 DIAGNOSIS — Z90.81 ACQUIRED ABSENCE OF SPLEEN: Chronic | ICD-10-CM

## 2023-08-30 DIAGNOSIS — Z98.890 OTHER SPECIFIED POSTPROCEDURAL STATES: Chronic | ICD-10-CM

## 2023-08-30 DIAGNOSIS — Z90.89 ACQUIRED ABSENCE OF OTHER ORGANS: Chronic | ICD-10-CM

## 2023-08-30 DIAGNOSIS — Z94.81 BONE MARROW TRANSPLANT STATUS: Chronic | ICD-10-CM

## 2023-08-30 PROCEDURE — G0463: CPT

## 2023-08-30 PROCEDURE — 99214 OFFICE O/P EST MOD 30 MIN: CPT

## 2023-08-30 RX ORDER — OLOPATADINE HCL 1 MG/ML
0.1 SOLUTION/ DROPS OPHTHALMIC
Qty: 5 | Refills: 3 | Status: DISCONTINUED | COMMUNITY
Start: 2023-05-18 | End: 2023-08-30

## 2023-08-30 RX ORDER — FLUTICASONE PROPIONATE 50 UG/1
50 SPRAY, METERED NASAL TWICE DAILY
Qty: 16 | Refills: 0 | Status: DISCONTINUED | COMMUNITY
Start: 2023-05-18 | End: 2023-08-30

## 2023-08-30 RX ORDER — CEPHALEXIN 500 MG/1
500 TABLET ORAL
Qty: 60 | Refills: 0 | Status: DISCONTINUED | COMMUNITY
Start: 2023-08-25 | End: 2023-08-30

## 2023-08-30 RX ORDER — DICLOFENAC SODIUM 1% 10 MG/G
1 GEL TOPICAL
Qty: 1 | Refills: 2 | Status: DISCONTINUED | COMMUNITY
Start: 2022-12-06 | End: 2023-08-30

## 2023-08-30 RX ORDER — CEPHALEXIN 500 MG/1
500 CAPSULE ORAL 4 TIMES DAILY
Qty: 120 | Refills: 0 | Status: DISCONTINUED | COMMUNITY
Start: 2023-08-25 | End: 2023-08-30

## 2023-08-30 NOTE — PHYSICAL EXAM
[General Appearance - Alert] : alert [General Appearance - In No Acute Distress] : in no acute distress [Oropharynx] : the oropharynx was normal with no thrush [] : the neck was supple [Neck Cervical Mass (___cm)] : no neck mass was observed [Auscultation Breath Sounds / Voice Sounds] : lungs were clear to auscultation bilaterally [Heart Sounds] : normal S1 and S2 [Abdomen Soft] : soft [Abdomen Tenderness] : non-tender [Nail Clubbing] : no clubbing  or cyanosis of the fingernails [Skin Lesions] : no skin lesions [Oriented To Time, Place, And Person] : oriented to person, place, and time [Affect] : the affect was normal [FreeTextEntry1] : 1+ lower extremity edema.

## 2023-08-30 NOTE — ASSESSMENT
[FreeTextEntry1] : Reviewed with the patient and her mother (present at patient request) in layman's terms and all questions answered to the best my ability.  After discussing the strengths and drawbacks of multiple treatment pathways, as well as adverse  with medications and interventions, the patient opts for IV antibiotics.  I feel this is most prudent course balancing risk, benefit, and regret.  Plan Stay on Ceftin pending initiation of IV antibiotics Place PICC line Ceftriaxone 2 g IV piggyback daily Plan for 6 weeks of therapy Weekly labs CBC, differential, comprehensive metabolic profile, ESR, CRP Likely oral antibiotics thereafter Follow-up here toward the end of the IV treatment

## 2023-08-30 NOTE — HISTORY OF PRESENT ILLNESS
[FreeTextEntry1] : This is a 32-year-old woman with sickle cell disease who was referred for concern of prosthetic joint infection after replacement at [facility] for most likely aseptic loosening.  Single Gram stain had gram-positive rods of 3 specimens, nothing initially grew but ultimately 1 specimen grew cutibacterium acnes, 1 specimen grew bacteria that were able to be further subcultured, and the third specimen did not grow any bacteria.  After discussion with orthopedics, the patient was placed on cefuroxime Axotal pending further evaluation.  She has been feeling well.  She denies any fevers chills or rigors.  Appetite is good and weight is stable.  Pain is minimal.  Medication list was reviewed.  She is taking cefuroxime axetil 500 mg twice a day, aspirin, Celebrex, Flexeril, Protonix, oxycodone as needed, Tylenol as needed.  =================== Excerpt of hospital consult  Consult Note Adult-Infectious Disease Attending [Charted Location: [address]] [Authored: 11-Aug-2023 18:53]- for Visit: [id number], Complete, Entered, Signed in Full, Available to Patient Referral/Consultation:   Initial Consult:  Requested by Name:	Dr. Cates  Date/Time:	11-Aug-2023 18:53  Reason for Referral/Consultation:	positive gram stain  DARRELL MOONEY [id number] Female 32y ([birth year])  Patient is a 32y old  Female who presents with a chief complaint of aseptic loosening left AMBER (10 Aug 2023 13:33)  HPI:32yFemale with history of aseptic loosening left AMBER presenting for left AMBER revision by Dr. Shaun Cates on 8/10/23.  She has a hx of SSD s/p bone marrow transplant 2011 as well as splenectomy. Hx of CVA, legally blind, hip replacements, brain hemorrhage s/p evacuation as a child   Patient is a 32y y/o Female s/p left AMBER, POD # 1. 1/3 OR cultures had a positive gram stain with gram positive rods, pending growth and identification. She has no systemic illness and is ambulating already, mild post op pain but manageable.  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 H/O bilateral hip replacements H/O bone marrow transplant 2011 History of tonsillectomy H/O splenectomy H/O brain surgery intercranial bleeding  Allergies apple (Anaphylaxis) Exjade (Rash)  ANTIMICROBIALS:   MEDICATIONS  (STANDING): ceFAZolin   IVPB  100 mL/Hr IV Intermittent (08-11-23 @ 00:52)  100 mL/Hr IV Intermittent (08-10-23 @ 16:01)  OTHER MEDS: MEDICATIONS  (STANDING): aspirin enteric coated 81 two times a day celecoxib 200 every 12 hours cyclobenzaprine 10 three times a day PRN dextrose 50% Injectable 25 once dextrose 50% Injectable 25 once dextrose 50% Injectable 12.5 once dextrose Oral Gel 15 once PRN gabapentin 300 two times a day glucagon  Injectable 1 once HYDROmorphone  Injectable 0.5 every 3 hours PRN insulin lispro (ADMELOG) corrective regimen sliding scale  three times a day before meals melatonin 3 at bedtime PRN ondansetron Injectable 4 every 6 hours PRN oxyCODONE    IR 5 every 4 hours PRN oxyCODONE    IR 10 every 4 hours PRN pantoprazole    Tablet 40 before breakfast polyethylene glycol 3350 17 at bedtime senna 2 at bedtime  SOCIAL HISTORY:    Denies toxic habits    FAMILY HISTORY: FH: type 2 diabetes (Mother)   Vital Signs Last 24 Hrs T(C): 36.6 (08-11-23 @ 13:17), Max: 36.7 (08-11-23 @ 01:00) HR: 98 (08-11-23 @ 13:17) (71 - 102) BP: 117/75 (08-11-23 @ 13:17) (102/70 - 125/78) RR: 18 (08-11-23 @ 13:17) (14 - 18) SpO2: 98% (08-11-23 @ 13:17) (98% - 100%)  PHYSICAL EXAM: Constitutional: non-toxic, no distress HEAD/EYES: anicteric, no conjunctival injection ENT:  supple, no thrush Cardiovascular:   normal S1, S2, no murmur, no edema Respiratory:  clear BS bilaterally, no wheezes, no rales GI:  soft, non-tender, normal bowel sounds :  no vasquez, no CVA tenderness Musculoskeletal:  no synovitis, normal ROM Neurologic: awake and alert, normal strength, no focal findings Skin:  no rash, no erythema, no phlebitis Heme/Onc: no lymphadenopathy  Psychiatric:  awake, alert, appropriate mood                      8.7   8.97  )-----------( 229      ( 11 Aug 2023 06:09 )            26.7  08-11  144  |  111<H>  |  12 ----------------------------<  144<H> 4.3   |  29  |  0.60  Ca    8.6      11 Aug 2023 06:09    MICROBIOLOGY: .Tissue Left Hip Synovium #1 08-10-23  No growth to date.  --   Few polymorphonuclear leukocytes per low power field Moderate Gram Positive Rods per oil power field  Assessment and Recommendation:   Assessment	 32yFemale with history of aseptic loosening left AMBER presenting for left AMBER revision by Dr. Shaun Cates on 8/10/23.  She has a hx of SSD s/p bone marrow transplant 2011 as well as splenectomy. Hx of CVA, legally blind, hip replacements, brain hemorrhage s/p evacuation as a child  vital signs stable wbc normal with anemia which is trending down prediabetic with HbA1c 6.8  this was an elective surgery. She has a hx of infection in the past (2018) and completed vancomycin and meropenem/rifampin for 6 weeks low suspicion for true infection-she has no systemic illness prior to admission, there is one positive gram stain with a GPR which can be a procurement contaminant.   Plan: monitor off antibiotics  follow all OR cultures for identification  pain control maintain active type and screen and transfuse if Hb drops below 7  good glycemic control and avoid hypoglycemic episodes   Discussed with Ortho PA  Phill Zuñiga DO Infectious Disease Attending Reachable via Microsoft Teams or ID office: [phone] After 5pm/weekends please call [phone] for all inquiries and new consults    Electronic Signatures: Phill Zuñiga)  (Signed 14-Aug-2023 11:11) 	Authored: Consult Note, Referral/Consultation, Subjective and Objective, Assessment and Recommendation Last Updated: 14-Aug-2023 11:11 by Phill Zuñiga ()

## 2023-09-05 DIAGNOSIS — T84.52XD INFECTION AND INFLAMMATORY REACTION DUE TO INTERNAL LEFT HIP PROSTHESIS, SUBSEQUENT ENCOUNTER: ICD-10-CM

## 2023-09-13 ENCOUNTER — APPOINTMENT (OUTPATIENT)
Dept: INFECTIOUS DISEASE | Facility: CLINIC | Age: 33
End: 2023-09-13
Payer: MEDICAID

## 2023-09-13 VITALS
DIASTOLIC BLOOD PRESSURE: 83 MMHG | HEIGHT: 64 IN | WEIGHT: 166 LBS | HEART RATE: 94 BPM | SYSTOLIC BLOOD PRESSURE: 117 MMHG | OXYGEN SATURATION: 98 % | TEMPERATURE: 97.8 F | BODY MASS INDEX: 28.34 KG/M2

## 2023-09-13 PROCEDURE — 99215 OFFICE O/P EST HI 40 MIN: CPT

## 2023-09-14 ENCOUNTER — NON-APPOINTMENT (OUTPATIENT)
Age: 33
End: 2023-09-14

## 2023-09-15 ENCOUNTER — NON-APPOINTMENT (OUTPATIENT)
Age: 33
End: 2023-09-15

## 2023-09-19 ENCOUNTER — APPOINTMENT (OUTPATIENT)
Dept: ORTHOPEDIC SURGERY | Facility: CLINIC | Age: 33
End: 2023-09-19
Payer: MEDICAID

## 2023-09-19 VITALS — WEIGHT: 166 LBS | HEIGHT: 64 IN | BODY MASS INDEX: 28.34 KG/M2

## 2023-09-19 PROCEDURE — 73564 X-RAY EXAM KNEE 4 OR MORE: CPT | Mod: LT

## 2023-09-19 PROCEDURE — 99024 POSTOP FOLLOW-UP VISIT: CPT

## 2023-09-21 ENCOUNTER — RESULT REVIEW (OUTPATIENT)
Age: 33
End: 2023-09-21

## 2023-09-21 ENCOUNTER — TRANSCRIPTION ENCOUNTER (OUTPATIENT)
Age: 33
End: 2023-09-21

## 2023-09-21 ENCOUNTER — OUTPATIENT (OUTPATIENT)
Dept: OUTPATIENT SERVICES | Facility: HOSPITAL | Age: 33
LOS: 1 days | End: 2023-09-21
Payer: MEDICAID

## 2023-09-21 VITALS
HEART RATE: 58 BPM | DIASTOLIC BLOOD PRESSURE: 90 MMHG | HEIGHT: 64 IN | WEIGHT: 160.06 LBS | SYSTOLIC BLOOD PRESSURE: 126 MMHG | TEMPERATURE: 97 F | RESPIRATION RATE: 18 BRPM | OXYGEN SATURATION: 99 %

## 2023-09-21 DIAGNOSIS — Z96.60 PRESENCE OF UNSPECIFIED ORTHOPEDIC JOINT IMPLANT: Chronic | ICD-10-CM

## 2023-09-21 DIAGNOSIS — Z90.89 ACQUIRED ABSENCE OF OTHER ORGANS: Chronic | ICD-10-CM

## 2023-09-21 DIAGNOSIS — Z98.890 OTHER SPECIFIED POSTPROCEDURAL STATES: Chronic | ICD-10-CM

## 2023-09-21 DIAGNOSIS — T84.52XD INFECTION AND INFLAMMATORY REACTION DUE TO INTERNAL LEFT HIP PROSTHESIS, SUBSEQUENT ENCOUNTER: ICD-10-CM

## 2023-09-21 DIAGNOSIS — Z94.81 BONE MARROW TRANSPLANT STATUS: Chronic | ICD-10-CM

## 2023-09-21 DIAGNOSIS — Z90.81 ACQUIRED ABSENCE OF SPLEEN: Chronic | ICD-10-CM

## 2023-09-21 LAB
BASOPHILS # BLD AUTO: 0.05 K/UL
BASOPHILS NFR BLD AUTO: 0.8 %
EOSINOPHIL # BLD AUTO: 0.18 K/UL
EOSINOPHIL NFR BLD AUTO: 2.9 %
GLUCOSE BLDC GLUCOMTR-MCNC: 144 MG/DL — HIGH (ref 70–99)
HCT VFR BLD CALC: 32 %
HGB BLD-MCNC: 10.2 G/DL
IMM GRANULOCYTES NFR BLD AUTO: 0.3 %
LYMPHOCYTES # BLD AUTO: 3.27 K/UL
LYMPHOCYTES NFR BLD AUTO: 53.4 %
MAN DIFF?: NORMAL
MCHC RBC-ENTMCNC: 27.3 PG
MCHC RBC-ENTMCNC: 31.9 GM/DL
MCV RBC AUTO: 85.8 FL
MONOCYTES # BLD AUTO: 0.62 K/UL
MONOCYTES NFR BLD AUTO: 10.1 %
NEUTROPHILS # BLD AUTO: 1.98 K/UL
NEUTROPHILS NFR BLD AUTO: 32.5 %
PLATELET # BLD AUTO: 383 K/UL
RBC # BLD: 3.73 M/UL
RBC # FLD: 14.2 %
WBC # FLD AUTO: 6.12 K/UL

## 2023-09-21 PROCEDURE — 36573 INSJ PICC RS&I 5 YR+: CPT

## 2023-09-21 PROCEDURE — 82962 GLUCOSE BLOOD TEST: CPT

## 2023-09-21 PROCEDURE — C1751: CPT

## 2023-09-21 NOTE — H&P ADULT - HISTORY OF PRESENT ILLNESS
Interventional Radiology    HPI: 33y Female  with history of aseptic loosening left AMBER presenting for left AMBER revision by Dr. Shaun Cates on 8/10/23. She has a hx of SSD s/p bone marrow transplant 2011 as well as splenectomy. Hx of CVA, legally blind, hip replacements, brain hemorrhage s/p evacuation as a child . Left hip [facility] need for  p.acnes , c/o having left knee pain - XR with mild OA. has had prior CSI before in the past.    patient has hx of AVN and is cured of sickle cell. Presents to IR today 9/21 for for picc like for antibiotics,              Review of Systems:   Neurological: aox3  Respiratory: No cough, wheezing, chills or hemoptysis; No shortness of breath  Cardiovascular: No chest pain, palpitations, dizziness, or leg swelling  Gastrointestinal: No abdominal or epigastric pain. No nausea, vomiting, or hematemesis; No diarrhea or constipation. No melena or hematochezia.         Allergies: Exjade (Rash)    Medications (Abx/Cardiac/Anticoagulation/Blood Products)      Data:  162.6  72.6  T(C): 36  HR: 58  BP: 126/90  RR: 18  SpO2: 99%            Physical Exam  General: No acute distress, nontoxic, A&Ox3  Chest: Non labored breathing  Abdomen: Non-distended, non-tender, no preitoneal signs       RADIOLOGY & ADDITIONAL TESTS:    Imaging Reviewed    H & P Note Reviewed from:   9/19/2023    Plan: 33y Female presents for single lumen picc line for abx       -Risks/Benefits/alternatives explained with the patient and/or healthcare proxy and witnessed informed consent obtained.

## 2023-09-21 NOTE — ASU PATIENT PROFILE, ADULT - NSICDXPASTMEDICALHX_GEN_ALL_CORE_FT
PAST MEDICAL HISTORY:  Anemia     Blindness, legal     Cerebral vascular accident age 17    Knee pain, bilateral     Sickle cell disease     T2DM (type 2 diabetes mellitus)

## 2023-09-21 NOTE — PROCEDURE NOTE - ADDITIONAL PROCEDURE DETAILS
in CHF Clinic Enrollment Visit, pt is still taking losartan 50 mg , furosemide and bumetinide as prior to hosp. Advised BMP today. Advised schedule PCP appt. Stressed low Na and leg elevation.  Pt falling asleep often during visit
.      RUE basilic vein  length 35cm, 4fr, single lumen, power injectable. bard. solo picc.   tip in svc ok to use      Sadaf Jalili, IR PA-C, available on TEAMS or IR callback [phone]

## 2023-09-21 NOTE — H&P ADULT - REASON FOR ADMISSION
ambulatory outpatient - presents to IR for picc like for antibiotics,  left hip prosthesis known infection

## 2023-09-21 NOTE — ASU DISCHARGE PLAN (ADULT/PEDIATRIC) - ASU DC SPECIAL INSTRUCTIONSFT
PICC Placement    Discharge Instructions  - You have had a PICC implated in your arm.   - The PICC is ready for use.  - You may shower as long as the PICC and dressing remains dry.  -  No soaking or swimming until the PICC is removed or when the site is completely healed.  - Keep the area covered and dry for as long as the PICC remains in. It may be removed and changed by a nurse as needed.  - Do not perform any heavy lifting or put tension on the area for the next week or until the site is healed.  - You may resume your normal diet.  - You may resume your normal medications however you should wait 48 hours before restarting aspirin, plavix, or blood thinners.  - It is normal to experience some pain over the site for the next few days. You may take apply ice to the area (20 minutes on, 20 minutes off) and take Tylenol for that pain. Do not take more frequently than every 6 hours and do not exceed more than 3000mg of Tylenol in a 24 hour period.    Notify your primary physician and/or Interventional Radiology IMMEDIATELY if you experience any of the following       - Fever of 100.4F or 38C       - Chills or Rigors/ Shakes       - Swelling and/or Redness in the area around the port       - Worsening Pain       - Blood soaked bandages or worsening bleeding       - Lightheadedness and/or dizziness upon standing       - Chest Pain/ Tightness       - Shortness of Breath       - Difficulty walking    If you have a problem that you believe requires IMMEDIATE attention, please go to your NEAREST Emergency Room. If you believe your problem can safely wait until you speak to a physician, please call Interventional Radiology for any concerns.    During Normal Weekday Business Hours- You can contact the Interventional Radiology department during normal business hours via telephone.  During Evenings and Weekends- If you need to contact Interventional Radiology during off hours, do so by calling the hospital and requesting to be connected to the Interventional Radiologist on call.

## 2023-09-21 NOTE — ASU DISCHARGE PLAN (ADULT/PEDIATRIC) - NURSING INSTRUCTIONS
Please feel free to contact us at [phone] if any questions or concerns arise. After 6PM on Monday through Friday (and weekends and holidays) please call [phone] and ask for the radiology resident on call to be paged.

## 2023-09-26 DIAGNOSIS — T84.52XA INFECTION AND INFLAMMATORY REACTION DUE TO INTERNAL LEFT HIP PROSTHESIS, INITIAL ENCOUNTER: ICD-10-CM

## 2023-09-26 DIAGNOSIS — Z45.2 ENCOUNTER FOR ADJUSTMENT AND MANAGEMENT OF VASCULAR ACCESS DEVICE: ICD-10-CM

## 2023-09-29 ENCOUNTER — APPOINTMENT (OUTPATIENT)
Dept: MRI IMAGING | Facility: CLINIC | Age: 33
End: 2023-09-29
Payer: MEDICAID

## 2023-09-29 PROCEDURE — 73721 MRI JNT OF LWR EXTRE W/O DYE: CPT | Mod: LT

## 2023-10-03 ENCOUNTER — APPOINTMENT (OUTPATIENT)
Dept: ORTHOPEDIC SURGERY | Facility: CLINIC | Age: 33
End: 2023-10-03
Payer: MEDICAID

## 2023-10-03 VITALS — WEIGHT: 166 LBS | BODY MASS INDEX: 28.34 KG/M2 | HEIGHT: 64 IN

## 2023-10-03 PROCEDURE — 99214 OFFICE O/P EST MOD 30 MIN: CPT | Mod: 24

## 2023-10-10 ENCOUNTER — APPOINTMENT (OUTPATIENT)
Dept: INTERNAL MEDICINE | Facility: CLINIC | Age: 33
End: 2023-10-10

## 2023-10-10 ENCOUNTER — APPOINTMENT (OUTPATIENT)
Dept: INTERNAL MEDICINE | Facility: CLINIC | Age: 33
End: 2023-10-10
Payer: MEDICAID

## 2023-10-10 ENCOUNTER — OUTPATIENT (OUTPATIENT)
Dept: OUTPATIENT SERVICES | Facility: HOSPITAL | Age: 33
LOS: 1 days | End: 2023-10-10

## 2023-10-10 VITALS
DIASTOLIC BLOOD PRESSURE: 90 MMHG | BODY MASS INDEX: 28.38 KG/M2 | OXYGEN SATURATION: 99 % | HEIGHT: 64 IN | SYSTOLIC BLOOD PRESSURE: 120 MMHG | HEART RATE: 78 BPM | WEIGHT: 166.25 LBS

## 2023-10-10 VITALS — SYSTOLIC BLOOD PRESSURE: 122 MMHG | DIASTOLIC BLOOD PRESSURE: 86 MMHG

## 2023-10-10 DIAGNOSIS — M17.12 UNILATERAL PRIMARY OSTEOARTHRITIS, LEFT KNEE: ICD-10-CM

## 2023-10-10 DIAGNOSIS — R73.03 PREDIABETES.: ICD-10-CM

## 2023-10-10 DIAGNOSIS — Z98.890 OTHER SPECIFIED POSTPROCEDURAL STATES: Chronic | ICD-10-CM

## 2023-10-10 DIAGNOSIS — K02.9 DENTAL CARIES, UNSPECIFIED: ICD-10-CM

## 2023-10-10 DIAGNOSIS — Z96.60 PRESENCE OF UNSPECIFIED ORTHOPEDIC JOINT IMPLANT: Chronic | ICD-10-CM

## 2023-10-10 DIAGNOSIS — M25.562 PAIN IN LEFT KNEE: ICD-10-CM

## 2023-10-10 DIAGNOSIS — M25.561 PAIN IN RIGHT KNEE: ICD-10-CM

## 2023-10-10 DIAGNOSIS — Z94.81 BONE MARROW TRANSPLANT STATUS: Chronic | ICD-10-CM

## 2023-10-10 DIAGNOSIS — Z90.81 ACQUIRED ABSENCE OF SPLEEN: Chronic | ICD-10-CM

## 2023-10-10 DIAGNOSIS — Z23 ENCOUNTER FOR IMMUNIZATION: ICD-10-CM

## 2023-10-10 DIAGNOSIS — T84.52XD: ICD-10-CM

## 2023-10-10 DIAGNOSIS — Z90.89 ACQUIRED ABSENCE OF OTHER ORGANS: Chronic | ICD-10-CM

## 2023-10-10 DIAGNOSIS — M25.562 PAIN IN RIGHT KNEE: ICD-10-CM

## 2023-10-10 PROCEDURE — 36416 COLLJ CAPILLARY BLOOD SPEC: CPT | Mod: NC

## 2023-10-10 PROCEDURE — 99214 OFFICE O/P EST MOD 30 MIN: CPT | Mod: 25

## 2023-10-10 RX ORDER — BRIMONIDINE TARTRATE 2 MG/MG
0.2 SOLUTION/ DROPS OPHTHALMIC
Qty: 5 | Refills: 0 | Status: COMPLETED | COMMUNITY
Start: 2022-08-03 | End: 2023-09-01

## 2023-10-10 RX ORDER — FEXOFENADINE HYDROCHLORIDE 180 MG/1
180 TABLET ORAL DAILY
Qty: 30 | Refills: 3 | Status: COMPLETED | COMMUNITY
Start: 2023-05-18 | End: 2023-09-15

## 2023-10-10 RX ORDER — CEFUROXIME AXETIL 500 MG/1
500 TABLET ORAL
Qty: 60 | Refills: 0 | Status: COMPLETED | COMMUNITY
Start: 2023-08-24 | End: 2023-09-23

## 2023-10-11 ENCOUNTER — APPOINTMENT (OUTPATIENT)
Dept: INTERNAL MEDICINE | Facility: CLINIC | Age: 33
End: 2023-10-11

## 2023-10-11 ENCOUNTER — OUTPATIENT (OUTPATIENT)
Dept: OUTPATIENT SERVICES | Facility: HOSPITAL | Age: 33
LOS: 1 days | End: 2023-10-11

## 2023-10-11 DIAGNOSIS — Z90.81 ACQUIRED ABSENCE OF SPLEEN: Chronic | ICD-10-CM

## 2023-10-11 DIAGNOSIS — Z96.60 PRESENCE OF UNSPECIFIED ORTHOPEDIC JOINT IMPLANT: Chronic | ICD-10-CM

## 2023-10-11 DIAGNOSIS — Z98.890 OTHER SPECIFIED POSTPROCEDURAL STATES: Chronic | ICD-10-CM

## 2023-10-11 DIAGNOSIS — E11.9 TYPE 2 DIABETES MELLITUS WITHOUT COMPLICATIONS: ICD-10-CM

## 2023-10-11 DIAGNOSIS — Z90.89 ACQUIRED ABSENCE OF OTHER ORGANS: Chronic | ICD-10-CM

## 2023-10-11 DIAGNOSIS — Z94.81 BONE MARROW TRANSPLANT STATUS: Chronic | ICD-10-CM

## 2023-10-11 PROBLEM — M25.561 PAIN IN BOTH KNEES: Status: RESOLVED | Noted: 2018-03-14 | Resolved: 2023-10-11

## 2023-10-11 PROBLEM — M17.12 ARTHRITIS OF KNEE, LEFT: Status: ACTIVE | Noted: 2023-10-03

## 2023-10-11 PROBLEM — M17.12 ARTHRITIS OF KNEE, LEFT: Noted: 2023-10-03

## 2023-10-17 DIAGNOSIS — K02.9 DENTAL CARIES, UNSPECIFIED: ICD-10-CM

## 2023-10-17 DIAGNOSIS — Z01.818 ENCOUNTER FOR OTHER PREPROCEDURAL EXAMINATION: ICD-10-CM

## 2023-10-17 DIAGNOSIS — E11.9 TYPE 2 DIABETES MELLITUS WITHOUT COMPLICATIONS: ICD-10-CM

## 2023-10-17 PROBLEM — T84.52XD: Status: RESOLVED | Noted: 2023-08-30 | Resolved: 2023-10-17

## 2023-10-17 RX ORDER — OXYCODONE 5 MG/1
5 TABLET ORAL EVERY 6 HOURS
Qty: 28 | Refills: 0 | Status: COMPLETED | COMMUNITY
Start: 2023-08-22 | End: 2023-08-29

## 2023-10-24 ENCOUNTER — APPOINTMENT (OUTPATIENT)
Dept: INTERNAL MEDICINE | Facility: CLINIC | Age: 33
End: 2023-10-24

## 2023-11-03 ENCOUNTER — APPOINTMENT (OUTPATIENT)
Dept: INTERNAL MEDICINE | Facility: CLINIC | Age: 33
End: 2023-11-03

## 2023-11-03 VITALS
DIASTOLIC BLOOD PRESSURE: 78 MMHG | HEART RATE: 76 BPM | HEIGHT: 64 IN | SYSTOLIC BLOOD PRESSURE: 123 MMHG | WEIGHT: 167 LBS | BODY MASS INDEX: 28.51 KG/M2 | OXYGEN SATURATION: 98 %

## 2023-11-20 ENCOUNTER — OUTPATIENT (OUTPATIENT)
Dept: OUTPATIENT SERVICES | Facility: HOSPITAL | Age: 33
LOS: 1 days | End: 2023-11-20

## 2023-11-20 ENCOUNTER — APPOINTMENT (OUTPATIENT)
Dept: INTERNAL MEDICINE | Facility: CLINIC | Age: 33
End: 2023-11-20

## 2023-11-20 DIAGNOSIS — Z90.81 ACQUIRED ABSENCE OF SPLEEN: Chronic | ICD-10-CM

## 2023-11-20 DIAGNOSIS — Z90.89 ACQUIRED ABSENCE OF OTHER ORGANS: Chronic | ICD-10-CM

## 2023-11-20 DIAGNOSIS — Z98.890 OTHER SPECIFIED POSTPROCEDURAL STATES: Chronic | ICD-10-CM

## 2023-11-20 DIAGNOSIS — Z96.60 PRESENCE OF UNSPECIFIED ORTHOPEDIC JOINT IMPLANT: Chronic | ICD-10-CM

## 2023-11-20 DIAGNOSIS — Z94.81 BONE MARROW TRANSPLANT STATUS: Chronic | ICD-10-CM

## 2023-11-21 DIAGNOSIS — E11.9 TYPE 2 DIABETES MELLITUS WITHOUT COMPLICATIONS: ICD-10-CM

## 2023-11-28 ENCOUNTER — APPOINTMENT (OUTPATIENT)
Dept: ORTHOPEDIC SURGERY | Facility: CLINIC | Age: 33
End: 2023-11-28

## 2023-12-13 NOTE — OCCUPATIONAL THERAPY INITIAL EVALUATION ADULT - RUE MMT, REHAB EVAL
Patient was being followed by [facility] for anorexia.  She has had labs done which per the patient was normal.  She has not imaging or EGD.  She's had the anorexia for 2-3 years.. She has been on Cyproheptadine with good response.  When she was on it she maintaining her weight.  She has recurrent anorexia since she ran out and she has lost some weight.  She denies GERD/N/V/dysphagia/early satiety.  She denies abdominal pain.  She denies LGI symptoms.  She denies LGI bleed or melena.  She denies NSAID's or narcotics.  She denies THC.  She has not been seen by Endocrine.  She has not had recurrent "fainting spells."  She denies recnet sz activity.  HSe does not tkae anything for her seizures.
Grossly./no strength deficits were identified

## 2023-12-24 NOTE — PATIENT PROFILE ADULT. - NS PRO ABUSE SCREEN AFRAID ANYONE YN
Pharmacokinetic Assessment Follow Up: IV Vancomycin    Vancomycin serum concentration assessment(s):  - The vancomycin random level, drawn at ~ 16 hours after the last dose, was 15 mcg/mL which is within the goal range of 15-20 mcg/mL.  - Renal function appears to have stabilized.     Vancomycin Regimen Plan:  - At this time, will initiate a scheduled regimen of vancomycin 750 mg (15 mg/kg) IV every 24 hours.   - Obtain a vancomycin trough level prior to the 3rd dose of the new regimen on 12/26 @ 0800 or sooner if clinically warranted.   - Monitor for signs and symptoms of nephrotoxicity and infusion reactions.     Drug levels (last 3 results):  Recent Labs   Lab Result Units 12/23/23  0502 12/24/23  0238   Vancomycin, Random ug/mL 11.2 15.0       Pharmacy will continue to follow and monitor vancomycin.    Please contact pharmacy at extension [phone] for questions regarding this assessment.    Thank you for the consult,   Shira Moyer       Patient brief summary:  Elda Hernandez is a 56 y.o. female initiated on antimicrobial therapy with IV Vancomycin for treatment of bacteremia    The patient's current regimen is dose by levels    Drug Allergies:   Review of patient's allergies indicates:   Allergen Reactions    Codeine Itching     Other reaction(s): Itching    Lipitor [atorvastatin] Other (See Comments)     Other reaction(s): Muscle pain  Muscle cranmps    Morphine Itching     Other reaction(s): nausea and vomiting     Zoloft [sertraline] Other (See Comments)     Tremors/muscle spasms       Actual Body Weight:   50.8 kg    Renal Function:   Estimated Creatinine Clearance: 44.5 mL/min (based on SCr of 1.1 mg/dL).,     CBC (last 72 hours):  Recent Labs   Lab Result Units 12/21/23  0749 12/22/23  0516 12/23/23  0502 12/24/23  0238   WBC K/uL 5.29 9.45 4.81 3.08*   Hemoglobin g/dL 11.2* 9.8* 9.5* 9.0*   Hematocrit % 33.1* 29.8* 28.9* 27.2*   Platelets K/uL 64* 52* 61* 83*   Gran % % 82.6* 83.4* 69.9 59.7   Lymph %  % 8.5* 5.2* 11.0* 15.6*   Mono % % 6.2 10.6 15.0 18.2*   Eosinophil % % 1.5 0.1 3.1 5.2   Basophil % % 0.8 0.3 0.6 1.0   Differential Method  Automated Automated Automated Automated       Metabolic Panel (last 72 hours):  Recent Labs   Lab Result Units 12/21/23  0749 12/21/23  1723 12/22/23  0516 12/23/23  0502 12/24/23  0238   Sodium mmol/L 135*  --  135* 138 136   Potassium mmol/L 3.9  --  4.8 4.4 4.1   Chloride mmol/L 105  --  106 109 106   CO2 mmol/L 19*  --  19* 20* 22*   Glucose mg/dL 117*  --  90 74 86   Glucose, UA   --  Negative  --   --   --    BUN mg/dL 40*  --  49* 41* 25*   Creatinine mg/dL 1.0  --  1.5* 1.2 1.1   Albumin g/dL 3.3*  --  2.8* 2.6* 2.6*   Total Bilirubin mg/dL 4.2*  --  3.5* 3.5* 3.4*   Alkaline Phosphatase U/L 631*  --  487* 447* 478*   AST U/L 103*  --  75* 57* 62*   ALT U/L 73*  --  60* 53* 52*   Magnesium mg/dL 2.2  --   --   --  2.0   Phosphorus mg/dL 4.3  --   --   --   --        Vancomycin Administrations:  vancomycin given in the last 96 hours                     vancomycin 750 mg in dextrose 5 % (D5W) 250 mL IVPB (Vial-Mate) (mg) 750 mg New Bag 12/23/23 1045    vancomycin (VANCOCIN) 1,000 mg in dextrose 5 % (D5W) 250 mL IVPB (Vial-Mate) (mg) 1,000 mg New Bag 12/22/23 1042                    Microbiologic Results:  Microbiology Results (last 7 days)       Procedure Component Value Units Date/Time    Blood culture [[id number]] Collected: 12/23/23 0502    Order Status: Completed Specimen: Blood from Peripheral, Right Arm Updated: 12/24/23 0613     Blood Culture, Routine No Growth to date      No Growth to date    Blood culture [[id number]] Collected: 12/23/23 0504    Order Status: Completed Specimen: Blood from Antecubital, Right Arm Updated: 12/24/23 0613     Blood Culture, Routine No Growth to date      No Growth to date    Blood culture x two cultures. Draw prior to antibiotics. [[id number]]  (Abnormal) Collected: 12/21/23 0807    Order Status: Completed Specimen: Blood from  Peripheral, Forearm, Right Updated: 12/24/23 0559     Blood Culture, Routine Gram stain tommie bottle: Gram positive cocci in chains resembling Strep      Results called to and read back by: Daniela tOt RN      Gram stain aer bottle: Gram positive cocci in chains resembling Strep      Positive results previously called 12/23/2023  13:48      STREPTOCOCCUS MITIS/ORALIS   Susceptibility pending      Narrative:      Aerobic and anaerobic    Blood culture x two cultures. Draw prior to antibiotics. [[id number]] Collected: 12/21/23 0749    Order Status: Completed Specimen: Blood from Peripheral, Antecubital, Right Updated: 12/23/23 0812     Blood Culture, Routine No Growth to date      No Growth to date      No Growth to date    Narrative:      Aerobic and anaerobic    Blood culture [[id number]]     Order Status: Canceled Specimen: Blood     Blood culture [[id number]]     Order Status: Canceled Specimen: Blood     Rapid Organism ID by PCR (from Blood culture) [[id number]]  (Abnormal) Collected: 12/21/23 0807    Order Status: Completed Updated: 12/22/23 0749     Enterococcus faecalis Not Detected     Enterococcus faecium Not Detected     Listeria monocytogenes Not Detected     Staphylococcus spp. Not Detected     Staphylococcus aureus Not Detected     Staphylococcus epidermidis Not Detected     Staphylococcus lugdunensis Not Detected     Streptococcus species Detected     Streptococcus agalactiae Not Detected     Streptococcus pneumoniae Not Detected     Streptococcus pyogenes Not Detected     Acinetobacter calcoaceticus/baumannii complex Not Detected     Bacteroides fragilis Not Detected     Enterobacterales Not Detected     Enterobacter cloacae complex Not Detected     Escherichia coli Not Detected     Klebsiella aerogenes Not Detected     Klebsiella oxytoca Not Detected     Klebsiella pneumoniae group Not Detected     Proteus Not Detected     Salmonella sp Not Detected     Serratia marcescens Not Detected     Haemophilus  influenzae Not Detected     Neisseria meningtidis Not Detected     Pseudomonas aeruginosa Not Detected     Stenotrophomonas maltophilia Not Detected     Candida albicans Not Detected     Candida auris Not Detected     Candida glabrata Not Detected     Candida krusei Not Detected     Candida parapsilosis Not Detected     Candida tropicalis Not Detected     Cryptococcus neoformans/gattii Not Detected     CTX-M (ESBL ) Test Not Applicable     IMP (Carbapenem resistant) Test Not Applicable     KPC resistance gene (Carbapenem resistant) Test Not Applicable     mcr-1  Test Not Applicable     mec A/C  Test Not Applicable     mec A/C and MREJ (MRSA) gene Test Not Applicable     NDM (Carbapenem resistant) Test Not Applicable     OXA-48-like (Carbapenem resistant) Test Not Applicable     van A/B (VRE gene) Test Not Applicable     VIM (Carbapenem resistant) Test Not Applicable    Narrative:      Aerobic and anaerobic    (rule out SBP) Aerobic culture [[id number]]     Order Status: Canceled Specimen: Ascites     (rule out SBP) Culture, Anaerobic [[id number]]     Order Status: Canceled Specimen: Ascites     (rule out SBP) Gram stain [[id number]]     Order Status: Canceled Specimen: Ascites     Clostridium difficile EIA [[id number]]     Order Status: Canceled Specimen: Stool            no

## 2024-01-02 ENCOUNTER — APPOINTMENT (OUTPATIENT)
Dept: INTERNAL MEDICINE | Facility: CLINIC | Age: 34
End: 2024-01-02

## 2024-01-22 ENCOUNTER — APPOINTMENT (OUTPATIENT)
Dept: INTERNAL MEDICINE | Facility: CLINIC | Age: 34
End: 2024-01-22

## 2024-01-22 ENCOUNTER — APPOINTMENT (OUTPATIENT)
Dept: INTERNAL MEDICINE | Facility: CLINIC | Age: 34
End: 2024-01-22
Payer: MEDICAID

## 2024-01-22 ENCOUNTER — OUTPATIENT (OUTPATIENT)
Dept: OUTPATIENT SERVICES | Facility: HOSPITAL | Age: 34
LOS: 1 days | End: 2024-01-22

## 2024-01-22 VITALS
WEIGHT: 168 LBS | HEART RATE: 77 BPM | OXYGEN SATURATION: 98 % | BODY MASS INDEX: 28.68 KG/M2 | SYSTOLIC BLOOD PRESSURE: 106 MMHG | DIASTOLIC BLOOD PRESSURE: 66 MMHG | HEIGHT: 64 IN

## 2024-01-22 DIAGNOSIS — E11.9 TYPE 2 DIABETES MELLITUS W/OUT COMPLICATIONS: ICD-10-CM

## 2024-01-22 DIAGNOSIS — Z90.81 ACQUIRED ABSENCE OF SPLEEN: Chronic | ICD-10-CM

## 2024-01-22 DIAGNOSIS — Z00.00 ENCOUNTER FOR GENERAL ADULT MEDICAL EXAMINATION WITHOUT ABNORMAL FINDINGS: ICD-10-CM

## 2024-01-22 DIAGNOSIS — Z96.60 PRESENCE OF UNSPECIFIED ORTHOPEDIC JOINT IMPLANT: Chronic | ICD-10-CM

## 2024-01-22 DIAGNOSIS — E11.9 TYPE 2 DIABETES MELLITUS WITHOUT COMPLICATIONS: ICD-10-CM

## 2024-01-22 DIAGNOSIS — Z98.890 OTHER SPECIFIED POSTPROCEDURAL STATES: Chronic | ICD-10-CM

## 2024-01-22 DIAGNOSIS — R21 RASH AND OTHER NONSPECIFIC SKIN ERUPTION: ICD-10-CM

## 2024-01-22 DIAGNOSIS — Z00.00 ENCOUNTER FOR GENERAL ADULT MEDICAL EXAMINATION W/OUT ABNORMAL FINDINGS: ICD-10-CM

## 2024-01-22 PROCEDURE — 99395 PREV VISIT EST AGE 18-39: CPT

## 2024-01-22 RX ORDER — TRIAMCINOLONE ACETONIDE 0.25 MG/G
0.03 OINTMENT TOPICAL TWICE DAILY
Qty: 2 | Refills: 1 | Status: ACTIVE | COMMUNITY
Start: 2024-01-22 | End: 1900-01-01

## 2024-01-23 LAB
25(OH)D3 SERPL-MCNC: 50 NG/ML
ALBUMIN SERPL ELPH-MCNC: 4.7 G/DL
ALP BLD-CCNC: 117 U/L
ALT SERPL-CCNC: 35 U/L
ANION GAP SERPL CALC-SCNC: 13 MMOL/L
AST SERPL-CCNC: 19 U/L
BASOPHILS # BLD AUTO: 0.07 K/UL
BASOPHILS NFR BLD AUTO: 1.3 %
BILIRUB SERPL-MCNC: 0.2 MG/DL
BUN SERPL-MCNC: 17 MG/DL
CALCIUM SERPL-MCNC: 9.5 MG/DL
CHLORIDE SERPL-SCNC: 105 MMOL/L
CHOLEST SERPL-MCNC: 235 MG/DL
CO2 SERPL-SCNC: 24 MMOL/L
CREAT SERPL-MCNC: 0.63 MG/DL
EGFR: 121 ML/MIN/1.73M2
EOSINOPHIL # BLD AUTO: 0.2 K/UL
EOSINOPHIL NFR BLD AUTO: 3.7 %
ESTIMATED AVERAGE GLUCOSE: 154 MG/DL
GLUCOSE SERPL-MCNC: 208 MG/DL
HBA1C MFR BLD HPLC: 7 %
HBV CORE IGG+IGM SER QL: NONREACTIVE
HBV SURFACE AB SER QL: REACTIVE
HBV SURFACE AG SER QL: NONREACTIVE
HCT VFR BLD CALC: 38 %
HCV AB SER QL: NONREACTIVE
HCV S/CO RATIO: 0.07 S/CO
HDLC SERPL-MCNC: 39 MG/DL
HGB BLD-MCNC: 12.1 G/DL
HIV1+2 AB SPEC QL IA.RAPID: NONREACTIVE
IMM GRANULOCYTES NFR BLD AUTO: 0.2 %
LDLC SERPL CALC-MCNC: 148 MG/DL
LDLC SERPL DIRECT ASSAY-MCNC: 148 MG/DL
LYMPHOCYTES # BLD AUTO: 2.47 K/UL
LYMPHOCYTES NFR BLD AUTO: 45.9 %
MAN DIFF?: NORMAL
MCHC RBC-ENTMCNC: 26.7 PG
MCHC RBC-ENTMCNC: 31.8 GM/DL
MCV RBC AUTO: 83.9 FL
MONOCYTES # BLD AUTO: 0.59 K/UL
MONOCYTES NFR BLD AUTO: 11 %
NEUTROPHILS # BLD AUTO: 2.04 K/UL
NEUTROPHILS NFR BLD AUTO: 37.9 %
NONHDLC SERPL-MCNC: 197 MG/DL
PLATELET # BLD AUTO: 306 K/UL
POTASSIUM SERPL-SCNC: 4.2 MMOL/L
PROT SERPL-MCNC: 7 G/DL
RBC # BLD: 4.53 M/UL
RBC # FLD: 15.9 %
SODIUM SERPL-SCNC: 142 MMOL/L
T PALLIDUM AB SER QL IA: NEGATIVE
TRIGL SERPL-MCNC: 267 MG/DL
WBC # FLD AUTO: 5.38 K/UL

## 2024-01-23 RX ORDER — METFORMIN ER 500 MG 500 MG/1
500 TABLET ORAL
Qty: 60 | Refills: 2 | Status: ACTIVE | COMMUNITY
Start: 2024-01-23 | End: 1900-01-01

## 2024-01-24 NOTE — HEALTH RISK ASSESSMENT
[Good] : ~his/her~  mood as  good [No] : In the past 12 months have you used drugs other than those required for medical reasons? No [No falls in past year] : Patient reported no falls in the past year [0] : 2) Feeling down, depressed, or hopeless: Not at all (0) [PHQ-2 Negative - No further assessment needed] : PHQ-2 Negative - No further assessment needed [Patient reported PAP Smear was normal] : Patient reported PAP Smear was normal [HIV Test offered] : HIV Test offered [Hepatitis C test offered] : Hepatitis C test offered [None] : None [With Family] : lives with family [On disability] : on disability [Single] : single [Feels Safe at Home] : Feels safe at home [Fully functional (bathing, dressing, toileting, transferring, walking, feeding)] : Fully functional (bathing, dressing, toileting, transferring, walking, feeding) [Never] : Never [[id number]] : 0 [Sexually Active] : not sexually active [PapSmearDate] : 2021 [PapSmearComments] : requested records [de-identified] : legally blind

## 2024-01-24 NOTE — REVIEW OF SYSTEMS
[Joint Pain] : joint pain [Negative] : Heme/Lymph [Skin Rash] : skin rash [Discharge] : no discharge [Pain] : no pain [Redness] : no redness [Dryness] : no dryness  [Itching] : no itching [Joint Stiffness] : no joint stiffness [Joint Swelling] : no joint swelling [Muscle Weakness] : no muscle weakness [Muscle Pain] : no muscle pain [Itching] : no itching [Back Pain] : no back pain [Mole Changes] : no mole changes [Nail Changes] : no nail changes [Hair Changes] : no hair changes [FreeTextEntry3] : legally blind [FreeTextEntry9] : b/l knee chronic pain  [de-identified] : rash around ankles

## 2024-01-24 NOTE — PHYSICAL EXAM
[No Lymphadenopathy] : no lymphadenopathy [Supple] : supple [No Respiratory Distress] : no respiratory distress  [No Accessory Muscle Use] : no accessory muscle use [Pedal Pulses Present] : the pedal pulses are present [No Varicosities] : no varicosities [No Edema] : there was no peripheral edema [No Extremity Clubbing/Cyanosis] : no extremity clubbing/cyanosis [Soft] : abdomen soft [Non Tender] : non-tender [Normal Bowel Sounds] : normal bowel sounds [Non-distended] : non-distended [Normal Supraclavicular Nodes] : no supraclavicular lymphadenopathy [Normal Posterior Cervical Nodes] : no posterior cervical lymphadenopathy [Normal Anterior Cervical Nodes] : no anterior cervical lymphadenopathy [Normal] : normal gait, coordination grossly intact, no focal deficits and deep tendon reflexes were 2+ and symmetric [Normal Mood] : the mood was normal [Normal Affect] : the affect was normal [Normal Insight/Judgement] : insight and judgment were intact [de-identified] : Dry hyperpigmented patches around b/l ankles

## 2024-01-24 NOTE — ASSESSMENT
[FreeTextEntry1] : #HCM Routine blood work ordered today.  Up to date with and related cancer screen and immunizations.   RTC in 3 months or early if needed.

## 2024-01-24 NOTE — HISTORY OF PRESENT ILLNESS
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   Patient is c/o pruritic rash on her B/l ankles that has been going on for a while now. She would like to see a specialist.   Preventative care- Cervical cancer screen- 2021- as per patient it was normal. requested records.   Immunizations-  COVID, Flu, Pneumococcal, Tdap- up to date.

## 2024-01-29 ENCOUNTER — OUTPATIENT (OUTPATIENT)
Dept: OUTPATIENT SERVICES | Facility: HOSPITAL | Age: 34
LOS: 1 days | End: 2024-01-29

## 2024-01-29 ENCOUNTER — APPOINTMENT (OUTPATIENT)
Dept: INTERNAL MEDICINE | Facility: CLINIC | Age: 34
End: 2024-01-29
Payer: MEDICAID

## 2024-01-29 VITALS
OXYGEN SATURATION: 99 % | SYSTOLIC BLOOD PRESSURE: 111 MMHG | WEIGHT: 167 LBS | DIASTOLIC BLOOD PRESSURE: 80 MMHG | BODY MASS INDEX: 28.51 KG/M2 | HEIGHT: 64 IN | HEART RATE: 78 BPM

## 2024-01-29 DIAGNOSIS — Z01.818 ENCOUNTER FOR OTHER PREPROCEDURAL EXAMINATION: ICD-10-CM

## 2024-01-29 DIAGNOSIS — Z96.60 PRESENCE OF UNSPECIFIED ORTHOPEDIC JOINT IMPLANT: Chronic | ICD-10-CM

## 2024-01-29 DIAGNOSIS — Z90.81 ACQUIRED ABSENCE OF SPLEEN: Chronic | ICD-10-CM

## 2024-01-29 DIAGNOSIS — Z90.89 ACQUIRED ABSENCE OF OTHER ORGANS: Chronic | ICD-10-CM

## 2024-01-29 DIAGNOSIS — Z94.81 BONE MARROW TRANSPLANT STATUS: Chronic | ICD-10-CM

## 2024-01-29 DIAGNOSIS — Z98.890 OTHER SPECIFIED POSTPROCEDURAL STATES: Chronic | ICD-10-CM

## 2024-01-29 PROCEDURE — 99213 OFFICE O/P EST LOW 20 MIN: CPT

## 2024-01-29 NOTE — ASSESSMENT
[High Risk Surgery - Intraperitoneal, Intrathoracic or Supringuinal Vascular Procedures] : High Risk Surgery - Intraperitoneal, Intrathoracic or Supringuinal Vascular Procedures - No (0) [Ischemic Heart Disease] : Ischemic Heart Disease - No (0) [Congestive Heart Failure] : Congestive Heart Failure - No (0) [Prior Cerebrovascular Accident or TIA] : Prior Cerebrovascular Accident or TIA - Yes (1) [Creatinine >= 2mg/dL (1 Point)] : Creatinine >= 2mg/dL - No (0) [Insulin-dependent Diabetic (1 Point)] : Insulin-dependent Diabetic - No (0) [1] : 1 , RCRI Class: II, Risk of Post-Op Cardiac Complications: 6.0%, 95% CI for Risk Estimate: 4.9% - 7.4% [Patient Optimized for Surgery] : Patient optimized for surgery [No Further Testing Recommended] : no further testing recommended [As per surgery] : as per surgery

## 2024-01-29 NOTE — PHYSICAL EXAM
[No Lymphadenopathy] : no lymphadenopathy [Supple] : supple [No Respiratory Distress] : no respiratory distress  [No Accessory Muscle Use] : no accessory muscle use [Clear to Auscultation] : lungs were clear to auscultation bilaterally [No Varicosities] : no varicosities [Pedal Pulses Present] : the pedal pulses are present [No Edema] : there was no peripheral edema [Soft] : abdomen soft [Non Tender] : non-tender [Non-distended] : non-distended [Normal Bowel Sounds] : normal bowel sounds [Normal Supraclavicular Nodes] : no supraclavicular lymphadenopathy [Normal Axillary Nodes] : no axillary lymphadenopathy [Normal Posterior Cervical Nodes] : no posterior cervical lymphadenopathy [Normal Anterior Cervical Nodes] : no anterior cervical lymphadenopathy [Normal] : normal gait, coordination grossly intact, no focal deficits and deep tendon reflexes were 2+ and symmetric [Memory Grossly Normal] : memory grossly normal [Normal Affect] : the affect was normal [Alert and Oriented x3] : oriented to person, place, and time [Normal Mood] : the mood was normal [Normal Insight/Judgement] : insight and judgment were intact

## 2024-01-31 NOTE — HISTORY OF PRESENT ILLNESS
[No Pertinent Cardiac History] : no history of aortic stenosis, atrial fibrillation, coronary artery disease, recent myocardial infarction, or implantable device/pacemaker [Asthma] : asthma [No Adverse Anesthesia Reaction] : no adverse anesthesia reaction in self or family member [Diabetes] : diabetes [(Patient denies any chest pain, claudication, dyspnea on exertion, orthopnea, palpitations or syncope)] : Patient denies any chest pain, claudication, dyspnea on exertion, orthopnea, palpitations or syncope [Good (7-10 METs)] : Good (7-10 METs) [COPD] : no COPD [Sleep Apnea] : no sleep apnea [Smoker] : not a smoker [Chronic Anticoagulation] : no chronic anticoagulation [Chronic Kidney Disease] : no chronic kidney disease [FreeTextEntry1] : Dental procedure [FreeTextEntry2] : 02/16/2024 [FreeTextEntry3] : Dr. 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   [FreeTextEntry5] : A1C - 7.0

## 2024-02-07 ENCOUNTER — NON-APPOINTMENT (OUTPATIENT)
Age: 34
End: 2024-02-07

## 2024-02-26 ENCOUNTER — LABORATORY RESULT (OUTPATIENT)
Age: 34
End: 2024-02-26

## 2024-02-26 ENCOUNTER — APPOINTMENT (OUTPATIENT)
Dept: OBGYN | Facility: CLINIC | Age: 34
End: 2024-02-26
Payer: MEDICAID

## 2024-02-26 VITALS
WEIGHT: 165 LBS | DIASTOLIC BLOOD PRESSURE: 74 MMHG | BODY MASS INDEX: 28.17 KG/M2 | SYSTOLIC BLOOD PRESSURE: 120 MMHG | HEIGHT: 64 IN

## 2024-02-26 DIAGNOSIS — N92.6 IRREGULAR MENSTRUATION, UNSPECIFIED: ICD-10-CM

## 2024-02-26 DIAGNOSIS — Z01.411 ENCOUNTER FOR GYNECOLOGICAL EXAMINATION (GENERAL) (ROUTINE) WITH ABNORMAL FINDINGS: ICD-10-CM

## 2024-02-26 PROCEDURE — 99385 PREV VISIT NEW AGE 18-39: CPT

## 2024-02-26 NOTE — PHYSICAL EXAM

## 2024-02-26 NOTE — DISCUSSION/SUMMARY
[FreeTextEntry1] : - Pap/HPV obtained today - Contraceptive options reviewed; pt not currently sexually active; also discussed due to CVA history would not give an estrogen-containing method; pt states she is waiting for marriage - STI testing offered; done - Hormone panel done secondary to irregular menses; suspect it is a central issue as this started s/p brain surgery

## 2024-02-26 NOTE — HISTORY OF PRESENT ILLNESS
[FreeTextEntry1] : 32yo nullip LMP 11/2023 here for annual exam.  Pt reports menses have been irregular since she was about 21yo after her brain surgeries due to complications of sickle cell. [No] : Patient does not have concerns regarding sex [Previously active] : previously active [Men] : men [Yes] : Yes [Condoms] : Condoms [Patient would like to be screened for STIs] : Patient would like to be screened for STIs [FreeTextEntry2] : Not for many years

## 2024-02-27 LAB
BASOPHILS # BLD AUTO: 0.06 K/UL
BASOPHILS NFR BLD AUTO: 0.9 %
DHEA-S SERPL-MCNC: 230 UG/DL
EOSINOPHIL # BLD AUTO: 0.19 K/UL
EOSINOPHIL NFR BLD AUTO: 3 %
ESTRADIOL SERPL-MCNC: 14 PG/ML
FSH SERPL-MCNC: 44.7 IU/L
HBV SURFACE AG SER QL: NONREACTIVE
HCT VFR BLD CALC: 39.2 %
HCV AB SER QL: NONREACTIVE
HCV S/CO RATIO: 0.07 S/CO
HGB BLD-MCNC: 12.1 G/DL
HIV1+2 AB SPEC QL IA.RAPID: NONREACTIVE
IMM GRANULOCYTES NFR BLD AUTO: 0.2 %
LH SERPL-ACNC: 36.1 IU/L
LYMPHOCYTES # BLD AUTO: 2.88 K/UL
LYMPHOCYTES NFR BLD AUTO: 44.8 %
MAN DIFF?: NORMAL
MCHC RBC-ENTMCNC: 27.3 PG
MCHC RBC-ENTMCNC: 30.9 GM/DL
MCV RBC AUTO: 88.5 FL
MONOCYTES # BLD AUTO: 0.57 K/UL
MONOCYTES NFR BLD AUTO: 8.9 %
NEUTROPHILS # BLD AUTO: 2.72 K/UL
NEUTROPHILS NFR BLD AUTO: 42.2 %
PLATELET # BLD AUTO: 292 K/UL
PROGEST SERPL-MCNC: 0.2 NG/ML
PROLACTIN SERPL-MCNC: 8.1 NG/ML
RBC # BLD: 4.43 M/UL
RBC # FLD: 15.4 %
T PALLIDUM AB SER QL IA: NEGATIVE
T4 FREE SERPL-MCNC: 1.1 NG/DL
TESTOST SERPL-MCNC: 24.3 NG/DL
TSH SERPL-ACNC: 1.94 UIU/ML
WBC # FLD AUTO: 6.43 K/UL

## 2024-02-29 DIAGNOSIS — N76.0 ACUTE VAGINITIS: ICD-10-CM

## 2024-02-29 DIAGNOSIS — B96.89 ACUTE VAGINITIS: ICD-10-CM

## 2024-02-29 LAB
C TRACH RRNA SPEC QL NAA+PROBE: NOT DETECTED
CYTOLOGY CVX/VAG DOC THIN PREP: ABNORMAL
HPV HIGH+LOW RISK DNA PNL CVX: NOT DETECTED
N GONORRHOEA RRNA SPEC QL NAA+PROBE: NOT DETECTED
SOURCE TP AMPLIFICATION: NORMAL

## 2024-03-01 ENCOUNTER — NON-APPOINTMENT (OUTPATIENT)
Age: 34
End: 2024-03-01

## 2024-03-18 ENCOUNTER — NON-APPOINTMENT (OUTPATIENT)
Age: 34
End: 2024-03-18

## 2024-03-19 ENCOUNTER — EMERGENCY (EMERGENCY)
Facility: HOSPITAL | Age: 34
LOS: 1 days | Discharge: HOME | End: 2024-03-19
Attending: EMERGENCY MEDICINE
Payer: MEDICAID

## 2024-03-19 VITALS
DIASTOLIC BLOOD PRESSURE: 68 MMHG | SYSTOLIC BLOOD PRESSURE: 118 MMHG | RESPIRATION RATE: 18 BRPM | HEIGHT: 64 IN | WEIGHT: 160.06 LBS | OXYGEN SATURATION: 100 % | HEART RATE: 60 BPM | TEMPERATURE: 98 F

## 2024-03-19 DIAGNOSIS — Z96.60 PRESENCE OF UNSPECIFIED ORTHOPEDIC JOINT IMPLANT: Chronic | ICD-10-CM

## 2024-03-19 DIAGNOSIS — Z98.890 OTHER SPECIFIED POSTPROCEDURAL STATES: Chronic | ICD-10-CM

## 2024-03-19 DIAGNOSIS — Z90.89 ACQUIRED ABSENCE OF OTHER ORGANS: Chronic | ICD-10-CM

## 2024-03-19 DIAGNOSIS — Z90.81 ACQUIRED ABSENCE OF SPLEEN: Chronic | ICD-10-CM

## 2024-03-19 DIAGNOSIS — Z94.81 BONE MARROW TRANSPLANT STATUS: Chronic | ICD-10-CM

## 2024-03-19 LAB
ALBUMIN SERPL ELPH-MCNC: 4.6 G/DL — SIGNIFICANT CHANGE UP (ref 3.3–5)
ALP SERPL-CCNC: 102 U/L — SIGNIFICANT CHANGE UP (ref 40–120)
ALT FLD-CCNC: 32 U/L — SIGNIFICANT CHANGE UP (ref 10–45)
ANION GAP SERPL CALC-SCNC: 9 MMOL/L — SIGNIFICANT CHANGE UP (ref 5–17)
APTT BLD: 33.6 SEC — SIGNIFICANT CHANGE UP (ref 24.5–35.6)
AST SERPL-CCNC: 22 U/L — SIGNIFICANT CHANGE UP (ref 10–40)
BASOPHILS # BLD AUTO: 0.04 K/UL — SIGNIFICANT CHANGE UP (ref 0–0.2)
BASOPHILS NFR BLD AUTO: 0.7 % — SIGNIFICANT CHANGE UP (ref 0–2)
BILIRUB SERPL-MCNC: 0.4 MG/DL — SIGNIFICANT CHANGE UP (ref 0.2–1.2)
BUN SERPL-MCNC: 12 MG/DL — SIGNIFICANT CHANGE UP (ref 7–23)
CALCIUM SERPL-MCNC: 9.9 MG/DL — SIGNIFICANT CHANGE UP (ref 8.4–10.5)
CHLORIDE SERPL-SCNC: 105 MMOL/L — SIGNIFICANT CHANGE UP (ref 96–108)
CO2 SERPL-SCNC: 27 MMOL/L — SIGNIFICANT CHANGE UP (ref 22–31)
CREAT SERPL-MCNC: 0.53 MG/DL — SIGNIFICANT CHANGE UP (ref 0.5–1.3)
EGFR: 126 ML/MIN/1.73M2 — SIGNIFICANT CHANGE UP
EOSINOPHIL # BLD AUTO: 0.05 K/UL — SIGNIFICANT CHANGE UP (ref 0–0.5)
EOSINOPHIL NFR BLD AUTO: 0.8 % — SIGNIFICANT CHANGE UP (ref 0–6)
GLUCOSE SERPL-MCNC: 109 MG/DL — HIGH (ref 70–99)
HCG SERPL-ACNC: <2 MIU/ML — SIGNIFICANT CHANGE UP
HCT VFR BLD CALC: 35 % — SIGNIFICANT CHANGE UP (ref 34.5–45)
HGB BLD-MCNC: 11.6 G/DL — SIGNIFICANT CHANGE UP (ref 11.5–15.5)
IMM GRANULOCYTES NFR BLD AUTO: 0.3 % — SIGNIFICANT CHANGE UP (ref 0–0.9)
INR BLD: 1.07 RATIO — SIGNIFICANT CHANGE UP (ref 0.85–1.18)
LYMPHOCYTES # BLD AUTO: 2.34 K/UL — SIGNIFICANT CHANGE UP (ref 1–3.3)
LYMPHOCYTES # BLD AUTO: 39 % — SIGNIFICANT CHANGE UP (ref 13–44)
MCHC RBC-ENTMCNC: 27.8 PG — SIGNIFICANT CHANGE UP (ref 27–34)
MCHC RBC-ENTMCNC: 33.1 GM/DL — SIGNIFICANT CHANGE UP (ref 32–36)
MCV RBC AUTO: 83.7 FL — SIGNIFICANT CHANGE UP (ref 80–100)
MONOCYTES # BLD AUTO: 0.68 K/UL — SIGNIFICANT CHANGE UP (ref 0–0.9)
MONOCYTES NFR BLD AUTO: 11.3 % — SIGNIFICANT CHANGE UP (ref 2–14)
NEUTROPHILS # BLD AUTO: 2.87 K/UL — SIGNIFICANT CHANGE UP (ref 1.8–7.4)
NEUTROPHILS NFR BLD AUTO: 47.9 % — SIGNIFICANT CHANGE UP (ref 43–77)
NRBC # BLD: 0 /100 WBCS — SIGNIFICANT CHANGE UP (ref 0–0)
PLATELET # BLD AUTO: 314 K/UL — SIGNIFICANT CHANGE UP (ref 150–400)
POTASSIUM SERPL-MCNC: 3.9 MMOL/L — SIGNIFICANT CHANGE UP (ref 3.5–5.3)
POTASSIUM SERPL-SCNC: 3.9 MMOL/L — SIGNIFICANT CHANGE UP (ref 3.5–5.3)
PROT SERPL-MCNC: 7.2 G/DL — SIGNIFICANT CHANGE UP (ref 6–8.3)
PROTHROM AB SERPL-ACNC: 11.7 SEC — SIGNIFICANT CHANGE UP (ref 9.5–13)
RBC # BLD: 4.18 M/UL — SIGNIFICANT CHANGE UP (ref 3.8–5.2)
RBC # FLD: 14.1 % — SIGNIFICANT CHANGE UP (ref 10.3–14.5)
SODIUM SERPL-SCNC: 141 MMOL/L — SIGNIFICANT CHANGE UP (ref 135–145)
WBC # BLD: 6 K/UL — SIGNIFICANT CHANGE UP (ref 3.8–10.5)
WBC # FLD AUTO: 6 K/UL — SIGNIFICANT CHANGE UP (ref 3.8–10.5)

## 2024-03-19 PROCEDURE — 36415 COLL VENOUS BLD VENIPUNCTURE: CPT

## 2024-03-19 PROCEDURE — 70496 CT ANGIOGRAPHY HEAD: CPT | Mod: 26,MC,76

## 2024-03-19 PROCEDURE — 84702 CHORIONIC GONADOTROPIN TEST: CPT

## 2024-03-19 PROCEDURE — 99284 EMERGENCY DEPT VISIT MOD MDM: CPT | Mod: 25

## 2024-03-19 PROCEDURE — 71045 X-RAY EXAM CHEST 1 VIEW: CPT | Mod: 26

## 2024-03-19 PROCEDURE — 71045 X-RAY EXAM CHEST 1 VIEW: CPT

## 2024-03-19 PROCEDURE — 70450 CT HEAD/BRAIN W/O DYE: CPT | Mod: MC

## 2024-03-19 PROCEDURE — 96374 THER/PROPH/DIAG INJ IV PUSH: CPT | Mod: XU

## 2024-03-19 PROCEDURE — 70450 CT HEAD/BRAIN W/O DYE: CPT | Mod: 26,MC

## 2024-03-19 PROCEDURE — 96375 TX/PRO/DX INJ NEW DRUG ADDON: CPT | Mod: XU

## 2024-03-19 PROCEDURE — 70496 CT ANGIOGRAPHY HEAD: CPT | Mod: MC

## 2024-03-19 PROCEDURE — 80053 COMPREHEN METABOLIC PANEL: CPT

## 2024-03-19 PROCEDURE — 99285 EMERGENCY DEPT VISIT HI MDM: CPT

## 2024-03-19 PROCEDURE — 85025 COMPLETE CBC W/AUTO DIFF WBC: CPT

## 2024-03-19 PROCEDURE — 85730 THROMBOPLASTIN TIME PARTIAL: CPT

## 2024-03-19 PROCEDURE — 85610 PROTHROMBIN TIME: CPT

## 2024-03-19 RX ORDER — ACETAMINOPHEN 500 MG
1000 TABLET ORAL ONCE
Refills: 0 | Status: COMPLETED | OUTPATIENT
Start: 2024-03-19 | End: 2024-03-19

## 2024-03-19 RX ORDER — METOCLOPRAMIDE HCL 10 MG
10 TABLET ORAL ONCE
Refills: 0 | Status: COMPLETED | OUTPATIENT
Start: 2024-03-19 | End: 2024-03-19

## 2024-03-19 RX ORDER — KETOROLAC TROMETHAMINE 30 MG/ML
15 SYRINGE (ML) INJECTION ONCE
Refills: 0 | Status: DISCONTINUED | OUTPATIENT
Start: 2024-03-19 | End: 2024-03-19

## 2024-03-19 RX ADMIN — Medication 15 MILLIGRAM(S): at 17:12

## 2024-03-19 RX ADMIN — Medication 10 MILLIGRAM(S): at 14:42

## 2024-03-19 RX ADMIN — Medication 400 MILLIGRAM(S): at 14:39

## 2024-03-19 RX ADMIN — Medication 1000 MILLIGRAM(S): at 15:49

## 2024-03-19 NOTE — CONSULT NOTE ADULT - ASSESSMENT
Impression: Headache after mild head trauma, with no new focal neuro deficits and CTH without acute pathology. HA 2/2 mild head trauma vs migranous (given one sided, features of nausea, photophobia)    Recommendations:  To be discussed Impression: Headache after mild head trauma, with no new focal neuro deficits and CTH without acute pathology. HA likely 2/2 mild head trauma vs migrainous (given one sided, features of nausea, photophobia) - however, would be prudent to r/o VST given age and hx of sickle cell.    Recommendations:  [ ] Obtain CTA/CTV - if negative, no further inpatient neurologic workup needed at this time  [ ] After discharge should f/u outpatient with stroke neurology - can schedule appointment with [address] [place] [address] at [phone].    Case discussed with neurology attending Dr. Mckenzie. Recommendations not finalized until attested by attending.   Impression: Headache after mild head trauma, with no new focal neuro deficits and CTH without acute pathology. DDx for HA includes i/s/o  mild head trauma vs migrainous (given one sided, features of nausea, photophobia) - however, would be prudent to r/o VST given age, hx of sickle cell, and no prior migraine hx.    Recommendations:  [ ] Obtain CTA/CTV - if negative, no further inpatient neurologic workup needed at this time  [ ] After discharge should f/u outpatient with stroke neurology - can schedule appointment with [address] [place] [address] at [phone].  [ ] Can c/w Tylenol, Toradol, and Reglan for HA control.    Case discussed with neurology attending Dr. Mckenzie. Recommendations not finalized until attested by attending.

## 2024-03-19 NOTE — ED ADULT NURSE NOTE - OBJECTIVE STATEMENT
PT is a 33 year old A&OX4 female with PMH of DM, arthritis, and craniotomy from multiple CVA's in the past and legal blindness who presents to the ED via EMS with c/o head injury. Per EMS, PT accidentally walked into a wooden pole and endorses headstrike. Since the incident, PT endorsing nausea, headache, and dizziness. PT states her pain feels similar to when she had her strokes and feels "like there is liquid in my head." PT denies LOC and vomiting. PT denies chest pain, SOB, diarrhea, and fevers. PT is resting comfortably in bed, breathing unlabored on room air, and speaking in complete sentences. Abdomen is soft, non-tender, and non-distended. Skin is warm and dry, no diaphoresis noted. No edema noted to B/L extremities. Strong strength in B/L extremities, sensation intact. PT placed in hospital gown. PT ambulatory with steady gait with 1x assist on the right side. Safety and comfort maintained. PT is a 33 year old A&OX4 female with PMH of SCD, DM, arthritis, and craniotomy from multiple CVA's in the past and legal blindness who presents to the ED via EMS with c/o head injury. Per EMS, PT accidentally walked into a wooden pole and endorses headstrike on 03/17. Since the incident, PT endorsing nausea, headache, and dizziness. PT states her pain feels similar to when she had her strokes and feels "like there is liquid in my head." PT denies LOC and vomiting. PT denies chest pain, SOB, diarrhea, and fevers. PT is resting comfortably in bed, breathing unlabored on room air, and speaking in complete sentences. Abdomen is soft, non-tender, and non-distended. Skin is warm and dry, no diaphoresis noted. No edema noted to B/L extremities. Strong strength in B/L extremities, sensation intact. PT placed in hospital gown. PT ambulatory with steady gait with 1x assist on the right side. Safety and comfort maintained.

## 2024-03-19 NOTE — ED PROVIDER NOTE - NSFOLLOWUPINSTRUCTIONS_ED_ALL_ED_FT
You were seen in the Emergency Department for headache.     1) Advance activity as tolerated.   2) Continue all previously prescribed medications as directed.  Take acetaminophen 975mg and ibuprofen 400mg every 6-8 hours if headache symptoms return. They can be taken together.   3) Follow up with neurology and your primary care physician in 1 week - take copies of your results.   [address] [place] [address] at [phone].  4) Return to the Emergency Department for worsening or persistent symptoms, and/or ANY NEW OR CONCERNING SYMPTOMS.     Headache    A headache is pain or discomfort felt around the head or neck area. The specific cause of a headache may not be found as there are many types including tension headaches, migraine headaches, and cluster headaches. Watch your condition for any changes. Things you can do to manage your pain include taking over the counter and prescription medications as instructed by your health care provider, lying down in a dark quiet room, limiting stress, getting regular sleep, and refraining from alcohol and tobacco products.    SEEK IMMEDIATE MEDICAL CARE IF YOU HAVE ANY OF THE FOLLOWING SYMPTOMS: fever, vomiting, stiff neck, loss of vision, problems with speech, muscle weakness, loss of balance, trouble walking, passing out, or confusion.

## 2024-03-19 NOTE — ED PROVIDER NOTE - PHYSICAL EXAMINATION
Gen: well appearing, in no acute distress   Head: normal appearing, circular scar over L forehead   HEENT: normal conjunctiva, oral mucosa moist, pupils are dilated LUBA, L is not reactive to light (baseline?)   Lung: no respiratory distress, speaking in full sentences, CTA b/l, no wheeze, crackles or rhonchi   CV: regular rate and rhythm, no murmurs  Abd: soft, non distended, non tender   MSK: no visible deformities, 5/5 strength in LUBA UE   Neuro: No focal deficits, AAOx3  Skin: Warm, no rashes   Psych: normal affect

## 2024-03-19 NOTE — CONSULT NOTE ADULT - SUBJECTIVE AND OBJECTIVE BOX
Neurology - Consult Note    -  Spectra: [phone] ([facility]), [phone] ([facility])  -    HPI: 33F pmhx stroke x 2 at age 17 (residual LHH, reportedly legally blind), Sickle Cell disease s/p HSCT in 2019.    Allergies:  apple (Anaphylaxis)  Exjade (Rash)      PMHx/PSHx/Family Hx: As above, otherwise see below   Sickle cell disease    Blindness, legal    Cerebral vascular accident    Abnormal LFTs (liver function tests)    Amenorrhea    Anemia    Right foot pain    Migraine headache    Cough    Knee pain, bilateral    Postnasal drip    T2DM (type 2 diabetes mellitus)    H/O aneurysm        Social Hx:  No current use of tobacco, alcohol, or illicit drugs  Lives with ***    Medications:  MEDICATIONS  (STANDING):    MEDICATIONS  (PRN):      Vitals:  T(C): 36.7 (03-19-24 @ 17:15), Max: 36.7 (03-19-24 @ 14:15)  HR: 97 (03-19-24 @ 17:15) (60 - 97)  BP: 138/82 (03-19-24 @ 17:15) (118/68 - 142/87)  RR: 18 (03-19-24 @ 17:15) (18 - 18)  SpO2: 99% (03-19-24 @ 17:15) (95% - 100%)    Physical Examination:   General - NAD  Cardiovascular - no edema  Eyes -Fundoscopy not well visualized    Neurologic Exam:  Mental status - Awake, Alert, Oriented to person, place, and time. Speech fluent, repetition and naming intact. Follows simple and complex commands. Attention/concentration, recent and remote memory (including registration and recall), and fund of knowledge intact    Cranial nerves - PERRL, +L homonymous hemianopsia, EOMI, face sensation (V1-V3) intact b/l, facial strength intact without asymmetry b/l, hearing intact b/l, palate with symmetric elevation, trapezius 5/5 strength b/l, tongue midline on protrusion with full lateral movement    Motor - Normal bulk and tone throughout. No pronator drift.    Strength testing            Deltoid      Biceps      Triceps     Wrist Extension    Wrist Flexion     Interossei         R            5                 5               5                     5                    5                   5                 5  L             5                 5               5                     5                     5                  5                 5              Hip Flexion    Hip Extension    Knee Flexion    Knee Extension    Dorsiflexion    Plantar Flexion  R              5                           5                       5              5                            5                          5  L              5                           5                        5              5                            5                          5    Sensation - Light touch intact throughout    DTR's -             Biceps      Triceps     Brachioradialis      Patellar    Ankle    Toes/plantar response  R             3+             2+                  3+             3+            2+                 Mute  L              3+             2+                 3+              3+            2+                Mute    Coordination - Finger to Nose intact b/l. No tremors appreciated    Gait and station - Ambulates, cautious gait due to visual impairment    Labs:                        11.6   6.00  )-----------( 314      ( 19 Mar 2024 15:01 )             35.0     03-19    141  |  105  |  12  ----------------------------<  109<H>  3.9   |  27  |  0.53    Ca    9.9      19 Mar 2024 15:01    TPro  7.2  /  Alb  4.6  /  TBili  0.4  /  DBili  x   /  AST  22  /  ALT  32  /  AlkPhos  102  03-19    CAPILLARY BLOOD GLUCOSE        LIVER FUNCTIONS - ( 19 Mar 2024 15:01 )  Alb: 4.6 g/dL / Pro: 7.2 g/dL / ALK PHOS: 102 U/L / ALT: 32 U/L / AST: 22 U/L / GGT: x             PT/INR - ( 19 Mar 2024 15:01 )   PT: 11.7 sec;   INR: 1.07 ratio         PTT - ( 19 Mar 2024 15:01 )  PTT:33.6 sec  CSF:                  Radiology:  CT Head No Cont:  (19 Mar 2024 16:22)     Neurology - Consult Note    -  Spectra: [phone] ([facility]), [phone] ([facility])  -    HPI: 33F pmhx ischemic stroke and IPH [unclear if HT or separate lesion] at age 17 (had L craniectomy, residual LHH, reportedly legally blind), Sickle Cell disease s/p HSCT in 2019, b/l hip replacements c/b infection, presents with headache after mild trauma. On christina 3/17, pt was walking and accidentally hit L side of head on a wooden plank (she did not see the object due to the LHH) - shortly afterwards, pt transiently experienced vertiginous dizziness and worsening of her vision, but these only lasted briefly and no residual neurologic changes (besides baseline LHH). Pt in came today because the headache persisted - it is maximally an 8/10 (although 1/10 when evaluated by neuro in ED, at that point pt had received Ofirmev 1000, Reglan 10, and Toradol 15), throbbing quality, present on L side of head, a/w nausea (no vomiting), mild photophobia, and nonpositional. Pt denies acute weakness, numbness/tingling, double vision, language difficulties, gait instability, hearing loss, tinnitus, or LoC.    Allergies:  apple (Anaphylaxis)  Exjade (Rash)      PMHx/PSHx/Family Hx: As above, otherwise see below   Sickle cell disease    Blindness, legal    Cerebral vascular accident    Abnormal LFTs (liver function tests)    Amenorrhea    Anemia    Right foot pain    Migraine headache    Cough    Knee pain, bilateral    Postnasal drip    T2DM (type 2 diabetes mellitus)    H/O aneurysm        Social Hx:  No current use of tobacco, alcohol, or illicit drugs  Lives with ***    Medications:  MEDICATIONS  (STANDING):    MEDICATIONS  (PRN):      Vitals:  T(C): 36.7 (03-19-24 @ 17:15), Max: 36.7 (03-19-24 @ 14:15)  HR: 97 (03-19-24 @ 17:15) (60 - 97)  BP: 138/82 (03-19-24 @ 17:15) (118/68 - 142/87)  RR: 18 (03-19-24 @ 17:15) (18 - 18)  SpO2: 99% (03-19-24 @ 17:15) (95% - 100%)    Physical Examination:   General - NAD  Cardiovascular - no edema  Eyes -Fundoscopy not well visualized    Neurologic Exam:  Mental status - Awake, Alert, Oriented to person, place, and time. Speech fluent, repetition and naming intact. Follows simple and complex commands. Attention/concentration, recent and remote memory (including registration and recall), and fund of knowledge intact    Cranial nerves - PERRL, +L homonymous hemianopsia, EOMI, face sensation (V1-V3) intact b/l, facial strength intact without asymmetry b/l, hearing intact b/l, palate with symmetric elevation, trapezius 5/5 strength b/l, tongue midline on protrusion with full lateral movement    Motor - Normal bulk and tone throughout. No pronator drift.    Strength testing            Deltoid      Biceps      Triceps     Wrist Extension    Wrist Flexion     Interossei         R            5                 5               5                     5                    5                   5                 5  L             5                 5               5                    5                     5                  5                 5              Hip Flexion    Hip Extension    Knee Flexion    Knee Extension    Dorsiflexion    Plantar Flexion  R              5                           5                       5              5                            5                          5  L              5                           5                        5              5                            5                          5    Sensation - Light touch intact throughout    DTR's -             Biceps      Triceps     Brachioradialis      Patellar    Ankle    Toes/plantar response  R             3+             2+                  3+             3+            2+                 Mute  L              3+             2+                 3+              3+            2+                Mute    Coordination - Finger to Nose intact b/l. No tremors appreciated    Gait and station - Ambulates, cautious gait due to visual impairment    Labs:                        11.6   6.00  )-----------( 314      ( 19 Mar 2024 15:01 )             35.0     03-19    141  |  105  |  12  ----------------------------<  109<H>  3.9   |  27  |  0.53    Ca    9.9      19 Mar 2024 15:01    TPro  7.2  /  Alb  4.6  /  TBili  0.4  /  DBili  x   /  AST  22  /  ALT  32  /  AlkPhos  102  03-19    CAPILLARY BLOOD GLUCOSE        LIVER FUNCTIONS - ( 19 Mar 2024 15:01 )  Alb: 4.6 g/dL / Pro: 7.2 g/dL / ALK PHOS: 102 U/L / ALT: 32 U/L / AST: 22 U/L / GGT: x             PT/INR - ( 19 Mar 2024 15:01 )   PT: 11.7 sec;   INR: 1.07 ratio         PTT - ( 19 Mar 2024 15:01 )  PTT:33.6 sec  CSF:                  Radiology:  CT Head No Cont:  (19 Mar 2024 16:22)

## 2024-03-19 NOTE — ED ADULT NURSE REASSESSMENT NOTE - NS ED NURSE REASSESS COMMENT FT1
RAZ Han received report from RAZ Izquierdo. Pt A&Ox3, breathing unlabored and spontaneous, full ROM of all extremities. Pt resting in stretcher, bed in lowest position, aware of plan of care. Comfort and safety measures maintained.

## 2024-03-19 NOTE — ED PROVIDER NOTE - CLINICAL SUMMARY MEDICAL DECISION MAKING FREE TEXT BOX
33-year-old female with past medical history of sickle cell disease, CVA and craniectomy 16 years ago presents to ED for evaluation of headache x 4 days.  Symptoms began after hitting head on piece of wood. Has had multiple episodes of emesis since injury and currently feels nauseous + worsening vision (has baseline vision problems since CVA). No focal extremity weakness. Pupils are dilated LUBA, L is not reactive to light, patient believes this may be normal since stroke. Plan to evaluate with CT, will mediate for symptoms. Dispo per CT findings, if imaging negative, will d/c with concussion precautions.

## 2024-03-19 NOTE — ED PROVIDER NOTE - OBJECTIVE STATEMENT
33-year-old female with past medical history of sickle cell disease, CVA and craniectomy 16 years ago presents to ED for evaluation of headache x 4 days.  Symptoms began after hitting head on piece of wood. Has had multiple episodes of emesis since injury and currently feels nauseous. States she swallowed her emesis on Saturday, and has had a cough since. Prior CVA was in the setting of sickle cell disease, she has since had bone marrow transplant and "is cured of sickle cell".  Has residual vision problems since stroke, feels vision is worsening since injury. Denies fevers, chills, chest pain, difficulty breathing, hematemesis, LOC, dizziness or syncope.  No known drug allergies.

## 2024-03-19 NOTE — CONSULT NOTE ADULT - ATTENDING COMMENTS
I have NOT examined the pt at bedside.  The risks and the benefits of the proposed diagnostic/therapeutic approach were thoroughly discussed.   I agree with the above plan and have modified it where necessary.    I was not present during the pt's evaluation by the Neurology Resident/Fellow.  Given the clinical presentation and outcome of the tests done, I agree with the above assessment and recommendations.

## 2024-03-19 NOTE — ED ADULT NURSE NOTE - RESPIRATION RHYTHM, QM
[facility] Labor and Delivery History and Physical    Debbie Carl MRN# [id number]   Age: 22 year old YOB: 1999     Date of Admission:  2022    Primary care provider: No Ref-Primary, Physician           Chief Complaint:   Debbie Carl is a 22 year old  at 39w2d by 20w2d US admitted for elective IOL for social situation.          Pregnancy history:     OBSTETRIC HISTORY:    OB History    Para Term  AB Living   2 1 1 0 0 1   SAB IAB Ectopic Multiple Live Births   0 0 0 0 1      # Outcome Date GA Lbr Alistair/2nd Weight Sex Delivery Anes PTL Lv   2 Current            1 Term 20 39w6d 11:38 / 03:00 4.065 kg (8 lb 15.4 oz) F Vag-Spont EPI, IV, Local N SARAH      Complications: Other Excessive Bleeding      Name: ANJALIFEMALE-DEBBIE      Apgar1: 9  Apgar5: 9      Obstetric Comments   Positive SMA carrier    Negative CF        EDC: Estimated Date of Delivery: 2022    Prenatal Labs:   Lab Results   Component Value Date    ABO O 2020    RH Pos 2020    AS Negative 2022    HEPBANG Nonreactive 2021    HGB 9.8 (L) 2022       GBS Status:   negative    Active Problem List  Patient Active Problem List   Diagnosis     ADHD     Appendicitis with abscess     Mononucleosis     Controlled substance agreement signed     Ruptured suppurative appendicitis     Encounter for triage in pregnant patient      contractions     Acute gastroenteritis     Dehydration     Normal labor and delivery      (normal spontaneous vaginal delivery)     Encounter for elective induction of labor       Medication Prior to Admission  Medications Prior to Admission   Medication Sig Dispense Refill Last Dose     ferrous sulfate (SLO-FE) 142 (45 Fe) MG CR tablet Take 1 tablet (142 mg) by mouth 3 times daily (with meals) 30 tablet 0 Past Week at Unknown time     Prenatal Vit-Fe Fumarate-FA (PRENATAL MULTIVITAMIN W/IRON) 27-0.8 MG tablet Take 1  tablet by mouth daily 90 tablet 3 2022 at Unknown time     sertraline (ZOLOFT) 25 MG tablet Take 1 tablet (25 mg) by mouth daily (Patient not taking: No sig reported) 30 tablet 1    .        Maternal Past Medical History:     Past Medical History:   Diagnosis Date     Acute gastroenteritis 2019     ADHD (attention deficit hyperactivity disorder)      Anemia     following delivery      Bipolar 1 disorder (H)      Depression      Depressive disorder      History of blood transfusion      Postpartum depression      Viral gastroenteritis 2019     Past Surgical History:   Procedure Laterality Date     APPENDECTOMY                         Family History:     Family History   Problem Relation Age of Onset     Bipolar Disorder Mother      Attention Deficit Disorder Father      Thyroid Disease Father      Hyperthyroidism Sister      No Known Problems Brother      Brain Tumor Maternal Grandmother      Cancer Maternal Grandfather      Heart Disease Paternal Grandfather      Cancer Paternal Grandfather               Social History:     Social History     Tobacco Use     Smoking status: Former Smoker     Packs/day: 1.00     Smokeless tobacco: Never Used   Substance Use Topics     Alcohol use: No            Review of Systems:   The Review of Systems is negative other than noted in the HPI          Physical Exam:   No data found.  Gen: resting in bed, NAD  CV: RRR  Resp: CTAB  Abd: gravid, soft, nontender  Ext: nontender, trace edema    Cervix: 2cm last week, will check again after epidural  Membranes: intact  EFW: 8.25 lbs  Presentation:Cephalic    Fetal Heart Rate Tracin, mod variability, + accels, no decels  Tocometer: irregular ctx        Assessment:   Bebe Carl is a 22 year old  at 39w2d by 20w2d US admitted for elective IOL          Plan:   Bipolar disorder: zoloft  Hx of PPH secondary to vaginal laceration, required transfusion  SMA carrier  Desires sterilization- plans PPTL if c/s,  interval tubal if   FWB: Cat I, reactive. EFW 8.25 lbs  Labor: pitocin per protocol, will AROM when able  Pain: plans epidural  Rh positive, RI, GBS negative  Anticipate       Mandie Shook MD        regular

## 2024-03-19 NOTE — ED ADULT NURSE NOTE - GLASGOW COMA SCALE: BEST VERBAL RESPONSE
Mom called stating that she had questions regarding her daughters (patient) new Migraine medication (Propranolol). She also says that the pharmacy needs a new review. Mom would like a call back [phone].   (V5) oriented

## 2024-03-19 NOTE — ED ADULT TRIAGE NOTE - WEIGHT IN KG
Katie Faye  47 y.o. male  1967  [address]  [id number]    [phone] (home)      [address]:    Telephone Encounter  Vicki Hitchcock MD       Encounter Date: 6/17/2020    Consent:  He and/or the health care decision maker is aware that that he may receive a bill for this telephone service, depending on his insurance coverage, and has provided verbal consent to proceed: Yes    Chief Complaint   Patient presents with    Diabetes       History of Present Illness   Katie Faye is a 47 y.o. male was evaluated by telephone. Patient following on Diabetes;    DM: Seen in clinic about a month ago for follow up. His diabetes is poorly controlled with A1C of 12% but patient adamantly refused being on insulin. He was also offered to be started on newer medications but patient was not able to afford them due to lack of insurance. His only Medication is Metformin 1000mg BID. -Signs and symptoms associated with Diabetes: No  -Received diabetic education? No  -Home blood sugar range: Generally in the 200-300 range although he has few readings <130    -Diet: States that he cut down significantly on \"junk\" food. Now tries to eat healthy in attempt to lower his A1C.  -Most recent A1c:  12% (06/11/2020)  -Last ophthalmologist visit: 2019  -Last Foot Exam: 2019  -Peripheral neuropathy? No  -On ace inhibitor or ARB?  no  -On a statin?  no  -On aspirin? no        Review of Systems   ROS    Review of systems:  Items bolded if positive.   Constitutional: Fever, chills, night sweats  Endocrine: Weight change, heat/ cold intolerance, tremor, insomnia, polyuria, polydipsia, polyphagia,  Cardiovascular: Chest pain, palpitations, syncope, lower extremity edema, orthopnea, paroxysmal nocturnal dyspnea  Pulmonary: Shortness of breath, dyspnea on exertion,      Vitals/Objective:     General: alert, cooperative,   Mental  status: mental status: alert, oriented to person, place, and time, normal mood, behavior, speech, and thought processes   Resp: resp: normal effort and no respiratory distress   Extremity Unable to evaluate       Due to this being a TeleHealth evaluation, many elements of the physical examination are unable to be assessed. Assessment and Plan:   Time-based coding, delete if not needed: I spent at least 15 minutes with this established patient, and >50% of the time was spent counseling and/or coordinating care regarding DM type II  Total Time: minutes: 11-20 minutes      ICD-10-CM ICD-9-CM    1. Uncontrolled type 2 diabetes mellitus with hyperglycemia (HCC) E11.65 250.02 glipiZIDE SR (GLUCOTROL XL) 5 mg CR tablet      atorvastatin (LIPITOR) 40 mg tablet        1. DM type II: Poorly controlled with A1C 12%. Due to lack of insurance and patient's refusal to started on insulin, our options are limited at this time despite elevated A1C. Pt can afford sulfonylureas and a statin.    - glipiZIDE SR (GLUCOTROL XL) 5 mg CR tablet; Take 1 Tab by mouth daily. - atorvastatin (LIPITOR) 40 mg tablet  - Continue Metformin  - Keep blood sugar log  - Hypoglycemic precautions and symptoms discussed with pt and counseled to hold medications and call office should they occur  -Focus on regular exercise (150 minutes each week) and healthy eating.    - A1C in 4 weeks      I affirm this is a Patient Initiated Episode with an Established Patient who has not had a related appointment within my department in the past 7 days or scheduled within the next 24 hours. Note: not billable if this call serves to triage the patient into an appointment for the relevant concern     We discussed the expected course, resolution and complications of the diagnosis(es) in detail. Medication risks, benefits, costs, interactions, and alternatives were discussed as indicated.   I advised him to contact the office if his condition worsens, changes or fails to improve as anticipated. He expressed understanding with the diagnosis(es) and plan. Patient understands that this encounter was a temporary measure, and the importance of further follow up and examination was emphasized. Patient verbalized understanding. Follow-up and Dispositions  ·   Return in about 4 weeks (around 7/15/2020). Patient discussed with Dr. Dang Chavez (supervising provider)      Electronically Signed: Martine Payton MD    Providers location when delivering service: Home    CPT:  62681 (5-10 minutes)  [phone] (11-20 minutes)  21  (21-30 minutes)      Pursuant to the emergency declaration under the [facility] waiver authority and the [employer] and [employer] Act, this Virtual  Visit was conducted, with patient's consent, to reduce the patient's risk of exposure to COVID-19 and provide continuity of care for an established patient. Services were provided through a video synchronous discussion virtually to substitute for in-person clinic visit. History   Patients past medical, surgical and family histories were personally reviewed and updated. No past medical history on file. No past surgical history on file. No family history on file.   Social History     Socioeconomic History    Marital status:      Spouse name: Not on file    Number of children: Not on file    Years of education: Not on file    Highest education level: Not on file   Occupational History    Not on file   Social Needs    Financial resource strain: Not on file    Food insecurity     Worry: Not on file     Inability: Not on file    Transportation needs     Medical: Not on file     Non-medical: Not on file   Tobacco Use    Smoking status: Never Smoker    Smokeless tobacco: Never Used   Substance and Sexual Activity    Alcohol use: Not Currently    Drug use: Not on file    Sexual activity: Not on file   Lifestyle  Physical activity     Days per week: Not on file     Minutes per session: Not on file    Stress: Not on file   Relationships    Social connections     Talks on phone: Not on file     Gets together: Not on file     Attends Buddhism service: Not on file     Active member of club or organization: Not on file     Attends meetings of clubs or organizations: Not on file     Relationship status: Not on file    Intimate partner violence     Fear of current or ex partner: Not on file     Emotionally abused: Not on file     Physically abused: Not on file     Forced sexual activity: Not on file   Other Topics Concern    Not on file   Social History Narrative    Not on file            Current Medications/Allergies   Medications and Allergies reviewed:    Current Outpatient Medications   Medication Sig Dispense Refill    metFORMIN (GLUCOPHAGE) 500 mg tablet Take 2 Tabs by mouth two (2) times daily (with meals). 120 Tab 3    glipiZIDE SR (GLUCOTROL XL) 5 mg CR tablet Take 1 Tab by mouth daily. 90 Tab 0    atorvastatin (LIPITOR) 40 mg tablet Take 1 Tab by mouth daily.  90 Tab 1    Blood-Glucose Meter monitoring kit Use 4x per day to check sugar 1 Kit 0    glucose blood VI test strips (BLOOD GLUCOSE TEST) strip Use to check sugar 4 times per day 120 Strip 2    lancets misc Use to check sugar 4 times per day 1 Each 11     No Known Allergies 72.6

## 2024-03-19 NOTE — ED PROVIDER NOTE - PATIENT PORTAL LINK FT
You can access the FollowMyHealth Patient Portal offered by [facility] by registering at the following website: [email or web address]. By joining [facility]’s FollowMyHealth portal, you will also be able to view your health information using other applications (apps) compatible with our system.

## 2024-03-19 NOTE — ED PROVIDER NOTE - ATTENDING CONTRIBUTION TO CARE
I performed a history and physical exam of the patient and discussed their management with the resident. I reviewed the resident's note and agree with the documented findings and plan of care.  Jamilah Aviles MD

## 2024-03-19 NOTE — ED PROVIDER NOTE - NSFOLLOWUPCLINICS_GEN_ALL_ED_FT
[facility]  Neurology  [address] [place] [address] [address] [place] [address]  Phone: [phone]  Fax:

## 2024-03-19 NOTE — ED PROVIDER NOTE - PROGRESS NOTE DETAILS
[place] PGY1: contacted CT scan to expedite imaging, given concerning story and hx [place] PGY1: neurology evaluated patient. given hx of aneurysm and CVA, obesity recommending CTA and CTV. can continue with current medications for headache. if imaging negative, will d/c patient with stroke neurology f/u.

## 2024-03-20 VITALS
OXYGEN SATURATION: 99 % | SYSTOLIC BLOOD PRESSURE: 127 MMHG | TEMPERATURE: 99 F | RESPIRATION RATE: 18 BRPM | HEART RATE: 97 BPM | DIASTOLIC BLOOD PRESSURE: 83 MMHG

## 2024-03-20 NOTE — ED POST DISCHARGE NOTE - RESULT SUMMARY
CT head/venogram with ?adenoiditis and enlarged right retropharyngeal lymph node presumably reactive. Recommended clinical correlation. Spoke w/ p CT head/venogram with ?adenoiditis and enlarged right retropharyngeal lymph node presumably reactive. Recommended clinical correlation. Spoke w/ pt and informed her of this. States has mild sore throat, no fever or difficulty swallowing. Rec supportive measures at this time but advised if worsens should f/u with PMD to ensure no progressing infection. Pt acknowledged understanding and will f/u with PMD. - Linwood Mckeon PA-C

## 2024-04-01 ENCOUNTER — APPOINTMENT (OUTPATIENT)
Dept: NEUROLOGY | Facility: CLINIC | Age: 34
End: 2024-04-01
Payer: MEDICAID

## 2024-04-01 VITALS
HEART RATE: 67 BPM | SYSTOLIC BLOOD PRESSURE: 118 MMHG | BODY MASS INDEX: 27.31 KG/M2 | WEIGHT: 160 LBS | DIASTOLIC BLOOD PRESSURE: 81 MMHG | HEIGHT: 64 IN

## 2024-04-01 DIAGNOSIS — R51.9 HEADACHE, UNSPECIFIED: ICD-10-CM

## 2024-04-01 PROCEDURE — 99205 OFFICE O/P NEW HI 60 MIN: CPT

## 2024-04-01 RX ORDER — BLOOD-GLUCOSE METER
KIT MISCELLANEOUS
Qty: 1 | Refills: 11 | Status: DISCONTINUED | COMMUNITY
Start: 2023-12-07 | End: 2024-04-01

## 2024-04-01 RX ORDER — METRONIDAZOLE 500 MG/1
500 TABLET ORAL TWICE DAILY
Qty: 14 | Refills: 0 | Status: DISCONTINUED | COMMUNITY
Start: 2024-02-29 | End: 2024-04-01

## 2024-04-01 RX ORDER — BLOOD-GLUCOSE METER
W/DEVICE KIT MISCELLANEOUS
Qty: 1 | Refills: 1 | Status: DISCONTINUED | COMMUNITY
Start: 2023-12-07 | End: 2024-04-01

## 2024-04-01 RX ORDER — ACETAMINOPHEN EXTRA STRENGTH 500 MG/1
500 TABLET ORAL
Qty: 30 | Refills: 0 | Status: DISCONTINUED | COMMUNITY
Start: 2019-01-07 | End: 2024-04-01

## 2024-04-01 NOTE — DISCUSSION/SUMMARY
[FreeTextEntry1] : Impression: Headache after mild head trauma, with no new focal neuro deficits and CTH without acute pathology. DDx for HA includes i/s/o  mild head trauma vs migrainous (given one sided, features of nausea, photophobia) - however, would be prudent to r/o VST given age, hx of sickle cell, and no prior migraine hx.  Recommendations: [ ] Obtain CTA/CTV - if negative, no further inpatient neurologic workup needed at this time [ ] After discharge should f/u outpatient with stroke neurology - can schedule appointment with [address] [place] [address] at [phone]. [ ] Can c/w Tylenol, Toradol, and Reglan for HA control

## 2024-04-01 NOTE — DISCUSSION/SUMMARY
[FreeTextEntry1] : Impression: Headache after mild head trauma, with no new focal neuro deficits and CTH without acute pathology. DDx for HA includes i/s/o  mild head trauma vs migrainous (given one sided, features of nausea, photophobia) - however, would be prudent to r/o VST given age, hx of sickle cell, and no prior migraine hx.  Recommendations: [ ] Obtain CTA/CTV - if negative, no further inpatient neurologic workup needed at this time [ ] After discharge should f/u outpatient with stroke neurology - can schedule appointment with [address] [place] [address] at [phone]. [ ] Can c/w Tylenol, Toradol, and Reglan for HA control

## 2024-04-01 NOTE — PHYSICAL EXAM
[General Appearance - Alert] : alert [General Appearance - In No Acute Distress] : in no acute distress [Oriented To Time, Place, And Person] : oriented to person, place, and time [Impaired Insight] : insight and judgment were intact [Affect] : the affect was normal [Sclera] : the sclera and conjunctiva were normal [PERRL With Normal Accommodation] : pupils were equal in size, round, reactive to light, with normal accommodation [Outer Ear] : the ears and nose were normal in appearance [Extraocular Movements] : extraocular movements were intact [Examination Of The Oral Cavity] : the lips and gums were normal [Neck Appearance] : the appearance of the neck was normal [Auscultation Breath Sounds / Voice Sounds] : lungs were clear to auscultation bilaterally [Heart Sounds] : normal S1 and S2 [Abnormal Walk] : normal gait [Musculoskeletal - Swelling] : no joint swelling seen [Nail Clubbing] : no clubbing  or cyanosis of the fingernails [Skin Color & Pigmentation] : normal skin color and pigmentation [Motor Tone] : muscle strength and tone were normal [] : no rash [Skin Turgor] : normal skin turgor [FreeTextEntry1] :  Neurologic Exam: Mental status - Awake, Alert, Oriented to person, place, and time. Speech fluent. Fund of knowledge intact.  Cranial nerves - PERRL, +L homonymous hemianopsia, can only see the upper half of right visual field, EOMI, face sensation (V1-V3) intact b/l, facial strength intact without asymmetry b/l, hearing intact b/l, palate with symmetric elevation, trapezius 5/5 strength b/l, tongue midline on protrusion with full lateral movement  Motor - Normal bulk and tone throughout. No pronator drift.  Left iliopsoas 4/5; otherwise power was normal throughout.  Sensation - Light touch intact throughout  Coordination - Finger to Nose intact b/l.   Gait and station - Ambulates, spastic gait; Romberg is absent.

## 2024-04-01 NOTE — HISTORY OF PRESENT ILLNESS
[Headache] : headache [Dizziness] : dizziness [Nausea] : nausea [Vomiting] : Vomiting [improved] : improved [Daily] : daily [10] : a maximum pain level of 10/10 [3] : a current pain level of 3/10 [FreeTextEntry1] : HPI from [facility] 3/19/24: HPI: 33F pmhx ischemic stroke and IPH [unclear if HT or separate lesion] at age 17 (had L craniectomy, residual LHH, reportedly legally blind), Sickle Cell disease s/p HSCT in 2019, b/l hip replacements c/b infection, presents with headache after mild trauma. On christina 3/17, pt was walking and accidentally hit L side of head on a wooden plank (she did not see the object due to the LHH) - shortly afterwards, pt transiently experienced vertiginous dizziness and worsening of her vision, but these only lasted briefly and no residual neurologic changes (besides baseline LHH). Pt in came today because the headache persisted - it is maximally an 8/10 (although 1/10 when evaluated by neuro in ED, at that point pt had received Ofirmev 1000, Reglan 10, and Toradol 15), throbbing quality, present on L side of head, a/w nausea (no vomiting), mild photophobia, and nonpositional. Pt denies acute weakness, numbness/tingling, double vision, language difficulties, gait instability, hearing loss, tinnitus, or LoC.  CTA BRAIN 3/19/24: Patent intracranial circulation. No flow-limiting stenosis or occlusion. No evidence of aneurysm.  CT VENOGRAM 3/19/24: No evidence of dural sinus thrombosis.  ? Adenoiditis. Enlarged right retropharyngeal lymph node presumably reactive. Clinical correlation recommended.  4/1/24 She presents to the office today. She notes that her headaches are still present, but have been greatly improving. She denies any new focal neurologic symptoms.  PCP Dr. Brittanie Preston  [de-identified] : was hearing a high pitched sound

## 2024-04-01 NOTE — ASSESSMENT
[FreeTextEntry1] : To summarize, she had a stroke in 2016 with residual left hemiparesis and left homonymous hemianopia, thought to be due to sickle cell disease. On 3/17/24, she had a mechanical fall and hit her head. After the head trauma, she began having headaches. She presented to [facility] and had a CT head and CTV with no acute pathology. Her headaches have been improving and don't possess red flag features;  her presentations is consistent with post-concussive syndrome.   [] We discussed the natural course of post-concussive syndrome and that her headaches should continue to improve. She will call if she experiences any worsening. [] She can continue to use Tylenol PRN; less than 2x week, to prevent medication overuse headaches. [] I recommend she start taking Magnesium Glycinate 400mg at night for headache prophylaxis. [] We discussed healthy headache habits: eating regular meals, getting regular and sufficient sleep, paying close attention to hydration, participating in aerobic exercise, stress reduction, and avoidance of overuse of acute medications. [] She endorses snoring, raising concern for obstructive sleep apnea. I have requested a home sleep study.  [] Her CT venogram demonstrated possible adenoiditis. I will defer to Dr. Preston regarding whether that finding warrants further investigation. [] I have requested her outside records from [facility].  She can follow up in 1-2 months with [facility].

## 2024-04-01 NOTE — HISTORY OF PRESENT ILLNESS
[Dizziness] : dizziness [Headache] : headache [Nausea] : nausea [Vomiting] : Vomiting [improved] : improved [Daily] : daily [3] : a current pain level of 3/10 [10] : a maximum pain level of 10/10 [FreeTextEntry1] : HPI from [facility] 3/19/24: HPI: 33F pmhx ischemic stroke and IPH [unclear if HT or separate lesion] at age 17 (had L craniectomy, residual LHH, reportedly legally blind), Sickle Cell disease s/p HSCT in 2019, b/l hip replacements c/b infection, presents with headache after mild trauma. On christina 3/17, pt was walking and accidentally hit L side of head on a wooden plank (she did not see the object due to the LHH) - shortly afterwards, pt transiently experienced vertiginous dizziness and worsening of her vision, but these only lasted briefly and no residual neurologic changes (besides baseline LHH). Pt in came today because the headache persisted - it is maximally an 8/10 (although 1/10 when evaluated by neuro in ED, at that point pt had received Ofirmev 1000, Reglan 10, and Toradol 15), throbbing quality, present on L side of head, a/w nausea (no vomiting), mild photophobia, and nonpositional. Pt denies acute weakness, numbness/tingling, double vision, language difficulties, gait instability, hearing loss, tinnitus, or LoC.  CTA BRAIN 3/19/24: Patent intracranial circulation. No flow-limiting stenosis or occlusion. No evidence of aneurysm.  CT VENOGRAM 3/19/24: No evidence of dural sinus thrombosis.  ? Adenoiditis. Enlarged right retropharyngeal lymph node presumably reactive. Clinical correlation recommended.  4/1/24 She presents to the office today. She notes that her headaches are still present, but have been greatly improving. She denies any new focal neurologic symptoms.  PCP Dr. Brittanie Preston  [de-identified] : was hearing a high pitched sound

## 2024-04-01 NOTE — ASSESSMENT
[FreeTextEntry1] : To summarize, she had a stroke in 2016 with residual left hemiparesis and left homonymous hemianopia, thought to be due to sickle cell disease. On 3/17/24, she had a mechanical fall and hit her head. After the head trauma, she began having headaches. She presented to [facility] and had a CT head and CTV with no acute pathology. Her headaches have been improving and don't possess red flag features;  her presentations is consistent with post-concussive syndrome.   [] We discussed the natural course of post-concussive syndrome and that her headaches should continue to improve. She will call if she experiences any worsening. [] She can continue to use Tylenol PRN; less than 2x week, to prevent medication overuse headaches. [] I recommend she start taking Magnesium Glycinate 400mg at night for headache prophylaxis. [] We discussed healthy headache habits: eating regular meals, getting regular and sufficient sleep, paying close attention to hydration, participating in aerobic exercise, stress reduction, and avoidance of overuse of acute medications. [] She endorses snoring, raising concern for obstructive sleep apnea. I have requested a home sleep study.  [] Her CT venogram demonstrated possible adenoiditis. I will defer to Dr. Preston regarding whether that finding warrants further investigation. [] I have requested her outside records from [facility].  She can follow up in 1-2 months with [facility].

## 2024-04-01 NOTE — REASON FOR VISIT
[Post Hospitalization] : a post hospitalization visit [Initial Evaluation] : an initial evaluation [FreeTextEntry1] : headaches

## 2024-04-01 NOTE — PHYSICAL EXAM
[General Appearance - Alert] : alert [General Appearance - In No Acute Distress] : in no acute distress [Oriented To Time, Place, And Person] : oriented to person, place, and time [Impaired Insight] : insight and judgment were intact [Affect] : the affect was normal [Sclera] : the sclera and conjunctiva were normal [PERRL With Normal Accommodation] : pupils were equal in size, round, reactive to light, with normal accommodation [Outer Ear] : the ears and nose were normal in appearance [Extraocular Movements] : extraocular movements were intact [Neck Appearance] : the appearance of the neck was normal [Examination Of The Oral Cavity] : the lips and gums were normal [Auscultation Breath Sounds / Voice Sounds] : lungs were clear to auscultation bilaterally [Heart Sounds] : normal S1 and S2 [Abnormal Walk] : normal gait [Musculoskeletal - Swelling] : no joint swelling seen [Nail Clubbing] : no clubbing  or cyanosis of the fingernails [Motor Tone] : muscle strength and tone were normal [Skin Color & Pigmentation] : normal skin color and pigmentation [Skin Turgor] : normal skin turgor [] : no rash [FreeTextEntry1] :  Neurologic Exam: Mental status - Awake, Alert, Oriented to person, place, and time. Speech fluent. Fund of knowledge intact.  Cranial nerves - PERRL, +L homonymous hemianopsia, can only see the upper half of right visual field, EOMI, face sensation (V1-V3) intact b/l, facial strength intact without asymmetry b/l, hearing intact b/l, palate with symmetric elevation, trapezius 5/5 strength b/l, tongue midline on protrusion with full lateral movement  Motor - Normal bulk and tone throughout. No pronator drift.  Left iliopsoas 4/5; otherwise power was normal throughout.  Sensation - Light touch intact throughout  Coordination - Finger to Nose intact b/l.   Gait and station - Ambulates, spastic gait; Romberg is absent.

## 2024-04-12 ENCOUNTER — APPOINTMENT (OUTPATIENT)
Dept: INTERNAL MEDICINE | Facility: CLINIC | Age: 34
End: 2024-04-12

## 2024-04-17 DIAGNOSIS — Z29.89 ENCOUNTER. FOR OTHER SPECIFIED PROPHYLACTIC MEASURES: ICD-10-CM

## 2024-06-24 ENCOUNTER — APPOINTMENT (OUTPATIENT)
Dept: NEUROLOGY | Facility: CLINIC | Age: 34
End: 2024-06-24
Payer: MEDICAID

## 2024-06-24 VITALS
HEART RATE: 60 BPM | WEIGHT: 160 LBS | BODY MASS INDEX: 27.31 KG/M2 | SYSTOLIC BLOOD PRESSURE: 137 MMHG | HEIGHT: 64 IN | DIASTOLIC BLOOD PRESSURE: 83 MMHG

## 2024-06-24 DIAGNOSIS — I63.9 CEREBRAL INFARCTION, UNSPECIFIED: ICD-10-CM

## 2024-06-24 PROCEDURE — 99213 OFFICE O/P EST LOW 20 MIN: CPT

## 2024-06-24 PROCEDURE — 93886 INTRACRANIAL COMPLETE STUDY: CPT

## 2024-06-24 PROCEDURE — 93892 TCD EMBOLI DETECT W/O INJ: CPT

## 2024-06-24 PROCEDURE — 99203 OFFICE O/P NEW LOW 30 MIN: CPT

## 2024-06-24 PROCEDURE — G2211 COMPLEX E/M VISIT ADD ON: CPT | Mod: NC,1L

## 2024-06-24 PROCEDURE — 93880 EXTRACRANIAL BILAT STUDY: CPT

## 2024-06-24 RX ORDER — ATOVAQUONE AND PROGUANIL HYDROCHLORIDE 250; 100 MG/1; MG/1
250-100 TABLET, FILM COATED ORAL DAILY
Qty: 20 | Refills: 0 | Status: DISCONTINUED | COMMUNITY
Start: 2024-04-17 | End: 2024-06-24

## 2024-07-02 ENCOUNTER — NON-APPOINTMENT (OUTPATIENT)
Age: 34
End: 2024-07-02

## 2024-07-03 ENCOUNTER — NON-APPOINTMENT (OUTPATIENT)
Age: 34
End: 2024-07-03

## 2024-08-06 ENCOUNTER — NON-APPOINTMENT (OUTPATIENT)
Age: 34
End: 2024-08-06

## 2024-08-09 ENCOUNTER — APPOINTMENT (OUTPATIENT)
Dept: INTERNAL MEDICINE | Facility: CLINIC | Age: 34
End: 2024-08-09

## 2024-08-09 ENCOUNTER — OUTPATIENT (OUTPATIENT)
Dept: OUTPATIENT SERVICES | Facility: HOSPITAL | Age: 34
LOS: 1 days | End: 2024-08-09

## 2024-08-09 DIAGNOSIS — Z94.81 BONE MARROW TRANSPLANT STATUS: Chronic | ICD-10-CM

## 2024-08-09 PROBLEM — Z91.018 ALLERGY TO APPLE: Status: ACTIVE | Noted: 2024-08-09

## 2024-08-09 PROCEDURE — G2211 COMPLEX E/M VISIT ADD ON: CPT | Mod: NC,1L

## 2024-08-09 PROCEDURE — 99214 OFFICE O/P EST MOD 30 MIN: CPT

## 2024-08-13 PROBLEM — Z87.898 HISTORY OF CARIES: Status: RESOLVED | Noted: 2022-07-05 | Resolved: 2024-08-13

## 2024-08-13 PROBLEM — Z01.818 PREOP EXAMINATION: Status: RESOLVED | Noted: 2021-04-26 | Resolved: 2024-08-13

## 2024-08-13 PROBLEM — Z01.411 ENCOUNTER FOR GYNECOLOGICAL EXAMINATION (GENERAL) (ROUTINE) WITH ABNORMAL FINDINGS: Status: RESOLVED | Noted: 2024-02-26 | Resolved: 2024-08-13

## 2024-08-13 PROBLEM — R10.11 RIGHT UPPER QUADRANT ABDOMINAL PAIN OF UNKNOWN ETIOLOGY: Status: ACTIVE | Noted: 2024-08-09

## 2024-08-13 PROBLEM — Z29.89 NEED FOR MALARIA PROPHYLAXIS: Status: RESOLVED | Noted: 2024-04-17 | Resolved: 2024-08-13

## 2024-08-13 PROBLEM — N76.0 BACTERIAL VAGINOSIS: Status: RESOLVED | Noted: 2024-02-29 | Resolved: 2024-08-13

## 2024-08-13 NOTE — PHYSICAL EXAM
[No Acute Distress] : no acute distress [Normal Voice/Communication] : normal voice/communication [Normal Sclera/Conjunctiva] : normal sclera/conjunctiva [No Respiratory Distress] : no respiratory distress  [No Accessory Muscle Use] : no accessory muscle use [Clear to Auscultation] : lungs were clear to auscultation bilaterally [Soft] : abdomen soft [Non-distended] : non-distended [No HSM] : no HSM [No Hernias] : no hernias [de-identified] : no rib tenderness to palpation. [de-identified] : right upper quadrant tenderness to palpation, positive andrea's sign, but patient extra sensitive.

## 2024-08-13 NOTE — ASSESSMENT
[FreeTextEntry1] : Assessment as indicated above in impressions with plans through orders below. Lab orders placed; serum being drawn in office today.

## 2024-08-13 NOTE — PHYSICAL EXAM
[No Acute Distress] : no acute distress [Normal Voice/Communication] : normal voice/communication [Normal Sclera/Conjunctiva] : normal sclera/conjunctiva [No Respiratory Distress] : no respiratory distress  [No Accessory Muscle Use] : no accessory muscle use [Clear to Auscultation] : lungs were clear to auscultation bilaterally [Soft] : abdomen soft [Non-distended] : non-distended [No HSM] : no HSM [No Hernias] : no hernias [de-identified] : no rib tenderness to palpation. [de-identified] : right upper quadrant tenderness to palpation, positive andrea's sign, but patient extra sensitive.

## 2024-08-13 NOTE — HISTORY OF PRESENT ILLNESS
[FreeTextEntry8] : Right rib pain started a few months ago, intermittent for that time period.  But this week pain was consistent.  Seems to go away coming into the doctor.  Pain is under the rib, not in the skin.  4 to 5 months ago, out of the blue pain started.  Intermittently, lasting a few minutes occurring intermittently as well.  No inciting or alleviating factors. Not similar to her sickle pains that she had in the past.    Cough she mentioned to RN has been present for years. On an inhaler, combo taking as needed. Has not needed to use the inhaler in the past month.  Normal PFTs in the past but being treated as if has asthma.   Never smoked.    Regarding her diabetes: she reports checking her fingersticks intermittently, noticing readings around 173s, taking Metformin.

## 2024-08-13 NOTE — PHYSICAL EXAM
[No Acute Distress] : no acute distress [Normal Voice/Communication] : normal voice/communication [Normal Sclera/Conjunctiva] : normal sclera/conjunctiva [No Respiratory Distress] : no respiratory distress  [No Accessory Muscle Use] : no accessory muscle use [Clear to Auscultation] : lungs were clear to auscultation bilaterally [Soft] : abdomen soft [Non-distended] : non-distended [No HSM] : no HSM [No Hernias] : no hernias [de-identified] : no rib tenderness to palpation. [de-identified] : right upper quadrant tenderness to palpation, positive andrea's sign, but patient extra sensitive.

## 2024-08-13 NOTE — REVIEW OF SYSTEMS
[Fever] : no fever [Chills] : no chills [Chest Pain] : no chest pain [Palpitations] : no palpitations [Orthopnea] : no orthopnea [Shortness Of Breath] : no shortness of breath [Wheezing] : no wheezing [Abdominal Pain] : no abdominal pain [Nausea] : no nausea [Constipation] : no constipation [Diarrhea] : diarrhea [Vomiting] : no vomiting [Melena] : no melena [Negative] : Genitourinary [FreeTextEntry6] : no change to her baseline cough

## 2024-08-16 DIAGNOSIS — J45.909 UNSPECIFIED ASTHMA, UNCOMPLICATED: ICD-10-CM

## 2024-08-16 DIAGNOSIS — M87.051 IDIOPATHIC ASEPTIC NECROSIS OF RIGHT FEMUR: ICD-10-CM

## 2024-08-16 DIAGNOSIS — Z91.018 ALLERGY TO OTHER FOODS: ICD-10-CM

## 2024-08-16 DIAGNOSIS — R10.11 RIGHT UPPER QUADRANT PAIN: ICD-10-CM

## 2024-08-16 DIAGNOSIS — E11.9 TYPE 2 DIABETES MELLITUS WITHOUT COMPLICATIONS: ICD-10-CM

## 2024-08-22 ENCOUNTER — APPOINTMENT (OUTPATIENT)
Dept: ULTRASOUND IMAGING | Facility: CLINIC | Age: 34
End: 2024-08-22
Payer: MEDICAID

## 2024-08-22 PROCEDURE — 76700 US EXAM ABDOM COMPLETE: CPT | Mod: 26

## 2024-08-30 DIAGNOSIS — K80.20 CALCULUS OF GALLBLADDER W/OUT CHOLECYSTITIS W/OUT OBSTRUCTION: ICD-10-CM

## 2024-09-04 ENCOUNTER — LABORATORY RESULT (OUTPATIENT)
Age: 34
End: 2024-09-04

## 2024-09-05 LAB
ALBUMIN SERPL ELPH-MCNC: 4.9 G/DL
ALP BLD-CCNC: 93 U/L
ALT SERPL-CCNC: 84 U/L
APPEARANCE: ABNORMAL
AST SERPL-CCNC: 43 U/L
BILIRUB DIRECT SERPL-MCNC: 0.1 MG/DL
BILIRUB INDIRECT SERPL-MCNC: 0.4 MG/DL
BILIRUB SERPL-MCNC: 0.5 MG/DL
BILIRUBIN URINE: NEGATIVE
BLOOD URINE: NEGATIVE
COLOR: YELLOW
GLUCOSE QUALITATIVE U: 100 MG/DL
KETONES URINE: NEGATIVE MG/DL
LEUKOCYTE ESTERASE URINE: NEGATIVE
NITRITE URINE: NEGATIVE
PH URINE: 5.5
PROT SERPL-MCNC: 7.2 G/DL
PROTEIN URINE: 100 MG/DL
SPECIFIC GRAVITY URINE: 1.02
UROBILINOGEN URINE: 0.2 MG/DL

## 2024-09-07 LAB
M TB IFN-G BLD-IMP: NEGATIVE
QUANTIFERON TB PLUS MITOGEN MINUS NIL: >10 IU/ML
QUANTIFERON TB PLUS NIL: 0.03 IU/ML
QUANTIFERON TB PLUS TB1 MINUS NIL: 0 IU/ML
QUANTIFERON TB PLUS TB2 MINUS NIL: 0 IU/ML

## 2024-09-09 PROBLEM — E11.29 MICROALBUMINURIA DUE TO TYPE 2 DIABETES MELLITUS: Status: ACTIVE | Noted: 2024-09-09

## 2024-09-20 ENCOUNTER — OUTPATIENT (OUTPATIENT)
Dept: OUTPATIENT SERVICES | Facility: HOSPITAL | Age: 34
LOS: 1 days | End: 2024-09-20

## 2024-09-20 ENCOUNTER — APPOINTMENT (OUTPATIENT)
Dept: INTERNAL MEDICINE | Facility: CLINIC | Age: 34
End: 2024-09-20
Payer: MEDICAID

## 2024-09-20 VITALS
BODY MASS INDEX: 29.02 KG/M2 | DIASTOLIC BLOOD PRESSURE: 90 MMHG | SYSTOLIC BLOOD PRESSURE: 130 MMHG | WEIGHT: 170 LBS | HEIGHT: 64 IN | HEART RATE: 94 BPM | OXYGEN SATURATION: 97 %

## 2024-09-20 DIAGNOSIS — E11.29 TYPE 2 DIABETES MELLITUS WITH OTHER DIABETIC KIDNEY COMPLICATION: ICD-10-CM

## 2024-09-20 DIAGNOSIS — E11.9 TYPE 2 DIABETES MELLITUS W/OUT COMPLICATIONS: ICD-10-CM

## 2024-09-20 DIAGNOSIS — K80.20 CALCULUS OF GALLBLADDER W/OUT CHOLECYSTITIS W/OUT OBSTRUCTION: ICD-10-CM

## 2024-09-20 DIAGNOSIS — Z96.60 PRESENCE OF UNSPECIFIED ORTHOPEDIC JOINT IMPLANT: Chronic | ICD-10-CM

## 2024-09-20 DIAGNOSIS — Z90.81 ACQUIRED ABSENCE OF SPLEEN: Chronic | ICD-10-CM

## 2024-09-20 DIAGNOSIS — Z90.89 ACQUIRED ABSENCE OF OTHER ORGANS: Chronic | ICD-10-CM

## 2024-09-20 DIAGNOSIS — R03.0 ELEVATED BLOOD-PRESSURE READING, W/OUT DIAGNOSIS OF HYPERTENSION: ICD-10-CM

## 2024-09-20 DIAGNOSIS — R80.9 TYPE 2 DIABETES MELLITUS WITH OTHER DIABETIC KIDNEY COMPLICATION: ICD-10-CM

## 2024-09-20 DIAGNOSIS — Z98.890 OTHER SPECIFIED POSTPROCEDURAL STATES: Chronic | ICD-10-CM

## 2024-09-20 PROCEDURE — 99213 OFFICE O/P EST LOW 20 MIN: CPT

## 2024-09-20 NOTE — ASSESSMENT
[FreeTextEntry1] : Assessment as indicated above in impressions with plans through orders below. Lab orders placed; serum being drawn in future.

## 2024-09-20 NOTE — HISTORY OF PRESENT ILLNESS
[FreeTextEntry1] : follow up abdominal pain. [de-identified] : Patient presents for follow up abdominal pain.  She reports that the pain persists intermittently, will catch her at odd times.  No nausea or vomiting, she is scheduled to see the surgeon for evaluation.   She is also scheduled to see the Neurologist as she has been having some headaches on the right side of her head.   She also has an upcoming appointment with the kidney specialist.  She is not taking her diabetes medication as prescribed.

## 2024-09-20 NOTE — HISTORY OF PRESENT ILLNESS
[FreeTextEntry1] : follow up abdominal pain. [de-identified] : Patient presents for follow up abdominal pain.  She reports that the pain persists intermittently, will catch her at odd times.  No nausea or vomiting, she is scheduled to see the surgeon for evaluation.   She is also scheduled to see the Neurologist as she has been having some headaches on the right side of her head.   She also has an upcoming appointment with the kidney specialist.  She is not taking her diabetes medication as prescribed.

## 2024-09-20 NOTE — REVIEW OF SYSTEMS
[Fever] : no fever [Chills] : no chills [Abdominal Pain] : abdominal pain [Nausea] : no nausea [Vomiting] : no vomiting [Headache] : headache

## 2024-09-23 DIAGNOSIS — E11.9 TYPE 2 DIABETES MELLITUS WITHOUT COMPLICATIONS: ICD-10-CM

## 2024-09-23 DIAGNOSIS — R03.0 ELEVATED BLOOD-PRESSURE READING, WITHOUT DIAGNOSIS OF HYPERTENSION: ICD-10-CM

## 2024-09-23 DIAGNOSIS — K80.20 CALCULUS OF GALLBLADDER WITHOUT CHOLECYSTITIS WITHOUT OBSTRUCTION: ICD-10-CM

## 2024-09-23 DIAGNOSIS — E11.29 TYPE 2 DIABETES MELLITUS WITH OTHER DIABETIC KIDNEY COMPLICATION: ICD-10-CM

## 2024-09-25 NOTE — ED PROVIDER NOTE - PHYSICAL EXAMINATION
Gen: Alert, patient looks uncomfortable  Head: NC, AT   Eyes: PERRL, EOMI, normal lids/conjunctiva  ENT: normal hearing, patent oropharynx without erythema/exudate, uvula midline  Neck: supple, no tenderness, Trachea midline  Pulm: Bilateral BS, normal resp effort, no wheeze/stridor/retractions  CV: RRR, no M/R/G, 2+ radial and dp pulses bl, no edema  Abd: soft, NT/ND, +BS, no hepatosplenomegaly  Mskel: extremities x4 with normal ROM and no joint effusions. no ctl spine ttp.   Skin: no rash, no bruising   Neuro: AAOx3, no sensory/motor deficits, CN 2-12 intact Gen: Alert, patient looks uncomfortable  Head: NC, AT   Eyes: PERRL, EOMI, normal lids/conjunctiva  ENT: normal hearing, patent oropharynx without erythema/exudate, uvula midline  Neck: supple, no tenderness, Trachea midline  Pulm: Bilateral BS, normal resp effort, no wheeze/stridor/retractions  CV: RRR, no M/R/G, 2+ radial and dp pulses bl, no edema  Abd: soft, NT/ND, +BS, no hepatosplenomegaly  Mskel: initially patient unwilling to move hips. once she does, we see that there is normal ROM and no joint effusions. no ctl spine ttp. flexion of the hip seems to exacerbate the pain, however, consistent with positive straight leg raise.    Skin: no rash, no bruising   Neuro: AAOx3, no sensory/motor deficits, CN 2-12 intact No

## 2024-10-03 RX ORDER — BLOOD-GLUCOSE METER
EACH MISCELLANEOUS
Qty: 3 | Refills: 3 | Status: ACTIVE | COMMUNITY
Start: 2024-10-03 | End: 1900-01-01

## 2024-10-03 RX ORDER — BLOOD-GLUCOSE METER
EACH MISCELLANEOUS
Refills: 0 | Status: ACTIVE | COMMUNITY
Start: 2024-10-03

## 2024-10-07 ENCOUNTER — APPOINTMENT (OUTPATIENT)
Dept: INTERNAL MEDICINE | Facility: CLINIC | Age: 34
End: 2024-10-07
Payer: MEDICAID

## 2024-10-07 ENCOUNTER — OUTPATIENT (OUTPATIENT)
Dept: OUTPATIENT SERVICES | Facility: HOSPITAL | Age: 34
LOS: 1 days | End: 2024-10-07

## 2024-10-07 VITALS — WEIGHT: 160 LBS | BODY MASS INDEX: 27.31 KG/M2 | HEIGHT: 64 IN

## 2024-10-07 DIAGNOSIS — Z98.890 OTHER SPECIFIED POSTPROCEDURAL STATES: Chronic | ICD-10-CM

## 2024-10-07 DIAGNOSIS — Z94.81 BONE MARROW TRANSPLANT STATUS: Chronic | ICD-10-CM

## 2024-10-07 DIAGNOSIS — J06.9 ACUTE UPPER RESPIRATORY INFECTION, UNSPECIFIED: ICD-10-CM

## 2024-10-07 DIAGNOSIS — Z96.60 PRESENCE OF UNSPECIFIED ORTHOPEDIC JOINT IMPLANT: Chronic | ICD-10-CM

## 2024-10-07 DIAGNOSIS — Z90.81 ACQUIRED ABSENCE OF SPLEEN: Chronic | ICD-10-CM

## 2024-10-07 DIAGNOSIS — Z90.89 ACQUIRED ABSENCE OF OTHER ORGANS: Chronic | ICD-10-CM

## 2024-10-07 PROCEDURE — 99213 OFFICE O/P EST LOW 20 MIN: CPT | Mod: 95

## 2024-10-07 RX ORDER — FLUTICASONE PROPIONATE 50 UG/1
50 SPRAY, METERED NASAL
Qty: 16 | Refills: 0 | Status: ACTIVE | COMMUNITY
Start: 2024-10-07 | End: 1900-01-01

## 2024-10-10 ENCOUNTER — APPOINTMENT (OUTPATIENT)
Dept: NEUROLOGY | Facility: CLINIC | Age: 34
End: 2024-10-10
Payer: MEDICAID

## 2024-10-10 DIAGNOSIS — J06.9 ACUTE UPPER RESPIRATORY INFECTION, UNSPECIFIED: ICD-10-CM

## 2024-10-10 PROCEDURE — 95816 EEG AWAKE AND DROWSY: CPT

## 2024-10-11 ENCOUNTER — APPOINTMENT (OUTPATIENT)
Dept: NEUROLOGY | Facility: CLINIC | Age: 34
End: 2024-10-11

## 2024-10-11 ENCOUNTER — NON-APPOINTMENT (OUTPATIENT)
Age: 34
End: 2024-10-11

## 2024-10-11 ENCOUNTER — APPOINTMENT (OUTPATIENT)
Dept: SURGERY | Facility: CLINIC | Age: 34
End: 2024-10-11
Payer: MEDICAID

## 2024-10-11 VITALS
TEMPERATURE: 97.4 F | HEART RATE: 74 BPM | WEIGHT: 167 LBS | DIASTOLIC BLOOD PRESSURE: 87 MMHG | HEIGHT: 64 IN | BODY MASS INDEX: 28.51 KG/M2 | SYSTOLIC BLOOD PRESSURE: 124 MMHG | OXYGEN SATURATION: 98 %

## 2024-10-11 DIAGNOSIS — K21.9 GASTRO-ESOPHAGEAL REFLUX DISEASE W/OUT ESOPHAGITIS: ICD-10-CM

## 2024-10-11 DIAGNOSIS — K80.20 CALCULUS OF GALLBLADDER W/OUT CHOLECYSTITIS W/OUT OBSTRUCTION: ICD-10-CM

## 2024-10-11 DIAGNOSIS — K76.0 FATTY (CHANGE OF) LIVER, NOT ELSEWHERE CLASSIFIED: ICD-10-CM

## 2024-10-11 DIAGNOSIS — K75.81 NONALCOHOLIC STEATOHEPATITIS (NASH): ICD-10-CM

## 2024-10-11 PROCEDURE — 99203 OFFICE O/P NEW LOW 30 MIN: CPT

## 2024-10-11 RX ORDER — OMEPRAZOLE MAGNESIUM 20 MG/1
20 TABLET, DELAYED RELEASE ORAL DAILY
Qty: 30 | Refills: 0 | Status: ACTIVE | COMMUNITY
Start: 2024-10-11 | End: 1900-01-01

## 2024-10-14 ENCOUNTER — LABORATORY RESULT (OUTPATIENT)
Age: 34
End: 2024-10-14

## 2024-10-14 ENCOUNTER — RX RENEWAL (OUTPATIENT)
Age: 34
End: 2024-10-14

## 2024-10-14 ENCOUNTER — APPOINTMENT (OUTPATIENT)
Dept: NEPHROLOGY | Facility: CLINIC | Age: 34
End: 2024-10-14
Payer: MEDICAID

## 2024-10-14 VITALS
TEMPERATURE: 97.3 F | SYSTOLIC BLOOD PRESSURE: 124 MMHG | HEIGHT: 64 IN | OXYGEN SATURATION: 99 % | DIASTOLIC BLOOD PRESSURE: 86 MMHG | HEART RATE: 82 BPM

## 2024-10-14 DIAGNOSIS — R80.9 TYPE 2 DIABETES MELLITUS WITH OTHER DIABETIC KIDNEY COMPLICATION: ICD-10-CM

## 2024-10-14 DIAGNOSIS — R10.819 ABDOMINAL TENDERNESS, UNSPECIFIED SITE: ICD-10-CM

## 2024-10-14 DIAGNOSIS — R03.0 ELEVATED BLOOD-PRESSURE READING, W/OUT DIAGNOSIS OF HYPERTENSION: ICD-10-CM

## 2024-10-14 DIAGNOSIS — R80.9 PROTEINURIA, UNSPECIFIED: ICD-10-CM

## 2024-10-14 DIAGNOSIS — E11.29 TYPE 2 DIABETES MELLITUS WITH OTHER DIABETIC KIDNEY COMPLICATION: ICD-10-CM

## 2024-10-14 LAB
25(OH)D3 SERPL-MCNC: 29.5 NG/ML
ALBUMIN SERPL ELPH-MCNC: 4.6 G/DL
ALP BLD-CCNC: 106 U/L
ALT SERPL-CCNC: 79 U/L
ANION GAP SERPL CALC-SCNC: 12 MMOL/L
APPEARANCE: CLEAR
AST SERPL-CCNC: 43 U/L
BACTERIA: ABNORMAL /HPF
BASOPHILS # BLD AUTO: 0.06 K/UL
BASOPHILS NFR BLD AUTO: 0.9 %
BILIRUB SERPL-MCNC: 0.3 MG/DL
BILIRUBIN URINE: NEGATIVE
BLOOD URINE: NEGATIVE
BUN SERPL-MCNC: 10 MG/DL
C3 SERPL-MCNC: 161 MG/DL
C4 SERPL-MCNC: 42 MG/DL
CALCIUM SERPL-MCNC: 9.7 MG/DL
CALCIUM SERPL-MCNC: 9.7 MG/DL
CAST: 1 /LPF
CHLORIDE SERPL-SCNC: 104 MMOL/L
CO2 SERPL-SCNC: 26 MMOL/L
COLOR: YELLOW
CREAT SERPL-MCNC: 0.57 MG/DL
CREAT SPEC-SCNC: 103 MG/DL
CREAT/PROT UR: 0.3 RATIO
EGFR: 123 ML/MIN/1.73M2
EOSINOPHIL # BLD AUTO: 0.11 K/UL
EOSINOPHIL NFR BLD AUTO: 1.6 %
EPITHELIAL CELLS: 8 /HPF
ERYTHROCYTE [SEDIMENTATION RATE] IN BLOOD BY WESTERGREN METHOD: 10 MM/HR
GLUCOSE QUALITATIVE U: >=1000 MG/DL
GLUCOSE SERPL-MCNC: 151 MG/DL
HAV IGM SER QL: NONREACTIVE
HBV CORE IGM SER QL: NONREACTIVE
HBV SURFACE AG SER QL: NONREACTIVE
HCT VFR BLD CALC: 38.7 %
HCV AB SER QL: NONREACTIVE
HCV S/CO RATIO: 0.07 S/CO
HGB BLD-MCNC: 12.2 G/DL
HIV1+2 AB SPEC QL IA.RAPID: NONREACTIVE
IMM GRANULOCYTES NFR BLD AUTO: 0.3 %
KETONES URINE: NEGATIVE MG/DL
LEUKOCYTE ESTERASE URINE: NEGATIVE
LYMPHOCYTES # BLD AUTO: 3.03 K/UL
LYMPHOCYTES NFR BLD AUTO: 43 %
MAN DIFF?: NORMAL
MCHC RBC-ENTMCNC: 27.5 PG
MCHC RBC-ENTMCNC: 31.5 GM/DL
MCV RBC AUTO: 87.2 FL
MICROSCOPIC-UA: NORMAL
MONOCYTES # BLD AUTO: 0.7 K/UL
MONOCYTES NFR BLD AUTO: 9.9 %
NEUTROPHILS # BLD AUTO: 3.13 K/UL
NEUTROPHILS NFR BLD AUTO: 44.3 %
NITRITE URINE: NEGATIVE
PARATHYROID HORMONE INTACT: 65 PG/ML
PH URINE: 5.5
PLATELET # BLD AUTO: 302 K/UL
POTASSIUM SERPL-SCNC: 4.3 MMOL/L
PROT SERPL-MCNC: 6.8 G/DL
PROT UR-MCNC: 30 MG/DL
PROTEIN URINE: 30 MG/DL
RBC # BLD: 4.44 M/UL
RBC # FLD: 14 %
RED BLOOD CELLS URINE: 1 /HPF
SODIUM SERPL-SCNC: 142 MMOL/L
SPECIFIC GRAVITY URINE: 1.02
URATE SERPL-MCNC: 4 MG/DL
UROBILINOGEN URINE: 0.2 MG/DL
WBC # FLD AUTO: 7.05 K/UL
WHITE BLOOD CELLS URINE: 2 /HPF

## 2024-10-14 PROCEDURE — G2211 COMPLEX E/M VISIT ADD ON: CPT | Mod: NC

## 2024-10-14 PROCEDURE — 99205 OFFICE O/P NEW HI 60 MIN: CPT

## 2024-10-14 RX ORDER — LISINOPRIL 5 MG/1
5 TABLET ORAL DAILY
Qty: 90 | Refills: 3 | Status: ACTIVE | COMMUNITY
Start: 2024-10-14 | End: 1900-01-01

## 2024-10-15 LAB
ANA SER IF-ACNC: NEGATIVE
FREE KAPPA URINE: 84.92 MG/L
FREE KAPPA/LAMDA RATIO: 3.93 RATIO
FREE LAMDA URINE: 21.59 MG/L
HGB A MFR BLD: 60.4 %
HGB A2 MFR BLD: 3.5 %
HGB FRACT BLD-IMP: NORMAL
HGB S MFR BLD: 36.1 %
PROTEINASE3 AB SER IA-ACNC: <5 UNITS
PROTEINASE3 AB SER-ACNC: NEGATIVE

## 2024-10-16 LAB
KAPPA LC 24H UR QL: NORMAL
PHOSPHOLIPASE A2 RECEPTOR ELISA: <1.8 RU/ML

## 2024-10-17 LAB
ALBUMIN MFR SERPL ELPH: 65.3 %
ALBUMIN SERPL-MCNC: 4.4 G/DL
ALBUMIN/GLOB SERPL: 1.8 RATIO
ALPHA1 GLOB MFR SERPL ELPH: 3.7 %
ALPHA1 GLOB SERPL ELPH-MCNC: 0.3 G/DL
ALPHA2 GLOB MFR SERPL ELPH: 10.9 %
ALPHA2 GLOB SERPL ELPH-MCNC: 0.7 G/DL
B-GLOBULIN MFR SERPL ELPH: 9.9 %
B-GLOBULIN SERPL ELPH-MCNC: 0.7 G/DL
DEPRECATED KAPPA LC FREE/LAMBDA SER: 0.74 RATIO
GAMMA GLOB FLD ELPH-MCNC: 0.7 G/DL
GAMMA GLOB MFR SERPL ELPH: 10.2 %
IGA SER QL IEP: 85 MG/DL
IGG SER QL IEP: 787 MG/DL
IGM SER QL IEP: 31 MG/DL
INTERPRETATION SERPL IEP-IMP: NORMAL
KAPPA LC CSF-MCNC: 1.5 MG/DL
KAPPA LC SERPL-MCNC: 1.11 MG/DL
M PROTEIN SPEC IFE-MCNC: NORMAL
PROT SERPL-MCNC: 6.8 G/DL
PROT SERPL-MCNC: 6.8 G/DL

## 2024-11-01 ENCOUNTER — APPOINTMENT (OUTPATIENT)
Dept: NEUROLOGY | Facility: CLINIC | Age: 34
End: 2024-11-01

## 2024-11-04 LAB
CHOLEST SERPL-MCNC: 245 MG/DL
HDLC SERPL-MCNC: 37 MG/DL
LDLC SERPL CALC-MCNC: 128 MG/DL
NONHDLC SERPL-MCNC: 208 MG/DL
TRIGL SERPL-MCNC: 446 MG/DL

## 2024-11-13 ENCOUNTER — APPOINTMENT (OUTPATIENT)
Dept: INTERNAL MEDICINE | Facility: CLINIC | Age: 34
End: 2024-11-13
Payer: MEDICAID

## 2024-11-13 ENCOUNTER — OUTPATIENT (OUTPATIENT)
Dept: OUTPATIENT SERVICES | Facility: HOSPITAL | Age: 34
LOS: 1 days | End: 2024-11-13

## 2024-11-13 VITALS
HEART RATE: 79 BPM | WEIGHT: 167 LBS | SYSTOLIC BLOOD PRESSURE: 111 MMHG | DIASTOLIC BLOOD PRESSURE: 77 MMHG | OXYGEN SATURATION: 99 % | HEIGHT: 64 IN | BODY MASS INDEX: 28.51 KG/M2

## 2024-11-13 VITALS — SYSTOLIC BLOOD PRESSURE: 96 MMHG | DIASTOLIC BLOOD PRESSURE: 64 MMHG

## 2024-11-13 DIAGNOSIS — Z23 ENCOUNTER FOR IMMUNIZATION: ICD-10-CM

## 2024-11-13 DIAGNOSIS — Z94.81 BONE MARROW TRANSPLANT STATUS: Chronic | ICD-10-CM

## 2024-11-13 DIAGNOSIS — Z90.89 ACQUIRED ABSENCE OF OTHER ORGANS: Chronic | ICD-10-CM

## 2024-11-13 DIAGNOSIS — R05.8 OTHER SPECIFIED COUGH: ICD-10-CM

## 2024-11-13 DIAGNOSIS — Z96.60 PRESENCE OF UNSPECIFIED ORTHOPEDIC JOINT IMPLANT: Chronic | ICD-10-CM

## 2024-11-13 DIAGNOSIS — E11.9 TYPE 2 DIABETES MELLITUS W/OUT COMPLICATIONS: ICD-10-CM

## 2024-11-13 DIAGNOSIS — E11.29 TYPE 2 DIABETES MELLITUS WITH OTHER DIABETIC KIDNEY COMPLICATION: ICD-10-CM

## 2024-11-13 DIAGNOSIS — K76.0 FATTY (CHANGE OF) LIVER, NOT ELSEWHERE CLASSIFIED: ICD-10-CM

## 2024-11-13 DIAGNOSIS — Z90.81 ACQUIRED ABSENCE OF SPLEEN: Chronic | ICD-10-CM

## 2024-11-13 DIAGNOSIS — R80.9 TYPE 2 DIABETES MELLITUS WITH OTHER DIABETIC KIDNEY COMPLICATION: ICD-10-CM

## 2024-11-13 DIAGNOSIS — M87.059 IDIOPATHIC ASEPTIC NECROSIS OF UNSPECIFIED FEMUR: ICD-10-CM

## 2024-11-13 DIAGNOSIS — Z98.890 OTHER SPECIFIED POSTPROCEDURAL STATES: Chronic | ICD-10-CM

## 2024-11-13 DIAGNOSIS — R03.0 ELEVATED BLOOD-PRESSURE READING, W/OUT DIAGNOSIS OF HYPERTENSION: ICD-10-CM

## 2024-11-13 DIAGNOSIS — T46.4X5A OTHER SPECIFIED COUGH: ICD-10-CM

## 2024-11-13 PROCEDURE — 99213 OFFICE O/P EST LOW 20 MIN: CPT

## 2024-11-13 RX ORDER — LOSARTAN POTASSIUM 25 MG/1
25 TABLET, FILM COATED ORAL
Qty: 90 | Refills: 1 | Status: ACTIVE | COMMUNITY
Start: 2024-11-13 | End: 1900-01-01

## 2024-11-15 PROBLEM — R05.8 ACE-INHIBITOR COUGH: Status: ACTIVE | Noted: 2024-11-15

## 2024-11-18 DIAGNOSIS — Z23 ENCOUNTER FOR IMMUNIZATION: ICD-10-CM

## 2024-11-18 DIAGNOSIS — R03.0 ELEVATED BLOOD-PRESSURE READING, WITHOUT DIAGNOSIS OF HYPERTENSION: ICD-10-CM

## 2024-11-18 DIAGNOSIS — R05.8 OTHER SPECIFIED COUGH: ICD-10-CM

## 2024-11-18 DIAGNOSIS — E11.9 TYPE 2 DIABETES MELLITUS WITHOUT COMPLICATIONS: ICD-10-CM

## 2024-11-18 DIAGNOSIS — E11.29 TYPE 2 DIABETES MELLITUS WITH OTHER DIABETIC KIDNEY COMPLICATION: ICD-10-CM

## 2024-11-21 ENCOUNTER — OUTPATIENT (OUTPATIENT)
Dept: OUTPATIENT SERVICES | Facility: HOSPITAL | Age: 34
LOS: 1 days | End: 2024-11-21

## 2024-11-21 ENCOUNTER — APPOINTMENT (OUTPATIENT)
Dept: INTERNAL MEDICINE | Facility: CLINIC | Age: 34
End: 2024-11-21
Payer: MEDICAID

## 2024-11-21 VITALS
WEIGHT: 168 LBS | HEART RATE: 83 BPM | BODY MASS INDEX: 28.68 KG/M2 | SYSTOLIC BLOOD PRESSURE: 116 MMHG | DIASTOLIC BLOOD PRESSURE: 79 MMHG | OXYGEN SATURATION: 99 % | HEIGHT: 64 IN

## 2024-11-21 DIAGNOSIS — Z94.81 BONE MARROW TRANSPLANT STATUS: Chronic | ICD-10-CM

## 2024-11-21 DIAGNOSIS — Z90.81 ACQUIRED ABSENCE OF SPLEEN: Chronic | ICD-10-CM

## 2024-11-21 DIAGNOSIS — Z98.890 OTHER SPECIFIED POSTPROCEDURAL STATES: Chronic | ICD-10-CM

## 2024-11-21 DIAGNOSIS — R10.11 RIGHT UPPER QUADRANT PAIN: ICD-10-CM

## 2024-11-21 DIAGNOSIS — Z90.89 ACQUIRED ABSENCE OF OTHER ORGANS: Chronic | ICD-10-CM

## 2024-11-21 DIAGNOSIS — Z96.60 PRESENCE OF UNSPECIFIED ORTHOPEDIC JOINT IMPLANT: Chronic | ICD-10-CM

## 2024-11-21 PROCEDURE — 99213 OFFICE O/P EST LOW 20 MIN: CPT

## 2024-11-25 ENCOUNTER — EMERGENCY (EMERGENCY)
Facility: HOSPITAL | Age: 34
LOS: 0 days | Discharge: HOME | End: 2024-11-25
Attending: EMERGENCY MEDICINE
Payer: MEDICAID

## 2024-11-25 VITALS
RESPIRATION RATE: 16 BRPM | OXYGEN SATURATION: 100 % | DIASTOLIC BLOOD PRESSURE: 88 MMHG | HEART RATE: 62 BPM | SYSTOLIC BLOOD PRESSURE: 135 MMHG | TEMPERATURE: 98 F

## 2024-11-25 VITALS
SYSTOLIC BLOOD PRESSURE: 136 MMHG | HEIGHT: 64 IN | DIASTOLIC BLOOD PRESSURE: 108 MMHG | RESPIRATION RATE: 16 BRPM | TEMPERATURE: 98 F | WEIGHT: 166.89 LBS | HEART RATE: 120 BPM | OXYGEN SATURATION: 100 %

## 2024-11-25 DIAGNOSIS — R10.11 RIGHT UPPER QUADRANT PAIN: ICD-10-CM

## 2024-11-25 DIAGNOSIS — Z94.81 BONE MARROW TRANSPLANT STATUS: Chronic | ICD-10-CM

## 2024-11-25 DIAGNOSIS — Z90.81 ACQUIRED ABSENCE OF SPLEEN: Chronic | ICD-10-CM

## 2024-11-25 DIAGNOSIS — Z96.60 PRESENCE OF UNSPECIFIED ORTHOPEDIC JOINT IMPLANT: Chronic | ICD-10-CM

## 2024-11-25 DIAGNOSIS — E11.9 TYPE 2 DIABETES MELLITUS WITHOUT COMPLICATIONS: ICD-10-CM

## 2024-11-25 DIAGNOSIS — Z88.8 ALLERGY STATUS TO OTHER DRUGS, MEDICAMENTS AND BIOLOGICAL SUBSTANCES: ICD-10-CM

## 2024-11-25 DIAGNOSIS — K80.50 CALCULUS OF BILE DUCT WITHOUT CHOLANGITIS OR CHOLECYSTITIS WITHOUT OBSTRUCTION: ICD-10-CM

## 2024-11-25 DIAGNOSIS — Z98.890 OTHER SPECIFIED POSTPROCEDURAL STATES: Chronic | ICD-10-CM

## 2024-11-25 DIAGNOSIS — Z91.018 ALLERGY TO OTHER FOODS: ICD-10-CM

## 2024-11-25 DIAGNOSIS — Z90.89 ACQUIRED ABSENCE OF OTHER ORGANS: Chronic | ICD-10-CM

## 2024-11-25 LAB
ALBUMIN SERPL ELPH-MCNC: 4.1 G/DL — SIGNIFICANT CHANGE UP (ref 3.3–5)
ALP SERPL-CCNC: 99 U/L — SIGNIFICANT CHANGE UP (ref 40–120)
ALT FLD-CCNC: 69 U/L — SIGNIFICANT CHANGE UP (ref 12–78)
ANION GAP SERPL CALC-SCNC: 5 MMOL/L — SIGNIFICANT CHANGE UP (ref 5–17)
APPEARANCE UR: CLEAR — SIGNIFICANT CHANGE UP
AST SERPL-CCNC: 33 U/L — SIGNIFICANT CHANGE UP (ref 15–37)
BASOPHILS # BLD AUTO: 0.07 K/UL — SIGNIFICANT CHANGE UP (ref 0–0.2)
BASOPHILS NFR BLD AUTO: 0.9 % — SIGNIFICANT CHANGE UP (ref 0–2)
BILIRUB SERPL-MCNC: 0.4 MG/DL — SIGNIFICANT CHANGE UP (ref 0.2–1.2)
BILIRUB UR-MCNC: NEGATIVE — SIGNIFICANT CHANGE UP
BUN SERPL-MCNC: 17 MG/DL — SIGNIFICANT CHANGE UP (ref 7–23)
CALCIUM SERPL-MCNC: 9.8 MG/DL — SIGNIFICANT CHANGE UP (ref 8.5–10.1)
CHLORIDE SERPL-SCNC: 109 MMOL/L — HIGH (ref 96–108)
CO2 SERPL-SCNC: 27 MMOL/L — SIGNIFICANT CHANGE UP (ref 22–31)
COLOR SPEC: YELLOW — SIGNIFICANT CHANGE UP
CREAT SERPL-MCNC: 0.89 MG/DL — SIGNIFICANT CHANGE UP (ref 0.5–1.3)
DIFF PNL FLD: NEGATIVE — SIGNIFICANT CHANGE UP
EGFR: 88 ML/MIN/1.73M2 — SIGNIFICANT CHANGE UP
EOSINOPHIL # BLD AUTO: 0.33 K/UL — SIGNIFICANT CHANGE UP (ref 0–0.5)
EOSINOPHIL NFR BLD AUTO: 4.1 % — SIGNIFICANT CHANGE UP (ref 0–6)
GLUCOSE SERPL-MCNC: 113 MG/DL — HIGH (ref 70–99)
GLUCOSE UR QL: NEGATIVE MG/DL — SIGNIFICANT CHANGE UP
HCG SERPL-ACNC: <1 MIU/ML — SIGNIFICANT CHANGE UP
HCT VFR BLD CALC: 38.5 % — SIGNIFICANT CHANGE UP (ref 34.5–45)
HGB BLD-MCNC: 12.5 G/DL — SIGNIFICANT CHANGE UP (ref 11.5–15.5)
IMM GRANULOCYTES NFR BLD AUTO: 0.2 % — SIGNIFICANT CHANGE UP (ref 0–0.9)
KETONES UR-MCNC: NEGATIVE MG/DL — SIGNIFICANT CHANGE UP
LACTATE SERPL-SCNC: 0.9 MMOL/L — SIGNIFICANT CHANGE UP (ref 0.7–2)
LEUKOCYTE ESTERASE UR-ACNC: NEGATIVE — SIGNIFICANT CHANGE UP
LIDOCAIN IGE QN: 17 U/L — SIGNIFICANT CHANGE UP (ref 13–75)
LYMPHOCYTES # BLD AUTO: 3.67 K/UL — HIGH (ref 1–3.3)
LYMPHOCYTES # BLD AUTO: 45.1 % — HIGH (ref 13–44)
MCHC RBC-ENTMCNC: 27.4 PG — SIGNIFICANT CHANGE UP (ref 27–34)
MCHC RBC-ENTMCNC: 32.5 G/DL — SIGNIFICANT CHANGE UP (ref 32–36)
MCV RBC AUTO: 84.2 FL — SIGNIFICANT CHANGE UP (ref 80–100)
MONOCYTES # BLD AUTO: 0.81 K/UL — SIGNIFICANT CHANGE UP (ref 0–0.9)
MONOCYTES NFR BLD AUTO: 10 % — SIGNIFICANT CHANGE UP (ref 2–14)
NEUTROPHILS # BLD AUTO: 3.23 K/UL — SIGNIFICANT CHANGE UP (ref 1.8–7.4)
NEUTROPHILS NFR BLD AUTO: 39.7 % — LOW (ref 43–77)
NITRITE UR-MCNC: NEGATIVE — SIGNIFICANT CHANGE UP
NRBC # BLD: 0 /100 WBCS — SIGNIFICANT CHANGE UP (ref 0–0)
PH UR: 6 — SIGNIFICANT CHANGE UP (ref 5–8)
PLATELET # BLD AUTO: 314 K/UL — SIGNIFICANT CHANGE UP (ref 150–400)
POTASSIUM SERPL-MCNC: 4.5 MMOL/L — SIGNIFICANT CHANGE UP (ref 3.5–5.3)
POTASSIUM SERPL-SCNC: 4.5 MMOL/L — SIGNIFICANT CHANGE UP (ref 3.5–5.3)
PROT SERPL-MCNC: 7.3 GM/DL — SIGNIFICANT CHANGE UP (ref 6–8.3)
PROT UR-MCNC: SIGNIFICANT CHANGE UP MG/DL
RBC # BLD: 4.57 M/UL — SIGNIFICANT CHANGE UP (ref 3.8–5.2)
RBC # FLD: 13.7 % — SIGNIFICANT CHANGE UP (ref 10.3–14.5)
SODIUM SERPL-SCNC: 141 MMOL/L — SIGNIFICANT CHANGE UP (ref 135–145)
SP GR SPEC: >1.03 — HIGH (ref 1–1.03)
UROBILINOGEN FLD QL: 0.2 MG/DL — SIGNIFICANT CHANGE UP (ref 0.2–1)
WBC # BLD: 8.13 K/UL — SIGNIFICANT CHANGE UP (ref 3.8–10.5)
WBC # FLD AUTO: 8.13 K/UL — SIGNIFICANT CHANGE UP (ref 3.8–10.5)

## 2024-11-25 PROCEDURE — 36410 VNPNXR 3YR/> PHY/QHP DX/THER: CPT

## 2024-11-25 PROCEDURE — 74177 CT ABD & PELVIS W/CONTRAST: CPT | Mod: 26,MC

## 2024-11-25 PROCEDURE — 99285 EMERGENCY DEPT VISIT HI MDM: CPT

## 2024-11-25 RX ORDER — ONDANSETRON HYDROCHLORIDE 2 MG/ML
1 INJECTION, SOLUTION INTRAMUSCULAR; INTRAVENOUS
Qty: 6 | Refills: 0
Start: 2024-11-25 | End: 2024-11-27

## 2024-11-25 RX ORDER — MORPHINE SULFATE 30 MG/1
4 TABLET, EXTENDED RELEASE ORAL ONCE
Refills: 0 | Status: DISCONTINUED | OUTPATIENT
Start: 2024-11-25 | End: 2024-11-25

## 2024-11-25 RX ORDER — TRAMADOL HYDROCHLORIDE 50 MG/1
1 TABLET, COATED ORAL
Qty: 9 | Refills: 0
Start: 2024-11-25 | End: 2024-11-27

## 2024-11-25 RX ORDER — KETOROLAC TROMETHAMINE 30 MG/ML
30 INJECTION INTRAMUSCULAR; INTRAVENOUS ONCE
Refills: 0 | Status: DISCONTINUED | OUTPATIENT
Start: 2024-11-25 | End: 2024-11-25

## 2024-11-25 RX ORDER — SODIUM CHLORIDE 9 MG/ML
1000 INJECTION, SOLUTION INTRAMUSCULAR; INTRAVENOUS; SUBCUTANEOUS ONCE
Refills: 0 | Status: COMPLETED | OUTPATIENT
Start: 2024-11-25 | End: 2024-11-25

## 2024-11-25 RX ADMIN — KETOROLAC TROMETHAMINE 30 MILLIGRAM(S): 30 INJECTION INTRAMUSCULAR; INTRAVENOUS at 17:30

## 2024-11-25 RX ADMIN — MORPHINE SULFATE 4 MILLIGRAM(S): 30 TABLET, EXTENDED RELEASE ORAL at 16:44

## 2024-11-25 RX ADMIN — MORPHINE SULFATE 4 MILLIGRAM(S): 30 TABLET, EXTENDED RELEASE ORAL at 17:30

## 2024-11-25 RX ADMIN — KETOROLAC TROMETHAMINE 30 MILLIGRAM(S): 30 INJECTION INTRAMUSCULAR; INTRAVENOUS at 16:54

## 2024-11-25 RX ADMIN — SODIUM CHLORIDE 1000 MILLILITER(S): 9 INJECTION, SOLUTION INTRAMUSCULAR; INTRAVENOUS; SUBCUTANEOUS at 16:44

## 2024-11-25 NOTE — ED PROVIDER NOTE - CARE PROVIDER_API CALL
Anibal Gross  Gastroenterology  [address], [address]  [place] [address]  Phone: [phone]  Fax: [phone]  Follow Up Time: 1-3 Days    Perry Sánchez  Surgery  [address]  [place] [address]  Phone: [phone]  Fax: [phone]  Follow Up Time: 1-3 Days

## 2024-11-25 NOTE — ED PROVIDER NOTE - PROVIDER TOKENS
PROVIDER:[TOKEN:[[id number]],FOLLOWUP:[1-3 Days]],PROVIDER:[TOKEN:[[id number]],FOLLOWUP:[1-3 Days]]

## 2024-11-25 NOTE — ED PROVIDER NOTE - CLINICAL SUMMARY MEDICAL DECISION MAKING FREE TEXT BOX
Patient with likely biliary colic given cholelithiasis and currently without any pain further on reassessment.  Patient to DC on outpatient GI and possible surgery consultation.  Will also recommend PMD follow-up.  Patient to discharge with Zofran for nausea as needed as well as tramadol as needed should pain return.  Advised on diet changes to reduce likelihood for  biliary colic.

## 2024-11-25 NOTE — ED ADULT TRIAGE NOTE - CHIEF COMPLAINT QUOTE
Right sided abd pain x 6 weeks, increased pain x four hours, took tylenol at 9am, no relief. Scheduled CT scan  11/27. Denies N/V. Hx legally blind

## 2024-11-25 NOTE — ED ADULT NURSE NOTE - OBJECTIVE STATEMENT
pt presents to the ED accompanied with sister c/o intermittent right sided abd pain x 6 weeks, increased pain x four hours. Position worse when laying down and sitting. pt states took Tylenol at 9am, w/o relief. Scheduled CT scan 11/27. Denies N/V/D. Hx DM, legally blind

## 2024-11-25 NOTE — ED PROVIDER NOTE - OBJECTIVE STATEMENT
34-year-old female with past medical history of diabetes otherwise presenting to ER due to right-sided abdominal pain on and off for the past week with negative sono done at doctor's office otherwise sent in for further evaluation of pain.  Patient states that since this morning patient has been having significant pain in the right upper quadrant that is not relieved no matter what she does.  Position seems to be worse when laying down or sitting.  Denies any nausea vomiting or diarrhea.

## 2024-11-25 NOTE — ED ADULT NURSE NOTE - NSFALLUNIVINTERV_ED_ALL_ED
Bed/Stretcher in lowest position, wheels locked, appropriate side rails in place/Call bell, personal items and telephone in reach/Instruct patient to call for assistance before getting out of bed/chair/stretcher/Non-slip footwear applied when patient is off stretcher/[place] to call system/Physically safe environment - no spills, clutter or unnecessary equipment/Purposeful proactive rounding/Room/bathroom lighting operational, light cord in reach

## 2024-11-25 NOTE — ED PROVIDER NOTE - PATIENT PORTAL LINK FT
You can access the FollowMyHealth Patient Portal offered by [facility] by registering at the following website: [email or web address]. By joining [facility]’s FollowMyHealth portal, you will also be able to view your health information using other applications (apps) compatible with our system.

## 2024-11-25 NOTE — ED ADULT NURSE NOTE - CAS EDN DISCHARGE INTERVENTIONS
Eliseo Brownarez  [address] [address]      2023      : 1925    Dear Mr. Hussein:    We have been trying to reach you without success.  Would like to inform you that your echocardiogram was normal.  If you have any questions please feel free to call us at [phone].     Thank you for your timely response.    Sincerely,       From the office of  Cardiology Dept.         
IV discontinued, cath removed intact

## 2024-11-25 NOTE — ED ADULT NURSE NOTE - NSICDXPASTMEDICALHX_GEN_ALL_CORE_FT
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

## 2024-11-26 ENCOUNTER — APPOINTMENT (OUTPATIENT)
Dept: INTERNAL MEDICINE | Facility: CLINIC | Age: 34
End: 2024-11-26
Payer: MEDICAID

## 2024-11-26 ENCOUNTER — APPOINTMENT (OUTPATIENT)
Dept: OTOLARYNGOLOGY | Facility: CLINIC | Age: 34
End: 2024-11-26

## 2024-11-26 ENCOUNTER — OUTPATIENT (OUTPATIENT)
Dept: OUTPATIENT SERVICES | Facility: HOSPITAL | Age: 34
LOS: 1 days | End: 2024-11-26

## 2024-11-26 DIAGNOSIS — Z98.890 OTHER SPECIFIED POSTPROCEDURAL STATES: Chronic | ICD-10-CM

## 2024-11-26 DIAGNOSIS — Z96.60 PRESENCE OF UNSPECIFIED ORTHOPEDIC JOINT IMPLANT: Chronic | ICD-10-CM

## 2024-11-26 DIAGNOSIS — R73.03 PREDIABETES: ICD-10-CM

## 2024-11-26 DIAGNOSIS — Z90.89 ACQUIRED ABSENCE OF OTHER ORGANS: Chronic | ICD-10-CM

## 2024-11-26 DIAGNOSIS — Z90.81 ACQUIRED ABSENCE OF SPLEEN: Chronic | ICD-10-CM

## 2024-11-26 PROCEDURE — ZZZZZ: CPT

## 2024-11-27 ENCOUNTER — APPOINTMENT (OUTPATIENT)
Dept: CT IMAGING | Facility: IMAGING CENTER | Age: 34
End: 2024-11-27

## 2024-12-16 ENCOUNTER — APPOINTMENT (OUTPATIENT)
Dept: SURGERY | Facility: CLINIC | Age: 34
End: 2024-12-16
Payer: MEDICAID

## 2024-12-16 VITALS
HEART RATE: 62 BPM | RESPIRATION RATE: 16 BRPM | BODY MASS INDEX: 28.32 KG/M2 | WEIGHT: 165 LBS | DIASTOLIC BLOOD PRESSURE: 90 MMHG | OXYGEN SATURATION: 99 % | SYSTOLIC BLOOD PRESSURE: 129 MMHG

## 2024-12-16 PROCEDURE — 99214 OFFICE O/P EST MOD 30 MIN: CPT

## 2024-12-24 ENCOUNTER — APPOINTMENT (OUTPATIENT)
Dept: SURGERY | Facility: CLINIC | Age: 34
End: 2024-12-24
Payer: MEDICAID

## 2024-12-24 PROCEDURE — 99215 OFFICE O/P EST HI 40 MIN: CPT

## 2024-12-27 ENCOUNTER — APPOINTMENT (OUTPATIENT)
Dept: NEUROLOGY | Facility: CLINIC | Age: 34
End: 2024-12-27
Payer: MEDICAID

## 2024-12-27 VITALS
SYSTOLIC BLOOD PRESSURE: 124 MMHG | BODY MASS INDEX: 27.83 KG/M2 | WEIGHT: 163 LBS | DIASTOLIC BLOOD PRESSURE: 81 MMHG | HEART RATE: 60 BPM | HEIGHT: 64 IN

## 2024-12-27 DIAGNOSIS — G47.30 SLEEP APNEA, UNSPECIFIED: ICD-10-CM

## 2024-12-27 DIAGNOSIS — I63.9 CEREBRAL INFARCTION, UNSPECIFIED: ICD-10-CM

## 2024-12-27 PROCEDURE — G2211 COMPLEX E/M VISIT ADD ON: CPT | Mod: NC

## 2024-12-27 PROCEDURE — 99214 OFFICE O/P EST MOD 30 MIN: CPT

## 2024-12-27 RX ORDER — ASPIRIN 81 MG
81 TABLET, DELAYED RELEASE (ENTERIC COATED) ORAL
Refills: 0 | Status: ACTIVE | COMMUNITY

## 2024-12-30 ENCOUNTER — APPOINTMENT (OUTPATIENT)
Dept: ORTHOPEDIC SURGERY | Facility: CLINIC | Age: 34
End: 2024-12-30
Payer: MEDICAID

## 2024-12-30 DIAGNOSIS — M70.60 TROCHANTERIC BURSITIS, UNSPECIFIED HIP: ICD-10-CM

## 2024-12-30 PROCEDURE — 72100 X-RAY EXAM L-S SPINE 2/3 VWS: CPT

## 2024-12-30 PROCEDURE — 99213 OFFICE O/P EST LOW 20 MIN: CPT

## 2024-12-30 PROCEDURE — 73522 X-RAY EXAM HIPS BI 3-4 VIEWS: CPT

## 2025-01-09 ENCOUNTER — RESULT CHARGE (OUTPATIENT)
Age: 35
End: 2025-01-09

## 2025-01-09 ENCOUNTER — APPOINTMENT (OUTPATIENT)
Dept: ENDOCRINOLOGY | Facility: CLINIC | Age: 35
End: 2025-01-09
Payer: MEDICAID

## 2025-01-09 VITALS
WEIGHT: 161.44 LBS | OXYGEN SATURATION: 97 % | HEART RATE: 78 BPM | DIASTOLIC BLOOD PRESSURE: 90 MMHG | TEMPERATURE: 98 F | SYSTOLIC BLOOD PRESSURE: 129 MMHG | HEIGHT: 64 IN | BODY MASS INDEX: 27.56 KG/M2

## 2025-01-09 DIAGNOSIS — R80.9 TYPE 2 DIABETES MELLITUS WITH OTHER DIABETIC KIDNEY COMPLICATION: ICD-10-CM

## 2025-01-09 DIAGNOSIS — E11.9 TYPE 2 DIABETES MELLITUS W/OUT COMPLICATIONS: ICD-10-CM

## 2025-01-09 DIAGNOSIS — E11.29 TYPE 2 DIABETES MELLITUS WITH OTHER DIABETIC KIDNEY COMPLICATION: ICD-10-CM

## 2025-01-09 LAB — GLUCOSE BLDC GLUCOMTR-MCNC: 166

## 2025-01-09 PROCEDURE — 99204 OFFICE O/P NEW MOD 45 MIN: CPT

## 2025-01-23 ENCOUNTER — OUTPATIENT (OUTPATIENT)
Dept: OUTPATIENT SERVICES | Facility: HOSPITAL | Age: 35
LOS: 1 days | End: 2025-01-23

## 2025-01-23 ENCOUNTER — APPOINTMENT (OUTPATIENT)
Dept: INTERNAL MEDICINE | Facility: CLINIC | Age: 35
End: 2025-01-23

## 2025-01-23 VITALS
HEART RATE: 65 BPM | HEIGHT: 64 IN | SYSTOLIC BLOOD PRESSURE: 117 MMHG | OXYGEN SATURATION: 99 % | DIASTOLIC BLOOD PRESSURE: 78 MMHG | WEIGHT: 162 LBS | BODY MASS INDEX: 27.66 KG/M2

## 2025-01-23 DIAGNOSIS — E11.29 TYPE 2 DIABETES MELLITUS WITH OTHER DIABETIC KIDNEY COMPLICATION: ICD-10-CM

## 2025-01-23 DIAGNOSIS — K80.20 CALCULUS OF GALLBLADDER W/OUT CHOLECYSTITIS W/OUT OBSTRUCTION: ICD-10-CM

## 2025-01-23 DIAGNOSIS — Z90.89 ACQUIRED ABSENCE OF OTHER ORGANS: Chronic | ICD-10-CM

## 2025-01-23 DIAGNOSIS — Z90.81 ACQUIRED ABSENCE OF SPLEEN: Chronic | ICD-10-CM

## 2025-01-23 DIAGNOSIS — J45.909 UNSPECIFIED ASTHMA, UNCOMPLICATED: ICD-10-CM

## 2025-01-23 DIAGNOSIS — E11.9 TYPE 2 DIABETES MELLITUS W/OUT COMPLICATIONS: ICD-10-CM

## 2025-01-23 DIAGNOSIS — Z98.890 OTHER SPECIFIED POSTPROCEDURAL STATES: Chronic | ICD-10-CM

## 2025-01-23 DIAGNOSIS — R80.9 TYPE 2 DIABETES MELLITUS WITH OTHER DIABETIC KIDNEY COMPLICATION: ICD-10-CM

## 2025-01-23 DIAGNOSIS — Z02.89 ENCOUNTER FOR OTHER ADMINISTRATIVE EXAMINATIONS: ICD-10-CM

## 2025-01-23 DIAGNOSIS — Z00.00 ENCOUNTER FOR GENERAL ADULT MEDICAL EXAMINATION W/OUT ABNORMAL FINDINGS: ICD-10-CM

## 2025-01-23 DIAGNOSIS — Z96.60 PRESENCE OF UNSPECIFIED ORTHOPEDIC JOINT IMPLANT: Chronic | ICD-10-CM

## 2025-01-23 DIAGNOSIS — Z94.81 BONE MARROW TRANSPLANT STATUS: Chronic | ICD-10-CM

## 2025-01-23 PROCEDURE — 99395 PREV VISIT EST AGE 18-39: CPT

## 2025-01-24 DIAGNOSIS — Z00.00 ENCOUNTER FOR GENERAL ADULT MEDICAL EXAMINATION WITHOUT ABNORMAL FINDINGS: ICD-10-CM

## 2025-01-24 DIAGNOSIS — Z02.89 ENCOUNTER FOR OTHER ADMINISTRATIVE EXAMINATIONS: ICD-10-CM

## 2025-01-24 DIAGNOSIS — E11.29 TYPE 2 DIABETES MELLITUS WITH OTHER DIABETIC KIDNEY COMPLICATION: ICD-10-CM

## 2025-01-24 DIAGNOSIS — K80.20 CALCULUS OF GALLBLADDER WITHOUT CHOLECYSTITIS WITHOUT OBSTRUCTION: ICD-10-CM

## 2025-01-24 DIAGNOSIS — J45.909 UNSPECIFIED ASTHMA, UNCOMPLICATED: ICD-10-CM

## 2025-01-24 DIAGNOSIS — E11.9 TYPE 2 DIABETES MELLITUS WITHOUT COMPLICATIONS: ICD-10-CM

## 2025-01-24 LAB
CHOLEST SERPL-MCNC: 252 MG/DL
HDLC SERPL-MCNC: 43 MG/DL
LDLC SERPL CALC-MCNC: 171 MG/DL
LDLC SERPL DIRECT ASSAY-MCNC: 175 MG/DL
NONHDLC SERPL-MCNC: 208 MG/DL
TRIGL SERPL-MCNC: 199 MG/DL

## 2025-02-12 ENCOUNTER — APPOINTMENT (OUTPATIENT)
Dept: NEPHROLOGY | Facility: CLINIC | Age: 35
End: 2025-02-12

## 2025-02-28 ENCOUNTER — EMERGENCY (EMERGENCY)
Facility: HOSPITAL | Age: 35
LOS: 1 days | Discharge: HOME | End: 2025-02-28
Attending: EMERGENCY MEDICINE | Admitting: EMERGENCY MEDICINE
Payer: MEDICAID

## 2025-02-28 VITALS
DIASTOLIC BLOOD PRESSURE: 86 MMHG | HEART RATE: 63 BPM | TEMPERATURE: 98 F | SYSTOLIC BLOOD PRESSURE: 129 MMHG | OXYGEN SATURATION: 99 % | RESPIRATION RATE: 18 BRPM

## 2025-02-28 VITALS
RESPIRATION RATE: 15 BRPM | WEIGHT: 162.92 LBS | TEMPERATURE: 98 F | HEART RATE: 80 BPM | HEIGHT: 64 IN | DIASTOLIC BLOOD PRESSURE: 68 MMHG | SYSTOLIC BLOOD PRESSURE: 104 MMHG | OXYGEN SATURATION: 95 %

## 2025-02-28 DIAGNOSIS — Z96.60 PRESENCE OF UNSPECIFIED ORTHOPEDIC JOINT IMPLANT: Chronic | ICD-10-CM

## 2025-02-28 DIAGNOSIS — Z94.81 BONE MARROW TRANSPLANT STATUS: Chronic | ICD-10-CM

## 2025-02-28 DIAGNOSIS — Z98.890 OTHER SPECIFIED POSTPROCEDURAL STATES: Chronic | ICD-10-CM

## 2025-02-28 DIAGNOSIS — Z90.81 ACQUIRED ABSENCE OF SPLEEN: Chronic | ICD-10-CM

## 2025-02-28 DIAGNOSIS — Z90.89 ACQUIRED ABSENCE OF OTHER ORGANS: Chronic | ICD-10-CM

## 2025-02-28 LAB
ALBUMIN SERPL ELPH-MCNC: 3.7 G/DL — SIGNIFICANT CHANGE UP (ref 3.3–5)
ALP SERPL-CCNC: 96 U/L — SIGNIFICANT CHANGE UP (ref 30–120)
ALT FLD-CCNC: 39 U/L — SIGNIFICANT CHANGE UP (ref 10–60)
ANION GAP SERPL CALC-SCNC: 7 MMOL/L — SIGNIFICANT CHANGE UP (ref 5–17)
APPEARANCE UR: CLEAR — SIGNIFICANT CHANGE UP
AST SERPL-CCNC: 27 U/L — SIGNIFICANT CHANGE UP (ref 10–40)
BASOPHILS # BLD AUTO: 0.05 K/UL — SIGNIFICANT CHANGE UP (ref 0–0.2)
BASOPHILS NFR BLD AUTO: 0.8 % — SIGNIFICANT CHANGE UP (ref 0–2)
BILIRUB SERPL-MCNC: 0.5 MG/DL — SIGNIFICANT CHANGE UP (ref 0.2–1.2)
BILIRUB UR-MCNC: NEGATIVE — SIGNIFICANT CHANGE UP
BUN SERPL-MCNC: 10 MG/DL — SIGNIFICANT CHANGE UP (ref 7–23)
CALCIUM SERPL-MCNC: 8.8 MG/DL — SIGNIFICANT CHANGE UP (ref 8.4–10.5)
CHLORIDE SERPL-SCNC: 106 MMOL/L — SIGNIFICANT CHANGE UP (ref 96–108)
CO2 SERPL-SCNC: 27 MMOL/L — SIGNIFICANT CHANGE UP (ref 22–31)
COLOR SPEC: YELLOW — SIGNIFICANT CHANGE UP
CREAT SERPL-MCNC: 0.63 MG/DL — SIGNIFICANT CHANGE UP (ref 0.5–1.3)
DIFF PNL FLD: NEGATIVE — SIGNIFICANT CHANGE UP
EGFR: 120 ML/MIN/1.73M2 — SIGNIFICANT CHANGE UP
EOSINOPHIL # BLD AUTO: 0.2 K/UL — SIGNIFICANT CHANGE UP (ref 0–0.5)
EOSINOPHIL NFR BLD AUTO: 3.1 % — SIGNIFICANT CHANGE UP (ref 0–6)
GLUCOSE SERPL-MCNC: 136 MG/DL — HIGH (ref 70–99)
GLUCOSE UR QL: NEGATIVE MG/DL — SIGNIFICANT CHANGE UP
HCG UR QL: NEGATIVE — SIGNIFICANT CHANGE UP
HCT VFR BLD CALC: 34.6 % — SIGNIFICANT CHANGE UP (ref 34.5–45)
HGB BLD-MCNC: 11.3 G/DL — LOW (ref 11.5–15.5)
IMM GRANULOCYTES NFR BLD AUTO: 0.3 % — SIGNIFICANT CHANGE UP (ref 0–0.9)
KETONES UR-MCNC: NEGATIVE MG/DL — SIGNIFICANT CHANGE UP
LEUKOCYTE ESTERASE UR-ACNC: NEGATIVE — SIGNIFICANT CHANGE UP
LIDOCAIN IGE QN: 13 U/L — LOW (ref 16–77)
LYMPHOCYTES # BLD AUTO: 2.69 K/UL — SIGNIFICANT CHANGE UP (ref 1–3.3)
LYMPHOCYTES # BLD AUTO: 41.6 % — SIGNIFICANT CHANGE UP (ref 13–44)
MCHC RBC-ENTMCNC: 27.4 PG — SIGNIFICANT CHANGE UP (ref 27–34)
MCHC RBC-ENTMCNC: 32.7 G/DL — SIGNIFICANT CHANGE UP (ref 32–36)
MCV RBC AUTO: 84 FL — SIGNIFICANT CHANGE UP (ref 80–100)
MONOCYTES # BLD AUTO: 0.64 K/UL — SIGNIFICANT CHANGE UP (ref 0–0.9)
MONOCYTES NFR BLD AUTO: 9.9 % — SIGNIFICANT CHANGE UP (ref 2–14)
NEUTROPHILS # BLD AUTO: 2.87 K/UL — SIGNIFICANT CHANGE UP (ref 1.8–7.4)
NEUTROPHILS NFR BLD AUTO: 44.3 % — SIGNIFICANT CHANGE UP (ref 43–77)
NITRITE UR-MCNC: NEGATIVE — SIGNIFICANT CHANGE UP
NRBC BLD AUTO-RTO: 0 /100 WBCS — SIGNIFICANT CHANGE UP (ref 0–0)
PH UR: 5.5 — SIGNIFICANT CHANGE UP (ref 5–8)
PLATELET # BLD AUTO: 255 K/UL — SIGNIFICANT CHANGE UP (ref 150–400)
POTASSIUM SERPL-MCNC: 5.2 MMOL/L — SIGNIFICANT CHANGE UP (ref 3.5–5.3)
POTASSIUM SERPL-SCNC: 5.2 MMOL/L — SIGNIFICANT CHANGE UP (ref 3.5–5.3)
PROT SERPL-MCNC: 6.7 G/DL — SIGNIFICANT CHANGE UP (ref 6–8.3)
PROT UR-MCNC: NEGATIVE MG/DL — SIGNIFICANT CHANGE UP
RBC # BLD: 4.12 M/UL — SIGNIFICANT CHANGE UP (ref 3.8–5.2)
RBC # FLD: 13.3 % — SIGNIFICANT CHANGE UP (ref 10.3–14.5)
SODIUM SERPL-SCNC: 140 MMOL/L — SIGNIFICANT CHANGE UP (ref 135–145)
SP GR SPEC: 1.01 — SIGNIFICANT CHANGE UP (ref 1–1.03)
UROBILINOGEN FLD QL: 0.2 MG/DL — SIGNIFICANT CHANGE UP (ref 0.2–1)
WBC # BLD: 6.47 K/UL — SIGNIFICANT CHANGE UP (ref 3.8–10.5)
WBC # FLD AUTO: 6.47 K/UL — SIGNIFICANT CHANGE UP (ref 3.8–10.5)

## 2025-02-28 PROCEDURE — 80053 COMPREHEN METABOLIC PANEL: CPT

## 2025-02-28 PROCEDURE — 83690 ASSAY OF LIPASE: CPT

## 2025-02-28 PROCEDURE — 81003 URINALYSIS AUTO W/O SCOPE: CPT

## 2025-02-28 PROCEDURE — 74177 CT ABD & PELVIS W/CONTRAST: CPT | Mod: 26

## 2025-02-28 PROCEDURE — 81025 URINE PREGNANCY TEST: CPT

## 2025-02-28 PROCEDURE — 76705 ECHO EXAM OF ABDOMEN: CPT

## 2025-02-28 PROCEDURE — 74177 CT ABD & PELVIS W/CONTRAST: CPT | Mod: MC

## 2025-02-28 PROCEDURE — 96360 HYDRATION IV INFUSION INIT: CPT | Mod: XU

## 2025-02-28 PROCEDURE — 36415 COLL VENOUS BLD VENIPUNCTURE: CPT

## 2025-02-28 PROCEDURE — 85025 COMPLETE CBC W/AUTO DIFF WBC: CPT

## 2025-02-28 PROCEDURE — 99285 EMERGENCY DEPT VISIT HI MDM: CPT

## 2025-02-28 PROCEDURE — 76705 ECHO EXAM OF ABDOMEN: CPT | Mod: 26

## 2025-02-28 PROCEDURE — 99284 EMERGENCY DEPT VISIT MOD MDM: CPT | Mod: 25

## 2025-02-28 RX ORDER — ACETAMINOPHEN 500 MG/5ML
1000 LIQUID (ML) ORAL ONCE
Refills: 0 | Status: COMPLETED | OUTPATIENT
Start: 2025-02-28 | End: 2025-02-28

## 2025-02-28 RX ORDER — ONDANSETRON HCL/PF 4 MG/2 ML
4 VIAL (ML) INJECTION ONCE
Refills: 0 | Status: COMPLETED | OUTPATIENT
Start: 2025-02-28 | End: 2025-02-28

## 2025-02-28 RX ADMIN — Medication 1000 MILLILITER(S): at 14:00

## 2025-02-28 RX ADMIN — Medication 1000 MILLILITER(S): at 13:00

## 2025-02-28 NOTE — ED PROVIDER NOTE - PROGRESS NOTE DETAILS
Patient stable.  Overall much improved.  Labs, ultrasound, and CAT scan results discussed with patient.  No signs of cholecystitis.  No tenderness over umbilical region.  Patient does report this where pain was initially.  CAT scan shows fat-containing umbilical hernia.  Pain resolved.  Spoke with on call attending general surgeon, Dr. Segura. States patient can be discharged. will follow up in office.  Will call to make appointment

## 2025-02-28 NOTE — ED ADULT TRIAGE NOTE - CHIEF COMPLAINT QUOTE
35 y/o female presents axo4 via [facility] from [facility] c/o RLQ abd pain since this morning. denies nausea, given 50mcg of fentanyl IVP by ems prior to ed arrival

## 2025-02-28 NOTE — ED PROVIDER NOTE - CLINICAL SUMMARY MEDICAL DECISION MAKING FREE TEXT BOX
34-year-old female with a history of sickle cell anemia, osteoporosis, CVA, diabetes, chronic blindness present with brought in by EMS for abdominal pain which started early this morning.  No associated vomiting or diarrhea.  No dysuria/hematuria.  No cough/URI.  No acute fever or chills.  No recent chest pain or shortness of breath.  No neck or back pain.  The patient was given some fentanyl prior to arrival by EMS.  Patient with some improvement after.  Patient states the pain feels different than her usual pain from her gallbladder.  Otherwise no acute complaints at this time.  Exam: Nontoxic, well-appearing.  Normal respiratory effort.  CTA BL, no W/R/R.  Normal cardiac exam.  Abdomen soft, positive tenderness in the right mid abdomen, right lower quadrant.  Minimal to no tenderness in the right upper quadrant.  No rebound or guarding.  No HSM.  No CVAT.  Normal rest of abdomen.  No C/C/E.  Nonfocal neurologic exam.  No other acute findings on exam.  Acute right-sided abdominal pain today.  Will check labs, CT, ultrasound, IV fluids, UA, reeval

## 2025-02-28 NOTE — ED PROVIDER NOTE - OBJECTIVE STATEMENT
34-year-old female with history of osteoporosis, bilateral hip replacements, sickle cell, vertigo, history of CVA, blind, and diabetes mellitus brought in by ambulance for right sided abdominal pain that started this morning.  No nausea, vomiting, diarrhea, or urinary symptoms.  No fever or chills.  No chest pain or shortness of breath.  Pain initially a 10 out of 10.  EMS gave 15 mcg of fentanyl and pain now currently 4 out of 10.  Patient reports similar symptoms a couple months ago and was seen in emergency room.  States she had a CAT scan which showed gallstones.  Patient was discharged and followed up with GI and surgery.  Was told he may take gallbladder out but nothing urgent per patient.  [facility] 34-year-old female with history of osteoporosis, bilateral hip replacements, sickle cell, vertigo, history of CVA, blind, and diabetes mellitus brought in by ambulance for right sided abdominal pain that started this morning.  No nausea, vomiting, diarrhea, or urinary symptoms.  No fever or chills.  No chest pain or shortness of breath.  Pain initially a 10 out of 10.  EMS gave fentanyl and pain now currently 4 out of 10.  Patient reports similar symptoms a couple months ago and was seen in emergency room.  States she had a CAT scan which showed gallstones.  Patient was discharged and followed up with GI and surgery.  Was told he may take gallbladder out but nothing urgent per patient.  PCP [facility] Med Specialist

## 2025-02-28 NOTE — ED PROVIDER NOTE - DIFFERENTIAL DIAGNOSIS
Differential Diagnosis Differentials include but not limited to biliary colic, cholecystitis blocked biliary stone, appendicitis, kidney stone, pancreatitis, gastritis, enteritis

## 2025-02-28 NOTE — ED PROVIDER NOTE - CARE PROVIDER_API CALL
Camacho Davila.  Surgery  [address] [place] [id number]  Phone: [phone]  Fax: [phone]  Follow Up Time: 1-3 Days

## 2025-02-28 NOTE — ED ADULT NURSE NOTE - NS ED NURSE IV DC DT
Bed: ED06  Expected date:   Expected time:   Means of arrival: Ambulance  Comments:  [place]  
28-Feb-2025 15:24

## 2025-02-28 NOTE — ED PROVIDER NOTE - PATIENT PORTAL LINK FT
You can access the FollowMyHealth Patient Portal offered by [facility] by registering at the following website: [email or web address]. By joining [facility]’s FollowMyHealth portal, you will also be able to view your health information using other applications (apps) compatible with our system.

## 2025-02-28 NOTE — ED ADULT NURSE NOTE - CHIEF COMPLAINT QUOTE
35 y/o female presents axo4 via [facility] from [facility] c/o RLQ abd pain since this morning. denies nausea, given 50mcg of fentanyl IVP by ems prior to ed arrival

## 2025-02-28 NOTE — ED PROVIDER NOTE - NSFOLLOWUPINSTRUCTIONS_ED_ALL_ED_FT
follow up with general surgeon. referral provided if needed. call to make appointment       Umbilical Hernia    WHAT YOU NEED TO KNOW:    What is an umbilical hernia? An umbilical hernia is a bulge through the abdominal wall near your umbilicus (belly button). The hernia may contain tissue from the abdomen, part of an organ (such as the intestine), or fluid.  Umbilical Hernia    What increases my risk for an umbilical hernia? Umbilical hernias usually happen because of a hole or weak area in your abdominal muscles. Umbilical hernias happen more often in women than in men. The following may increase your risk for an umbilical hernia:    Being overweight    Age older than 60    Fluid in your abdomen (ascites)    A large growth in your abdomen    Pregnancy, especially more than 1 pregnancy    Chronic constipation or straining to have bowel movements    Repeated coughing caused by lung disease such as COPD  What are the signs and symptoms of an umbilical hernia? Umbilical hernias usually do not cause any pain. Your hernia may disappear when you lay flat. You may have any of the following:    A bulge or swelling in or near your belly button    A bulge that gets bigger when you cough, strain to have a bowel movement, or sit up    Nausea or vomiting    Constipation  How is an umbilical hernia diagnosed? Your healthcare provider will usually find the hernia during an exam. You may need an ultrasound or x-ray. These tests may show if tissue, fluid, or an organ is trapped inside the hernia. The tests will also help your provider plan your treatment.    How is an umbilical hernia treated? Your hernia may go away without treatment. Your healthcare provider may be able to reduce your hernia. He or she will put firm, steady pressure on your hernia until it disappears behind the abdominal wall. You may need surgery to fix the hernia if it cannot be reduced. Surgery will also be needed if your intestines or other organ get trapped inside the hernia. This can stop blood flow to the organ and become an emergency.    How can I manage my umbilical hernia?    Drink more liquids. Liquids may prevent constipation and straining during a bowel movement. This can prevent your hernia from getting bigger. Ask how much liquid you should drink each day and which liquids are best for you.    Eat high-fiber foods. Fiber may prevent constipation and straining during a bowel movement. This can prevent your hernia from getting bigger. Foods that contain fiber include fruits, vegetables, legumes, and whole grains.        Avoid heavy lifting. Heavy lifting can put pressure on your hernia and make it bigger. Ask your healthcare provider how much is safe to lift.    Do not place anything over your umbilical hernia. Do not place tape or a coin over the hernia. This treatment does not help treat a hernia.  When should I seek immediate care?    Your hernia gets bigger, feels firm, or turns blue or purple.    You have severe abdominal pain with nausea or vomiting.    You stop having bowel movements and passing gas.    You have blood in your bowel movement.  When should I contact my healthcare provider?    You have a fever.    You have nausea or are vomiting.    You are constipated.    You have questions or concerns about your condition or care.  CARE AGREEMENT:    You have the right to help plan your care. Learn about your health condition and how it may be treated. Discuss treatment options with your healthcare providers to decide what care you want to receive. You always have the right to refuse treatment.

## 2025-03-04 ENCOUNTER — NON-APPOINTMENT (OUTPATIENT)
Age: 35
End: 2025-03-04

## 2025-03-05 ENCOUNTER — APPOINTMENT (OUTPATIENT)
Dept: INTERNAL MEDICINE | Facility: CLINIC | Age: 35
End: 2025-03-05
Payer: MEDICAID

## 2025-03-05 ENCOUNTER — OUTPATIENT (OUTPATIENT)
Dept: OUTPATIENT SERVICES | Facility: HOSPITAL | Age: 35
LOS: 1 days | End: 2025-03-05

## 2025-03-05 VITALS
HEIGHT: 64 IN | WEIGHT: 162 LBS | SYSTOLIC BLOOD PRESSURE: 119 MMHG | HEART RATE: 72 BPM | OXYGEN SATURATION: 98 % | BODY MASS INDEX: 27.66 KG/M2 | DIASTOLIC BLOOD PRESSURE: 81 MMHG

## 2025-03-05 DIAGNOSIS — I63.9 CEREBRAL INFARCTION, UNSPECIFIED: ICD-10-CM

## 2025-03-05 DIAGNOSIS — Z96.60 PRESENCE OF UNSPECIFIED ORTHOPEDIC JOINT IMPLANT: Chronic | ICD-10-CM

## 2025-03-05 DIAGNOSIS — J06.9 ACUTE UPPER RESPIRATORY INFECTION, UNSPECIFIED: ICD-10-CM

## 2025-03-05 DIAGNOSIS — K43.9 VENTRAL HERNIA W/OUT OBSTRUCTION OR GANGRENE: ICD-10-CM

## 2025-03-05 DIAGNOSIS — K80.20 CALCULUS OF GALLBLADDER W/OUT CHOLECYSTITIS W/OUT OBSTRUCTION: ICD-10-CM

## 2025-03-05 DIAGNOSIS — Z23 ENCOUNTER FOR IMMUNIZATION: ICD-10-CM

## 2025-03-05 DIAGNOSIS — R21 RASH AND OTHER NONSPECIFIC SKIN ERUPTION: ICD-10-CM

## 2025-03-05 DIAGNOSIS — Z96.649 PRESENCE OF UNSPECIFIED ARTIFICIAL HIP JOINT: ICD-10-CM

## 2025-03-05 DIAGNOSIS — T84.52XD: ICD-10-CM

## 2025-03-05 DIAGNOSIS — M70.60 TROCHANTERIC BURSITIS, UNSPECIFIED HIP: ICD-10-CM

## 2025-03-05 DIAGNOSIS — Z02.89 ENCOUNTER FOR OTHER ADMINISTRATIVE EXAMINATIONS: ICD-10-CM

## 2025-03-05 DIAGNOSIS — Z90.81 ACQUIRED ABSENCE OF SPLEEN: Chronic | ICD-10-CM

## 2025-03-05 DIAGNOSIS — R10.11 RIGHT UPPER QUADRANT PAIN: ICD-10-CM

## 2025-03-05 DIAGNOSIS — Z98.890 OTHER SPECIFIED POSTPROCEDURAL STATES: Chronic | ICD-10-CM

## 2025-03-05 DIAGNOSIS — T46.4X5A OTHER SPECIFIED COUGH: ICD-10-CM

## 2025-03-05 DIAGNOSIS — Z94.81 BONE MARROW TRANSPLANT STATUS: Chronic | ICD-10-CM

## 2025-03-05 DIAGNOSIS — E11.9 TYPE 2 DIABETES MELLITUS W/OUT COMPLICATIONS: ICD-10-CM

## 2025-03-05 DIAGNOSIS — Z90.89 ACQUIRED ABSENCE OF OTHER ORGANS: Chronic | ICD-10-CM

## 2025-03-05 DIAGNOSIS — R05.8 OTHER SPECIFIED COUGH: ICD-10-CM

## 2025-03-05 PROCEDURE — G2211 COMPLEX E/M VISIT ADD ON: CPT | Mod: NC

## 2025-03-05 PROCEDURE — 99213 OFFICE O/P EST LOW 20 MIN: CPT

## 2025-03-07 PROBLEM — R21 RASH: Status: RESOLVED | Noted: 2024-01-22 | Resolved: 2025-03-07

## 2025-03-07 PROBLEM — R10.11 RIGHT UPPER QUADRANT ABDOMINAL PAIN OF UNKNOWN ETIOLOGY: Noted: 2024-08-09

## 2025-03-07 PROBLEM — Z02.89 ENCOUNTER FOR COMPLETION OF FORM WITH PATIENT: Status: RESOLVED | Noted: 2025-01-23 | Resolved: 2025-03-07

## 2025-03-07 PROBLEM — I63.9 ISCHEMIC STROKE: Status: RESOLVED | Noted: 2024-04-01 | Resolved: 2025-03-07

## 2025-03-07 PROBLEM — M70.60 GREATER TROCHANTERIC BURSITIS, UNSPECIFIED LATERALITY: Status: RESOLVED | Noted: 2024-12-30 | Resolved: 2025-03-07

## 2025-03-07 PROBLEM — J06.9 VIRAL URI: Status: RESOLVED | Noted: 2024-10-07 | Resolved: 2025-03-07

## 2025-03-07 PROBLEM — Z23 ENCOUNTER FOR IMMUNIZATION: Status: RESOLVED | Noted: 2020-10-29 | Resolved: 2025-03-07

## 2025-03-07 PROBLEM — K43.9 SUPRAUMBILICAL HERNIA: Status: ACTIVE | Noted: 2025-03-05

## 2025-03-07 LAB
ALBUMIN SERPL ELPH-MCNC: 4.6 G/DL
ALP BLD-CCNC: 107 U/L
ALT SERPL-CCNC: 29 U/L
ANION GAP SERPL CALC-SCNC: 16 MMOL/L
AST SERPL-CCNC: 23 U/L
BILIRUB SERPL-MCNC: 0.2 MG/DL
BUN SERPL-MCNC: 10 MG/DL
CALCIUM SERPL-MCNC: 9.7 MG/DL
CHLORIDE SERPL-SCNC: 104 MMOL/L
CO2 SERPL-SCNC: 22 MMOL/L
CREAT SERPL-MCNC: 0.6 MG/DL
EGFRCR SERPLBLD CKD-EPI 2021: 121 ML/MIN/1.73M2
ESTIMATED AVERAGE GLUCOSE: 163 MG/DL
GLUCOSE SERPL-MCNC: 174 MG/DL
HBA1C MFR BLD HPLC: 7.3 %
POTASSIUM SERPL-SCNC: 4.7 MMOL/L
PROT SERPL-MCNC: 6.5 G/DL
SODIUM SERPL-SCNC: 142 MMOL/L

## 2025-03-10 PROBLEM — Z96.649 STATUS POST REVISION OF TOTAL HIP: Status: RESOLVED | Noted: 2022-05-10 | Resolved: 2025-03-10

## 2025-03-10 PROBLEM — R05.8 ACE-INHIBITOR COUGH: Status: RESOLVED | Noted: 2024-11-15 | Resolved: 2025-03-10

## 2025-03-12 DIAGNOSIS — E11.9 TYPE 2 DIABETES MELLITUS WITHOUT COMPLICATIONS: ICD-10-CM

## 2025-03-12 DIAGNOSIS — K80.20 CALCULUS OF GALLBLADDER WITHOUT CHOLECYSTITIS WITHOUT OBSTRUCTION: ICD-10-CM

## 2025-03-12 DIAGNOSIS — Z23 ENCOUNTER FOR IMMUNIZATION: ICD-10-CM

## 2025-03-12 DIAGNOSIS — K43.9 VENTRAL HERNIA WITHOUT OBSTRUCTION OR GANGRENE: ICD-10-CM

## 2025-03-21 ENCOUNTER — APPOINTMENT (OUTPATIENT)
Dept: INTERNAL MEDICINE | Facility: CLINIC | Age: 35
End: 2025-03-21

## 2025-03-24 ENCOUNTER — APPOINTMENT (OUTPATIENT)
Dept: ORTHOPEDIC SURGERY | Facility: CLINIC | Age: 35
End: 2025-03-24

## 2025-03-24 ENCOUNTER — APPOINTMENT (OUTPATIENT)
Dept: OBGYN | Facility: CLINIC | Age: 35
End: 2025-03-24

## 2025-03-25 ENCOUNTER — EMERGENCY (EMERGENCY)
Facility: HOSPITAL | Age: 35
LOS: 1 days | Discharge: HOME | End: 2025-03-25
Attending: EMERGENCY MEDICINE | Admitting: EMERGENCY MEDICINE
Payer: MEDICAID

## 2025-03-25 VITALS
TEMPERATURE: 98 F | RESPIRATION RATE: 16 BRPM | HEIGHT: 64 IN | SYSTOLIC BLOOD PRESSURE: 124 MMHG | HEART RATE: 82 BPM | OXYGEN SATURATION: 96 % | WEIGHT: 165.35 LBS | DIASTOLIC BLOOD PRESSURE: 79 MMHG

## 2025-03-25 DIAGNOSIS — Z90.81 ACQUIRED ABSENCE OF SPLEEN: Chronic | ICD-10-CM

## 2025-03-25 DIAGNOSIS — Z96.60 PRESENCE OF UNSPECIFIED ORTHOPEDIC JOINT IMPLANT: Chronic | ICD-10-CM

## 2025-03-25 DIAGNOSIS — Z98.890 OTHER SPECIFIED POSTPROCEDURAL STATES: Chronic | ICD-10-CM

## 2025-03-25 DIAGNOSIS — Z94.81 BONE MARROW TRANSPLANT STATUS: Chronic | ICD-10-CM

## 2025-03-25 DIAGNOSIS — Z90.89 ACQUIRED ABSENCE OF OTHER ORGANS: Chronic | ICD-10-CM

## 2025-03-25 LAB
ALBUMIN SERPL ELPH-MCNC: 3.8 G/DL — SIGNIFICANT CHANGE UP (ref 3.3–5)
ALP SERPL-CCNC: 103 U/L — SIGNIFICANT CHANGE UP (ref 30–120)
ALT FLD-CCNC: 42 U/L — SIGNIFICANT CHANGE UP (ref 10–60)
ANION GAP SERPL CALC-SCNC: 6 MMOL/L — SIGNIFICANT CHANGE UP (ref 5–17)
APPEARANCE UR: CLEAR — SIGNIFICANT CHANGE UP
AST SERPL-CCNC: 22 U/L — SIGNIFICANT CHANGE UP (ref 10–40)
BASOPHILS # BLD AUTO: 0.07 K/UL — SIGNIFICANT CHANGE UP (ref 0–0.2)
BASOPHILS NFR BLD AUTO: 0.8 % — SIGNIFICANT CHANGE UP (ref 0–2)
BILIRUB SERPL-MCNC: 0.4 MG/DL — SIGNIFICANT CHANGE UP (ref 0.2–1.2)
BILIRUB UR-MCNC: NEGATIVE — SIGNIFICANT CHANGE UP
BUN SERPL-MCNC: 20 MG/DL — SIGNIFICANT CHANGE UP (ref 7–23)
CALCIUM SERPL-MCNC: 8.8 MG/DL — SIGNIFICANT CHANGE UP (ref 8.4–10.5)
CHLORIDE SERPL-SCNC: 103 MMOL/L — SIGNIFICANT CHANGE UP (ref 96–108)
CO2 SERPL-SCNC: 27 MMOL/L — SIGNIFICANT CHANGE UP (ref 22–31)
COLOR SPEC: YELLOW — SIGNIFICANT CHANGE UP
CREAT SERPL-MCNC: 0.65 MG/DL — SIGNIFICANT CHANGE UP (ref 0.5–1.3)
DIFF PNL FLD: NEGATIVE — SIGNIFICANT CHANGE UP
EGFR: 119 ML/MIN/1.73M2 — SIGNIFICANT CHANGE UP
EGFR: 119 ML/MIN/1.73M2 — SIGNIFICANT CHANGE UP
EOSINOPHIL # BLD AUTO: 0.28 K/UL — SIGNIFICANT CHANGE UP (ref 0–0.5)
EOSINOPHIL NFR BLD AUTO: 3 % — SIGNIFICANT CHANGE UP (ref 0–6)
GLUCOSE SERPL-MCNC: 147 MG/DL — HIGH (ref 70–99)
GLUCOSE UR QL: 500 MG/DL
HCG UR QL: NEGATIVE — SIGNIFICANT CHANGE UP
HCT VFR BLD CALC: 35.5 % — SIGNIFICANT CHANGE UP (ref 34.5–45)
HGB BLD-MCNC: 11.4 G/DL — LOW (ref 11.5–15.5)
IMM GRANULOCYTES NFR BLD AUTO: 0.4 % — SIGNIFICANT CHANGE UP (ref 0–0.9)
KETONES UR-MCNC: NEGATIVE MG/DL — SIGNIFICANT CHANGE UP
LACTATE SERPL-SCNC: 1.1 MMOL/L — SIGNIFICANT CHANGE UP (ref 0.7–2)
LEUKOCYTE ESTERASE UR-ACNC: NEGATIVE — SIGNIFICANT CHANGE UP
LIDOCAIN IGE QN: 17 U/L — SIGNIFICANT CHANGE UP (ref 16–77)
LYMPHOCYTES # BLD AUTO: 3.39 K/UL — HIGH (ref 1–3.3)
LYMPHOCYTES # BLD AUTO: 36.6 % — SIGNIFICANT CHANGE UP (ref 13–44)
MCHC RBC-ENTMCNC: 27.5 PG — SIGNIFICANT CHANGE UP (ref 27–34)
MCHC RBC-ENTMCNC: 32.1 G/DL — SIGNIFICANT CHANGE UP (ref 32–36)
MCV RBC AUTO: 85.7 FL — SIGNIFICANT CHANGE UP (ref 80–100)
MONOCYTES # BLD AUTO: 0.83 K/UL — SIGNIFICANT CHANGE UP (ref 0–0.9)
MONOCYTES NFR BLD AUTO: 9 % — SIGNIFICANT CHANGE UP (ref 2–14)
NEUTROPHILS # BLD AUTO: 4.65 K/UL — SIGNIFICANT CHANGE UP (ref 1.8–7.4)
NEUTROPHILS NFR BLD AUTO: 50.2 % — SIGNIFICANT CHANGE UP (ref 43–77)
NITRITE UR-MCNC: NEGATIVE — SIGNIFICANT CHANGE UP
NRBC BLD AUTO-RTO: 0 /100 WBCS — SIGNIFICANT CHANGE UP (ref 0–0)
PH UR: 6 — SIGNIFICANT CHANGE UP (ref 5–8)
PLATELET # BLD AUTO: 286 K/UL — SIGNIFICANT CHANGE UP (ref 150–400)
POTASSIUM SERPL-MCNC: 3.9 MMOL/L — SIGNIFICANT CHANGE UP (ref 3.5–5.3)
POTASSIUM SERPL-SCNC: 3.9 MMOL/L — SIGNIFICANT CHANGE UP (ref 3.5–5.3)
PROT SERPL-MCNC: 7.1 G/DL — SIGNIFICANT CHANGE UP (ref 6–8.3)
PROT UR-MCNC: NEGATIVE MG/DL — SIGNIFICANT CHANGE UP
RBC # BLD: 4.14 M/UL — SIGNIFICANT CHANGE UP (ref 3.8–5.2)
RBC # FLD: 13.9 % — SIGNIFICANT CHANGE UP (ref 10.3–14.5)
SODIUM SERPL-SCNC: 136 MMOL/L — SIGNIFICANT CHANGE UP (ref 135–145)
SP GR SPEC: 1.02 — SIGNIFICANT CHANGE UP (ref 1–1.03)
UROBILINOGEN FLD QL: 0.2 MG/DL — SIGNIFICANT CHANGE UP (ref 0.2–1)
WBC # BLD: 9.26 K/UL — SIGNIFICANT CHANGE UP (ref 3.8–10.5)
WBC # FLD AUTO: 9.26 K/UL — SIGNIFICANT CHANGE UP (ref 3.8–10.5)

## 2025-03-25 PROCEDURE — 96374 THER/PROPH/DIAG INJ IV PUSH: CPT

## 2025-03-25 PROCEDURE — 83690 ASSAY OF LIPASE: CPT

## 2025-03-25 PROCEDURE — 76705 ECHO EXAM OF ABDOMEN: CPT

## 2025-03-25 PROCEDURE — 83605 ASSAY OF LACTIC ACID: CPT

## 2025-03-25 PROCEDURE — 99285 EMERGENCY DEPT VISIT HI MDM: CPT

## 2025-03-25 PROCEDURE — 36415 COLL VENOUS BLD VENIPUNCTURE: CPT

## 2025-03-25 PROCEDURE — 81003 URINALYSIS AUTO W/O SCOPE: CPT

## 2025-03-25 PROCEDURE — 99284 EMERGENCY DEPT VISIT MOD MDM: CPT | Mod: 25

## 2025-03-25 PROCEDURE — 85025 COMPLETE CBC W/AUTO DIFF WBC: CPT

## 2025-03-25 PROCEDURE — 80053 COMPREHEN METABOLIC PANEL: CPT

## 2025-03-25 PROCEDURE — 81025 URINE PREGNANCY TEST: CPT

## 2025-03-25 PROCEDURE — 96375 TX/PRO/DX INJ NEW DRUG ADDON: CPT

## 2025-03-25 RX ORDER — ONDANSETRON HCL/PF 4 MG/2 ML
4 VIAL (ML) INJECTION ONCE
Refills: 0 | Status: COMPLETED | OUTPATIENT
Start: 2025-03-25 | End: 2025-03-25

## 2025-03-25 RX ADMIN — Medication 4 MILLIGRAM(S): at 22:27

## 2025-03-25 RX ADMIN — Medication 1000 MILLILITER(S): at 22:26

## 2025-03-25 RX ADMIN — Medication 4 MILLIGRAM(S): at 22:26

## 2025-03-25 NOTE — ED PROVIDER NOTE - PROGRESS NOTE DETAILS
Patient reevaluated, states she feels better. Pain is resolved. Patient only followed up with her primary care doctor is no follow-up with Dr. Segura. Patient is advised to make an appointment with Dr. Segura's office and follow up with him in regards to elective cholecystectomy as well as possible repair of the super umbilical hernia. Advised the return of worsening pain vomiting or fever.

## 2025-03-25 NOTE — ED PROVIDER NOTE - NSFOLLOWUPINSTRUCTIONS_ED_ALL_ED_FT
Call Dr. Segura's office in the morning to schedule an appointment for follow-up. Return to the emergency room if you are pain worsens, you develop vomiting and/or fever. Avoid fried greasy foods.    Biliary Colic, Adult    Biliary colic is severe pain caused by a problem with the gallbladder. The gallbladder is a small organ in the upper right part of the abdomen. The gallbladder stores a digestive fluid produced in the liver (bile) that helps the body break down fat. Bile and other digestive enzymes are carried from the liver to the small intestine through tube-like structures called bile ducts. The gallbladder and the bile ducts form the biliary tract.    Sometimes, hard deposits of digestive fluids (gallstones) form in the gallbladder and block the flow of bile from the gallbladder, causing biliary colic. This condition is also called a gallbladder attack. Gallstones can be as small as a grain of sand or as big as a golf ball. There could be just one gallstone in the gallbladder, or there could be many.    What are the causes?  This condition is usually caused by gallstones. Less often, a tumor could block the flow of bile from the gallbladder and trigger biliary colic.    What increases the risk?  The following factors may make you more likely to develop this condition:  Being female.  Having a family history of gallstones.  Being obese.  Losing weight suddenly or quickly.  Eating a diet that is high in calories, low in fiber, and rich in refined carbohydrates, such as white bread and white rice.  Having certain health conditions, such as:  An intestinal disease that affects nutrient absorption, such as Crohn's disease.  A metabolic condition, such as diabetes or metabolic syndrome. Metabolic syndrome occurs when someone has high blood pressure, high cholesterol, and diabetes.  A blood condition, such as hemolytic anemia or sickle cell disease.  What are the signs or symptoms?  The main symptom of this condition is severe pain in the upper right side of the abdomen. You may feel this pain below the chest but above the hip. This pain often occurs at night or after eating a meal that is high in fat. This pain may get worse for up to an hour and last as long as 12 hours. In most cases, the pain fades (subsides) within 2 hours.    Other symptoms of this condition include:  Nausea and vomiting.  Pain under the right shoulder.  How is this diagnosed?  This condition is diagnosed based on your medical history, your symptoms, and a physical exam.    You may also have tests, including:  Blood tests to rule out infection or inflammation of the bile ducts, gallbladder, pancreas, or liver.  Imaging studies, such as:  An ultrasound.  A CT scan.  An MRI.  In some cases, you may need to have an imaging study done using a small amount of radioactive material (nuclear medicine) to confirm the diagnosis.    How is this treated?  This condition may be treated with medicines to:  Relieve your pain or nausea.  Dissolve the gallstones. It may take months or years before the gallstones are completely gone.  If you have gallstones, or if you have a tumor in the gallbladder that is causing biliary colic, you may need surgery to remove the gallbladder (cholecystectomy).    Follow these instructions at home:  Eating and drinking    Drink enough fluid to keep your urine pale yellow.  Follow instructions from your health care provider about eating or drinking restrictions. These may include avoiding:  Fatty, greasy, and fried foods.  Any foods that make the pain worse.  Overeating.  Having a large meal after not eating for a while.  General instructions    Take over-the-counter and prescription medicines only as told by your health care provider.  Keep all follow-up visits as told by your health care provider. This is important.  How is this prevented?  Steps to prevent this condition include:  Maintaining a healthy body weight.  Getting regular exercise.  Eating a healthy diet that is high in fiber and low in fat.  Limiting how much sugar and refined carbohydrates you eat.  Contact a health care provider if:  Your pain lasts more than 5 hours.  You vomit.  You have a fever and chills.  Your pain gets worse.  Get help right away if:  Your skin or the whites of your eyes look yellow (jaundice).  Your have tea-colored urine and light-colored stools (feces).  You are dizzy or you faint.  Summary  Biliary colic is severe pain caused by a problem with the gallbladder. The gallbladder is a small organ in the upper right part of your abdomen.  Treatment for this condition may include medicine to relieve your pain or nausea, or medicine to slowly dissolve the gallstones.  If you have gallstones, or if you have a tumor in the gallbladder that is causing biliary colic, you may need surgery to remove the gallbladder (cholecystectomy).  This information is not intended to replace advice given to you by your health care provider. Make sure you discuss any questions you have with your health care provider.

## 2025-03-25 NOTE — ED PROVIDER NOTE - CLINICAL SUMMARY MEDICAL DECISION MAKING FREE TEXT BOX
34-year-old female is presenting with right-sided abdominal pain both right upper right lower quadrant are tender started 45 minutes prior to arrival. Patient center on the right side with a positive Lees's. We'll get labs, ultrasound evaluate for cholecystitis. Will rule out pancreatitis, strangulation hernia. Give IV fluids, pain management. Will reassess.

## 2025-03-25 NOTE — ED PROVIDER NOTE - CARE PROVIDER_API CALL
Camacho Davila.  Surgery  [address] [place] [id number]  Phone: [phone]  Fax: [phone]  Follow Up Time: 1-3 Days

## 2025-03-25 NOTE — ED ADULT NURSE NOTE - OBJECTIVE STATEMENT
hx blind. pt reporting right abdominal pain starting approx 1 hour pta. pt states multiple hospital visits d/t abdominal pain, most recent visit 2 weeks ago. diagnosed with gallstones and umbilical hernia. pt states sudden onset of pain today, denies taking pain meds pta, denies n/v/d. 20G iv placed to right AC, blood drawn and sent to lab. medicated as per orders, iv fluids infusing well without complications, iv site WNL. safety measures maintained, side rails up x2

## 2025-03-25 NOTE — ED PROVIDER NOTE - OBJECTIVE STATEMENT
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 Patient reports she was here approximately two weeks ago forcing pain into that she had gallstones but no infection told her she had a umbilical hernia and advised her to follow-up outpatient. Patient reports she saw primary care doctor this other hernia small something for her to be worried about. States the pain started today again worse than the previous one no nausea vomiting, bowel movements are normal. Pain was not triggered by food. Do not take anything for the pain. Denies fever chills. Denies urinary symptoms.

## 2025-03-25 NOTE — ED PROVIDER NOTE - PATIENT PORTAL LINK FT
You can access the FollowMyHealth Patient Portal offered by [facility] by registering at the following website: [email or web address]. By joining [employer]’s FollowMyHealth portal, you will also be able to view your health information using other applications (apps) compatible with our system.

## 2025-03-26 VITALS
DIASTOLIC BLOOD PRESSURE: 80 MMHG | OXYGEN SATURATION: 100 % | TEMPERATURE: 98 F | RESPIRATION RATE: 16 BRPM | HEART RATE: 62 BPM | SYSTOLIC BLOOD PRESSURE: 124 MMHG

## 2025-03-26 PROCEDURE — 76705 ECHO EXAM OF ABDOMEN: CPT | Mod: 26

## 2025-04-06 ENCOUNTER — EMERGENCY (EMERGENCY)
Facility: HOSPITAL | Age: 35
LOS: 1 days | End: 2025-04-06
Payer: MEDICAID

## 2025-04-06 VITALS
HEIGHT: 64 IN | RESPIRATION RATE: 19 BRPM | HEART RATE: 83 BPM | SYSTOLIC BLOOD PRESSURE: 123 MMHG | WEIGHT: 164.91 LBS | TEMPERATURE: 98 F | OXYGEN SATURATION: 99 % | DIASTOLIC BLOOD PRESSURE: 86 MMHG

## 2025-04-06 DIAGNOSIS — Z90.81 ACQUIRED ABSENCE OF SPLEEN: Chronic | ICD-10-CM

## 2025-04-06 DIAGNOSIS — Z96.60 PRESENCE OF UNSPECIFIED ORTHOPEDIC JOINT IMPLANT: Chronic | ICD-10-CM

## 2025-04-06 DIAGNOSIS — Z53.21 PROCEDURE AND TREATMENT NOT CARRIED OUT DUE TO PATIENT LEAVING PRIOR TO BEING SEEN BY HEALTH CARE PROVIDER: ICD-10-CM

## 2025-04-06 DIAGNOSIS — Z90.89 ACQUIRED ABSENCE OF OTHER ORGANS: Chronic | ICD-10-CM

## 2025-04-06 DIAGNOSIS — Z94.81 BONE MARROW TRANSPLANT STATUS: Chronic | ICD-10-CM

## 2025-04-06 DIAGNOSIS — Z98.890 OTHER SPECIFIED POSTPROCEDURAL STATES: Chronic | ICD-10-CM

## 2025-04-06 PROCEDURE — L9991: CPT

## 2025-04-06 NOTE — ED ADULT NURSE NOTE - EXPLANATION OF PATIENT'S REASON FOR LEAVING
pt mother states that she is a nurse and will take patient there. pt mother states that she is a nurse and will take patient where she works.

## 2025-04-11 ENCOUNTER — APPOINTMENT (OUTPATIENT)
Dept: INTERNAL MEDICINE | Facility: CLINIC | Age: 35
End: 2025-04-11
Payer: MEDICAID

## 2025-04-11 ENCOUNTER — OUTPATIENT (OUTPATIENT)
Dept: OUTPATIENT SERVICES | Facility: HOSPITAL | Age: 35
LOS: 1 days | End: 2025-04-11

## 2025-04-11 VITALS
OXYGEN SATURATION: 99 % | DIASTOLIC BLOOD PRESSURE: 82 MMHG | RESPIRATION RATE: 16 BRPM | SYSTOLIC BLOOD PRESSURE: 136 MMHG | HEART RATE: 67 BPM | WEIGHT: 164 LBS | HEIGHT: 64 IN | BODY MASS INDEX: 28 KG/M2

## 2025-04-11 VITALS — SYSTOLIC BLOOD PRESSURE: 110 MMHG | DIASTOLIC BLOOD PRESSURE: 78 MMHG

## 2025-04-11 DIAGNOSIS — K80.20 CALCULUS OF GALLBLADDER W/OUT CHOLECYSTITIS W/OUT OBSTRUCTION: ICD-10-CM

## 2025-04-11 DIAGNOSIS — Z94.81 BONE MARROW TRANSPLANT STATUS: Chronic | ICD-10-CM

## 2025-04-11 DIAGNOSIS — Z98.890 OTHER SPECIFIED POSTPROCEDURAL STATES: Chronic | ICD-10-CM

## 2025-04-11 DIAGNOSIS — Z71.84 ENC FOR HEALTH COUNSELING RELATED TO TRAVEL: ICD-10-CM

## 2025-04-11 DIAGNOSIS — Z23 ENCOUNTER FOR IMMUNIZATION: ICD-10-CM

## 2025-04-11 DIAGNOSIS — Z96.60 PRESENCE OF UNSPECIFIED ORTHOPEDIC JOINT IMPLANT: Chronic | ICD-10-CM

## 2025-04-11 DIAGNOSIS — E11.9 TYPE 2 DIABETES MELLITUS W/OUT COMPLICATIONS: ICD-10-CM

## 2025-04-11 DIAGNOSIS — R05.3 CHRONIC COUGH: ICD-10-CM

## 2025-04-11 DIAGNOSIS — Z90.81 ACQUIRED ABSENCE OF SPLEEN: Chronic | ICD-10-CM

## 2025-04-11 DIAGNOSIS — Z90.89 ACQUIRED ABSENCE OF OTHER ORGANS: Chronic | ICD-10-CM

## 2025-04-11 PROCEDURE — 99214 OFFICE O/P EST MOD 30 MIN: CPT

## 2025-04-11 PROCEDURE — G2211 COMPLEX E/M VISIT ADD ON: CPT | Mod: NC

## 2025-04-11 RX ORDER — ATOVAQUONE AND PROGUANIL HYDROCHLORIDE 250; 100 MG/1; MG/1
250-100 TABLET, FILM COATED ORAL DAILY
Qty: 20 | Refills: 0 | Status: ACTIVE | COMMUNITY
Start: 2025-04-11 | End: 1900-01-01

## 2025-04-11 RX ORDER — ASPIRIN 81 MG/1
81 TABLET ORAL
Qty: 90 | Refills: 3 | Status: DISCONTINUED | COMMUNITY
Start: 2025-04-11 | End: 2025-04-11

## 2025-04-14 ENCOUNTER — APPOINTMENT (OUTPATIENT)
Dept: INTERNAL MEDICINE | Facility: CLINIC | Age: 35
End: 2025-04-14
Payer: MEDICAID

## 2025-04-14 ENCOUNTER — OUTPATIENT (OUTPATIENT)
Dept: OUTPATIENT SERVICES | Facility: HOSPITAL | Age: 35
LOS: 1 days | End: 2025-04-14

## 2025-04-14 DIAGNOSIS — E11.9 TYPE 2 DIABETES MELLITUS WITHOUT COMPLICATIONS: ICD-10-CM

## 2025-04-14 DIAGNOSIS — Z98.890 OTHER SPECIFIED POSTPROCEDURAL STATES: Chronic | ICD-10-CM

## 2025-04-14 DIAGNOSIS — Z94.81 BONE MARROW TRANSPLANT STATUS: Chronic | ICD-10-CM

## 2025-04-14 DIAGNOSIS — Z90.89 ACQUIRED ABSENCE OF OTHER ORGANS: Chronic | ICD-10-CM

## 2025-04-14 DIAGNOSIS — Z90.81 ACQUIRED ABSENCE OF SPLEEN: Chronic | ICD-10-CM

## 2025-04-14 DIAGNOSIS — Z96.60 PRESENCE OF UNSPECIFIED ORTHOPEDIC JOINT IMPLANT: Chronic | ICD-10-CM

## 2025-04-14 PROCEDURE — ZZZZZ: CPT

## 2025-04-15 PROBLEM — Z71.84 TRAVEL ADVICE ENCOUNTER: Status: ACTIVE | Noted: 2025-04-11

## 2025-04-15 PROBLEM — R05.3 UNEXPLAINED CHRONIC COUGH: Status: ACTIVE | Noted: 2025-04-11

## 2025-04-16 DIAGNOSIS — E11.9 TYPE 2 DIABETES MELLITUS WITHOUT COMPLICATIONS: ICD-10-CM

## 2025-04-16 DIAGNOSIS — Z71.84 ENCOUNTER FOR HEALTH COUNSELING RELATED TO TRAVEL: ICD-10-CM

## 2025-04-16 DIAGNOSIS — R05.3 CHRONIC COUGH: ICD-10-CM

## 2025-04-16 DIAGNOSIS — K80.20 CALCULUS OF GALLBLADDER WITHOUT CHOLECYSTITIS WITHOUT OBSTRUCTION: ICD-10-CM

## 2025-05-07 ENCOUNTER — APPOINTMENT (OUTPATIENT)
Dept: ENDOCRINOLOGY | Facility: CLINIC | Age: 35
End: 2025-05-07
Payer: MEDICAID

## 2025-05-07 VITALS
SYSTOLIC BLOOD PRESSURE: 108 MMHG | RESPIRATION RATE: 18 BRPM | DIASTOLIC BLOOD PRESSURE: 73 MMHG | WEIGHT: 169 LBS | HEART RATE: 88 BPM | HEIGHT: 64 IN | OXYGEN SATURATION: 97 % | TEMPERATURE: 97 F | BODY MASS INDEX: 28.85 KG/M2

## 2025-05-07 DIAGNOSIS — E11.9 TYPE 2 DIABETES MELLITUS W/OUT COMPLICATIONS: ICD-10-CM

## 2025-05-07 LAB
GLUCOSE BLDC GLUCOMTR-MCNC: 143
HBA1C MFR BLD HPLC: 7.6

## 2025-05-07 PROCEDURE — 82962 GLUCOSE BLOOD TEST: CPT

## 2025-05-07 PROCEDURE — 99214 OFFICE O/P EST MOD 30 MIN: CPT

## 2025-05-07 PROCEDURE — 83036 HEMOGLOBIN GLYCOSYLATED A1C: CPT | Mod: QW

## 2025-05-07 RX ORDER — BLOOD-GLUCOSE METER
70 EACH MISCELLANEOUS
Qty: 3 | Refills: 3 | Status: ACTIVE | COMMUNITY
Start: 2025-05-07 | End: 1900-01-01

## 2025-05-16 ENCOUNTER — OUTPATIENT (OUTPATIENT)
Dept: OUTPATIENT SERVICES | Facility: HOSPITAL | Age: 35
LOS: 1 days | End: 2025-05-16

## 2025-05-16 ENCOUNTER — APPOINTMENT (OUTPATIENT)
Dept: INTERNAL MEDICINE | Facility: CLINIC | Age: 35
End: 2025-05-16
Payer: MEDICAID

## 2025-05-16 VITALS
WEIGHT: 167.38 LBS | BODY MASS INDEX: 28.57 KG/M2 | OXYGEN SATURATION: 99 % | SYSTOLIC BLOOD PRESSURE: 120 MMHG | HEART RATE: 93 BPM | HEIGHT: 64 IN | DIASTOLIC BLOOD PRESSURE: 82 MMHG

## 2025-05-16 DIAGNOSIS — Z94.81 BONE MARROW TRANSPLANT STATUS: Chronic | ICD-10-CM

## 2025-05-16 DIAGNOSIS — Z90.89 ACQUIRED ABSENCE OF OTHER ORGANS: Chronic | ICD-10-CM

## 2025-05-16 DIAGNOSIS — J30.2 OTHER SEASONAL ALLERGIC RHINITIS: ICD-10-CM

## 2025-05-16 DIAGNOSIS — Z98.890 OTHER SPECIFIED POSTPROCEDURAL STATES: Chronic | ICD-10-CM

## 2025-05-16 DIAGNOSIS — Z96.60 PRESENCE OF UNSPECIFIED ORTHOPEDIC JOINT IMPLANT: Chronic | ICD-10-CM

## 2025-05-16 DIAGNOSIS — Z90.81 ACQUIRED ABSENCE OF SPLEEN: Chronic | ICD-10-CM

## 2025-05-16 PROCEDURE — 99213 OFFICE O/P EST LOW 20 MIN: CPT

## 2025-05-16 RX ORDER — FLUTICASONE PROPIONATE 50 UG/1
50 SPRAY, METERED NASAL
Qty: 1 | Refills: 0 | Status: ACTIVE | COMMUNITY
Start: 2025-05-16 | End: 1900-01-01

## 2025-05-16 RX ORDER — ARTIFICIAL TEARS 1; 2; 3 MG/ML; MG/ML; MG/ML
SOLUTION/ DROPS OPHTHALMIC
Qty: 1 | Refills: 0 | Status: ACTIVE | COMMUNITY
Start: 2025-05-16 | End: 1900-01-01

## 2025-05-21 NOTE — CURRENT MEDS
[facility] store [id number] faxed 90 day refill request:    Buspirone HCL 5 mg tablet  Sig: Take 1 tablet by mouth in the morning and in the evening.   Quantity:  180    Ldm    [Takes medication as prescribed] : takes

## 2025-06-02 ENCOUNTER — APPOINTMENT (OUTPATIENT)
Dept: INTERNAL MEDICINE | Facility: CLINIC | Age: 35
End: 2025-06-02

## 2025-06-03 ENCOUNTER — APPOINTMENT (OUTPATIENT)
Dept: ORTHOPEDIC SURGERY | Facility: CLINIC | Age: 35
End: 2025-06-03
Payer: MEDICAID

## 2025-06-03 DIAGNOSIS — M70.60 TROCHANTERIC BURSITIS, UNSPECIFIED HIP: ICD-10-CM

## 2025-06-03 DIAGNOSIS — M54.50 LOW BACK PAIN, UNSPECIFIED: ICD-10-CM

## 2025-06-03 PROCEDURE — 99214 OFFICE O/P EST MOD 30 MIN: CPT

## 2025-06-03 PROCEDURE — 72100 X-RAY EXAM L-S SPINE 2/3 VWS: CPT

## 2025-06-03 PROCEDURE — 73502 X-RAY EXAM HIP UNI 2-3 VIEWS: CPT

## 2025-06-03 RX ORDER — METHYLPREDNISOLONE 4 MG/1
4 TABLET ORAL
Qty: 2 | Refills: 1 | Status: ACTIVE | COMMUNITY
Start: 2025-06-03 | End: 1900-01-01

## 2025-07-07 ENCOUNTER — NON-APPOINTMENT (OUTPATIENT)
Age: 35
End: 2025-07-07

## 2025-07-08 ENCOUNTER — OUTPATIENT (OUTPATIENT)
Dept: OUTPATIENT SERVICES | Facility: HOSPITAL | Age: 35
LOS: 1 days | End: 2025-07-08

## 2025-07-08 ENCOUNTER — APPOINTMENT (OUTPATIENT)
Dept: INTERNAL MEDICINE | Facility: CLINIC | Age: 35
End: 2025-07-08
Payer: MEDICAID

## 2025-07-08 VITALS
HEIGHT: 64 IN | SYSTOLIC BLOOD PRESSURE: 110 MMHG | DIASTOLIC BLOOD PRESSURE: 74 MMHG | OXYGEN SATURATION: 96 % | WEIGHT: 165 LBS | BODY MASS INDEX: 28.17 KG/M2 | HEART RATE: 84 BPM

## 2025-07-08 DIAGNOSIS — Z96.60 PRESENCE OF UNSPECIFIED ORTHOPEDIC JOINT IMPLANT: Chronic | ICD-10-CM

## 2025-07-08 DIAGNOSIS — Z90.89 ACQUIRED ABSENCE OF OTHER ORGANS: Chronic | ICD-10-CM

## 2025-07-08 DIAGNOSIS — Z90.81 ACQUIRED ABSENCE OF SPLEEN: Chronic | ICD-10-CM

## 2025-07-08 DIAGNOSIS — Z98.890 OTHER SPECIFIED POSTPROCEDURAL STATES: Chronic | ICD-10-CM

## 2025-07-08 DIAGNOSIS — Z94.81 BONE MARROW TRANSPLANT STATUS: Chronic | ICD-10-CM

## 2025-07-08 PROCEDURE — 99213 OFFICE O/P EST LOW 20 MIN: CPT

## 2025-07-09 DIAGNOSIS — M54.12 RADICULOPATHY, CERVICAL REGION: ICD-10-CM

## 2025-07-09 DIAGNOSIS — Z02.89 ENCOUNTER FOR OTHER ADMINISTRATIVE EXAMINATIONS: ICD-10-CM

## 2025-07-14 ENCOUNTER — APPOINTMENT (OUTPATIENT)
Dept: ORTHOPEDIC SURGERY | Facility: CLINIC | Age: 35
End: 2025-07-14
Payer: MEDICAID

## 2025-07-14 VITALS — BODY MASS INDEX: 27.83 KG/M2 | WEIGHT: 163 LBS | HEIGHT: 64 IN

## 2025-07-14 PROBLEM — M54.12 CERVICAL RADICULOPATHY, CHRONIC: Status: ACTIVE | Noted: 2025-07-08

## 2025-07-14 PROCEDURE — 72040 X-RAY EXAM NECK SPINE 2-3 VW: CPT

## 2025-07-14 PROCEDURE — 99203 OFFICE O/P NEW LOW 30 MIN: CPT

## 2025-08-12 ENCOUNTER — NON-APPOINTMENT (OUTPATIENT)
Age: 35
End: 2025-08-12

## 2025-09-05 ENCOUNTER — OUTPATIENT (OUTPATIENT)
Dept: OUTPATIENT SERVICES | Facility: HOSPITAL | Age: 35
LOS: 1 days | End: 2025-09-05

## 2025-09-05 ENCOUNTER — APPOINTMENT (OUTPATIENT)
Dept: INTERNAL MEDICINE | Facility: CLINIC | Age: 35
End: 2025-09-05

## 2025-09-05 VITALS
OXYGEN SATURATION: 96 % | DIASTOLIC BLOOD PRESSURE: 80 MMHG | BODY MASS INDEX: 29.02 KG/M2 | HEART RATE: 84 BPM | SYSTOLIC BLOOD PRESSURE: 121 MMHG | HEIGHT: 64 IN | WEIGHT: 170 LBS

## 2025-09-05 VITALS — DIASTOLIC BLOOD PRESSURE: 78 MMHG | SYSTOLIC BLOOD PRESSURE: 122 MMHG

## 2025-09-05 DIAGNOSIS — K76.0 FATTY (CHANGE OF) LIVER, NOT ELSEWHERE CLASSIFIED: ICD-10-CM

## 2025-09-05 DIAGNOSIS — Z23 ENCOUNTER FOR IMMUNIZATION: ICD-10-CM

## 2025-09-05 DIAGNOSIS — N93.9 ABNORMAL UTERINE AND VAGINAL BLEEDING, UNSPECIFIED: ICD-10-CM

## 2025-09-05 DIAGNOSIS — R10.819 ABDOMINAL TENDERNESS, UNSPECIFIED SITE: ICD-10-CM

## 2025-09-05 DIAGNOSIS — L25.0 UNSPECIFIED CONTACT DERMATITIS DUE TO COSMETICS: ICD-10-CM

## 2025-09-05 DIAGNOSIS — Z98.890 OTHER SPECIFIED POSTPROCEDURAL STATES: Chronic | ICD-10-CM

## 2025-09-05 DIAGNOSIS — Z02.89 ENCOUNTER FOR OTHER ADMINISTRATIVE EXAMINATIONS: ICD-10-CM

## 2025-09-05 DIAGNOSIS — Z91.018 ALLERGY TO OTHER FOODS: ICD-10-CM

## 2025-09-05 DIAGNOSIS — E11.9 TYPE 2 DIABETES MELLITUS W/OUT COMPLICATIONS: ICD-10-CM

## 2025-09-05 DIAGNOSIS — I63.9 CEREBRAL INFARCTION, UNSPECIFIED: ICD-10-CM

## 2025-09-05 DIAGNOSIS — K80.20 CALCULUS OF GALLBLADDER W/OUT CHOLECYSTITIS W/OUT OBSTRUCTION: ICD-10-CM

## 2025-09-05 DIAGNOSIS — Z96.60 PRESENCE OF UNSPECIFIED ORTHOPEDIC JOINT IMPLANT: Chronic | ICD-10-CM

## 2025-09-05 DIAGNOSIS — M54.12 RADICULOPATHY, CERVICAL REGION: ICD-10-CM

## 2025-09-05 DIAGNOSIS — M17.12 UNILATERAL PRIMARY OSTEOARTHRITIS, LEFT KNEE: ICD-10-CM

## 2025-09-05 DIAGNOSIS — Z90.89 ACQUIRED ABSENCE OF OTHER ORGANS: Chronic | ICD-10-CM

## 2025-09-05 DIAGNOSIS — R05.3 CHRONIC COUGH: ICD-10-CM

## 2025-09-05 DIAGNOSIS — R80.9 TYPE 2 DIABETES MELLITUS WITH OTHER DIABETIC KIDNEY COMPLICATION: ICD-10-CM

## 2025-09-05 DIAGNOSIS — Z90.81 ACQUIRED ABSENCE OF SPLEEN: Chronic | ICD-10-CM

## 2025-09-05 DIAGNOSIS — M87.051 IDIOPATHIC ASEPTIC NECROSIS OF RIGHT FEMUR: ICD-10-CM

## 2025-09-05 DIAGNOSIS — Z71.84 ENC FOR HEALTH COUNSELING RELATED TO TRAVEL: ICD-10-CM

## 2025-09-05 DIAGNOSIS — E11.29 TYPE 2 DIABETES MELLITUS WITH OTHER DIABETIC KIDNEY COMPLICATION: ICD-10-CM

## 2025-09-05 DIAGNOSIS — Z94.81 BONE MARROW TRANSPLANT STATUS: ICD-10-CM

## 2025-09-05 DIAGNOSIS — Z94.81 BONE MARROW TRANSPLANT STATUS: Chronic | ICD-10-CM

## 2025-09-05 PROCEDURE — 99214 OFFICE O/P EST MOD 30 MIN: CPT

## 2025-09-05 PROCEDURE — G2211 COMPLEX E/M VISIT ADD ON: CPT | Mod: NC

## 2025-09-09 ENCOUNTER — APPOINTMENT (OUTPATIENT)
Dept: PAIN MANAGEMENT | Facility: CLINIC | Age: 35
End: 2025-09-09
Payer: MEDICAID

## 2025-09-09 VITALS
SYSTOLIC BLOOD PRESSURE: 122 MMHG | HEIGHT: 64 IN | DIASTOLIC BLOOD PRESSURE: 82 MMHG | BODY MASS INDEX: 29.02 KG/M2 | RESPIRATION RATE: 15 BRPM | WEIGHT: 170 LBS | HEART RATE: 76 BPM

## 2025-09-09 DIAGNOSIS — E11.29 TYPE 2 DIABETES MELLITUS WITH OTHER DIABETIC KIDNEY COMPLICATION: ICD-10-CM

## 2025-09-09 DIAGNOSIS — K80.20 CALCULUS OF GALLBLADDER WITHOUT CHOLECYSTITIS WITHOUT OBSTRUCTION: ICD-10-CM

## 2025-09-09 DIAGNOSIS — K76.0 FATTY (CHANGE OF) LIVER, NOT ELSEWHERE CLASSIFIED: ICD-10-CM

## 2025-09-09 DIAGNOSIS — M13.0 POLYARTHRITIS, UNSPECIFIED: ICD-10-CM

## 2025-09-09 DIAGNOSIS — L25.0 UNSPECIFIED CONTACT DERMATITIS DUE TO COSMETICS: ICD-10-CM

## 2025-09-09 DIAGNOSIS — Z23 ENCOUNTER FOR IMMUNIZATION: ICD-10-CM

## 2025-09-09 DIAGNOSIS — Z94.81 BONE MARROW TRANSPLANT STATUS: ICD-10-CM

## 2025-09-09 DIAGNOSIS — I63.9 CEREBRAL INFARCTION, UNSPECIFIED: ICD-10-CM

## 2025-09-09 DIAGNOSIS — E11.9 TYPE 2 DIABETES MELLITUS WITHOUT COMPLICATIONS: ICD-10-CM

## 2025-09-09 PROCEDURE — 99204 OFFICE O/P NEW MOD 45 MIN: CPT

## 2025-09-09 PROCEDURE — G2211 COMPLEX E/M VISIT ADD ON: CPT | Mod: NC

## 2025-09-09 RX ORDER — DICLOFENAC SODIUM 75 MG/1
75 TABLET, DELAYED RELEASE ORAL
Qty: 60 | Refills: 1 | Status: ACTIVE | COMMUNITY
Start: 2025-09-09 | End: 1900-01-01

## 2025-09-10 LAB
ALBUMIN SERPL ELPH-MCNC: 4.6 G/DL
ALP BLD-CCNC: 106 U/L
ALT SERPL-CCNC: 49 U/L
ANION GAP SERPL CALC-SCNC: 14 MMOL/L
AST SERPL-CCNC: 28 U/L
BILIRUB SERPL-MCNC: 0.2 MG/DL
BUN SERPL-MCNC: 13 MG/DL
CALCIUM SERPL-MCNC: 10 MG/DL
CHLORIDE SERPL-SCNC: 105 MMOL/L
CHOLEST SERPL-MCNC: 259 MG/DL
CO2 SERPL-SCNC: 24 MMOL/L
CREAT SERPL-MCNC: 0.58 MG/DL
EGFRCR SERPLBLD CKD-EPI 2021: 122 ML/MIN/1.73M2
ESTIMATED AVERAGE GLUCOSE: 180 MG/DL
GLUCOSE SERPL-MCNC: 258 MG/DL
HBA1C MFR BLD HPLC: 7.9 %
HCT VFR BLD CALC: 37.6 %
HDLC SERPL-MCNC: 38 MG/DL
HGB BLD-MCNC: 12 G/DL
LDLC SERPL-MCNC: 156 MG/DL
MCHC RBC-ENTMCNC: 27 PG
MCHC RBC-ENTMCNC: 31.9 G/DL
MCV RBC AUTO: 84.7 FL
NONHDLC SERPL-MCNC: 221 MG/DL
PLATELET # BLD AUTO: 259 K/UL
POTASSIUM SERPL-SCNC: 4.3 MMOL/L
PROT SERPL-MCNC: 6.9 G/DL
RBC # BLD: 4.44 M/UL
RBC # FLD: 14 %
SODIUM SERPL-SCNC: 143 MMOL/L
TRIGL SERPL-MCNC: 345 MG/DL
VIT B12 SERPL-MCNC: 570 PG/ML
WBC # FLD AUTO: 7.46 K/UL

## 2025-09-19 ENCOUNTER — APPOINTMENT (OUTPATIENT)
Dept: NEUROLOGY | Facility: CLINIC | Age: 35
End: 2025-09-19

## 2025-09-22 PROBLEM — N90.89 VULVAR IRRITATION: Status: ACTIVE | Noted: 2025-09-22

## (undated) DEVICE — HOOD T5 PEELAWAY

## (undated) DEVICE — DRAPE U 47X51" LF STERILE

## (undated) DEVICE — ELCTR BOVIE TIP BLADE INSULATED 6.5" EDGE

## (undated) DEVICE — DRAPE INSTRUMENT POUCH 6.75" X 11"

## (undated) DEVICE — SAW BLADE STRYKER SAGITTAL PERFORMANCE SERIES 25X1.19X90MM

## (undated) DEVICE — SUT STRATAFIX SYMMETRIC PDS PLUS 1 18" CTX VIOLET

## (undated) DEVICE — NDL HYPO SAFE 20G X 1.5" (YELLOW)

## (undated) DEVICE — SYR LUER LOK 50CC

## (undated) DEVICE — DRAPE 3/4 SHEET W REINFORCEMENT 56X77"

## (undated) DEVICE — SUT VLOC 90 4-0 23" P-12 UNDYED

## (undated) DEVICE — ELCTR AQUAMANTYS BIPOLAR SEALER 6.0

## (undated) DEVICE — BLADE SURGICAL #11 CARBON

## (undated) DEVICE — SUT VICRYL 4-0 27" PS-2 UNDYED

## (undated) DEVICE — MIDAS REX MR8 TAPERED SM BORE 2.3MM X 7CM FOOTED

## (undated) DEVICE — ZIMMER PULSAVAC PLUS FAN KIT

## (undated) DEVICE — Device

## (undated) DEVICE — POSITIONER HANA PATIENT CARE KIT POSITION SUPINE

## (undated) DEVICE — GLV 7 PROTEXIS (BLUE)

## (undated) DEVICE — SUT HISTOACRYL BLUE

## (undated) DEVICE — MIDAS REX MR8 MATCH HEAD DIAMOND LG BORE 3MM X 14CM

## (undated) DEVICE — DRAPE SPLIT SHEET 77" X 120"

## (undated) DEVICE — DRSG AQUACEL 3.5 X 10"

## (undated) DEVICE — DRAPE TOWEL BLUE 17" X 24"

## (undated) DEVICE — DRILL BIT STRYKER ORTHO 4.2X80MM

## (undated) DEVICE — GLV 7 PROTEXIS ORTHO (BROWN)

## (undated) DEVICE — DRAPE C ARM 41X140"

## (undated) DEVICE — MARKING PEN W RULER

## (undated) DEVICE — GLV 7 PROTEXIS (WHITE)

## (undated) DEVICE — MAKO VIZADISC KNEE TRACKING KIT

## (undated) DEVICE — GOWN IMPERV BREATHABLE XL

## (undated) DEVICE — SOL BETADINE POUCH 0.75OZ STERILE

## (undated) DEVICE — SUT VICRYL 2-0 27" CT-2 UNDYED

## (undated) DEVICE — DRAPE IOBAN 23" X 17"

## (undated) DEVICE — SUCTION YANKAUER TAPERED BULBOUS NO VENT

## (undated) DEVICE — MEDICATION LABELS W MARKER

## (undated) DEVICE — PREP CHLORAPREP HI-LITE ORANGE 26ML

## (undated) DEVICE — FRA-ESU BOVIE FORCE TRIAD T6D04558DX: Type: DURABLE MEDICAL EQUIPMENT

## (undated) DEVICE — DRSG PICO NPWT 4X12"

## (undated) DEVICE — ELCTR GROUNDING PAD ADULT COVIDIEN

## (undated) DEVICE — MAKO DRAPE KIT

## (undated) DEVICE — SUT ETHIBOND 5 4-30" V-37